# Patient Record
Sex: FEMALE | Race: WHITE | NOT HISPANIC OR LATINO | Employment: FULL TIME | URBAN - METROPOLITAN AREA
[De-identification: names, ages, dates, MRNs, and addresses within clinical notes are randomized per-mention and may not be internally consistent; named-entity substitution may affect disease eponyms.]

---

## 2017-02-13 ENCOUNTER — GENERIC CONVERSION - ENCOUNTER (OUTPATIENT)
Dept: OTHER | Facility: OTHER | Age: 62
End: 2017-02-13

## 2017-02-15 ENCOUNTER — APPOINTMENT (OUTPATIENT)
Dept: PHYSICAL THERAPY | Age: 62
End: 2017-02-15
Payer: COMMERCIAL

## 2017-02-15 PROCEDURE — 97161 PT EVAL LOW COMPLEX 20 MIN: CPT

## 2017-02-23 ENCOUNTER — APPOINTMENT (OUTPATIENT)
Dept: PHYSICAL THERAPY | Age: 62
End: 2017-02-23
Payer: COMMERCIAL

## 2017-02-23 PROCEDURE — 97110 THERAPEUTIC EXERCISES: CPT

## 2017-03-02 ENCOUNTER — APPOINTMENT (OUTPATIENT)
Dept: PHYSICAL THERAPY | Age: 62
End: 2017-03-02
Payer: COMMERCIAL

## 2017-03-02 PROCEDURE — 97140 MANUAL THERAPY 1/> REGIONS: CPT

## 2017-03-02 PROCEDURE — 97110 THERAPEUTIC EXERCISES: CPT

## 2017-03-03 ENCOUNTER — TRANSCRIBE ORDERS (OUTPATIENT)
Dept: LAB | Facility: HOSPITAL | Age: 62
End: 2017-03-03

## 2017-03-03 ENCOUNTER — APPOINTMENT (OUTPATIENT)
Dept: LAB | Facility: HOSPITAL | Age: 62
End: 2017-03-03
Payer: COMMERCIAL

## 2017-03-03 DIAGNOSIS — M15.0 PRIMARY GENERALIZED HYPERTROPHIC OSTEOARTHROSIS: ICD-10-CM

## 2017-03-03 DIAGNOSIS — Z79.899 ENCOUNTER FOR LONG-TERM (CURRENT) USE OF OTHER MEDICATIONS: ICD-10-CM

## 2017-03-03 DIAGNOSIS — L40.59 POLYARTICULAR PSORIATIC ARTHRITIS (HCC): Primary | ICD-10-CM

## 2017-03-03 DIAGNOSIS — L40.0 PSORIASIS VULGARIS: ICD-10-CM

## 2017-03-03 DIAGNOSIS — L40.59 POLYARTICULAR PSORIATIC ARTHRITIS (HCC): ICD-10-CM

## 2017-03-03 LAB
ALBUMIN SERPL BCP-MCNC: 3.7 G/DL (ref 3.5–5)
ALP SERPL-CCNC: 65 U/L (ref 46–116)
ALT SERPL W P-5'-P-CCNC: 19 U/L (ref 12–78)
ANION GAP SERPL CALCULATED.3IONS-SCNC: 6 MMOL/L (ref 4–13)
AST SERPL W P-5'-P-CCNC: 18 U/L (ref 5–45)
BACTERIA UR QL AUTO: ABNORMAL /HPF
BASOPHILS # BLD AUTO: 0.02 THOUSANDS/ΜL (ref 0–0.1)
BASOPHILS NFR BLD AUTO: 1 % (ref 0–1)
BILIRUB SERPL-MCNC: 0.59 MG/DL (ref 0.2–1)
BILIRUB UR QL STRIP: NEGATIVE
BUN SERPL-MCNC: 15 MG/DL (ref 5–25)
CALCIUM SERPL-MCNC: 9.4 MG/DL (ref 8.3–10.1)
CHLORIDE SERPL-SCNC: 105 MMOL/L (ref 100–108)
CLARITY UR: ABNORMAL
CO2 SERPL-SCNC: 30 MMOL/L (ref 21–32)
COLOR UR: YELLOW
CREAT SERPL-MCNC: 0.74 MG/DL (ref 0.6–1.3)
EOSINOPHIL # BLD AUTO: 0.01 THOUSAND/ΜL (ref 0–0.61)
EOSINOPHIL NFR BLD AUTO: 0 % (ref 0–6)
ERYTHROCYTE [DISTWIDTH] IN BLOOD BY AUTOMATED COUNT: 12.4 % (ref 11.6–15.1)
ERYTHROCYTE [SEDIMENTATION RATE] IN BLOOD: 7 MM/HOUR (ref 0–20)
GFR SERPL CREATININE-BSD FRML MDRD: >60 ML/MIN/1.73SQ M
GLUCOSE SERPL-MCNC: 85 MG/DL (ref 65–140)
GLUCOSE UR STRIP-MCNC: NEGATIVE MG/DL
HCT VFR BLD AUTO: 41.3 % (ref 34.8–46.1)
HGB BLD-MCNC: 13.7 G/DL (ref 11.5–15.4)
HGB UR QL STRIP.AUTO: NEGATIVE
HYALINE CASTS #/AREA URNS LPF: ABNORMAL /LPF
KETONES UR STRIP-MCNC: NEGATIVE MG/DL
LEUKOCYTE ESTERASE UR QL STRIP: ABNORMAL
LYMPHOCYTES # BLD AUTO: 1.37 THOUSANDS/ΜL (ref 0.6–4.47)
LYMPHOCYTES NFR BLD AUTO: 33 % (ref 14–44)
MCH RBC QN AUTO: 31.9 PG (ref 26.8–34.3)
MCHC RBC AUTO-ENTMCNC: 33.2 G/DL (ref 31.4–37.4)
MCV RBC AUTO: 96 FL (ref 82–98)
MONOCYTES # BLD AUTO: 0.64 THOUSAND/ΜL (ref 0.17–1.22)
MONOCYTES NFR BLD AUTO: 15 % (ref 4–12)
NEUTROPHILS # BLD AUTO: 2.14 THOUSANDS/ΜL (ref 1.85–7.62)
NEUTS SEG NFR BLD AUTO: 51 % (ref 43–75)
NITRITE UR QL STRIP: NEGATIVE
NON-SQ EPI CELLS URNS QL MICRO: ABNORMAL /HPF
NRBC BLD AUTO-RTO: 0 /100 WBCS
PH UR STRIP.AUTO: 7.5 [PH] (ref 4.5–8)
PLATELET # BLD AUTO: 217 THOUSANDS/UL (ref 149–390)
PMV BLD AUTO: 10.1 FL (ref 8.9–12.7)
POTASSIUM SERPL-SCNC: 3.8 MMOL/L (ref 3.5–5.3)
PROT SERPL-MCNC: 7.6 G/DL (ref 6.4–8.2)
PROT UR STRIP-MCNC: NEGATIVE MG/DL
RBC # BLD AUTO: 4.3 MILLION/UL (ref 3.81–5.12)
RBC #/AREA URNS AUTO: ABNORMAL /HPF
SODIUM SERPL-SCNC: 141 MMOL/L (ref 136–145)
SP GR UR STRIP.AUTO: 1.01 (ref 1–1.03)
UROBILINOGEN UR QL STRIP.AUTO: 0.2 E.U./DL
WBC # BLD AUTO: 4.18 THOUSAND/UL (ref 4.31–10.16)
WBC #/AREA URNS AUTO: ABNORMAL /HPF

## 2017-03-03 PROCEDURE — 85652 RBC SED RATE AUTOMATED: CPT

## 2017-03-03 PROCEDURE — 85025 COMPLETE CBC W/AUTO DIFF WBC: CPT

## 2017-03-03 PROCEDURE — 81001 URINALYSIS AUTO W/SCOPE: CPT | Performed by: INTERNAL MEDICINE

## 2017-03-03 PROCEDURE — 80053 COMPREHEN METABOLIC PANEL: CPT

## 2017-03-03 PROCEDURE — 36415 COLL VENOUS BLD VENIPUNCTURE: CPT

## 2017-03-09 ENCOUNTER — GENERIC CONVERSION - ENCOUNTER (OUTPATIENT)
Dept: OTHER | Facility: OTHER | Age: 62
End: 2017-03-09

## 2017-03-20 ENCOUNTER — ALLSCRIPTS OFFICE VISIT (OUTPATIENT)
Dept: OTHER | Facility: OTHER | Age: 62
End: 2017-03-20

## 2017-03-20 ENCOUNTER — HOSPITAL ENCOUNTER (OUTPATIENT)
Dept: RADIOLOGY | Facility: HOSPITAL | Age: 62
Discharge: HOME | End: 2017-03-20
Attending: ORTHOPAEDIC SURGERY
Payer: COMMERCIAL

## 2017-03-20 ENCOUNTER — TRANSCRIBE ORDERS (OUTPATIENT)
Dept: ADMINISTRATIVE | Facility: HOSPITAL | Age: 62
End: 2017-03-20

## 2017-03-20 DIAGNOSIS — M19.211 SECONDARY OSTEOARTHRITIS OF RIGHT SHOULDER: Primary | ICD-10-CM

## 2017-03-20 DIAGNOSIS — M25.511 PAIN IN RIGHT SHOULDER: ICD-10-CM

## 2017-03-20 DIAGNOSIS — M19.211 SECONDARY OSTEOARTHRITIS OF RIGHT SHOULDER: ICD-10-CM

## 2017-03-20 DIAGNOSIS — M17.11 PRIMARY OSTEOARTHRITIS OF RIGHT KNEE: ICD-10-CM

## 2017-03-20 PROCEDURE — 73030 X-RAY EXAM OF SHOULDER: CPT

## 2017-04-06 ENCOUNTER — HOSPITAL ENCOUNTER (OUTPATIENT)
Dept: RADIOLOGY | Facility: HOSPITAL | Age: 62
Discharge: HOME | End: 2017-04-06
Payer: COMMERCIAL

## 2017-04-06 DIAGNOSIS — M19.211 SECONDARY OSTEOARTHRITIS OF RIGHT SHOULDER: ICD-10-CM

## 2017-04-06 PROCEDURE — 73200 CT UPPER EXTREMITY W/O DYE: CPT

## 2017-04-06 PROCEDURE — 73221 MRI JOINT UPR EXTREM W/O DYE: CPT

## 2017-04-17 ENCOUNTER — ALLSCRIPTS OFFICE VISIT (OUTPATIENT)
Dept: OTHER | Facility: OTHER | Age: 62
End: 2017-04-17

## 2017-04-17 ENCOUNTER — APPOINTMENT (OUTPATIENT)
Dept: LAB | Facility: HOSPITAL | Age: 62
End: 2017-04-17
Attending: ORTHOPAEDIC SURGERY
Payer: COMMERCIAL

## 2017-04-17 ENCOUNTER — TRANSCRIBE ORDERS (OUTPATIENT)
Dept: LAB | Facility: HOSPITAL | Age: 62
End: 2017-04-17

## 2017-04-17 DIAGNOSIS — M17.11 PRIMARY OSTEOARTHRITIS OF RIGHT KNEE: ICD-10-CM

## 2017-04-17 DIAGNOSIS — M25.511 PAIN IN RIGHT SHOULDER: ICD-10-CM

## 2017-04-17 LAB
BASOPHILS # BLD AUTO: 0.03 THOUSANDS/ΜL (ref 0–0.1)
BASOPHILS NFR BLD AUTO: 1 % (ref 0–1)
EOSINOPHIL # BLD AUTO: 0.01 THOUSAND/ΜL (ref 0–0.61)
EOSINOPHIL NFR BLD AUTO: 0 % (ref 0–6)
ERYTHROCYTE [DISTWIDTH] IN BLOOD BY AUTOMATED COUNT: 12.6 % (ref 11.6–15.1)
EST. AVERAGE GLUCOSE BLD GHB EST-MCNC: 105 MG/DL
HBA1C MFR BLD: 5.3 % (ref 4.2–6.3)
HCT VFR BLD AUTO: 41.7 % (ref 34.8–46.1)
HGB BLD-MCNC: 13.9 G/DL (ref 11.5–15.4)
LYMPHOCYTES # BLD AUTO: 1.72 THOUSANDS/ΜL (ref 0.6–4.47)
LYMPHOCYTES NFR BLD AUTO: 40 % (ref 14–44)
MCH RBC QN AUTO: 32.1 PG (ref 26.8–34.3)
MCHC RBC AUTO-ENTMCNC: 33.3 G/DL (ref 31.4–37.4)
MCV RBC AUTO: 96 FL (ref 82–98)
MONOCYTES # BLD AUTO: 0.56 THOUSAND/ΜL (ref 0.17–1.22)
MONOCYTES NFR BLD AUTO: 13 % (ref 4–12)
NEUTROPHILS # BLD AUTO: 2 THOUSANDS/ΜL (ref 1.85–7.62)
NEUTS SEG NFR BLD AUTO: 46 % (ref 43–75)
NRBC BLD AUTO-RTO: 0 /100 WBCS
PLATELET # BLD AUTO: 214 THOUSANDS/UL (ref 149–390)
PMV BLD AUTO: 10.1 FL (ref 8.9–12.7)
RBC # BLD AUTO: 4.33 MILLION/UL (ref 3.81–5.12)
WBC # BLD AUTO: 4.33 THOUSAND/UL (ref 4.31–10.16)

## 2017-04-17 PROCEDURE — 86901 BLOOD TYPING SEROLOGIC RH(D): CPT

## 2017-04-17 PROCEDURE — 86850 RBC ANTIBODY SCREEN: CPT

## 2017-04-17 PROCEDURE — 86900 BLOOD TYPING SEROLOGIC ABO: CPT

## 2017-04-17 PROCEDURE — 85025 COMPLETE CBC W/AUTO DIFF WBC: CPT

## 2017-04-17 PROCEDURE — 83036 HEMOGLOBIN GLYCOSYLATED A1C: CPT

## 2017-04-17 PROCEDURE — 36415 COLL VENOUS BLD VENIPUNCTURE: CPT

## 2017-04-19 ENCOUNTER — ALLSCRIPTS OFFICE VISIT (OUTPATIENT)
Dept: OTHER | Facility: OTHER | Age: 62
End: 2017-04-19

## 2017-04-24 RX ORDER — LANOLIN ALCOHOL/MO/W.PET/CERES
1 CREAM (GRAM) TOPICAL 3 TIMES DAILY
COMMUNITY

## 2017-04-24 RX ORDER — SULFASALAZINE 500 MG/1
1000 TABLET ORAL DAILY
COMMUNITY
End: 2019-12-04

## 2017-04-25 ENCOUNTER — ANESTHESIA (OUTPATIENT)
Dept: PERIOP | Facility: HOSPITAL | Age: 62
DRG: 483 | End: 2017-04-25
Payer: COMMERCIAL

## 2017-04-25 ENCOUNTER — APPOINTMENT (INPATIENT)
Dept: RADIOLOGY | Facility: HOSPITAL | Age: 62
DRG: 483 | End: 2017-04-25
Attending: ORTHOPAEDIC SURGERY
Payer: COMMERCIAL

## 2017-04-25 ENCOUNTER — HOSPITAL ENCOUNTER (INPATIENT)
Facility: HOSPITAL | Age: 62
LOS: 2 days | Discharge: HOME HEALTH | DRG: 483 | End: 2017-04-27
Attending: ORTHOPAEDIC SURGERY | Admitting: ORTHOPAEDIC SURGERY
Payer: COMMERCIAL

## 2017-04-25 ENCOUNTER — ANESTHESIA EVENT (OUTPATIENT)
Dept: PERIOP | Facility: HOSPITAL | Age: 62
DRG: 483 | End: 2017-04-25
Payer: COMMERCIAL

## 2017-04-25 PROBLEM — M19.011 OSTEOARTHRITIS OF RIGHT GLENOHUMERAL JOINT: Status: ACTIVE | Noted: 2017-04-25

## 2017-04-25 LAB
ABO GROUP BLD BPU: NORMAL
ABO GROUP BLD BPU: NORMAL
ABO GROUP BLD: NORMAL
BLD GP AB SCN SERPL QL: NEGATIVE
BPU ID: NORMAL
BPU ID: NORMAL
CROSSMATCH: NORMAL
CROSSMATCH: NORMAL
RH BLD: POSITIVE
SPECIMEN EXPIRATION DATE: NORMAL
UNIT DISPENSE STATUS: NORMAL
UNIT DISPENSE STATUS: NORMAL
UNIT PRODUCT CODE: NORMAL
UNIT PRODUCT CODE: NORMAL
UNIT RH: NORMAL
UNIT RH: NORMAL

## 2017-04-25 PROCEDURE — 86920 COMPATIBILITY TEST SPIN: CPT

## 2017-04-25 PROCEDURE — C1776 JOINT DEVICE (IMPLANTABLE): HCPCS | Performed by: ORTHOPAEDIC SURGERY

## 2017-04-25 PROCEDURE — 0LS30ZZ REPOSITION RIGHT UPPER ARM TENDON, OPEN APPROACH: ICD-10-PCS | Performed by: ORTHOPAEDIC SURGERY

## 2017-04-25 PROCEDURE — 0RRJ0JZ REPLACEMENT OF RIGHT SHOULDER JOINT WITH SYNTHETIC SUBSTITUTE, OPEN APPROACH: ICD-10-PCS | Performed by: ORTHOPAEDIC SURGERY

## 2017-04-25 PROCEDURE — 73020 X-RAY EXAM OF SHOULDER: CPT

## 2017-04-25 PROCEDURE — C1713 ANCHOR/SCREW BN/BN,TIS/BN: HCPCS | Performed by: ORTHOPAEDIC SURGERY

## 2017-04-25 DEVICE — IMPLANTABLE DEVICE
Type: IMPLANTABLE DEVICE | Site: SHOULDER | Status: FUNCTIONAL
Brand: AEQUALIS™ PERFORM

## 2017-04-25 DEVICE — SMARTSET HV HIGH VISCOSITY BONE CEMENT 40G
Type: IMPLANTABLE DEVICE | Site: SHOULDER | Status: FUNCTIONAL
Brand: SMARTSET

## 2017-04-25 DEVICE — IMPLANTABLE DEVICE
Type: IMPLANTABLE DEVICE | Site: SHOULDER | Status: FUNCTIONAL
Brand: AEQUALIS™ ASCEND™ FLEX

## 2017-04-25 DEVICE — IMPLANTABLE DEVICE
Type: IMPLANTABLE DEVICE | Site: SHOULDER | Status: FUNCTIONAL
Brand: FLEX SHOULDER SYSTEM

## 2017-04-25 RX ORDER — SODIUM PHOSPHATE,MONO-DIBASIC 19G-7G/118
500 ENEMA (ML) RECTAL 3 TIMES DAILY
Status: DISCONTINUED | OUTPATIENT
Start: 2017-04-25 | End: 2017-04-27 | Stop reason: HOSPADM

## 2017-04-25 RX ORDER — OXYCODONE HYDROCHLORIDE 5 MG/1
TABLET ORAL
Qty: 45 TABLET | Refills: 0 | Status: ON HOLD | OUTPATIENT
Start: 2017-04-25 | End: 2017-10-24 | Stop reason: ALTCHOICE

## 2017-04-25 RX ORDER — EPHEDRINE SULFATE 50 MG/ML
INJECTION, SOLUTION INTRAVENOUS AS NEEDED
Status: DISCONTINUED | OUTPATIENT
Start: 2017-04-25 | End: 2017-04-25 | Stop reason: SURG

## 2017-04-25 RX ORDER — MEPERIDINE HYDROCHLORIDE 25 MG/ML
12.5 INJECTION INTRAMUSCULAR; INTRAVENOUS; SUBCUTANEOUS AS NEEDED
Status: DISCONTINUED | OUTPATIENT
Start: 2017-04-25 | End: 2017-04-25 | Stop reason: HOSPADM

## 2017-04-25 RX ORDER — MAGNESIUM HYDROXIDE 1200 MG/15ML
LIQUID ORAL AS NEEDED
Status: DISCONTINUED | OUTPATIENT
Start: 2017-04-25 | End: 2017-04-25 | Stop reason: HOSPADM

## 2017-04-25 RX ORDER — SODIUM CHLORIDE, SODIUM LACTATE, POTASSIUM CHLORIDE, CALCIUM CHLORIDE 600; 310; 30; 20 MG/100ML; MG/100ML; MG/100ML; MG/100ML
125 INJECTION, SOLUTION INTRAVENOUS CONTINUOUS
Status: DISCONTINUED | OUTPATIENT
Start: 2017-04-25 | End: 2017-04-27 | Stop reason: HOSPADM

## 2017-04-25 RX ORDER — DOCUSATE SODIUM 100 MG/1
100 CAPSULE, LIQUID FILLED ORAL 2 TIMES DAILY
Status: DISCONTINUED | OUTPATIENT
Start: 2017-04-25 | End: 2017-04-27 | Stop reason: HOSPADM

## 2017-04-25 RX ORDER — FENTANYL CITRATE 50 UG/ML
INJECTION, SOLUTION INTRAMUSCULAR; INTRAVENOUS AS NEEDED
Status: DISCONTINUED | OUTPATIENT
Start: 2017-04-25 | End: 2017-04-25 | Stop reason: SURG

## 2017-04-25 RX ORDER — ROPIVACAINE HYDROCHLORIDE 5 MG/ML
INJECTION, SOLUTION EPIDURAL; INFILTRATION; PERINEURAL AS NEEDED
Status: DISCONTINUED | OUTPATIENT
Start: 2017-04-25 | End: 2017-04-25 | Stop reason: SURG

## 2017-04-25 RX ORDER — MORPHINE SULFATE 2 MG/ML
2 INJECTION, SOLUTION INTRAMUSCULAR; INTRAVENOUS
Status: DISCONTINUED | OUTPATIENT
Start: 2017-04-25 | End: 2017-04-27 | Stop reason: HOSPADM

## 2017-04-25 RX ORDER — ROCURONIUM BROMIDE 10 MG/ML
INJECTION, SOLUTION INTRAVENOUS AS NEEDED
Status: DISCONTINUED | OUTPATIENT
Start: 2017-04-25 | End: 2017-04-25 | Stop reason: SURG

## 2017-04-25 RX ORDER — SULFASALAZINE 500 MG/1
1000 TABLET ORAL 2 TIMES DAILY
Status: DISCONTINUED | OUTPATIENT
Start: 2017-04-25 | End: 2017-04-27 | Stop reason: HOSPADM

## 2017-04-25 RX ORDER — FENTANYL CITRATE/PF 50 MCG/ML
25 SYRINGE (ML) INJECTION
Status: DISCONTINUED | OUTPATIENT
Start: 2017-04-25 | End: 2017-04-25 | Stop reason: HOSPADM

## 2017-04-25 RX ORDER — SODIUM CHLORIDE, SODIUM LACTATE, POTASSIUM CHLORIDE, CALCIUM CHLORIDE 600; 310; 30; 20 MG/100ML; MG/100ML; MG/100ML; MG/100ML
20 INJECTION, SOLUTION INTRAVENOUS CONTINUOUS
Status: DISCONTINUED | OUTPATIENT
Start: 2017-04-25 | End: 2017-04-27 | Stop reason: HOSPADM

## 2017-04-25 RX ORDER — KETOROLAC TROMETHAMINE 30 MG/ML
INJECTION, SOLUTION INTRAMUSCULAR; INTRAVENOUS AS NEEDED
Status: DISCONTINUED | OUTPATIENT
Start: 2017-04-25 | End: 2017-04-25 | Stop reason: SURG

## 2017-04-25 RX ORDER — OXYCODONE HYDROCHLORIDE 5 MG/1
5 TABLET ORAL EVERY 4 HOURS PRN
Status: DISCONTINUED | OUTPATIENT
Start: 2017-04-25 | End: 2017-04-27 | Stop reason: HOSPADM

## 2017-04-25 RX ORDER — DEXMEDETOMIDINE HYDROCHLORIDE 100 UG/ML
INJECTION, SOLUTION INTRAVENOUS AS NEEDED
Status: DISCONTINUED | OUTPATIENT
Start: 2017-04-25 | End: 2017-04-25 | Stop reason: SURG

## 2017-04-25 RX ORDER — ALBUTEROL SULFATE 2.5 MG/3ML
2.5 SOLUTION RESPIRATORY (INHALATION) ONCE AS NEEDED
Status: DISCONTINUED | OUTPATIENT
Start: 2017-04-25 | End: 2017-04-25 | Stop reason: HOSPADM

## 2017-04-25 RX ORDER — ACETAMINOPHEN 325 MG/1
650 TABLET ORAL EVERY 6 HOURS PRN
Status: DISCONTINUED | OUTPATIENT
Start: 2017-04-25 | End: 2017-04-27 | Stop reason: HOSPADM

## 2017-04-25 RX ORDER — LIDOCAINE HYDROCHLORIDE 10 MG/ML
INJECTION, SOLUTION INFILTRATION; PERINEURAL AS NEEDED
Status: DISCONTINUED | OUTPATIENT
Start: 2017-04-25 | End: 2017-04-25

## 2017-04-25 RX ORDER — OXYCODONE HYDROCHLORIDE 10 MG/1
10 TABLET ORAL EVERY 4 HOURS PRN
Status: DISCONTINUED | OUTPATIENT
Start: 2017-04-25 | End: 2017-04-27 | Stop reason: HOSPADM

## 2017-04-25 RX ORDER — GLYCOPYRROLATE 0.2 MG/ML
INJECTION INTRAMUSCULAR; INTRAVENOUS AS NEEDED
Status: DISCONTINUED | OUTPATIENT
Start: 2017-04-25 | End: 2017-04-25 | Stop reason: SURG

## 2017-04-25 RX ORDER — ONDANSETRON 2 MG/ML
INJECTION INTRAMUSCULAR; INTRAVENOUS AS NEEDED
Status: DISCONTINUED | OUTPATIENT
Start: 2017-04-25 | End: 2017-04-25 | Stop reason: SURG

## 2017-04-25 RX ORDER — PROPOFOL 10 MG/ML
INJECTION, EMULSION INTRAVENOUS AS NEEDED
Status: DISCONTINUED | OUTPATIENT
Start: 2017-04-25 | End: 2017-04-25 | Stop reason: SURG

## 2017-04-25 RX ORDER — ONDANSETRON 2 MG/ML
4 INJECTION INTRAMUSCULAR; INTRAVENOUS EVERY 6 HOURS PRN
Status: DISCONTINUED | OUTPATIENT
Start: 2017-04-25 | End: 2017-04-27 | Stop reason: HOSPADM

## 2017-04-25 RX ORDER — CALCIUM CARBONATE 200(500)MG
1000 TABLET,CHEWABLE ORAL DAILY PRN
Status: DISCONTINUED | OUTPATIENT
Start: 2017-04-25 | End: 2017-04-27 | Stop reason: HOSPADM

## 2017-04-25 RX ORDER — ONDANSETRON 2 MG/ML
4 INJECTION INTRAMUSCULAR; INTRAVENOUS ONCE AS NEEDED
Status: DISCONTINUED | OUTPATIENT
Start: 2017-04-25 | End: 2017-04-25 | Stop reason: HOSPADM

## 2017-04-25 RX ORDER — B-COMPLEX WITH VITAMIN C
1 TABLET ORAL
Status: DISCONTINUED | OUTPATIENT
Start: 2017-04-26 | End: 2017-04-27 | Stop reason: HOSPADM

## 2017-04-25 RX ORDER — SODIUM CHLORIDE, SODIUM LACTATE, POTASSIUM CHLORIDE, CALCIUM CHLORIDE 600; 310; 30; 20 MG/100ML; MG/100ML; MG/100ML; MG/100ML
125 INJECTION, SOLUTION INTRAVENOUS CONTINUOUS
Status: DISPENSED | OUTPATIENT
Start: 2017-04-25 | End: 2017-04-26

## 2017-04-25 RX ORDER — MIDAZOLAM HYDROCHLORIDE 1 MG/ML
INJECTION INTRAMUSCULAR; INTRAVENOUS AS NEEDED
Status: DISCONTINUED | OUTPATIENT
Start: 2017-04-25 | End: 2017-04-25 | Stop reason: SURG

## 2017-04-25 RX ADMIN — OXYCODONE HYDROCHLORIDE 5 MG: 5 TABLET ORAL at 17:23

## 2017-04-25 RX ADMIN — ROPIVACAINE HYDROCHLORIDE 30 ML: 5 INJECTION, SOLUTION EPIDURAL; INFILTRATION; PERINEURAL at 07:20

## 2017-04-25 RX ADMIN — CEFAZOLIN SODIUM 2000 MG: 10 INJECTION, POWDER, FOR SOLUTION INTRAVENOUS at 15:43

## 2017-04-25 RX ADMIN — EPHEDRINE SULFATE 5 MG: 50 INJECTION, SOLUTION INTRAMUSCULAR; INTRAVENOUS; SUBCUTANEOUS at 08:30

## 2017-04-25 RX ADMIN — CEFAZOLIN SODIUM 2000 MG: 10 INJECTION, POWDER, FOR SOLUTION INTRAVENOUS at 23:18

## 2017-04-25 RX ADMIN — PROPOFOL 150 MG: 10 INJECTION, EMULSION INTRAVENOUS at 07:32

## 2017-04-25 RX ADMIN — EPHEDRINE SULFATE 5 MG: 50 INJECTION, SOLUTION INTRAMUSCULAR; INTRAVENOUS; SUBCUTANEOUS at 07:51

## 2017-04-25 RX ADMIN — ROCURONIUM BROMIDE 40 MG: 10 INJECTION, SOLUTION INTRAVENOUS at 07:33

## 2017-04-25 RX ADMIN — Medication 500 MG: at 17:22

## 2017-04-25 RX ADMIN — DEXAMETHASONE SODIUM PHOSPHATE 10 MG: 10 INJECTION INTRAMUSCULAR; INTRAVENOUS at 07:50

## 2017-04-25 RX ADMIN — SODIUM CHLORIDE, SODIUM LACTATE, POTASSIUM CHLORIDE, AND CALCIUM CHLORIDE: .6; .31; .03; .02 INJECTION, SOLUTION INTRAVENOUS at 07:10

## 2017-04-25 RX ADMIN — ACETAMINOPHEN 650 MG: 325 TABLET, FILM COATED ORAL at 17:22

## 2017-04-25 RX ADMIN — EPHEDRINE SULFATE 5 MG: 50 INJECTION, SOLUTION INTRAMUSCULAR; INTRAVENOUS; SUBCUTANEOUS at 08:54

## 2017-04-25 RX ADMIN — EPHEDRINE SULFATE 5 MG: 50 INJECTION, SOLUTION INTRAMUSCULAR; INTRAVENOUS; SUBCUTANEOUS at 08:00

## 2017-04-25 RX ADMIN — EPHEDRINE SULFATE 5 MG: 50 INJECTION, SOLUTION INTRAMUSCULAR; INTRAVENOUS; SUBCUTANEOUS at 09:14

## 2017-04-25 RX ADMIN — Medication 2000 MG: at 07:40

## 2017-04-25 RX ADMIN — SODIUM CHLORIDE, SODIUM LACTATE, POTASSIUM CHLORIDE, AND CALCIUM CHLORIDE 125 ML/HR: .6; .31; .03; .02 INJECTION, SOLUTION INTRAVENOUS at 10:28

## 2017-04-25 RX ADMIN — ONDANSETRON 4 MG: 2 INJECTION INTRAMUSCULAR; INTRAVENOUS at 09:40

## 2017-04-25 RX ADMIN — KETOROLAC TROMETHAMINE 30 MG: 30 INJECTION, SOLUTION INTRAMUSCULAR at 09:42

## 2017-04-25 RX ADMIN — SODIUM CHLORIDE, SODIUM LACTATE, POTASSIUM CHLORIDE, AND CALCIUM CHLORIDE 125 ML/HR: .6; .31; .03; .02 INJECTION, SOLUTION INTRAVENOUS at 18:05

## 2017-04-25 RX ADMIN — MIDAZOLAM HYDROCHLORIDE 2 MG: 1 INJECTION, SOLUTION INTRAMUSCULAR; INTRAVENOUS at 07:15

## 2017-04-25 RX ADMIN — FENTANYL CITRATE 100 MCG: 50 INJECTION, SOLUTION INTRAMUSCULAR; INTRAVENOUS at 07:32

## 2017-04-25 RX ADMIN — GLYCOPYRROLATE 0.2 MG: 0.2 INJECTION INTRAMUSCULAR; INTRAVENOUS at 07:51

## 2017-04-25 RX ADMIN — NEOSTIGMINE METHYLSULFATE 2 MG: 1 INJECTION INTRAMUSCULAR; INTRAVENOUS; SUBCUTANEOUS at 09:43

## 2017-04-25 RX ADMIN — GLYCOPYRROLATE 0.2 MG: 0.2 INJECTION INTRAMUSCULAR; INTRAVENOUS at 09:43

## 2017-04-25 RX ADMIN — DOCUSATE SODIUM 100 MG: 100 CAPSULE, LIQUID FILLED ORAL at 17:23

## 2017-04-25 RX ADMIN — SULFASALAZINE 1000 MG: 500 TABLET ORAL at 17:23

## 2017-04-25 RX ADMIN — MENTHOL 5.4 MG: 5.4 LOZENGE ORAL at 17:23

## 2017-04-25 RX ADMIN — DEXMEDETOMIDINE HYDROCHLORIDE 40 MCG: 100 INJECTION, SOLUTION INTRAVENOUS at 07:20

## 2017-04-26 LAB
ERYTHROCYTE [DISTWIDTH] IN BLOOD BY AUTOMATED COUNT: 13.2 % (ref 11.6–15.1)
HCT VFR BLD AUTO: 26.9 % (ref 34.8–46.1)
HGB BLD-MCNC: 8.8 G/DL (ref 11.5–15.4)
MCH RBC QN AUTO: 31.9 PG (ref 26.8–34.3)
MCHC RBC AUTO-ENTMCNC: 32.7 G/DL (ref 31.4–37.4)
MCV RBC AUTO: 98 FL (ref 82–98)
PLATELET # BLD AUTO: 160 THOUSANDS/UL (ref 149–390)
PMV BLD AUTO: 9.8 FL (ref 8.9–12.7)
RBC # BLD AUTO: 2.76 MILLION/UL (ref 3.81–5.12)
WBC # BLD AUTO: 7.49 THOUSAND/UL (ref 4.31–10.16)

## 2017-04-26 PROCEDURE — 97166 OT EVAL MOD COMPLEX 45 MIN: CPT

## 2017-04-26 PROCEDURE — 97163 PT EVAL HIGH COMPLEX 45 MIN: CPT

## 2017-04-26 PROCEDURE — G8979 MOBILITY GOAL STATUS: HCPCS

## 2017-04-26 PROCEDURE — 85027 COMPLETE CBC AUTOMATED: CPT | Performed by: PHYSICIAN ASSISTANT

## 2017-04-26 PROCEDURE — G8978 MOBILITY CURRENT STATUS: HCPCS

## 2017-04-26 PROCEDURE — G8987 SELF CARE CURRENT STATUS: HCPCS

## 2017-04-26 PROCEDURE — G8988 SELF CARE GOAL STATUS: HCPCS

## 2017-04-26 RX ADMIN — SODIUM CHLORIDE, SODIUM LACTATE, POTASSIUM CHLORIDE, AND CALCIUM CHLORIDE 125 ML/HR: .6; .31; .03; .02 INJECTION, SOLUTION INTRAVENOUS at 03:10

## 2017-04-26 RX ADMIN — SULFASALAZINE 1000 MG: 500 TABLET ORAL at 08:55

## 2017-04-26 RX ADMIN — Medication 500 MG: at 08:55

## 2017-04-26 RX ADMIN — SODIUM CHLORIDE, SODIUM LACTATE, POTASSIUM CHLORIDE, AND CALCIUM CHLORIDE 125 ML/HR: .6; .31; .03; .02 INJECTION, SOLUTION INTRAVENOUS at 11:29

## 2017-04-26 RX ADMIN — MORPHINE SULFATE 2 MG: 2 INJECTION, SOLUTION INTRAMUSCULAR; INTRAVENOUS at 06:13

## 2017-04-26 RX ADMIN — Medication 500 MG: at 18:10

## 2017-04-26 RX ADMIN — DOCUSATE SODIUM 100 MG: 100 CAPSULE, LIQUID FILLED ORAL at 18:10

## 2017-04-26 RX ADMIN — ONDANSETRON 4 MG: 2 INJECTION INTRAMUSCULAR; INTRAVENOUS at 00:25

## 2017-04-26 RX ADMIN — OXYCODONE HYDROCHLORIDE 10 MG: 10 TABLET ORAL at 15:06

## 2017-04-26 RX ADMIN — OXYCODONE HYDROCHLORIDE 10 MG: 10 TABLET ORAL at 20:53

## 2017-04-26 RX ADMIN — OXYCODONE HYDROCHLORIDE 10 MG: 10 TABLET ORAL at 04:30

## 2017-04-26 RX ADMIN — DOCUSATE SODIUM 100 MG: 100 CAPSULE, LIQUID FILLED ORAL at 08:55

## 2017-04-26 RX ADMIN — Medication 500 MG: at 20:53

## 2017-04-26 RX ADMIN — OXYCODONE HYDROCHLORIDE 5 MG: 5 TABLET ORAL at 00:33

## 2017-04-26 RX ADMIN — OXYCODONE HYDROCHLORIDE 10 MG: 10 TABLET ORAL at 08:56

## 2017-04-26 RX ADMIN — CALCIUM CARBONATE 500 MG (1,250 MG)-VITAMIN D3 200 UNIT TABLET 1 TABLET: at 08:55

## 2017-04-27 VITALS
DIASTOLIC BLOOD PRESSURE: 62 MMHG | HEART RATE: 81 BPM | WEIGHT: 137 LBS | OXYGEN SATURATION: 96 % | BODY MASS INDEX: 21.5 KG/M2 | TEMPERATURE: 98 F | RESPIRATION RATE: 16 BRPM | SYSTOLIC BLOOD PRESSURE: 120 MMHG | HEIGHT: 67 IN

## 2017-04-27 PROCEDURE — 97530 THERAPEUTIC ACTIVITIES: CPT

## 2017-04-27 PROCEDURE — 97535 SELF CARE MNGMENT TRAINING: CPT

## 2017-04-27 PROCEDURE — 97116 GAIT TRAINING THERAPY: CPT

## 2017-04-27 PROCEDURE — 97110 THERAPEUTIC EXERCISES: CPT

## 2017-04-27 RX ADMIN — ACETAMINOPHEN 650 MG: 325 TABLET, FILM COATED ORAL at 02:33

## 2017-04-27 RX ADMIN — SULFASALAZINE 1000 MG: 500 TABLET ORAL at 09:40

## 2017-04-27 RX ADMIN — DOCUSATE SODIUM 100 MG: 100 CAPSULE, LIQUID FILLED ORAL at 09:40

## 2017-04-27 RX ADMIN — CALCIUM CARBONATE 500 MG (1,250 MG)-VITAMIN D3 200 UNIT TABLET 1 TABLET: at 09:39

## 2017-04-27 RX ADMIN — ACETAMINOPHEN 650 MG: 325 TABLET, FILM COATED ORAL at 09:39

## 2017-04-27 RX ADMIN — Medication 500 MG: at 09:40

## 2017-05-01 ENCOUNTER — APPOINTMENT (OUTPATIENT)
Dept: PHYSICAL THERAPY | Age: 62
End: 2017-05-01
Payer: COMMERCIAL

## 2017-05-01 ENCOUNTER — GENERIC CONVERSION - ENCOUNTER (OUTPATIENT)
Dept: OTHER | Facility: OTHER | Age: 62
End: 2017-05-01

## 2017-05-01 DIAGNOSIS — M17.11 PRIMARY OSTEOARTHRITIS OF RIGHT KNEE: ICD-10-CM

## 2017-05-01 PROCEDURE — 97162 PT EVAL MOD COMPLEX 30 MIN: CPT

## 2017-05-01 PROCEDURE — 97110 THERAPEUTIC EXERCISES: CPT

## 2017-05-04 ENCOUNTER — APPOINTMENT (OUTPATIENT)
Dept: PHYSICAL THERAPY | Age: 62
End: 2017-05-04
Payer: COMMERCIAL

## 2017-05-04 PROCEDURE — 97140 MANUAL THERAPY 1/> REGIONS: CPT

## 2017-05-08 ENCOUNTER — HOSPITAL ENCOUNTER (OUTPATIENT)
Dept: RADIOLOGY | Facility: HOSPITAL | Age: 62
Discharge: HOME | End: 2017-05-08
Attending: ORTHOPAEDIC SURGERY
Payer: COMMERCIAL

## 2017-05-08 ENCOUNTER — ALLSCRIPTS OFFICE VISIT (OUTPATIENT)
Dept: OTHER | Facility: OTHER | Age: 62
End: 2017-05-08

## 2017-05-08 ENCOUNTER — APPOINTMENT (OUTPATIENT)
Dept: PHYSICAL THERAPY | Age: 62
End: 2017-05-08
Payer: COMMERCIAL

## 2017-05-08 DIAGNOSIS — Z47.1 AFTERCARE FOLLOWING JOINT REPLACEMENT SURGERY: ICD-10-CM

## 2017-05-08 DIAGNOSIS — Z13.820 ENCOUNTER FOR SCREENING FOR OSTEOPOROSIS: ICD-10-CM

## 2017-05-08 PROCEDURE — 97140 MANUAL THERAPY 1/> REGIONS: CPT

## 2017-05-08 PROCEDURE — 73030 X-RAY EXAM OF SHOULDER: CPT

## 2017-05-08 PROCEDURE — 97110 THERAPEUTIC EXERCISES: CPT

## 2017-05-10 ENCOUNTER — APPOINTMENT (OUTPATIENT)
Dept: PHYSICAL THERAPY | Age: 62
End: 2017-05-10
Payer: COMMERCIAL

## 2017-05-10 PROCEDURE — 97140 MANUAL THERAPY 1/> REGIONS: CPT

## 2017-05-10 PROCEDURE — 97110 THERAPEUTIC EXERCISES: CPT

## 2017-05-15 ENCOUNTER — APPOINTMENT (OUTPATIENT)
Dept: PHYSICAL THERAPY | Age: 62
End: 2017-05-15
Payer: COMMERCIAL

## 2017-05-15 PROCEDURE — 97110 THERAPEUTIC EXERCISES: CPT

## 2017-05-15 PROCEDURE — 97140 MANUAL THERAPY 1/> REGIONS: CPT

## 2017-05-18 ENCOUNTER — APPOINTMENT (OUTPATIENT)
Dept: PHYSICAL THERAPY | Age: 62
End: 2017-05-18
Payer: COMMERCIAL

## 2017-05-18 PROCEDURE — 97110 THERAPEUTIC EXERCISES: CPT

## 2017-05-18 PROCEDURE — 97140 MANUAL THERAPY 1/> REGIONS: CPT

## 2017-05-22 ENCOUNTER — APPOINTMENT (OUTPATIENT)
Dept: PHYSICAL THERAPY | Age: 62
End: 2017-05-22
Payer: COMMERCIAL

## 2017-05-22 PROCEDURE — 97110 THERAPEUTIC EXERCISES: CPT

## 2017-05-22 PROCEDURE — 97140 MANUAL THERAPY 1/> REGIONS: CPT

## 2017-05-25 ENCOUNTER — APPOINTMENT (OUTPATIENT)
Dept: PHYSICAL THERAPY | Age: 62
End: 2017-05-25
Payer: COMMERCIAL

## 2017-05-25 PROCEDURE — 97140 MANUAL THERAPY 1/> REGIONS: CPT

## 2017-05-25 PROCEDURE — 97110 THERAPEUTIC EXERCISES: CPT

## 2017-05-30 ENCOUNTER — APPOINTMENT (OUTPATIENT)
Dept: PHYSICAL THERAPY | Age: 62
End: 2017-05-30
Payer: COMMERCIAL

## 2017-05-30 PROCEDURE — 97110 THERAPEUTIC EXERCISES: CPT

## 2017-05-30 PROCEDURE — 97140 MANUAL THERAPY 1/> REGIONS: CPT

## 2017-06-01 ENCOUNTER — APPOINTMENT (OUTPATIENT)
Dept: PHYSICAL THERAPY | Age: 62
End: 2017-06-01
Payer: COMMERCIAL

## 2017-06-01 PROCEDURE — 97140 MANUAL THERAPY 1/> REGIONS: CPT

## 2017-06-01 PROCEDURE — 97110 THERAPEUTIC EXERCISES: CPT

## 2017-06-05 ENCOUNTER — APPOINTMENT (OUTPATIENT)
Dept: PHYSICAL THERAPY | Age: 62
End: 2017-06-05
Payer: COMMERCIAL

## 2017-06-05 PROCEDURE — 97110 THERAPEUTIC EXERCISES: CPT

## 2017-06-05 PROCEDURE — 97140 MANUAL THERAPY 1/> REGIONS: CPT

## 2017-06-07 ENCOUNTER — APPOINTMENT (OUTPATIENT)
Dept: PHYSICAL THERAPY | Age: 62
End: 2017-06-07
Payer: COMMERCIAL

## 2017-06-07 PROCEDURE — 97140 MANUAL THERAPY 1/> REGIONS: CPT

## 2017-06-07 PROCEDURE — 97110 THERAPEUTIC EXERCISES: CPT

## 2017-06-08 ENCOUNTER — APPOINTMENT (OUTPATIENT)
Dept: PHYSICAL THERAPY | Age: 62
End: 2017-06-08
Payer: COMMERCIAL

## 2017-06-09 DIAGNOSIS — R92.2 INCONCLUSIVE MAMMOGRAM: ICD-10-CM

## 2017-06-09 DIAGNOSIS — Z47.1 AFTERCARE FOLLOWING JOINT REPLACEMENT SURGERY: ICD-10-CM

## 2017-06-09 DIAGNOSIS — Z12.31 ENCOUNTER FOR SCREENING MAMMOGRAM FOR MALIGNANT NEOPLASM OF BREAST: ICD-10-CM

## 2017-06-12 ENCOUNTER — APPOINTMENT (OUTPATIENT)
Dept: PHYSICAL THERAPY | Age: 62
End: 2017-06-12
Payer: COMMERCIAL

## 2017-06-12 ENCOUNTER — ALLSCRIPTS OFFICE VISIT (OUTPATIENT)
Dept: OTHER | Facility: OTHER | Age: 62
End: 2017-06-12

## 2017-06-12 ENCOUNTER — HOSPITAL ENCOUNTER (OUTPATIENT)
Dept: RADIOLOGY | Facility: HOSPITAL | Age: 62
Discharge: HOME | End: 2017-06-12
Attending: ORTHOPAEDIC SURGERY
Payer: COMMERCIAL

## 2017-06-12 DIAGNOSIS — Z47.1 AFTERCARE FOLLOWING JOINT REPLACEMENT SURGERY: ICD-10-CM

## 2017-06-12 PROCEDURE — 73030 X-RAY EXAM OF SHOULDER: CPT

## 2017-06-15 ENCOUNTER — APPOINTMENT (OUTPATIENT)
Dept: LAB | Age: 62
End: 2017-06-15
Payer: COMMERCIAL

## 2017-06-15 ENCOUNTER — GENERIC CONVERSION - ENCOUNTER (OUTPATIENT)
Dept: OTHER | Facility: OTHER | Age: 62
End: 2017-06-15

## 2017-06-15 ENCOUNTER — TRANSCRIBE ORDERS (OUTPATIENT)
Dept: ADMINISTRATIVE | Age: 62
End: 2017-06-15

## 2017-06-15 ENCOUNTER — HOSPITAL ENCOUNTER (OUTPATIENT)
Dept: ULTRASOUND IMAGING | Facility: CLINIC | Age: 62
Discharge: HOME | End: 2017-06-15
Payer: COMMERCIAL

## 2017-06-15 ENCOUNTER — HOSPITAL ENCOUNTER (OUTPATIENT)
Dept: RADIOLOGY | Age: 62
Discharge: HOME | End: 2017-06-15
Payer: COMMERCIAL

## 2017-06-15 ENCOUNTER — APPOINTMENT (OUTPATIENT)
Dept: PHYSICAL THERAPY | Age: 62
End: 2017-06-15
Payer: COMMERCIAL

## 2017-06-15 ENCOUNTER — HOSPITAL ENCOUNTER (OUTPATIENT)
Dept: MAMMOGRAPHY | Facility: CLINIC | Age: 62
Discharge: HOME | End: 2017-06-15
Payer: COMMERCIAL

## 2017-06-15 DIAGNOSIS — Z00.8 HEALTH EXAMINATION IN POPULATION SURVEY: Primary | ICD-10-CM

## 2017-06-15 DIAGNOSIS — Z79.899 ENCOUNTER FOR LONG-TERM (CURRENT) USE OF OTHER MEDICATIONS: ICD-10-CM

## 2017-06-15 DIAGNOSIS — Z00.8 HEALTH EXAMINATION IN POPULATION SURVEY: ICD-10-CM

## 2017-06-15 DIAGNOSIS — L40.59 POLYARTICULAR PSORIATIC ARTHRITIS (HCC): ICD-10-CM

## 2017-06-15 DIAGNOSIS — R92.2 INCONCLUSIVE MAMMOGRAM: ICD-10-CM

## 2017-06-15 DIAGNOSIS — Z12.31 ENCOUNTER FOR SCREENING MAMMOGRAM FOR MALIGNANT NEOPLASM OF BREAST: ICD-10-CM

## 2017-06-15 DIAGNOSIS — M15.0 PRIMARY GENERALIZED HYPERTROPHIC OSTEOARTHROSIS: ICD-10-CM

## 2017-06-15 DIAGNOSIS — Z13.820 ENCOUNTER FOR SCREENING FOR OSTEOPOROSIS: ICD-10-CM

## 2017-06-15 DIAGNOSIS — L40.59 POLYARTICULAR PSORIATIC ARTHRITIS (HCC): Primary | ICD-10-CM

## 2017-06-15 LAB
ALBUMIN SERPL BCP-MCNC: 3.7 G/DL (ref 3.5–5)
ALP SERPL-CCNC: 71 U/L (ref 46–116)
ALT SERPL W P-5'-P-CCNC: 17 U/L (ref 12–78)
ANION GAP SERPL CALCULATED.3IONS-SCNC: 5 MMOL/L (ref 4–13)
AST SERPL W P-5'-P-CCNC: 22 U/L (ref 5–45)
BACTERIA UR QL AUTO: NORMAL /HPF
BASOPHILS # BLD AUTO: 0.02 THOUSANDS/ΜL (ref 0–0.1)
BASOPHILS NFR BLD AUTO: 1 % (ref 0–1)
BILIRUB SERPL-MCNC: 0.35 MG/DL (ref 0.2–1)
BILIRUB UR QL STRIP: NEGATIVE
BUN SERPL-MCNC: 12 MG/DL (ref 5–25)
CALCIUM SERPL-MCNC: 8.9 MG/DL (ref 8.3–10.1)
CHLORIDE SERPL-SCNC: 105 MMOL/L (ref 100–108)
CHOLEST SERPL-MCNC: 188 MG/DL (ref 50–200)
CLARITY UR: CLEAR
CO2 SERPL-SCNC: 30 MMOL/L (ref 21–32)
COLOR UR: YELLOW
CREAT SERPL-MCNC: 0.71 MG/DL (ref 0.6–1.3)
EOSINOPHIL # BLD AUTO: 0.01 THOUSAND/ΜL (ref 0–0.61)
EOSINOPHIL NFR BLD AUTO: 0 % (ref 0–6)
ERYTHROCYTE [DISTWIDTH] IN BLOOD BY AUTOMATED COUNT: 12.8 % (ref 11.6–15.1)
ERYTHROCYTE [SEDIMENTATION RATE] IN BLOOD: 7 MM/HOUR (ref 0–20)
EST. AVERAGE GLUCOSE BLD GHB EST-MCNC: 88 MG/DL
GFR SERPL CREATININE-BSD FRML MDRD: >60 ML/MIN/1.73SQ M
GLUCOSE P FAST SERPL-MCNC: 87 MG/DL (ref 65–99)
GLUCOSE UR STRIP-MCNC: NEGATIVE MG/DL
HBA1C MFR BLD: 4.7 % (ref 4.2–6.3)
HCT VFR BLD AUTO: 40.3 % (ref 34.8–46.1)
HDLC SERPL-MCNC: 72 MG/DL (ref 40–60)
HGB BLD-MCNC: 13.1 G/DL (ref 11.5–15.4)
HGB UR QL STRIP.AUTO: NEGATIVE
HYALINE CASTS #/AREA URNS LPF: NORMAL /LPF
KETONES UR STRIP-MCNC: NEGATIVE MG/DL
LDLC SERPL CALC-MCNC: 106 MG/DL (ref 0–100)
LEUKOCYTE ESTERASE UR QL STRIP: ABNORMAL
LYMPHOCYTES # BLD AUTO: 1.62 THOUSANDS/ΜL (ref 0.6–4.47)
LYMPHOCYTES NFR BLD AUTO: 40 % (ref 14–44)
MCH RBC QN AUTO: 31.6 PG (ref 26.8–34.3)
MCHC RBC AUTO-ENTMCNC: 32.5 G/DL (ref 31.4–37.4)
MCV RBC AUTO: 97 FL (ref 82–98)
MONOCYTES # BLD AUTO: 0.57 THOUSAND/ΜL (ref 0.17–1.22)
MONOCYTES NFR BLD AUTO: 14 % (ref 4–12)
NEUTROPHILS # BLD AUTO: 1.84 THOUSANDS/ΜL (ref 1.85–7.62)
NEUTS SEG NFR BLD AUTO: 45 % (ref 43–75)
NITRITE UR QL STRIP: NEGATIVE
NON-SQ EPI CELLS URNS QL MICRO: NORMAL /HPF
NRBC BLD AUTO-RTO: 0 /100 WBCS
PH UR STRIP.AUTO: 7.5 [PH] (ref 4.5–8)
PLATELET # BLD AUTO: 259 THOUSANDS/UL (ref 149–390)
PMV BLD AUTO: 10.4 FL (ref 8.9–12.7)
POTASSIUM SERPL-SCNC: 3.9 MMOL/L (ref 3.5–5.3)
PROT SERPL-MCNC: 7.3 G/DL (ref 6.4–8.2)
PROT UR STRIP-MCNC: NEGATIVE MG/DL
RBC # BLD AUTO: 4.15 MILLION/UL (ref 3.81–5.12)
RBC #/AREA URNS AUTO: NORMAL /HPF
SODIUM SERPL-SCNC: 140 MMOL/L (ref 136–145)
SP GR UR STRIP.AUTO: 1.01 (ref 1–1.03)
TRIGL SERPL-MCNC: 50 MG/DL
UROBILINOGEN UR QL STRIP.AUTO: 0.2 E.U./DL
WBC # BLD AUTO: 4.07 THOUSAND/UL (ref 4.31–10.16)
WBC #/AREA URNS AUTO: NORMAL /HPF

## 2017-06-15 PROCEDURE — 76377 3D RENDER W/INTRP POSTPROCES: CPT

## 2017-06-15 PROCEDURE — 81001 URINALYSIS AUTO W/SCOPE: CPT | Performed by: INTERNAL MEDICINE

## 2017-06-15 PROCEDURE — 85652 RBC SED RATE AUTOMATED: CPT

## 2017-06-15 PROCEDURE — 85025 COMPLETE CBC W/AUTO DIFF WBC: CPT

## 2017-06-15 PROCEDURE — 80053 COMPREHEN METABOLIC PANEL: CPT

## 2017-06-15 PROCEDURE — G0202 SCR MAMMO BI INCL CAD: HCPCS

## 2017-06-15 PROCEDURE — 83036 HEMOGLOBIN GLYCOSYLATED A1C: CPT

## 2017-06-15 PROCEDURE — 76641 ULTRASOUND BREAST COMPLETE: CPT

## 2017-06-15 PROCEDURE — 77063 BREAST TOMOSYNTHESIS BI: CPT

## 2017-06-15 PROCEDURE — 77080 DXA BONE DENSITY AXIAL: CPT

## 2017-06-15 PROCEDURE — 36415 COLL VENOUS BLD VENIPUNCTURE: CPT

## 2017-06-15 PROCEDURE — 80061 LIPID PANEL: CPT

## 2017-06-19 ENCOUNTER — APPOINTMENT (OUTPATIENT)
Dept: PHYSICAL THERAPY | Age: 62
End: 2017-06-19
Payer: COMMERCIAL

## 2017-06-19 PROCEDURE — 97140 MANUAL THERAPY 1/> REGIONS: CPT

## 2017-06-19 PROCEDURE — 97110 THERAPEUTIC EXERCISES: CPT

## 2017-06-22 ENCOUNTER — APPOINTMENT (OUTPATIENT)
Dept: PHYSICAL THERAPY | Age: 62
End: 2017-06-22
Payer: COMMERCIAL

## 2017-06-22 PROCEDURE — 97140 MANUAL THERAPY 1/> REGIONS: CPT

## 2017-06-22 PROCEDURE — 97110 THERAPEUTIC EXERCISES: CPT

## 2017-06-26 ENCOUNTER — APPOINTMENT (OUTPATIENT)
Dept: PHYSICAL THERAPY | Age: 62
End: 2017-06-26
Payer: COMMERCIAL

## 2017-06-26 PROCEDURE — 97140 MANUAL THERAPY 1/> REGIONS: CPT

## 2017-06-26 PROCEDURE — 97110 THERAPEUTIC EXERCISES: CPT

## 2017-06-29 ENCOUNTER — APPOINTMENT (OUTPATIENT)
Dept: PHYSICAL THERAPY | Age: 62
End: 2017-06-29
Payer: COMMERCIAL

## 2017-06-29 PROCEDURE — 97140 MANUAL THERAPY 1/> REGIONS: CPT

## 2017-06-29 PROCEDURE — 97110 THERAPEUTIC EXERCISES: CPT

## 2017-07-03 ENCOUNTER — APPOINTMENT (OUTPATIENT)
Dept: PHYSICAL THERAPY | Age: 62
End: 2017-07-03
Payer: COMMERCIAL

## 2017-07-03 PROCEDURE — 97110 THERAPEUTIC EXERCISES: CPT

## 2017-07-03 PROCEDURE — 97140 MANUAL THERAPY 1/> REGIONS: CPT

## 2017-07-06 ENCOUNTER — APPOINTMENT (OUTPATIENT)
Dept: PHYSICAL THERAPY | Age: 62
End: 2017-07-06
Payer: COMMERCIAL

## 2017-07-06 PROCEDURE — 97110 THERAPEUTIC EXERCISES: CPT

## 2017-07-06 PROCEDURE — 97140 MANUAL THERAPY 1/> REGIONS: CPT

## 2017-07-10 ENCOUNTER — ALLSCRIPTS OFFICE VISIT (OUTPATIENT)
Dept: OTHER | Facility: OTHER | Age: 62
End: 2017-07-10

## 2017-07-17 ENCOUNTER — APPOINTMENT (OUTPATIENT)
Dept: PHYSICAL THERAPY | Age: 62
End: 2017-07-17
Payer: COMMERCIAL

## 2017-07-17 PROCEDURE — 97140 MANUAL THERAPY 1/> REGIONS: CPT

## 2017-07-17 PROCEDURE — 97110 THERAPEUTIC EXERCISES: CPT

## 2017-07-20 ENCOUNTER — APPOINTMENT (OUTPATIENT)
Dept: PHYSICAL THERAPY | Age: 62
End: 2017-07-20
Payer: COMMERCIAL

## 2017-07-20 PROCEDURE — 97140 MANUAL THERAPY 1/> REGIONS: CPT

## 2017-07-20 PROCEDURE — 97110 THERAPEUTIC EXERCISES: CPT

## 2017-07-24 ENCOUNTER — APPOINTMENT (OUTPATIENT)
Dept: PHYSICAL THERAPY | Age: 62
End: 2017-07-24
Payer: COMMERCIAL

## 2017-07-24 PROCEDURE — 97110 THERAPEUTIC EXERCISES: CPT

## 2017-07-24 PROCEDURE — 97140 MANUAL THERAPY 1/> REGIONS: CPT

## 2017-07-27 ENCOUNTER — APPOINTMENT (OUTPATIENT)
Dept: PHYSICAL THERAPY | Age: 62
End: 2017-07-27
Payer: COMMERCIAL

## 2017-07-27 PROCEDURE — 97110 THERAPEUTIC EXERCISES: CPT

## 2017-07-27 PROCEDURE — 97140 MANUAL THERAPY 1/> REGIONS: CPT

## 2017-08-03 ENCOUNTER — APPOINTMENT (OUTPATIENT)
Dept: PHYSICAL THERAPY | Age: 62
End: 2017-08-03
Payer: COMMERCIAL

## 2017-08-03 PROCEDURE — 97110 THERAPEUTIC EXERCISES: CPT

## 2017-08-03 PROCEDURE — 97140 MANUAL THERAPY 1/> REGIONS: CPT

## 2017-08-09 ENCOUNTER — ALLSCRIPTS OFFICE VISIT (OUTPATIENT)
Dept: OTHER | Facility: OTHER | Age: 62
End: 2017-08-09

## 2017-08-10 ENCOUNTER — APPOINTMENT (OUTPATIENT)
Dept: PHYSICAL THERAPY | Age: 62
End: 2017-08-10
Payer: COMMERCIAL

## 2017-08-10 PROCEDURE — 97140 MANUAL THERAPY 1/> REGIONS: CPT

## 2017-08-10 PROCEDURE — 97110 THERAPEUTIC EXERCISES: CPT

## 2017-08-17 ENCOUNTER — APPOINTMENT (OUTPATIENT)
Dept: PHYSICAL THERAPY | Age: 62
End: 2017-08-17
Payer: COMMERCIAL

## 2017-08-17 PROCEDURE — 97140 MANUAL THERAPY 1/> REGIONS: CPT

## 2017-08-17 PROCEDURE — 97110 THERAPEUTIC EXERCISES: CPT

## 2017-08-24 ENCOUNTER — APPOINTMENT (OUTPATIENT)
Dept: PHYSICAL THERAPY | Age: 62
End: 2017-08-24
Payer: COMMERCIAL

## 2017-08-24 PROCEDURE — 97140 MANUAL THERAPY 1/> REGIONS: CPT

## 2017-08-24 PROCEDURE — 97110 THERAPEUTIC EXERCISES: CPT

## 2017-08-31 ENCOUNTER — APPOINTMENT (OUTPATIENT)
Dept: PHYSICAL THERAPY | Age: 62
End: 2017-08-31
Payer: COMMERCIAL

## 2017-08-31 PROCEDURE — 97110 THERAPEUTIC EXERCISES: CPT

## 2017-08-31 PROCEDURE — 97140 MANUAL THERAPY 1/> REGIONS: CPT

## 2017-09-06 ENCOUNTER — ALLSCRIPTS OFFICE VISIT (OUTPATIENT)
Dept: OTHER | Facility: OTHER | Age: 62
End: 2017-09-06

## 2017-09-07 ENCOUNTER — APPOINTMENT (OUTPATIENT)
Dept: PHYSICAL THERAPY | Age: 62
End: 2017-09-07
Payer: COMMERCIAL

## 2017-09-07 PROCEDURE — 97110 THERAPEUTIC EXERCISES: CPT

## 2017-09-07 PROCEDURE — 97140 MANUAL THERAPY 1/> REGIONS: CPT

## 2017-09-14 ENCOUNTER — APPOINTMENT (OUTPATIENT)
Dept: PHYSICAL THERAPY | Age: 62
End: 2017-09-14
Payer: COMMERCIAL

## 2017-09-14 PROCEDURE — 97140 MANUAL THERAPY 1/> REGIONS: CPT

## 2017-09-14 PROCEDURE — 97110 THERAPEUTIC EXERCISES: CPT

## 2017-09-21 ENCOUNTER — APPOINTMENT (OUTPATIENT)
Dept: PHYSICAL THERAPY | Age: 62
End: 2017-09-21
Payer: COMMERCIAL

## 2017-10-05 ENCOUNTER — APPOINTMENT (OUTPATIENT)
Dept: PHYSICAL THERAPY | Age: 62
End: 2017-10-05
Payer: COMMERCIAL

## 2017-10-05 PROCEDURE — 97110 THERAPEUTIC EXERCISES: CPT

## 2017-10-05 PROCEDURE — 97140 MANUAL THERAPY 1/> REGIONS: CPT

## 2017-10-12 ENCOUNTER — APPOINTMENT (OUTPATIENT)
Dept: PHYSICAL THERAPY | Age: 62
End: 2017-10-12
Payer: COMMERCIAL

## 2017-10-12 PROCEDURE — 97140 MANUAL THERAPY 1/> REGIONS: CPT

## 2017-10-12 PROCEDURE — 97110 THERAPEUTIC EXERCISES: CPT

## 2017-10-19 ENCOUNTER — APPOINTMENT (OUTPATIENT)
Dept: PHYSICAL THERAPY | Age: 62
End: 2017-10-19
Payer: COMMERCIAL

## 2017-10-19 PROCEDURE — 97110 THERAPEUTIC EXERCISES: CPT

## 2017-10-19 PROCEDURE — 97140 MANUAL THERAPY 1/> REGIONS: CPT

## 2017-10-24 ENCOUNTER — ANESTHESIA EVENT (OUTPATIENT)
Dept: GASTROENTEROLOGY | Facility: MEDICAL CENTER | Age: 62
End: 2017-10-24
Payer: COMMERCIAL

## 2017-10-24 ENCOUNTER — HOSPITAL ENCOUNTER (OUTPATIENT)
Facility: MEDICAL CENTER | Age: 62
Setting detail: OUTPATIENT SURGERY
Discharge: HOME | End: 2017-10-24
Attending: INTERNAL MEDICINE | Admitting: INTERNAL MEDICINE
Payer: COMMERCIAL

## 2017-10-24 ENCOUNTER — ANESTHESIA (OUTPATIENT)
Dept: GASTROENTEROLOGY | Facility: MEDICAL CENTER | Age: 62
End: 2017-10-24
Payer: COMMERCIAL

## 2017-10-24 ENCOUNTER — GENERIC CONVERSION - ENCOUNTER (OUTPATIENT)
Dept: GASTROENTEROLOGY | Facility: MEDICAL CENTER | Age: 62
End: 2017-10-24

## 2017-10-24 VITALS
TEMPERATURE: 99.4 F | HEART RATE: 57 BPM | WEIGHT: 136 LBS | SYSTOLIC BLOOD PRESSURE: 121 MMHG | HEIGHT: 67 IN | BODY MASS INDEX: 21.35 KG/M2 | OXYGEN SATURATION: 100 % | RESPIRATION RATE: 18 BRPM | DIASTOLIC BLOOD PRESSURE: 79 MMHG

## 2017-10-24 DIAGNOSIS — Z12.11 ENCOUNTER FOR SCREENING FOR MALIGNANT NEOPLASM OF COLON: ICD-10-CM

## 2017-10-24 PROCEDURE — 88305 TISSUE EXAM BY PATHOLOGIST: CPT | Performed by: INTERNAL MEDICINE

## 2017-10-24 RX ORDER — PROPOFOL 10 MG/ML
INJECTION, EMULSION INTRAVENOUS AS NEEDED
Status: DISCONTINUED | OUTPATIENT
Start: 2017-10-24 | End: 2017-10-24 | Stop reason: SURG

## 2017-10-24 RX ORDER — SODIUM CHLORIDE 9 MG/ML
125 INJECTION, SOLUTION INTRAVENOUS CONTINUOUS
Status: DISCONTINUED | OUTPATIENT
Start: 2017-10-24 | End: 2017-10-24 | Stop reason: HOSPADM

## 2017-10-24 RX ADMIN — PROPOFOL 20 MG: 10 INJECTION, EMULSION INTRAVENOUS at 07:45

## 2017-10-24 RX ADMIN — PROPOFOL 20 MG: 10 INJECTION, EMULSION INTRAVENOUS at 07:40

## 2017-10-24 RX ADMIN — PROPOFOL 80 MG: 10 INJECTION, EMULSION INTRAVENOUS at 07:36

## 2017-10-24 RX ADMIN — SODIUM CHLORIDE 125 ML/HR: 0.9 INJECTION, SOLUTION INTRAVENOUS at 07:12

## 2017-10-24 RX ADMIN — PROPOFOL 40 MG: 10 INJECTION, EMULSION INTRAVENOUS at 07:49

## 2017-10-24 RX ADMIN — PROPOFOL 20 MG: 10 INJECTION, EMULSION INTRAVENOUS at 07:53

## 2017-10-24 RX ADMIN — PROPOFOL 40 MG: 10 INJECTION, EMULSION INTRAVENOUS at 07:42

## 2017-10-24 NOTE — ANESTHESIA PREPROCEDURE EVALUATION
Review of Systems/Medical History  Patient summary reviewed  Chart reviewed  No history of anesthetic complications     Cardiovascular  Negative cardio ROS    Pulmonary  Negative pulmonary ROS ,        GI/Hepatic  Negative GI/hepatic ROS          Negative  ROS        Endo/Other  Negative endo/other ROS Arthritis     GYN  Negative gynecology ROS          Hematology  Negative hematology ROS      Musculoskeletal  Osteoarthritis,        Neurology      Comment: H/o b/l tinnitis  Psychology   Negative psychology ROS            Physical Exam    Airway    Mallampati score: II  TM Distance: >3 FB  Neck ROM: full     Dental   No notable dental hx     Cardiovascular  Comment: Negative ROS, Rhythm: regular, Rate: normal, Cardiovascular exam normal    Pulmonary  Pulmonary exam normal Breath sounds clear to auscultation,     Other Findings        Anesthesia Plan  ASA Score- 2       Anesthesia Type- IV sedation with anesthesia with ASA Monitors  Additional Monitors:   Airway Plan:           Induction- intravenous  Informed Consent- Anesthetic plan and risks discussed with patient

## 2017-10-24 NOTE — DISCHARGE INSTRUCTIONS
Colonoscopy   WHAT YOU NEED TO KNOW:   A colonoscopy is a procedure to examine the inside of your colon (intestine) with a scope  Polyps or tissue growths may have been removed during your colonoscopy  It is normal to feel bloated and to have some abdominal discomfort  You should be passing gas  If you have hemorrhoids or you had polyps removed, you may have a small amount of bleeding  DISCHARGE INSTRUCTIONS:   Seek care immediately if:   · You have a large amount of bright red blood in your bowel movements  · Your abdomen is hard and firm and you have severe pain  · You have sudden trouble breathing  Contact your healthcare provider if:   · You develop a rash or hives  · You have a fever within 24 hours of your procedure  · You have not had a bowel movement for 3 days after your procedure  · You have questions or concerns about your condition or care  Activity:   · Do not lift, strain, or run  for 3 days after your procedure  · Rest after your procedure  You have been given medicine to relax you  Do not  drive or make important decisions until the day after your procedure  Return to your normal activity as directed  · Relieve gas and discomfort from bloating  by lying on your right side with a heating pad on your abdomen  You may need to take short walks to help the gas move out  Eat small meals until bloating is relieved  If you had polyps removed: For 7 days after your procedure:  · Do not  take aspirin  · Do not  go on long car rides  Help prevent constipation:   · Eat a variety of healthy foods  Healthy foods include fruit, vegetables, whole-grain breads, low-fat dairy products, beans, lean meat, and fish  Ask if you need to be on a special diet  Your healthcare provider may recommend that you eat high-fiber foods such as cooked beans  Fiber helps you have regular bowel movements  · Drink liquids as directed    Adults should drink between 9 and 13 eight-ounce cups of liquid every day  Ask what amount is best for you  For most people, good liquids to drink are water, juice, and milk  · Exercise as directed  Talk to your healthcare provider about the best exercise plan for you  Exercise can help prevent constipation, decrease your blood pressure and improve your health  Follow up with your healthcare provider as directed:  Write down your questions so you remember to ask them during your visits  © 2017 [address] [employer] Information is for End User's use only and may not be sold, redistributed or otherwise used for commercial purposes  All illustrations and images included in CareNotes® are the copyrighted property of A D A M , Inc  or [address]  The above information is an  only  It is not intended as medical advice for individual conditions or treatments  Talk to your doctor, nurse or pharmacist before following any medical regimen to see if it is safe and effective for you  Colorectal Polyps   WHAT YOU NEED TO KNOW:   Colorectal polyps are small growths of tissue in the lining of the colon and rectum  Most polyps are hyperplastic polyps and are usually benign (noncancerous)  Certain types of polyps, called adenomatous polyps, may turn into cancer  DISCHARGE INSTRUCTIONS:   Follow up with your healthcare provider or gastroenterologist as directed: You may need to return for more tests, such as another colonoscopy  Write down your questions so you remember to ask them during your visits  Reduce your risk for colorectal polyps:   · Eat a variety of healthy foods:  Healthy foods include fruit, vegetables, whole-grain breads, low-fat dairy products, beans, lean meat, and fish  Ask if you need to be on a special diet  · Maintain a healthy weight:  Ask your healthcare provider if you need to lose weight and how much you need to lose   Ask for help with a weight loss program     · Exercise:  Begin to exercise slowly and do more as you get stronger  Talk with your healthcare provider before you start an exercise program      · Limit alcohol:  Your risk for polyps increases the more you drink  · Do not smoke: If you smoke, it is never too late to quit  Ask for information about how to stop  For support and more information:   · [facility] ([facility])  [address] , [place] [address]  Phone: [phone]  Web Address: www digestive  niddk nih gov  Contact your healthcare provider or gastroenterologist if:   · You have a fever  · You have chills, a cough, or feel weak and achy  · You have abdominal pain that does not go away or gets worse after you take medicine  · Your abdomen is swollen  · You are losing weight without trying  · You have questions or concerns about your condition or care  Seek care immediately or call 911 if:   · You have sudden shortness of breath  · You have a fast heart rate, fast breathing, or are too dizzy to stand up  · You have severe abdominal pain  · You see blood in your bowel movement  © 2017 [address] [facility] Information is for End User's use only and may not be sold, redistributed or otherwise used for commercial purposes  All illustrations and images included in CareNotes® are the copyrighted property of [employer] M , Inc  or [employer]  The above information is an  only  It is not intended as medical advice for individual conditions or treatments  Talk to your doctor, nurse or pharmacist before following any medical regimen to see if it is safe and effective for you  Hemorrhoids   WHAT YOU NEED TO KNOW:   What are hemorrhoids? Hemorrhoids are swollen blood vessels inside your rectum (internal hemorrhoids) or on your anus (external hemorrhoids)  Sometimes a hemorrhoid may prolapse  This means it extends out of your anus  What increases my risk for hemorrhoids? · Pregnancy or obesity    · Straining or sitting for a long time during bowel movements    · Liver disease    · Weak muscles around the anus caused by older age, rectal surgery, or anal intercourse    · A lack of physical activity    · Chronic diarrhea or constipation    · A low-fiber diet  What are the signs and symptoms of hemorrhoids? · Pain or itching around your anus or inside your rectum    · Swelling or bumps around your anus    · Bright red blood in your bowel movement, on the toilet paper, or in the toilet bowl    · Tissue bulging out of your anus (prolapsed hemorrhoids)    · Incontinence (poor control over urine or bowel movements)  How are hemorrhoids diagnosed? Your healthcare provider will ask about your symptoms, the foods you eat, and your bowel movements  He will examine your anus for external hemorrhoids  You may need the following:  · A digital rectal exam  is a test to check for hemorrhoids  Your healthcare provider will put a gloved finger inside your anus to feel for the hemorrhoids  · An anoscopy  is a test that uses a scope (small tube with a light and camera on the end) to look at your hemorrhoids  How are hemorrhoids treated? Treatment will depend on your symptoms  You may need any of the following:  · Medicines  can help decrease pain and swelling, and soften your bowel movement  The medicine may be a pill, pad, cream, or ointment  · Procedures  may be used to shrink or remove your hemorrhoid  Examples include rubber-band ligation, sclerotherapy, and photocoagulation  These procedures may be done in your healthcare provider's office  Ask your healthcare provider for more information about these procedures  · Surgery  may be needed to shrink or remove your hemorrhoids  How can I manage my symptoms? · Apply ice on your anus for 15 to 20 minutes every hour or as directed  Use an ice pack, or put crushed ice in a plastic bag   Cover it with a towel before you apply it to your anus  Ice helps prevent tissue damage and decreases swelling and pain  · Take a sitz bath  Fill a bathtub with 4 to 6 inches of warm water  You may also use a sitz bath pan that fits inside a toilet bowl  Sit in the sitz bath for 15 minutes  Do this 3 times a day, and after each bowel movement  The warm water can help decrease pain and swelling  · Keep your anal area clean  Gently wash the area with warm water daily  Soap may irritate the area  After a bowel movement, wipe with moist towelettes or wet toilet paper  Dry toilet paper can irritate the area  How can I help prevent hemorrhoids? · Do not strain to have a bowel movement  Do not sit on the toilet too long  These actions can increase pressure on the tissues in your rectum and anus  · Drink plenty of liquids  Liquids can help prevent constipation  Ask how much liquid to drink each day and which liquids are best for you  · Eat a variety of high-fiber foods  Examples include fruits, vegetables, and whole grains  Ask your healthcare provider how much fiber you need each day  You may need to take a fiber supplement  · Exercise as directed  Exercise, such as walking, may make it easier to have a bowel movement  Ask your healthcare provider to help you create an exercise plan  · Do not have anal sex  Anal sex can weaken the skin around your rectum and anus  · Avoid heavy lifting  This can cause straining and increase your risk for another hemorrhoid  When should I seek immediate care? · You have severe pain in your rectum or around your anus  · You have severe pain in your abdomen and you are vomiting  · You have bleeding from your anus that soaks through your underwear  When should I contact my healthcare provider? · You have frequent and painful bowel movements  · Your hemorrhoid looks or feels more swollen than usual      · You do not have a bowel movement for 2 days or more       · You see or feel tissue coming through your anus  · You have questions or concerns about your condition or care  CARE AGREEMENT:   You have the right to help plan your care  Learn about your health condition and how it may be treated  Discuss treatment options with your caregivers to decide what care you want to receive  You always have the right to refuse treatment  The above information is an  only  It is not intended as medical advice for individual conditions or treatments  Talk to your doctor, nurse or pharmacist before following any medical regimen to see if it is safe and effective for you  © 2017 [address]  Information is for End User's use only and may not be sold, redistributed or otherwise used for commercial purposes  All illustrations and images included in CareNotes® are the copyrighted property of A [employer] A M , Inc  or [employer]

## 2017-10-24 NOTE — OP NOTE
**** GI/ENDOSCOPY REPORT ****     PATIENT NAME: Rm Singh ------ VISIT ID:  Patient ID: [id number]   YOB: 1955     INTRODUCTION: Colonoscopy - A 64 female patient presents for an outpatient   Colonoscopy at [address] [place] [address]  PREVIOUS COLONOSCOPY: 2012     INDICATIONS: Surveillance  Family history of colon cancer  CONSENT:  The benefits, risks, and alternatives to the procedure were   discussed and informed consent was obtained from the patient  PREPARATION: EKG, pulse, pulse oximetry and blood pressure were monitored   throughout the procedure  The patient was identified by myself both   verbally and by visual inspection of ID band  Airway Assessment   Classification: Airway class 2 - Visualization of the soft palate, fauces   and uvula  ASA Classification: See anesthesia record  MEDICATIONS: Anesthesia-check records     PROCEDURE:  The endoscope was passed without difficulty through the anus   under direct visualization and advanced to the cecum, confirmed by   appendiceal orifice and ileocecal valve  The scope was withdrawn and the   mucosa was carefully examined  The quality of the preparation was good  Cecal Intubation Time: 6 minutes(s) Scope Withdrawal Time: 14 minutes(s)     RECTAL EXAM: Normal rectal exam      FINDINGS:  A single flat polyp, measuring 10 mm in size, was found in the   cecum  The polyp was completely removed by snare cautery polypectomy  Retrieved  One clip placement  A single flat polyp, measuring less than 5   mm in size, was found in the colon  The polyp was completely removed by   cold biopsy polypectomy  The polyp was retrieved  Small internal   hemorrhoids were found  COMPLICATIONS: There were no complications  IMPRESSIONS: A single flat polyp found in the cecum; removed by snare   cautery polypectomy  A single flat polyp found in the colon; removed by   cold biopsy polypectomy  Internal hemorrhoids  RECOMMENDATIONS: Colonoscopy recommended in 3 years  Resume regular diet   as tolerated  Follow-up on the results of the biopsy specimens  ESTIMATED BLOOD LOSS:     PATHOLOGY SPECIMENS: Completely removed by snare cautery polypectomy  Completely removed by cold biopsy polypectomy  PROCEDURE CODES:     ICD-9 Codes: 211 3 Benign neoplasm of colon     ICD-10 Codes: K63 5 Polyp of colon K63 5 Polyp of colon K64 9 Unspecified   hemorrhoids     PERFORMED BY: HIRO Patel  on 10/24/2017  Version 1, electronically signed by HIRO Reyes  on 10/24/2017   at 08:13

## 2017-10-25 NOTE — CONSULTS
Assessment  1  Encounter for screening for malignant neoplasm of colon (V76 51) (Z12 11)    Plan  Encounter for screening for malignant neoplasm of colon    · Suprep Bowel Prep Kit 17 5-3 13-1 6 GM/180ML Oral Solution; DILUTE CONTENTS  AND USE AS DIRECTED FOR BOWEL PREP   Rx By: Jay Larson; Dispense: 0 Days ; #:1 X 177 ML Bottle (2 Bottles); Refill: 0;For: Encounter for screening for malignant neoplasm of colon; TRACY = N; Rx auto-faxed to [address]; Last Updated By: System, [employer]; 9/6/2017 10:11:50 AM   · COLONOSCOPY (GI, SURG); Status:Active; Requested QEY:[id number];    Perform:[facility]; NYI:[id number]; Last Updated Silvia Nicholas; 9/6/2017 10:22:18 AM;Ordered;For:Encounter for screening for malignant neoplasm of colon; Ordered By:Nicho Churchill;    Discussion/Summary  Discussion Summary:   She is here for colorectal cancer screening consult  Her last colonoscopy was 5 years ago  She had normal colonoscopy  Due to her family history of colon cancer she is due for surveillance colonoscopy  We reviewed risks, benefits and alternates of colonoscopy  Risks include but not limited a, perforation bowel prep instructions  Counseling Documentation With Imm: The patient was counseled regarding prognosis,-- patient and family education,-- impressions  Goals and Barriers: The patient has the current Goals: See above  The patent has the current Barriers: None  Patient's Capacity to Self-Care: Patient is able to Self-Care  Chief Complaint  Chief Complaint Free Text Note Form: Patient is here for consult for colonoscopy  History of Present Illness  HPI: 40-year-old female with family history of colon cancer here for surveillance colonoscopy consult  Her family history significant for colon cancer including her maternal uncle who had colon cancer at young age  Her last colonoscopy was in 2012 by Demi Whiting   I reviewed her last colonoscopy report which was normal  She has no Gastroenterology symptoms  She denies melena, hematochezia, abdominal pain, nausea or vomiting  She has no reflux symptoms      Review of Systems  Complete-Female GI Adult:   Constitutional: No fever, no chills, feels well, no tiredness, no recent weight gain or weight loss  Eyes: No complaints of eye pain, no red eyes, no eyesight problems, no discharge, no dry eyes, no itching of eyes  ENT: no complaints of earache, no loss of hearing, no nose bleeds, no nasal discharge, no sore throat, no hoarseness  Cardiovascular: No complaints of slow heart rate, no fast heart rate, no chest pain, no palpitations, no leg claudication, no lower extremity edema  Respiratory: No complaints of shortness of breath, no wheezing, no cough, no SOB on exertion, no orthopnea, no PND  Gastrointestinal: constipation  Genitourinary: No complaints of dysuria, no incontinence, no pelvic pain, no dysmenorrhea, no vaginal discharge or bleeding  Musculoskeletal: No complaints of arthralgias, no myalgias, no joint swelling or stiffness, no limb pain or swelling  Integumentary: No complaints of skin rash or lesions, no itching, no skin wounds, no breast pain or lump  Neurological: No complaints of headache, no confusion, no convulsions, no numbness, no dizziness or fainting, no tingling, no limb weakness, no difficulty walking  Psychiatric: Not suicidal, no sleep disturbance, no anxiety or depression, no change in personality, no emotional problems  Endocrine: No complaints of proptosis, no hot flashes, no muscle weakness, no deepening of the voice, no feelings of weakness  Hematologic/Lymphatic: No complaints of swollen glands, no swollen glands in the neck, does not bleed easily, does not bruise easily  ROS Reviewed:   ROS reviewed  Active Problems  1  Abnormal blood chemistry (790 6) (R79 9)   2  Aftercare following right shoulder joint replacement surgery (V54 81,V43 61)   (Z47 1,Z96 611)   3   Arthritis (483 90) (M11 90) 4  Arthritis of right shoulder region (716 91) (M19 011)   5  Bursitis, knee (726 60) (M70 50)   6  Dense breasts (793 82) (R92 2)   7  Encounter for annual routine gynecological examination (V72 31) (Z01 419)   8  Encounter for PPD test (V74 1) (Z11 1)   9  Encounter for screening mammogram for malignant neoplasm of breast (V76 12)   (Z12 31)   10  Knee pain, right (719 46) (M25 561)   11  Leukopenia (288 50) (D72 819)   12  Localized secondary osteoarthritis of right shoulder region (715 21) (M19 211)   13  Post-nasal discharge (473 9) (R09 82)   14  Pre-op evaluation (V72 84) (Z01 818)   15  Primary osteoarthritis of right knee (715 16) (M17 11)   16  Psoriatic arthropathy (696 0) (L40 50)   17  Screening for osteoporosis (V82 81) (Z13 820)   18  Status post total shoulder replacement, right (V43 61) (Z96 611)   19  Varicose veins without complication (775 0) (R17 87)   20  History of Vitamin D deficiency (268 9) (E55 9)    Past Medical History  1  History of Abnormal thyroid function test (794 5) (R94 6)   2  History of Cough (786 2) (R05)   3  History of Effusion of knee (719 06) (M25 469)   4  History of acute bronchitis (V12 69) (Z87 09)   5  History of hypothyroidism (V12 29) (Z86 39)   6  History of low back pain (V13 59) (Z87 39)   7  History of Plantar warts (078 12) (B07 0)   8  History of Primary osteoarthritis of right knee (715 16) (M17 11)   9  History of Prolapsing Mitral Valve Leaflet Syndrome (424 0)   10  History of Reported Pap Smear   11  History of Reported Positive Pap Smear (795 19)   12  History of Shoulder Dislocation (831 00)   13  History of Tinnitus, unspecified laterality (388 30) (H93 19)   14  History of Vitamin D deficiency (268 9) (E55 9)  Active Problems And Past Medical History Reviewed: The active problems and past medical history were reviewed and updated today  Surgical History  1  History of Arthroscopy Knee   2   History of Cervical Conization Loop Electrode Excision 3  History of Colonoscopy (Fiberoptic)   4  History of Shoulder Surgery  Surgical History Reviewed: The surgical history was reviewed and updated today  Family History  Mother    1  Family history of Arthritis (V17 7)   2  Family history of Type 2 Diabetes Mellitus  Father    3  Family history of Arthritis (V17 7)   4  Family history of Laryngeal Cancer (V16 2)  Maternal Grandmother    5  Family history of Type 2 Diabetes Mellitus  Maternal Uncle    6  Family history of Colon Cancer (V16 0)  Family History    7  Family history of Diabetes Mellitus (V18 0)   8  Family history of Osteoarthritis (V17 7)  Family History Reviewed: The family history was reviewed and updated today  Social History   · Being A Social Drinker   · Marital History -  (V61 03)   · Never A Smoker   · Working Full Time  Social History Reviewed: The social history was reviewed and updated today  The social history was reviewed and is unchanged  Current Meds   1  Calcium 600+D3 600-200 MG-UNIT Oral Tablet; TAKE 1 TABLET DAILY; Therapy: [id number] to (Evaluate:86Ndw2152); Last Rx:28Jan2013 Ordered   2  Fish Oil OIL; USE AS DIRECTED; Therapy: 72HIM6309 to (Last Rx:28Jan2013) Ordered   3  Glucosamine-Chondroitin 500-400 MG Oral Capsule; TAKE 1 CAPSULE 3 TIMES DAILY; Therapy: 82IIW8436 to Recorded   4  SulfaSALAzine 500 MG Oral Tablet; 2 tablets po daily; Therapy: 84VET2493 to (Last Rx:19Apr2017) Ordered  Medication List Reviewed: The medication list was reviewed and updated today  Allergies  1  No Known Drug Allergies    Vitals  Vital Signs    Recorded: 86FMR9544 09:54AM   Temperature 97 8 F, Tympanic   Heart Rate 56   Respiration 18   Systolic 730, LUE, Sitting   Diastolic 83, LUE, Sitting   Height 5 ft 6 in   Weight 141 lb 6 oz   BMI Calculated 22 82   BSA Calculated 1 73     Physical Exam    Constitutional   General appearance: No acute distress, well appearing and well nourished      Ears, Nose, Mouth, and Throat   Oropharynx: Normal with no erythema, edema, exudate or lesions  Pulmonary   Respiratory effort: No increased work of breathing or signs of respiratory distress  Auscultation of lungs: Clear to auscultation  Cardiovascular   Auscultation of heart: Normal rate and rhythm, normal S1 and S2, without murmurs  Abdomen   Abdomen: Non-tender, no masses  Liver and spleen: No hepatomegaly or splenomegaly  Lymphatic   Palpation of lymph nodes in neck: No lymphadenopathy  Musculoskeletal   Inspection/palpation of joints, bones, and muscles: Normal     Skin   Skin and subcutaneous tissue: Normal without rashes or lesions      Psychiatric   Orientation to person, place, and time: Normal          Future Appointments    Date/Time Provider Specialty Site   11/07/2017 05:30 PM Drew Paulino MD Internal Medicine [facility] INTERNAL MED   10/24/2017 07:30 AM Susi Whipple MD Gastroenterology Adult [address] ENDOSCOPY     Signatures   Electronically signed by : Emily Charlton MD; Sep  6 2017  4:57PM EST                       (Author)

## 2017-10-26 ENCOUNTER — APPOINTMENT (OUTPATIENT)
Dept: PHYSICAL THERAPY | Age: 62
End: 2017-10-26
Payer: COMMERCIAL

## 2017-10-26 PROCEDURE — 97110 THERAPEUTIC EXERCISES: CPT

## 2017-10-26 PROCEDURE — 97140 MANUAL THERAPY 1/> REGIONS: CPT

## 2017-10-29 ENCOUNTER — GENERIC CONVERSION - ENCOUNTER (OUTPATIENT)
Dept: OTHER | Facility: OTHER | Age: 62
End: 2017-10-29

## 2017-11-28 DIAGNOSIS — H91.90 HEARING LOSS: ICD-10-CM

## 2017-12-05 ENCOUNTER — APPOINTMENT (OUTPATIENT)
Dept: AUDIOLOGY | Age: 62
End: 2017-12-05
Payer: COMMERCIAL

## 2017-12-05 ENCOUNTER — GENERIC CONVERSION - ENCOUNTER (OUTPATIENT)
Dept: OTHER | Facility: OTHER | Age: 62
End: 2017-12-05

## 2017-12-05 PROCEDURE — 92557 COMPREHENSIVE HEARING TEST: CPT | Performed by: AUDIOLOGIST

## 2017-12-05 PROCEDURE — 92567 TYMPANOMETRY: CPT | Performed by: AUDIOLOGIST

## 2017-12-29 ENCOUNTER — APPOINTMENT (OUTPATIENT)
Dept: AUDIOLOGY | Age: 62
End: 2017-12-29
Payer: COMMERCIAL

## 2017-12-29 PROCEDURE — V5160 DISPENSING FEE BINAURAL: HCPCS | Performed by: AUDIOLOGIST

## 2017-12-29 PROCEDURE — V5261 HEARING AID, DIGIT, BIN, BTE: HCPCS | Performed by: AUDIOLOGIST

## 2018-01-11 NOTE — PROGRESS NOTES
Chief Complaint  pt here to have ppd read and get shingles vaccine    PPD negative    Pt brought in her own Zostavax from pharmacy      Active Problems    1  Abnormal blood chemistry (790 6) (R79 9)   2  Abnormal thyroid function test (794 5) (R94 6)   3  Arthritis (716 90) (M19 90)   4  Bursitis, knee (726 60) (M70 50)   5  Effusion of knee (719 06) (M25 469)   6  Encounter for PPD test (V74 1) (Z11 1)   7  Encounter for screening mammogram for malignant neoplasm of breast (V76 12)   (Z12 31)   8  Hoarseness (784 42) (R49 0)   9  Knee pain, right (719 46) (M25 561)   10  Leukopenia (288 50) (D72 819)   11  Need for shingles vaccine (V04 89) (Z23)   12  Post-nasal discharge (473 9) (R09 82)   13  Primary osteoarthritis of right knee (715 16) (M17 11)   14  Psoriatic arthropathy (696 0) (L40 50)   15  Right shoulder pain (719 41) (M25 511)   16  Varicose veins without complication (399 3) (Y18 99)   17  History of Vitamin D deficiency (268 9) (E55 9)    Current Meds   1  Calcium 600+D3 600-200 MG-UNIT Oral Tablet; TAKE 1 TABLET DAILY; Therapy: 24YVR5899 to (Evaluate:28Sou0850); Last Rx:28Jan2013 Ordered   2  Fish Oil OIL; USE AS DIRECTED; Therapy: 10UCG1561 to (Last Rx:28Jan2013) Ordered   3  Glucosamine-Chondroitin 500-400 MG Oral Capsule; TAKE 1 CAPSULE 3 TIMES DAILY; Therapy: 29XPY4848 to Recorded   4  Proventil  (90 Base) MCG/ACT Inhalation Aerosol Solution; INHALE 1 TO 2   PUFFS EVERY 4 TO 6 HOURS AS NEEDED; Therapy: 23ULJ5881 to (Last Rx:26Mar2015) Ordered   5  SulfaSALAzine 500 MG Oral Tablet; 3 TABLETS PO DAILY; Therapy: 52LTE5213 to (Last Rx:28Jan2013) Ordered   6  TraMADol HCl - 50 MG Oral Tablet; TAKE 1 TO 2 TABLETS 4 TIMES DAILY AS NEEDED   FOR PAIN;   Therapy: 64Edk8519 to (Evaluate:22Mar2015); Last Rx:27Vzg6543 Ordered   7  Zostavax 03721 UNT/0 65ML Subcutaneous Solution Reconstituted;  Inject 0 65 ml SQ;   Therapy: 00FWR9990 to (Last Kathrine Mathews)  Requested for: 90XVM2153 Ordered    Allergies    1   No Known Drug Allergies    Future Appointments    Date/Time Provider Specialty Site   06/13/2017 05:00 PM Alvaro Morales MD Internal Medicine [facility] INTERNAL MED     Signatures   Electronically signed by : Desiree Doll MD; Oct 28 2016  8:05AM EST                       (Author)

## 2018-01-13 VITALS
WEIGHT: 141.38 LBS | TEMPERATURE: 97.8 F | SYSTOLIC BLOOD PRESSURE: 129 MMHG | DIASTOLIC BLOOD PRESSURE: 83 MMHG | HEART RATE: 56 BPM | RESPIRATION RATE: 18 BRPM | BODY MASS INDEX: 22.72 KG/M2 | HEIGHT: 66 IN

## 2018-01-13 VITALS
DIASTOLIC BLOOD PRESSURE: 81 MMHG | HEIGHT: 66 IN | BODY MASS INDEX: 22.66 KG/M2 | HEART RATE: 55 BPM | WEIGHT: 141 LBS | SYSTOLIC BLOOD PRESSURE: 132 MMHG

## 2018-01-14 VITALS
SYSTOLIC BLOOD PRESSURE: 122 MMHG | WEIGHT: 141.13 LBS | DIASTOLIC BLOOD PRESSURE: 82 MMHG | BODY MASS INDEX: 22.68 KG/M2 | HEIGHT: 66 IN

## 2018-01-14 VITALS — DIASTOLIC BLOOD PRESSURE: 89 MMHG | HEART RATE: 78 BPM | SYSTOLIC BLOOD PRESSURE: 145 MMHG

## 2018-01-14 VITALS
HEART RATE: 63 BPM | WEIGHT: 138.25 LBS | SYSTOLIC BLOOD PRESSURE: 145 MMHG | DIASTOLIC BLOOD PRESSURE: 87 MMHG | BODY MASS INDEX: 22.22 KG/M2 | HEIGHT: 66 IN

## 2018-01-14 VITALS
DIASTOLIC BLOOD PRESSURE: 93 MMHG | SYSTOLIC BLOOD PRESSURE: 143 MMHG | HEART RATE: 60 BPM | WEIGHT: 139.25 LBS | BODY MASS INDEX: 22.14 KG/M2

## 2018-01-14 NOTE — RESULT NOTES
Verified Results  * DXA BONE DENSITY SPINE HIP AND PELVIS [id number] 08:28AM Antonella Person Order Number: [id number]    - Patient Instructions: To schedule this appointment, please contact Central Scheduling at [phone]  Test Name Result Flag Reference   DXA BONE DENSITY SPINE HIP AND PELVIS (Report)     DXA SCAN     CLINICAL HISTORY: 57-year-old woman  Menopause at age 46  OTHER RISK FACTORS: June 28, 2011  TECHNIQUE: Bone densitometry was performed using a Hologic Horizon A bone densitometer  Regions of interest appear properly placed  COMPARISON: June 28, 2011  RESULTS:      LUMBAR SPINE L1-L2: BMD 0 963 gm/cm2 / T-score -0 1 / Z score 1 3  These values are artifactually elevated due to scoliosis with secondary degenerative sclerosis and osteophytosis  (Lumbar levels within parentheses [if any] represent vertebrae excluded from analysis due to local structural abnormalities or artifact)  LEFT TOTAL HIP: BMD 0 920 gm/cm2 / T-score -0 2 / Z score 0 9   LEFT FEMORAL NECK: BMD 0 858 gm/cm2 / T score 0 1 / Z score 1 4     LEFT FOREARM: 33% RADIUS BMD 0 636 gm/cm2 / T-score -0 8 / Z score 0 6     CHANGE: Since the last DXA:   LUMBAR SPINE BMD has increased 0 071 gm/cm2 or 6 6%  This change is statistically significant  However, this apparent increase in BMD is most likely artifactual as noted above  HIP BMD has decreased 0 017 gm/cm2 or 1 8%  This change is statistically significant  FOREARM BMD has decreased 0 074 gm/cm2 or 10 4%  This change is statistically significant  IMPRESSION:     1  Normal bone density  2  Since a DXA study from June 28, 2011, bone mineral density has decreased 1 8% in the left hip and 10 4% in the left radius  These changes are statistically significant       3  The 10 year risk of hip fracture is 0 1% with the 10 year risk of major osteoporotic fracture being 6 0% as calculated by the [facility]/WHO fracture risk assessment tool (FRAX)  4  The current NOF guidelines recommend treating patients with a T-score of -2 5 or less in the lumbar spine or hips, or in post-menopausal women and men over the age of 48 with low bone mass (osteopenia) and a FRAX 10 year risk score of >3% for hip    fracture and/or >20% for major osteoporotic fracture  5  A daily intake of at least 1200 mg calcium and vitamin D 800- 1000 IU, as well as weight bearing and muscle strengthening exercise, fall prevention and avoidance of tobacco and excessive alcohol as preventive measurements are suggested  6  Follow-up DXA in two years is recommended for most patients  A one year follow-up DXA is recommended after initiation or change in therapy for osteoporosis  More frequent evaluation is also appropriate for patients with conditions associated with    rapid bone loss, such as glucocorticoid therapy  The FRAX tool has not been validated in patients currently or previously treated with pharmacotherapy for osteoporosis  In such patients, clinical judgment must be exercised in interpreting FRAX scores  It is not appropriate to use FRAX to monitor    treatment response  WHO CLASSIFICATION:   Normal (a T-score of -1 0 or higher)   Low bone mineral density (a T-score of less than -1 0 but higher than -2 5)   Osteoporosis (a T-score of -2 5 or less)   Severe osteoporosis (a T-score of -2 5 or less with a fragility fracture)     Least significant change (lumbar spine): 0 014 g/cm2; 1 7%   Least significant change (total hip): 0 008 g/cm2; 1 1%   Least significant change (forearm): 0 011 g/cm2; 1 9%         Workstation performed: [id number]     Signed by:   Rios Solnao MD   6/15/17

## 2018-01-14 NOTE — MISCELLANEOUS
Assessment    1  Psoriatic arthropathy (696 0) (L40 50)   2  History of Shoulder Surgery   3  History of Vitamin D deficiency (268 9) (E55 9)   4  Knee pain, right (719 46) (M25 561)   5  Arthritis of right shoulder region (716 91) (M19 011)    Plan  Screening for osteoporosis    · * DXA BONE DENSITY SPINE HIP AND PELVIS; Status:Active; Requested [id number];     Perform:[facility]; ITL:80QVZ7085; Last Updated By:Brittny Cornell; 2017 1:55:51 PM;Ordered; For:Screening for osteoporosis; Ordered By:David Morales;    Discussion/Summary  Discussion Summary:     1  s/p shoulder surgery, right  Doing well  Apply warm compress at least twice a day before exercising, taking Tylenol prn   2  Constipation, Hx of hypothyroid  Discussed high fiber diet, take stool supplement regularly  Recheck TFTs  3  Psoriatic arthritis  Stable, sees Dr Neftaly Arguello months  4  Hx of right knee pain  5  HM  Bone density and mammogram due  Colonoscopy due   Regular follow up in October  Counseling Documentation With Imm: The patient was counseled regarding instructions for management, risk factor reductions, impressions  History of Present Illness  TCM Communication [facility]: The patient is being contacted for follow-up after hospitalization  She was hospitalized at [place]  The dates of hospitalization: -, date of admission: 17, date of discharge: 2017  Diagnosis: Secondary osteoarthritis of right shoulder  She was discharged to home  Medications reviewed and updated today  She scheduled a follow up appointment  Follow-up appointments with other specialists: Ortho surgeon   The patient is currently asymptomatic  Counseling was provided to the patient  Communication performed and completed by Garcia Ordonez 17   HPI: Ms Leroy Michaels is feeling well  She took pain medications while in the hospital  She felt "loopy" while on it, taking Tylenol instead   She takes 2 extra strength at night, helps with the pain  She takes a stool softener regularly, helps with her bowel movements  She saw ortho earlier today, started on physical therapy already  She complains of a firm hard spot beneath her right arm, sore when stretching  Denies any chest pain, or shortness of breath  No allergies  Review of Systems  Complete-Female:   Constitutional: no fever  Eyes: no eyesight problems  ENT: no nasal discharge  Cardiovascular: no chest pain, no palpitations and no lower extremity edema  Respiratory: no cough, no wheezing and no shortness of breath during exertion  Gastrointestinal: no abdominal pain and no constipation  Genitourinary: no dysuria  Musculoskeletal: arthralgias and joint stiffness, but as noted in HPI and no joint swelling  Integumentary: no skin wound  Neurological: no numbness and no difficulty walking  Psychiatric: sleep disturbances  no feelings of weakness   Hematologic/Lymphatic: no tendency for easy bruising  Active Problems     1  Abnormal blood chemistry (790 6) (R79 9)   2  Arthritis (716 90) (M19 90)   3  Arthritis of right shoulder region (716 91) (M19 011)   4  Bursitis, knee (726 60) (M70 50)   5  Encounter for PPD test (V74 1) (Z11 1)   6  Encounter for screening mammogram for malignant neoplasm of breast (V76 12)   (Z12 31)   7  Knee pain, right (719 46) (M25 561)   8  Leukopenia (288 50) (D72 819)   9  Localized secondary osteoarthritis of right shoulder region (715 21) (M19 211)   10  Post-nasal discharge (473 9) (R09 82)   11  Pre-op evaluation (V72 84) (Z01 818)   12  Primary osteoarthritis of right knee (715 16) (M17 11)   13  Psoriatic arthropathy (696 0) (L40 50)   14  Status post total shoulder replacement, right (V43 61) (Z96 611)   15  Varicose veins without complication (916 2) (K20 19)   16   History of Vitamin D deficiency (268 9) (E55 9)    Aftercare following right shoulder joint replacement surgery (V54 81) (Z47 1)          Past Medical History    1  History of Abnormal thyroid function test (794 5) (R94 6)   2  History of Cough (786 2) (R05)   3  History of Effusion of knee (719 06) (M25 469)   4  History of acute bronchitis (V12 69) (Z87 09)   5  History of hypothyroidism (V12 29) (Z86 39)   6  History of low back pain (V13 59) (Z87 39)   7  History of Plantar warts (078 12) (B07 0)   8  History of Primary osteoarthritis of right knee (715 16) (M17 11)   9  History of Prolapsing Mitral Valve Leaflet Syndrome (424 0)   10  History of Reported Pap Smear   11  History of Reported Positive Pap Smear (795 19)   12  History of Shoulder Dislocation (831 00)   13  History of Tinnitus, unspecified laterality (388 30) (H93 19)   14  History of Vitamin D deficiency (268 9) (E55 9)    Surgical History    1  History of Arthroscopy Knee   2  History of Cervical Conization Loop Electrode Excision   3  History of Colonoscopy (Fiberoptic)   4  History of Shoulder Surgery    Family History  Mother    1  Family history of Arthritis (V17 7)   2  Family history of Type 2 Diabetes Mellitus  Father    3  Family history of Arthritis (V17 7)   4  Family history of Laryngeal Cancer (V16 2)  Maternal Grandmother    5  Family history of Type 2 Diabetes Mellitus  Maternal Uncle    6  Family history of Colon Cancer (V16 0)  Family History    7  Family history of Diabetes Mellitus (V18 0)   8  Family history of Osteoarthritis (V17 7)    Social History    · Being A Social Drinker   · Marital History -  (V61 03)   · Never A Smoker   · Working Full Time  Social History Reviewed: The social history was reviewed and is unchanged  Current Meds   1  Calcium 600+D3 600-200 MG-UNIT Oral Tablet; TAKE 1 TABLET DAILY; Therapy: 29SMM5205 to (Evaluate:22Lsl4829); Last Rx:28Jan2013 Ordered   2  Fish Oil OIL; USE AS DIRECTED; Therapy: 82WWV4737 to (Last Rx:28Jan2013) Ordered   3  Glucosamine-Chondroitin 500-400 MG Oral Capsule; TAKE 1 CAPSULE 3 TIMES DAILY;    Therapy: 51OZX3339 to Recorded   4  SulfaSALAzine 500 MG Oral Tablet; 2 tablets po daily; Therapy: 56IMJ5754 to (Last Rx:19Apr2017) Ordered  Medication List Reviewed: The medication list was reviewed and updated today  Allergies    1  No Known Drug Allergies    Vitals  Signs   Recorded: 96PDK9405 01:08PM   Temperature: 98 F  Heart Rate: 82  Respiration: 17  Systolic: 637  Diastolic: 74  Height: 5 ft 6 in  Weight: 138 lb   BMI Calculated: 22 27  BSA Calculated: 1 71  O2 Saturation: 96    Physical Exam    Constitutional   General appearance: No acute distress, well appearing and well nourished  comfortable, clothing appropriate and well hydrated  Head and Face   Head and face: Normal     Eyes   Pupils and irises: Equal, round, reactive to light  Ears, Nose, Mouth, and Throat   External inspection of ears and nose: Normal     Neck   Neck: Supple, symmetric, trachea midline, no masses  Pulmonary   Respiratory effort: No increased work of breathing or signs of respiratory distress  Auscultation of lungs: Clear to auscultation  Cardiovascular   Auscultation of heart: Normal rate and rhythm, normal S1 and S2, no murmurs  Examination of extremities for edema and/or varicosities: Normal     Abdomen   Abdomen: Non-tender, no masses  Musculoskeletal   Gait and station: Normal     Skin Steri-Strips R anterior shoulder  Neurologic   Cortical function: Normal mental status  Psychiatric   Orientation to person, place, and time: Normal     Mood and affect: Normal        Future Appointments    Date/Time Provider Specialty Site   07/10/2017 11:10 HIRO Calixto  Orthopedic Surgery [facility] [place]   11/07/2017 05:30 PM Etienne Campos MD Internal Medicine [facility] INTERNAL MED   07/03/2017 01:00 PM HIRO Garcia   Obstetrics/Gynecology [facility] [address]     Signatures   Electronically signed by : Glendy Blanco MD; May  8 2017  4:14PM EST (Author)

## 2018-01-15 VITALS
WEIGHT: 134.25 LBS | BODY MASS INDEX: 21.57 KG/M2 | SYSTOLIC BLOOD PRESSURE: 150 MMHG | HEIGHT: 66 IN | DIASTOLIC BLOOD PRESSURE: 75 MMHG | HEART RATE: 51 BPM

## 2018-01-15 VITALS
SYSTOLIC BLOOD PRESSURE: 134 MMHG | TEMPERATURE: 98 F | HEART RATE: 48 BPM | HEIGHT: 66 IN | OXYGEN SATURATION: 98 % | DIASTOLIC BLOOD PRESSURE: 78 MMHG | WEIGHT: 136.5 LBS | RESPIRATION RATE: 16 BRPM | BODY MASS INDEX: 21.94 KG/M2

## 2018-01-17 NOTE — RESULT NOTES
Verified Results  (1) TISSUE EXAM 24Oct2017 07:46AM Bela Meza     Test Name Result Flag Reference   LAB AP CASE REPORT (Report)     Surgical Pathology Report             Case: [id number]                   Authorizing Provider: Ivanna Gilbert MD      Collected:      10/24/2017 0746        Ordering Location:   [facility]    Received:      10/25/2017 Scott Santos  Endoscopy                            Pathologist:      Devon Rubi MD                                 Specimens:  A) - Polyp, Colorectal, cecal polyp, hot snare                             B) - Polyp, Colorectal, rectal polyp, cold bx   LAB AP FINAL DIAGNOSIS (Report)     A  Cecal polyp (hot snare):    - Portions of serrated colon polyp with features suggesting sessile   serrated adenoma  - No high-grade dysplasia or malignancy identified  B  Rectal polyp (cold biopsy):    - Portions of tubular adenoma  - No high-grade dysplasia or malignancy identified  Electronically signed by Devon Rubi MD on 10/27/2017 at 11:14 AM   LAB AP NOTE      Interpretation performed at [facility], [address] [place] [address], [address] [place] [address]  LAB AP SURGICAL ADDITIONAL INFORMATION (Report)     All controls performed with the immunohistochemical stains reported above   reacted appropriately  These tests were developed and their performance   characteristics determined by [facility] or   [address] [place] [address]  They may not be cleared or approved by the [place]  Food and Drug Administration  The FDA has determined that such clearance   or approval is not necessary  These tests are used for clinical purposes  They should not be regarded as investigational or for research  This   laboratory has been approved by [place] 88, designated as a high-complexity   laboratory and is qualified to perform these tests  LAB AP GROSS DESCRIPTION (Report)     A   The specimen is received in formalin, labeled with the patient's name   and hospital number, and is designated Cecal polyp  The specimen   consists of one tan-pink polypoid soft tissue fragment measuring 0 7 cm in   greatest dimension  The margin of resection is inked blue  The polypoid   fragment is bisected revealing tan-pink dense cut surfaces  Also submitted   in the container are 2 additional tan-pink soft tissue fragments measuring   0 3 cm and 0 4 cm in greatest dimension  Entirely submitted  One cassette  B  The specimen is received in formalin, labeled with the patient's name   and hospital number, and is designated Rectal polyp  The specimen   consists of one tan-pink soft tissue fragment measuring 0 2 cm in greatest   dimension  Entirely submitted  One cassette  Note: The estimated total formalin fixation time based upon information   provided by the submitting clinician and the standard processing schedule   is under 72 hours   MAS   LAB AP CLINICAL INFORMATION      Encounter for screening for malignant neoplasm of colon

## 2018-01-19 ENCOUNTER — APPOINTMENT (OUTPATIENT)
Dept: LAB | Age: 63
End: 2018-01-19
Payer: COMMERCIAL

## 2018-01-19 ENCOUNTER — TRANSCRIBE ORDERS (OUTPATIENT)
Dept: ADMINISTRATIVE | Age: 63
End: 2018-01-19

## 2018-01-19 DIAGNOSIS — Q79.60 EHLERS-DANLOS SYNDROME: ICD-10-CM

## 2018-01-19 DIAGNOSIS — M15.0 PRIMARY GENERALIZED HYPERTROPHIC OSTEOARTHROSIS: ICD-10-CM

## 2018-01-19 DIAGNOSIS — Z79.899 NEED FOR PROPHYLACTIC CHEMOTHERAPY: ICD-10-CM

## 2018-01-19 DIAGNOSIS — L40.59 POLYARTICULAR PSORIATIC ARTHRITIS (HCC): Primary | ICD-10-CM

## 2018-01-19 LAB
ALBUMIN SERPL BCP-MCNC: 4 G/DL (ref 3.5–5)
ALP SERPL-CCNC: 80 U/L (ref 46–116)
ALT SERPL W P-5'-P-CCNC: 25 U/L (ref 12–78)
ANION GAP SERPL CALCULATED.3IONS-SCNC: 4 MMOL/L (ref 4–13)
AST SERPL W P-5'-P-CCNC: 26 U/L (ref 5–45)
BACTERIA UR QL AUTO: NORMAL /HPF
BASOPHILS # BLD AUTO: 0.03 THOUSANDS/ΜL (ref 0–0.1)
BASOPHILS NFR BLD AUTO: 1 % (ref 0–1)
BILIRUB SERPL-MCNC: 0.43 MG/DL (ref 0.2–1)
BILIRUB UR QL STRIP: NEGATIVE
BUN SERPL-MCNC: 12 MG/DL (ref 5–25)
CALCIUM SERPL-MCNC: 9.1 MG/DL (ref 8.3–10.1)
CHLORIDE SERPL-SCNC: 104 MMOL/L (ref 100–108)
CLARITY UR: CLEAR
CO2 SERPL-SCNC: 29 MMOL/L (ref 21–32)
COLOR UR: YELLOW
CREAT SERPL-MCNC: 0.67 MG/DL (ref 0.6–1.3)
EOSINOPHIL # BLD AUTO: 0.01 THOUSAND/ΜL (ref 0–0.61)
EOSINOPHIL NFR BLD AUTO: 0 % (ref 0–6)
ERYTHROCYTE [DISTWIDTH] IN BLOOD BY AUTOMATED COUNT: 12.7 % (ref 11.6–15.1)
ERYTHROCYTE [SEDIMENTATION RATE] IN BLOOD: 7 MM/HOUR (ref 0–20)
GFR SERPL CREATININE-BSD FRML MDRD: 95 ML/MIN/1.73SQ M
GLUCOSE SERPL-MCNC: 83 MG/DL (ref 65–140)
GLUCOSE UR STRIP-MCNC: NEGATIVE MG/DL
HCT VFR BLD AUTO: 39.8 % (ref 34.8–46.1)
HGB BLD-MCNC: 13.2 G/DL (ref 11.5–15.4)
HGB UR QL STRIP.AUTO: NEGATIVE
HYALINE CASTS #/AREA URNS LPF: NORMAL /LPF
KETONES UR STRIP-MCNC: NEGATIVE MG/DL
LEUKOCYTE ESTERASE UR QL STRIP: ABNORMAL
LYMPHOCYTES # BLD AUTO: 1.91 THOUSANDS/ΜL (ref 0.6–4.47)
LYMPHOCYTES NFR BLD AUTO: 38 % (ref 14–44)
MCH RBC QN AUTO: 31.4 PG (ref 26.8–34.3)
MCHC RBC AUTO-ENTMCNC: 33.2 G/DL (ref 31.4–37.4)
MCV RBC AUTO: 95 FL (ref 82–98)
MONOCYTES # BLD AUTO: 0.71 THOUSAND/ΜL (ref 0.17–1.22)
MONOCYTES NFR BLD AUTO: 14 % (ref 4–12)
NEUTROPHILS # BLD AUTO: 2.35 THOUSANDS/ΜL (ref 1.85–7.62)
NEUTS SEG NFR BLD AUTO: 47 % (ref 43–75)
NITRITE UR QL STRIP: NEGATIVE
NON-SQ EPI CELLS URNS QL MICRO: NORMAL /HPF
NRBC BLD AUTO-RTO: 0 /100 WBCS
PH UR STRIP.AUTO: 7 [PH] (ref 4.5–8)
PLATELET # BLD AUTO: 284 THOUSANDS/UL (ref 149–390)
PMV BLD AUTO: 10.4 FL (ref 8.9–12.7)
POTASSIUM SERPL-SCNC: 4 MMOL/L (ref 3.5–5.3)
PROT SERPL-MCNC: 7.9 G/DL (ref 6.4–8.2)
PROT UR STRIP-MCNC: NEGATIVE MG/DL
RBC # BLD AUTO: 4.21 MILLION/UL (ref 3.81–5.12)
RBC #/AREA URNS AUTO: NORMAL /HPF
SODIUM SERPL-SCNC: 137 MMOL/L (ref 136–145)
SP GR UR STRIP.AUTO: 1.01 (ref 1–1.03)
UROBILINOGEN UR QL STRIP.AUTO: 0.2 E.U./DL
WBC # BLD AUTO: 5.02 THOUSAND/UL (ref 4.31–10.16)
WBC #/AREA URNS AUTO: NORMAL /HPF

## 2018-01-19 PROCEDURE — 36415 COLL VENOUS BLD VENIPUNCTURE: CPT

## 2018-01-19 PROCEDURE — 85652 RBC SED RATE AUTOMATED: CPT

## 2018-01-19 PROCEDURE — 85025 COMPLETE CBC W/AUTO DIFF WBC: CPT

## 2018-01-19 PROCEDURE — 81001 URINALYSIS AUTO W/SCOPE: CPT | Performed by: INTERNAL MEDICINE

## 2018-01-19 PROCEDURE — 80053 COMPREHEN METABOLIC PANEL: CPT

## 2018-01-22 VITALS
SYSTOLIC BLOOD PRESSURE: 132 MMHG | DIASTOLIC BLOOD PRESSURE: 74 MMHG | BODY MASS INDEX: 22.18 KG/M2 | WEIGHT: 138 LBS | TEMPERATURE: 98 F | RESPIRATION RATE: 17 BRPM | HEIGHT: 66 IN | HEART RATE: 82 BPM | OXYGEN SATURATION: 96 %

## 2018-01-24 VITALS
DIASTOLIC BLOOD PRESSURE: 80 MMHG | RESPIRATION RATE: 18 BRPM | OXYGEN SATURATION: 98 % | WEIGHT: 139.5 LBS | HEIGHT: 66 IN | BODY MASS INDEX: 22.42 KG/M2 | SYSTOLIC BLOOD PRESSURE: 120 MMHG | HEART RATE: 52 BPM | TEMPERATURE: 98.1 F

## 2018-03-09 ENCOUNTER — TELEPHONE (OUTPATIENT)
Dept: INTERNAL MEDICINE CLINIC | Facility: CLINIC | Age: 63
End: 2018-03-09

## 2018-03-09 DIAGNOSIS — R10.11 RUQ PAIN: Primary | ICD-10-CM

## 2018-03-09 NOTE — TELEPHONE ENCOUNTER
FOR A COUPLE MOS  Pt  HAS A DULL ACHE ON Rt  SIDE UNDER RIBS WHERE HER LIVER Is  SHE KNOWS HER RECENT LIVER ENZYMES WERE NORMAL  IT'S NOT A SHARP PAIN BUT IT IS THERE ALL THE ITME  HAS HAD THIS SINCE KELSEY  SHE TAKES MED FOR HER PSORIATIC ARTHRITIS AND HER RHEUMATOLOGIST CHECKS HER BW RECENTLY

## 2018-04-13 ENCOUNTER — HOSPITAL ENCOUNTER (OUTPATIENT)
Dept: RADIOLOGY | Age: 63
Discharge: HOME | End: 2018-04-13
Payer: COMMERCIAL

## 2018-04-13 DIAGNOSIS — R10.11 RUQ PAIN: ICD-10-CM

## 2018-04-13 PROCEDURE — 76705 ECHO EXAM OF ABDOMEN: CPT

## 2018-04-16 ENCOUNTER — TELEPHONE (OUTPATIENT)
Dept: INTERNAL MEDICINE CLINIC | Facility: CLINIC | Age: 63
End: 2018-04-16

## 2018-04-16 NOTE — TELEPHONE ENCOUNTER
----- Message from Mario Redd MD sent at 4/16/2018  8:38 AM EDT -----  Ultrasound is normal  Ask if still having symptoms

## 2018-06-05 ENCOUNTER — OFFICE VISIT (OUTPATIENT)
Dept: AUDIOLOGY | Age: 63
End: 2018-06-05

## 2018-06-05 DIAGNOSIS — H90.3 SENSORINEURAL HEARING LOSS, BILATERAL: Primary | ICD-10-CM

## 2018-06-05 NOTE — PROGRESS NOTES
Hearing aid visit:    Name:  Jung Delgado  :  1955  Age:  58 y o  Date of Evaluation: 18     Jung Delgado is being seen for a hearing aid visit  Jung Delgado   is fit binaurally with O[employer]on Opn 1 mini [employer] hearing aid(s) serial number [id number] right/ serial number [id number] left  Warranty 21  Patient reports hearing aids stopped working over the weekend  Action:  Cleaned and checked hearing aids, wax filters completely clogged  Changed out filters and reviewed cleaning and changing  Recommendations: Follow up as needed          Suzette Darden , CCC-A  Clinical Audiologist

## 2018-06-19 ENCOUNTER — TRANSCRIBE ORDERS (OUTPATIENT)
Dept: ADMINISTRATIVE | Age: 63
End: 2018-06-19

## 2018-06-19 ENCOUNTER — APPOINTMENT (OUTPATIENT)
Dept: LAB | Age: 63
End: 2018-06-19

## 2018-06-19 DIAGNOSIS — Z00.8 HEALTH EXAMINATION IN POPULATION SURVEY: Primary | ICD-10-CM

## 2018-06-19 DIAGNOSIS — Z00.8 HEALTH EXAMINATION IN POPULATION SURVEY: ICD-10-CM

## 2018-06-19 LAB
CHOLEST SERPL-MCNC: 173 MG/DL (ref 50–200)
EST. AVERAGE GLUCOSE BLD GHB EST-MCNC: 100 MG/DL
HBA1C MFR BLD: 5.1 % (ref 4.2–6.3)
HDLC SERPL-MCNC: 75 MG/DL (ref 40–60)
LDLC SERPL CALC-MCNC: 91 MG/DL (ref 0–100)
NONHDLC SERPL-MCNC: 98 MG/DL
TRIGL SERPL-MCNC: 36 MG/DL

## 2018-06-19 PROCEDURE — 36415 COLL VENOUS BLD VENIPUNCTURE: CPT

## 2018-06-19 PROCEDURE — 80061 LIPID PANEL: CPT

## 2018-06-19 PROCEDURE — 83036 HEMOGLOBIN GLYCOSYLATED A1C: CPT

## 2018-08-01 ENCOUNTER — ANNUAL EXAM (OUTPATIENT)
Dept: OBGYN CLINIC | Facility: CLINIC | Age: 63
End: 2018-08-01
Payer: COMMERCIAL

## 2018-08-01 VITALS
WEIGHT: 139.6 LBS | HEIGHT: 67 IN | BODY MASS INDEX: 21.91 KG/M2 | DIASTOLIC BLOOD PRESSURE: 84 MMHG | SYSTOLIC BLOOD PRESSURE: 120 MMHG

## 2018-08-01 DIAGNOSIS — Z12.39 BREAST CANCER SCREENING: Primary | ICD-10-CM

## 2018-08-01 DIAGNOSIS — Z01.419 WOMEN'S ANNUAL ROUTINE GYNECOLOGICAL EXAMINATION: ICD-10-CM

## 2018-08-01 PROCEDURE — 87624 HPV HI-RISK TYP POOLED RSLT: CPT | Performed by: OBSTETRICS & GYNECOLOGY

## 2018-08-01 PROCEDURE — G0145 SCR C/V CYTO,THINLAYER,RESCR: HCPCS | Performed by: OBSTETRICS & GYNECOLOGY

## 2018-08-01 PROCEDURE — 99396 PREV VISIT EST AGE 40-64: CPT | Performed by: OBSTETRICS & GYNECOLOGY

## 2018-08-01 NOTE — PROGRESS NOTES
This is a 60-year-old white female, she is a  4 para 3 with 3 prior vaginal deliveries  She is now in menopause  She has been involved with intimate relations for almost 2 years  Things are going well  Vaginal dryness is not an issue  She is doing well since her shoulder replacement  Last year she had a DEXA scan colonoscopy all which were normal  She will need to make arrange for mammogram this year  She does have a history of psoriatic arthritis is under control  There are no new major family illnesses report at this time  There is no problem with depression or anxiety  Patient sees a dentist on a regular basis  She exercises  She is content with her weight  Review of systems negative      Medical history significant for psoriatic arthritis  History of shoulder replacement  Surgical history significant for cone biopsy of the cervix, and right shoulder replacement      Family history significant for arthritis, diabetes, can cancer, colon cancer, and throat cancer  Social history for tobacco positive for social alcohol        Physical exam      This is a 60-year-old white female, she is in no acute distress, her HEENT is was within normal limits, pulmonary exam shows the lungs clear to A&P, cardiac exam shows a regular rhythm and rate no murmur normal normal S1-S2, breast exam symmetrical nontender no dimpling no retraction no masses  Axilla clear bilaterally  Abdominal exam is soft nontender no masses flat positive bowel sounds  Pelvic exam the external genitalia no consistent with menopause  The vagina is clean and healthy to En day are thin  The cervix is closed uterus is small anterior, no cervical motion tenderness  Adnexa clear bilaterally  A Pap smear was performed because of her new relationship  Impression stable gyn examination  Menopause  Pap smear performed  Continue yearly mammograms  Return to office in 1 year    Colonoscopies and DEXA scans and dental appointments are all up-to-date

## 2018-08-03 LAB
HPV RRNA GENITAL QL NAA+PROBE: NORMAL
LAB AP GYN PRIMARY INTERPRETATION: NORMAL
Lab: NORMAL

## 2018-08-20 ENCOUNTER — HOSPITAL ENCOUNTER (OUTPATIENT)
Dept: RADIOLOGY | Age: 63
Discharge: HOME | End: 2018-08-20
Payer: COMMERCIAL

## 2018-08-20 DIAGNOSIS — Z12.39 BREAST CANCER SCREENING: ICD-10-CM

## 2018-08-20 PROCEDURE — 77063 BREAST TOMOSYNTHESIS BI: CPT

## 2018-08-20 PROCEDURE — 77067 SCR MAMMO BI INCL CAD: CPT

## 2018-08-22 ENCOUNTER — TELEPHONE (OUTPATIENT)
Dept: INTERNAL MEDICINE CLINIC | Facility: CLINIC | Age: 63
End: 2018-08-22

## 2018-08-22 DIAGNOSIS — L40.50 PSORIASIS WITH ARTHROPATHY (HCC): ICD-10-CM

## 2018-08-22 DIAGNOSIS — D72.818 OTHER DECREASED WHITE BLOOD CELL (WBC) COUNT: Primary | ICD-10-CM

## 2018-08-24 PROBLEM — M25.469 KNEE JOINT EFFUSION: Status: ACTIVE | Noted: 2018-08-24

## 2018-08-24 PROBLEM — R92.2 DENSE BREASTS: Status: ACTIVE | Noted: 2017-06-09

## 2018-08-24 PROBLEM — M19.211 LOCALIZED SECONDARY OSTEOARTHRITIS OF RIGHT SHOULDER REGION: Status: ACTIVE | Noted: 2017-03-20

## 2018-08-24 PROBLEM — H91.90 HEARING LOSS: Status: ACTIVE | Noted: 2017-11-28

## 2018-08-24 PROBLEM — M22.40 CHONDROMALACIA PATELLAE: Status: ACTIVE | Noted: 2018-08-24

## 2018-08-24 PROBLEM — M19.011 ARTHRITIS OF RIGHT SHOULDER REGION: Status: ACTIVE | Noted: 2017-04-19

## 2018-08-24 PROBLEM — R92.30 DENSE BREASTS: Status: ACTIVE | Noted: 2017-06-09

## 2018-08-24 PROBLEM — K59.00 CONSTIPATION: Status: ACTIVE | Noted: 2017-12-05

## 2018-08-24 PROBLEM — IMO0002 CURRENT TEAR OF MEDIAL CARTILAGE OR MENISCUS OF KNEE: Status: ACTIVE | Noted: 2018-08-24

## 2018-08-24 NOTE — TELEPHONE ENCOUNTER
Pt notified would like orders put in, will go to [facility] lab to have done prior to appt  Does not need cb once orders are in

## 2018-08-29 ENCOUNTER — OFFICE VISIT (OUTPATIENT)
Dept: OBGYN CLINIC | Facility: HOSPITAL | Age: 63
End: 2018-08-29
Payer: COMMERCIAL

## 2018-08-29 VITALS
WEIGHT: 139 LBS | HEIGHT: 67 IN | DIASTOLIC BLOOD PRESSURE: 78 MMHG | HEART RATE: 45 BPM | BODY MASS INDEX: 21.82 KG/M2 | SYSTOLIC BLOOD PRESSURE: 125 MMHG

## 2018-08-29 DIAGNOSIS — M25.561 CHRONIC PAIN OF RIGHT KNEE: ICD-10-CM

## 2018-08-29 DIAGNOSIS — M17.11 PRIMARY OSTEOARTHRITIS OF RIGHT KNEE: Primary | ICD-10-CM

## 2018-08-29 DIAGNOSIS — G89.29 CHRONIC PAIN OF RIGHT KNEE: ICD-10-CM

## 2018-08-29 DIAGNOSIS — Z98.890 HISTORY OF ARTHROSCOPY OF RIGHT KNEE: ICD-10-CM

## 2018-08-29 PROCEDURE — 20610 DRAIN/INJ JOINT/BURSA W/O US: CPT | Performed by: ORTHOPAEDIC SURGERY

## 2018-08-29 PROCEDURE — 99213 OFFICE O/P EST LOW 20 MIN: CPT | Performed by: ORTHOPAEDIC SURGERY

## 2018-08-29 RX ORDER — BUPIVACAINE HYDROCHLORIDE 2.5 MG/ML
2 INJECTION, SOLUTION INFILTRATION; PERINEURAL
Status: COMPLETED | OUTPATIENT
Start: 2018-08-29 | End: 2018-08-29

## 2018-08-29 RX ORDER — LIDOCAINE HYDROCHLORIDE 10 MG/ML
2 INJECTION, SOLUTION INFILTRATION; PERINEURAL
Status: COMPLETED | OUTPATIENT
Start: 2018-08-29 | End: 2018-08-29

## 2018-08-29 RX ORDER — BETAMETHASONE SODIUM PHOSPHATE AND BETAMETHASONE ACETATE 3; 3 MG/ML; MG/ML
12 INJECTION, SUSPENSION INTRA-ARTICULAR; INTRALESIONAL; INTRAMUSCULAR; SOFT TISSUE
Status: COMPLETED | OUTPATIENT
Start: 2018-08-29 | End: 2018-08-29

## 2018-08-29 RX ADMIN — BETAMETHASONE SODIUM PHOSPHATE AND BETAMETHASONE ACETATE 12 MG: 3; 3 INJECTION, SUSPENSION INTRA-ARTICULAR; INTRALESIONAL; INTRAMUSCULAR; SOFT TISSUE at 14:13

## 2018-08-29 RX ADMIN — LIDOCAINE HYDROCHLORIDE 2 ML: 10 INJECTION, SOLUTION INFILTRATION; PERINEURAL at 14:13

## 2018-08-29 RX ADMIN — BUPIVACAINE HYDROCHLORIDE 2 ML: 2.5 INJECTION, SOLUTION INFILTRATION; PERINEURAL at 14:13

## 2018-08-29 NOTE — PROGRESS NOTES
Assessment:  1  Primary osteoarthritis of right knee     2  Chronic pain of right knee     3  History of arthroscopy of right knee         Plan:  Right knee, known OA and based on her symptoms and physical exam findings appears that her symptoms are associated with her underlying OA being aggravated  Treatment options discussed, offered and accept a right knee cortisone injection  ICE and post-injection protocol advised  WBAT  Activities as tolerated  Short arc quadriceps strengthening exercises  To do next visit:  Return in about 3 months (around 11/29/2018) for re-check  Scribe Attestation    I,:   Lana Cruz am acting as a scribe while in the presence of the attending physician :        I,:   Marcelo Jimenez MD personally performed the services described in this documentation    as scribed in my presence :              Subjective:   Ankit Jacobson is a 58 y o  female who presents for re-evaluation of her right knee due to pain and swelling  She had a right knee scope in 2008/09 by Dr David Lopez and subsequently has had cortisone as well as visco injections, but not since 2015  Her right knee pain is medially and will vary with her daily and exercise activities (tennis, cycling)  Review of systems negative unless otherwise specified in HPI      Past Medical History:   Diagnosis Date    Abnormal thyroid function test     R    5/8/17     Arthritis     Dislocated shoulder     Right / LA  Felecia Ramirez 1/30/13     Effusion of knee     LA    5/21/14   R    5/8/17     Hypothyroidism     LA    2/6/13   R   3/31/15     Primary osteoarthritis of right knee     LA   4/9/14   R    5/21/14     Prolapsing mitral leaflet syndrome     LA    1/28/13   [place]  Felecia Ramirez 3/31/15     Tinnitus     ringing in both ears    Vaginal Pap smear, abnormal     Vitamin D deficiency     LA    5/8/17  R  Felecia Ramirez 3/31/15        Past Surgical History:   Procedure Laterality Date    CERVICAL BIOPSY  W/ LOOP ELECTRODE EXCISION      KNEE ARTHROSCOPY Right right knee, right shoulder , open right shoulder     HI COLONOSCOPY FLX DX W/COLLJ SPEC WHEN PFRMD N/A 10/24/2017    Procedure: COLONOSCOPY;  Surgeon: Breanna Napoles MD;  Location: [facility]; Service: Gastroenterology    HI RECONSTR TOTAL SHOULDER IMPLANT Right 4/25/2017    Procedure: TOTAL SHOULDER ARTHROPLASTY ;  Surgeon: Nidhi Wilson MD;  [facility];  Service: Orthopedics    SHOULDER SURGERY Right     total shoulder replacement        Family History   Problem Relation Age of Onset    Arthritis Mother     Diabetes type II Mother     Arthritis Father     Throat cancer Father     Diabetes type II Maternal Grandmother     Colon cancer Maternal Uncle     Diabetes Family     Osteoarthritis Family        Social History     Occupational History    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       working full time     Social History Main Topics    Smoking status: Never Smoker    Smokeless tobacco: Never Used    Alcohol use Yes      Comment: socially    Drug use: No    Sexual activity: Yes     Partners: Male     Birth control/ protection: Post-menopausal         Current Outpatient Prescriptions:     Calcium Carb-Cholecalciferol (CALCIUM + D3 PO), Take by mouth daily  , Disp: , Rfl:     glucosamine-chondroitin 500-400 MG tablet, Take 1 tablet by mouth 3 (three) times a day, Disp: , Rfl:     Omega-3 Fatty Acids (FISH OIL PO), Take by mouth, Disp: , Rfl:     sulfaSALAzine (AZULFIDINE) 500 mg tablet, Take 1,000 mg by mouth daily  , Disp: , Rfl:     No Known Allergies         Vitals:    08/29/18 1341   BP: 125/78   Pulse: (!) 45       Objective:          Physical Exam                    Right Knee Exam     Tenderness   The patient is experiencing tenderness in the medial joint line (medial patellar facet)  Range of Motion   Extension: 5   Flexion: 110 (with crepitus and stiffness)     Muscle Strength     The patient has normal right knee strength      Tests   Jasiel: Medial - negative Lateral - negative  Lachman:  Anterior - negative      Drawer:       Anterior - negative    Posterior - negative    Other   Erythema: absent  Sensation: normal  Swelling: mild    Comments:    Trace effusion  Mild hypermobile patella  Bony enlargement medially    NVID            Diagnostics, reviewed and taken today if performed as documented:    None performed      Procedures, if performed today:  Large joint arthrocentesis  Date/Time: 8/29/2018 2:13 PM  Consent given by: patient  Site marked: site marked  Supporting Documentation  Indications: pain   Procedure Details  Location: knee - R knee  Preparation: Patient was prepped and draped in the usual sterile fashion  Needle size: 22 G  Ultrasound guidance: no  Medications administered: 2 mL bupivacaine 0 25 %; 2 mL lidocaine 1 %; 12 mg betamethasone acetate-betamethasone sodium phosphate 6 (3-3) mg/mL    Patient tolerance: patient tolerated the procedure well with no immediate complications  Dressing:  Sterile dressing applied

## 2018-08-29 NOTE — PATIENT INSTRUCTIONS
Steroid Joint Injection   WHAT YOU NEED TO KNOW:   What do I need to know about steroid joint injection? A steroid joint injection is a procedure to inject steroid medicine into a joint  Steroid medicine decreases pain and inflammation  The injection may also contain an anesthetic (numbing medicine) to decrease pain  It may be done to treat conditions such as arthritis, gout, or carpal tunnel syndrome  The injections may be given in your knee, ankle, shoulder, elbow, wrist, or ankle  How do I prepare for steroid joint injection? Your healthcare provider will talk to you about how to prepare for this procedure  He will tell you what medicines to take or not take on the day of your procedure  You may need to stop taking blood thinners several days before your procedure  What will happen during steroid joint injection? You may be given local anesthesia to numb the area where the injection will be given  With local anesthesia, you may still feel pressure during the procedure, but you should not feel any pain  Your healthcare provider may use ultrasound or fluoroscopy (a type of x-ray) to guide the needle to the right area  He will then inject the steroid into your joint  A bandage will be placed on the injection site  What will happen after steroid joint injection? You may have redness, warmth, or sweating in your face and chest right after the steroid injection  Steroids can affect blood sugar levels  If you have diabetes, check your blood sugars closely in the first 24 hours after your procedure  What are the risks of steroid joint injection? You may get an infection in your joint  The injection may also cause more pain during the first 24 to 36 hours  You may need more than one injection to feel pain relief  The skin near the injection site may be damaged and become discolored or indented  This can happen if the steroid is placed too close to your skin   A tendon near the injection site may rupture or a nerve can be damaged  CARE AGREEMENT:   You have the right to help plan your care  Learn about your health condition and how it may be treated  Discuss treatment options with your caregivers to decide what care you want to receive  You always have the right to refuse treatment  The above information is an  only  It is not intended as medical advice for individual conditions or treatments  Talk to your doctor, nurse or pharmacist before following any medical regimen to see if it is safe and effective for you  © 2017 [employer]  Information is for End User's use only and may not be sold, redistributed or otherwise used for commercial purposes  All illustrations and images included in CareNotes® are the copyrighted property of [employer]  or [employer]

## 2018-08-30 ENCOUNTER — TELEPHONE (OUTPATIENT)
Dept: OBGYN CLINIC | Facility: HOSPITAL | Age: 63
End: 2018-08-30

## 2018-08-30 NOTE — TELEPHONE ENCOUNTER
Patient sees Dr Virginia Hancock  She was in the office yesterday and forgot to ask about the form she dropped off about a month ago  She states it was for reimbursement for the fish oil she was taking  Is this form complete? I did not see it in the patient's chart

## 2018-09-04 ENCOUNTER — TRANSCRIBE ORDERS (OUTPATIENT)
Dept: ADMINISTRATIVE | Age: 63
End: 2018-09-04

## 2018-09-04 ENCOUNTER — APPOINTMENT (OUTPATIENT)
Dept: LAB | Age: 63
End: 2018-09-04
Payer: COMMERCIAL

## 2018-09-04 DIAGNOSIS — L40.59 POLYARTICULAR PSORIATIC ARTHRITIS (HCC): ICD-10-CM

## 2018-09-04 DIAGNOSIS — L40.59 POLYARTICULAR PSORIATIC ARTHRITIS (HCC): Primary | ICD-10-CM

## 2018-09-04 DIAGNOSIS — L40.50 PSORIATIC ARTHROPATHY (HCC): ICD-10-CM

## 2018-09-04 DIAGNOSIS — Z79.899 NEED FOR PROPHYLACTIC CHEMOTHERAPY: ICD-10-CM

## 2018-09-04 DIAGNOSIS — L40.50 PSORIATIC ARTHROPATHY (HCC): Primary | ICD-10-CM

## 2018-09-04 LAB
ALBUMIN SERPL BCP-MCNC: 3.8 G/DL (ref 3.5–5)
ALP SERPL-CCNC: 73 U/L (ref 46–116)
ALT SERPL W P-5'-P-CCNC: 19 U/L (ref 12–78)
ANION GAP SERPL CALCULATED.3IONS-SCNC: 7 MMOL/L (ref 4–13)
AST SERPL W P-5'-P-CCNC: 18 U/L (ref 5–45)
BACTERIA UR QL AUTO: ABNORMAL /HPF
BASOPHILS # BLD AUTO: 0.05 THOUSANDS/ΜL (ref 0–0.1)
BASOPHILS NFR BLD AUTO: 1 % (ref 0–1)
BILIRUB SERPL-MCNC: 0.4 MG/DL (ref 0.2–1)
BILIRUB UR QL STRIP: NEGATIVE
BUN SERPL-MCNC: 16 MG/DL (ref 5–25)
CALCIUM SERPL-MCNC: 8.8 MG/DL (ref 8.3–10.1)
CHLORIDE SERPL-SCNC: 102 MMOL/L (ref 100–108)
CLARITY UR: CLEAR
CO2 SERPL-SCNC: 28 MMOL/L (ref 21–32)
COLOR UR: YELLOW
CREAT SERPL-MCNC: 0.82 MG/DL (ref 0.6–1.3)
EOSINOPHIL # BLD AUTO: 0.01 THOUSAND/ΜL (ref 0–0.61)
EOSINOPHIL NFR BLD AUTO: 0 % (ref 0–6)
ERYTHROCYTE [DISTWIDTH] IN BLOOD BY AUTOMATED COUNT: 12.4 % (ref 11.6–15.1)
ERYTHROCYTE [SEDIMENTATION RATE] IN BLOOD: 7 MM/HOUR (ref 0–20)
GFR SERPL CREATININE-BSD FRML MDRD: 77 ML/MIN/1.73SQ M
GLUCOSE P FAST SERPL-MCNC: 88 MG/DL (ref 65–99)
GLUCOSE UR STRIP-MCNC: NEGATIVE MG/DL
HCT VFR BLD AUTO: 42.6 % (ref 34.8–46.1)
HGB BLD-MCNC: 13.8 G/DL (ref 11.5–15.4)
HGB UR QL STRIP.AUTO: ABNORMAL
HYALINE CASTS #/AREA URNS LPF: ABNORMAL /LPF
IMM GRANULOCYTES # BLD AUTO: 0.02 THOUSAND/UL (ref 0–0.2)
IMM GRANULOCYTES NFR BLD AUTO: 0 % (ref 0–2)
KETONES UR STRIP-MCNC: NEGATIVE MG/DL
LEUKOCYTE ESTERASE UR QL STRIP: ABNORMAL
LYMPHOCYTES # BLD AUTO: 2.1 THOUSANDS/ΜL (ref 0.6–4.47)
LYMPHOCYTES NFR BLD AUTO: 45 % (ref 14–44)
MCH RBC QN AUTO: 32 PG (ref 26.8–34.3)
MCHC RBC AUTO-ENTMCNC: 32.4 G/DL (ref 31.4–37.4)
MCV RBC AUTO: 99 FL (ref 82–98)
MONOCYTES # BLD AUTO: 0.64 THOUSAND/ΜL (ref 0.17–1.22)
MONOCYTES NFR BLD AUTO: 14 % (ref 4–12)
NEUTROPHILS # BLD AUTO: 1.9 THOUSANDS/ΜL (ref 1.85–7.62)
NEUTS SEG NFR BLD AUTO: 40 % (ref 43–75)
NITRITE UR QL STRIP: NEGATIVE
NON-SQ EPI CELLS URNS QL MICRO: ABNORMAL /HPF
NRBC BLD AUTO-RTO: 0 /100 WBCS
PH UR STRIP.AUTO: 6 [PH] (ref 4.5–8)
PLATELET # BLD AUTO: 244 THOUSANDS/UL (ref 149–390)
PMV BLD AUTO: 10.3 FL (ref 8.9–12.7)
POTASSIUM SERPL-SCNC: 4.2 MMOL/L (ref 3.5–5.3)
PROT SERPL-MCNC: 7.9 G/DL (ref 6.4–8.2)
PROT UR STRIP-MCNC: NEGATIVE MG/DL
RBC # BLD AUTO: 4.31 MILLION/UL (ref 3.81–5.12)
RBC #/AREA URNS AUTO: ABNORMAL /HPF
SODIUM SERPL-SCNC: 137 MMOL/L (ref 136–145)
SP GR UR STRIP.AUTO: 1.02 (ref 1–1.03)
TSH SERPL DL<=0.05 MIU/L-ACNC: 7.36 UIU/ML (ref 0.36–3.74)
UROBILINOGEN UR QL STRIP.AUTO: 0.2 E.U./DL
WBC # BLD AUTO: 4.72 THOUSAND/UL (ref 4.31–10.16)
WBC #/AREA URNS AUTO: ABNORMAL /HPF

## 2018-09-04 PROCEDURE — 81001 URINALYSIS AUTO W/SCOPE: CPT | Performed by: PHYSICIAN ASSISTANT

## 2018-09-04 PROCEDURE — 84443 ASSAY THYROID STIM HORMONE: CPT | Performed by: INTERNAL MEDICINE

## 2018-09-04 PROCEDURE — 80053 COMPREHEN METABOLIC PANEL: CPT | Performed by: INTERNAL MEDICINE

## 2018-09-04 PROCEDURE — 36415 COLL VENOUS BLD VENIPUNCTURE: CPT | Performed by: INTERNAL MEDICINE

## 2018-09-04 PROCEDURE — 85025 COMPLETE CBC W/AUTO DIFF WBC: CPT | Performed by: INTERNAL MEDICINE

## 2018-09-04 PROCEDURE — 85652 RBC SED RATE AUTOMATED: CPT

## 2018-09-11 ENCOUNTER — OFFICE VISIT (OUTPATIENT)
Dept: INTERNAL MEDICINE CLINIC | Facility: CLINIC | Age: 63
End: 2018-09-11
Payer: COMMERCIAL

## 2018-09-11 VITALS
TEMPERATURE: 97.8 F | BODY MASS INDEX: 21.75 KG/M2 | HEART RATE: 52 BPM | WEIGHT: 138.6 LBS | DIASTOLIC BLOOD PRESSURE: 80 MMHG | HEIGHT: 67 IN | RESPIRATION RATE: 16 BRPM | OXYGEN SATURATION: 98 % | SYSTOLIC BLOOD PRESSURE: 120 MMHG

## 2018-09-11 DIAGNOSIS — E03.9 ACQUIRED HYPOTHYROIDISM: ICD-10-CM

## 2018-09-11 DIAGNOSIS — Z00.00 HEALTH MAINTENANCE EXAMINATION: Primary | ICD-10-CM

## 2018-09-11 DIAGNOSIS — K59.09 OTHER CONSTIPATION: ICD-10-CM

## 2018-09-11 DIAGNOSIS — G89.29 CHRONIC PAIN OF RIGHT KNEE: ICD-10-CM

## 2018-09-11 DIAGNOSIS — L40.50 PSORIASIS WITH ARTHROPATHY (HCC): ICD-10-CM

## 2018-09-11 DIAGNOSIS — M25.561 CHRONIC PAIN OF RIGHT KNEE: ICD-10-CM

## 2018-09-11 PROCEDURE — 99396 PREV VISIT EST AGE 40-64: CPT | Performed by: INTERNAL MEDICINE

## 2018-09-11 RX ORDER — LEVOTHYROXINE SODIUM 0.03 MG/1
25 TABLET ORAL DAILY
Qty: 30 TABLET | Refills: 1 | Status: SHIPPED | OUTPATIENT
Start: 2018-09-11 | End: 2018-10-22 | Stop reason: SDUPTHER

## 2018-09-11 NOTE — PROGRESS NOTES
Assessment/Plan:    Acquired hypothyroidism  Agrees to start medication  Recheck TFTs in 6 weeks  Leukopenia  Now normal   Repeat CBC due to slight lymphocytosis  Knee pain, right  S/p injection  Follow up with ortho  Psoriasis with arthropathy (HCC)  On sulfasalazine  Sees Dr Camara Quiet months  Hearing loss  Uses hearing aids regularly  Diagnoses and all orders for this visit:    Health maintenance examination  Comments:  Mammogram updated, bone density 2017 normal  Repeat colonoscopy 2020  Acquired hypothyroidism  -     levothyroxine 25 mcg tablet; Take 1 tablet (25 mcg total) by mouth daily  -     TSH, 3rd generation with Free T4 reflex; Future  -     CBC and differential; Future    Psoriasis with arthropathy (HCC)    Other constipation    Chronic pain of right knee      Follow up in 1 year or as needed  Subjective:      Patient ID: James Hernandez is a 58 y o  female  Ms  Asberry Frankel has been doing well  She noticed that she is feeling more tired in the morning, has dry skin  She sleeps well at night  She reports her shoulder feels good, back to playing tennis  She had an injection for her right knee recently  She sees Dr Becki Romero regularly  She takes Miralax every other day for constipation  The following portions of the patient's history were reviewed and updated as appropriate: allergies, current medications, past medical history, past social history and problem list     Review of Systems   Constitutional: Positive for fatigue  Negative for activity change and appetite change  HENT: Positive for hearing loss  Negative for congestion, ear pain and postnasal drip  Eyes: Negative for visual disturbance  Respiratory: Negative for cough and shortness of breath  Cardiovascular: Negative for chest pain and leg swelling  Gastrointestinal: Positive for constipation  Negative for abdominal pain and diarrhea  Genitourinary: Negative for dysuria, frequency and urgency  Musculoskeletal: Negative for arthralgias and myalgias  Skin: Negative for rash and wound  Neurological: Negative for dizziness, numbness and headaches  Hematological: Does not bruise/bleed easily  Psychiatric/Behavioral: Negative for confusion  The patient is not nervous/anxious  Objective:      /80   Pulse (!) 52   Temp 97 8 °F (36 6 °C)   Resp 16   Ht 5' 7" (1 702 m)   Wt 62 9 kg (138 lb 9 6 oz)   SpO2 98%   BMI 21 71 kg/m²          Physical Exam   Constitutional: She is oriented to person, place, and time  She appears well-developed and well-nourished  HENT:   Head: Normocephalic and atraumatic  Nose: Nose normal    Eyes: Conjunctivae are normal  Pupils are equal, round, and reactive to light  Neck: Neck supple  Cardiovascular: Normal rate, regular rhythm and normal heart sounds  No edema  Pulmonary/Chest: Effort normal and breath sounds normal  She has no wheezes  She has no rales  Abdominal: Soft  Bowel sounds are normal    Neurological: She is alert and oriented to person, place, and time  Skin: Skin is warm  No rash noted  Psychiatric: She has a normal mood and affect  Her behavior is normal    Nursing note and vitals reviewed  Lab results reviewed with patient

## 2018-10-16 ENCOUNTER — APPOINTMENT (OUTPATIENT)
Dept: LAB | Age: 63
End: 2018-10-16
Payer: COMMERCIAL

## 2018-10-16 ENCOUNTER — TRANSCRIBE ORDERS (OUTPATIENT)
Dept: ADMINISTRATIVE | Age: 63
End: 2018-10-16

## 2018-10-16 DIAGNOSIS — E03.9 ACQUIRED HYPOTHYROIDISM: ICD-10-CM

## 2018-10-16 LAB
BASOPHILS # BLD AUTO: 0.04 THOUSANDS/ΜL (ref 0–0.1)
BASOPHILS NFR BLD AUTO: 1 % (ref 0–1)
EOSINOPHIL # BLD AUTO: 0 THOUSAND/ΜL (ref 0–0.61)
EOSINOPHIL NFR BLD AUTO: 0 % (ref 0–6)
ERYTHROCYTE [DISTWIDTH] IN BLOOD BY AUTOMATED COUNT: 12.5 % (ref 11.6–15.1)
HCT VFR BLD AUTO: 39.4 % (ref 34.8–46.1)
HGB BLD-MCNC: 12.8 G/DL (ref 11.5–15.4)
IMM GRANULOCYTES # BLD AUTO: 0.01 THOUSAND/UL (ref 0–0.2)
IMM GRANULOCYTES NFR BLD AUTO: 0 % (ref 0–2)
LYMPHOCYTES # BLD AUTO: 1.28 THOUSANDS/ΜL (ref 0.6–4.47)
LYMPHOCYTES NFR BLD AUTO: 33 % (ref 14–44)
MCH RBC QN AUTO: 31.9 PG (ref 26.8–34.3)
MCHC RBC AUTO-ENTMCNC: 32.5 G/DL (ref 31.4–37.4)
MCV RBC AUTO: 98 FL (ref 82–98)
MONOCYTES # BLD AUTO: 0.59 THOUSAND/ΜL (ref 0.17–1.22)
MONOCYTES NFR BLD AUTO: 15 % (ref 4–12)
NEUTROPHILS # BLD AUTO: 1.96 THOUSANDS/ΜL (ref 1.85–7.62)
NEUTS SEG NFR BLD AUTO: 51 % (ref 43–75)
NRBC BLD AUTO-RTO: 0 /100 WBCS
PLATELET # BLD AUTO: 221 THOUSANDS/UL (ref 149–390)
PMV BLD AUTO: 10 FL (ref 8.9–12.7)
RBC # BLD AUTO: 4.01 MILLION/UL (ref 3.81–5.12)
T4 FREE SERPL-MCNC: 0.77 NG/DL (ref 0.76–1.46)
TSH SERPL DL<=0.05 MIU/L-ACNC: 4.2 UIU/ML (ref 0.36–3.74)
WBC # BLD AUTO: 3.88 THOUSAND/UL (ref 4.31–10.16)

## 2018-10-16 PROCEDURE — 84443 ASSAY THYROID STIM HORMONE: CPT

## 2018-10-16 PROCEDURE — 84439 ASSAY OF FREE THYROXINE: CPT

## 2018-10-16 PROCEDURE — 36415 COLL VENOUS BLD VENIPUNCTURE: CPT

## 2018-10-16 PROCEDURE — 85025 COMPLETE CBC W/AUTO DIFF WBC: CPT

## 2018-10-20 ENCOUNTER — TELEPHONE (OUTPATIENT)
Dept: INTERNAL MEDICINE CLINIC | Facility: CLINIC | Age: 63
End: 2018-10-20

## 2018-10-20 DIAGNOSIS — E03.9 ACQUIRED HYPOTHYROIDISM: Primary | ICD-10-CM

## 2018-10-20 NOTE — TELEPHONE ENCOUNTER
Thyroid test still not within normal   Please take 2 tablets one day a week (like Sunday), continue taking 1 tablet daily 6 days a week  recheck labs in a few weeks  Printed  Your white count is a bit low  Will recheck this with thyroid labs

## 2018-10-22 DIAGNOSIS — E03.9 ACQUIRED HYPOTHYROIDISM: ICD-10-CM

## 2018-10-22 RX ORDER — LEVOTHYROXINE SODIUM 0.03 MG/1
25 TABLET ORAL DAILY
Qty: 90 TABLET | Refills: 1 | Status: SHIPPED | OUTPATIENT
Start: 2018-10-22 | End: 2019-03-26 | Stop reason: SDUPTHER

## 2018-11-05 ENCOUNTER — TRANSCRIBE ORDERS (OUTPATIENT)
Dept: ADMINISTRATIVE | Age: 63
End: 2018-11-05

## 2018-11-05 ENCOUNTER — APPOINTMENT (OUTPATIENT)
Dept: LAB | Age: 63
End: 2018-11-05
Payer: COMMERCIAL

## 2018-11-05 DIAGNOSIS — E03.9 ACQUIRED HYPOTHYROIDISM: ICD-10-CM

## 2018-11-05 LAB
BASOPHILS # BLD AUTO: 0.03 THOUSANDS/ΜL (ref 0–0.1)
BASOPHILS NFR BLD AUTO: 1 % (ref 0–1)
EOSINOPHIL # BLD AUTO: 0 THOUSAND/ΜL (ref 0–0.61)
EOSINOPHIL NFR BLD AUTO: 0 % (ref 0–6)
ERYTHROCYTE [DISTWIDTH] IN BLOOD BY AUTOMATED COUNT: 12.4 % (ref 11.6–15.1)
HCT VFR BLD AUTO: 39.2 % (ref 34.8–46.1)
HGB BLD-MCNC: 12.6 G/DL (ref 11.5–15.4)
IMM GRANULOCYTES # BLD AUTO: 0.02 THOUSAND/UL (ref 0–0.2)
IMM GRANULOCYTES NFR BLD AUTO: 0 % (ref 0–2)
LYMPHOCYTES # BLD AUTO: 1.75 THOUSANDS/ΜL (ref 0.6–4.47)
LYMPHOCYTES NFR BLD AUTO: 38 % (ref 14–44)
MCH RBC QN AUTO: 31.9 PG (ref 26.8–34.3)
MCHC RBC AUTO-ENTMCNC: 32.1 G/DL (ref 31.4–37.4)
MCV RBC AUTO: 99 FL (ref 82–98)
MONOCYTES # BLD AUTO: 0.67 THOUSAND/ΜL (ref 0.17–1.22)
MONOCYTES NFR BLD AUTO: 15 % (ref 4–12)
NEUTROPHILS # BLD AUTO: 2.09 THOUSANDS/ΜL (ref 1.85–7.62)
NEUTS SEG NFR BLD AUTO: 46 % (ref 43–75)
NRBC BLD AUTO-RTO: 0 /100 WBCS
PLATELET # BLD AUTO: 237 THOUSANDS/UL (ref 149–390)
PMV BLD AUTO: 10.2 FL (ref 8.9–12.7)
RBC # BLD AUTO: 3.95 MILLION/UL (ref 3.81–5.12)
T4 FREE SERPL-MCNC: 0.82 NG/DL (ref 0.76–1.46)
TSH SERPL DL<=0.05 MIU/L-ACNC: 4.74 UIU/ML (ref 0.36–3.74)
WBC # BLD AUTO: 4.56 THOUSAND/UL (ref 4.31–10.16)

## 2018-11-05 PROCEDURE — 85025 COMPLETE CBC W/AUTO DIFF WBC: CPT

## 2018-11-05 PROCEDURE — 36415 COLL VENOUS BLD VENIPUNCTURE: CPT

## 2018-11-05 PROCEDURE — 84439 ASSAY OF FREE THYROXINE: CPT

## 2018-11-05 PROCEDURE — 84443 ASSAY THYROID STIM HORMONE: CPT

## 2018-11-08 ENCOUNTER — TELEPHONE (OUTPATIENT)
Dept: INTERNAL MEDICINE CLINIC | Facility: CLINIC | Age: 63
End: 2018-11-08

## 2018-11-08 NOTE — TELEPHONE ENCOUNTER
How are you taking your thyroid medication? One a day?     Can continue current dosing, will recheck labs next year (January)    White count is normal

## 2018-11-09 NOTE — TELEPHONE ENCOUNTER
Regarding your thyroid medication, please continue taking 2 pills one day a week, rest of the wake take one

## 2018-11-09 NOTE — TELEPHONE ENCOUNTER
Patient is taking it once a day for the past 2 saturdays she took 2  As suggested she wants to know if she should continue taking 2 pills one day a week?  You can leave her a message in [employer] she said

## 2018-11-26 ENCOUNTER — OFFICE VISIT (OUTPATIENT)
Dept: OBGYN CLINIC | Facility: HOSPITAL | Age: 63
End: 2018-11-26
Payer: COMMERCIAL

## 2018-11-26 ENCOUNTER — HOSPITAL ENCOUNTER (OUTPATIENT)
Dept: RADIOLOGY | Facility: HOSPITAL | Age: 63
Discharge: HOME | End: 2018-11-26
Payer: COMMERCIAL

## 2018-11-26 VITALS
HEART RATE: 53 BPM | HEIGHT: 67 IN | DIASTOLIC BLOOD PRESSURE: 84 MMHG | SYSTOLIC BLOOD PRESSURE: 135 MMHG | WEIGHT: 141 LBS | BODY MASS INDEX: 22.13 KG/M2

## 2018-11-26 DIAGNOSIS — M17.10 ARTHRITIS OF KNEE: ICD-10-CM

## 2018-11-26 DIAGNOSIS — M25.562 LEFT KNEE PAIN, UNSPECIFIED CHRONICITY: ICD-10-CM

## 2018-11-26 DIAGNOSIS — M25.562 LEFT KNEE PAIN, UNSPECIFIED CHRONICITY: Primary | ICD-10-CM

## 2018-11-26 PROCEDURE — 73562 X-RAY EXAM OF KNEE 3: CPT

## 2018-11-26 PROCEDURE — 99213 OFFICE O/P EST LOW 20 MIN: CPT | Performed by: PHYSICIAN ASSISTANT

## 2018-11-26 PROCEDURE — 20610 DRAIN/INJ JOINT/BURSA W/O US: CPT | Performed by: PHYSICIAN ASSISTANT

## 2018-11-26 RX ORDER — LIDOCAINE HYDROCHLORIDE 10 MG/ML
2 INJECTION, SOLUTION INFILTRATION; PERINEURAL
Status: COMPLETED | OUTPATIENT
Start: 2018-11-26 | End: 2018-11-26

## 2018-11-26 RX ORDER — BUPIVACAINE HYDROCHLORIDE 5 MG/ML
2 INJECTION, SOLUTION EPIDURAL; INTRACAUDAL
Status: COMPLETED | OUTPATIENT
Start: 2018-11-26 | End: 2018-11-26

## 2018-11-26 RX ORDER — TRIAMCINOLONE ACETONIDE 40 MG/ML
40 INJECTION, SUSPENSION INTRA-ARTICULAR; INTRAMUSCULAR
Status: COMPLETED | OUTPATIENT
Start: 2018-11-26 | End: 2018-11-26

## 2018-11-26 RX ADMIN — TRIAMCINOLONE ACETONIDE 40 MG: 40 INJECTION, SUSPENSION INTRA-ARTICULAR; INTRAMUSCULAR at 16:50

## 2018-11-26 RX ADMIN — LIDOCAINE HYDROCHLORIDE 2 ML: 10 INJECTION, SOLUTION INFILTRATION; PERINEURAL at 16:50

## 2018-11-26 RX ADMIN — BUPIVACAINE HYDROCHLORIDE 2 ML: 5 INJECTION, SOLUTION EPIDURAL; INTRACAUDAL at 16:50

## 2018-11-26 NOTE — PATIENT INSTRUCTIONS
Ice Pack Application   WHAT YOU NEED TO KNOW:   Ice can be used to decrease swelling and pain after an injury or surgery  Common injuries that may benefit from ice therapy are sprains, strains, and bruises  The use of ice is most effective in the first 1 to 3 days after an injury  DISCHARGE INSTRUCTIONS:   How to apply ice:   · Fill a bag with crushed ice about half full  Remove the air from the bag before you close it  You can also use a bag of frozen vegetables  · Wrap the ice pack in a cloth to protect your skin from frostbite or other injury  · Put the ice over the injured area for 20 to 30 minutes or as long as directed  · Check your skin after about 30 seconds for color changes or blistering  Remove the ice if you notice skin changes or you feel burning or numbness in the area  · Throw the ice pack away after use  · Apply ice to your injured area 4 times each day or as directed  Ask your healthcare provider how many days you should apply ice  Contact your healthcare provider if:   · You see blisters, whitening of your skin, or a bluish color to your skin after using ice  · You feel burning or numbness when using ice  · You have questions about the use of ice packs  © 2017 [facility] [facility] Information is for End User's use only and may not be sold, redistributed or otherwise used for commercial purposes  All illustrations and images included in CareNotes® are the copyrighted property of [employer]  or [employer]  The above information is an  only  It is not intended as medical advice for individual conditions or treatments  Talk to your doctor, nurse or pharmacist before following any medical regimen to see if it is safe and effective for you  Safe Use of NSAIDs   WHAT YOU NEED TO KNOW:   NSAIDs are medicines that are used to decrease pain, swelling, and fever  NSAIDs are available with or without a doctor's order   NSAIDs that you can buy without a doctor's order include aspirin, ibuprofen, and naproxen  DISCHARGE INSTRUCTIONS:   Return to the emergency department if:   · You have swelling around your mouth or trouble breathing  · You are breathing fast or you have a fast heartbeat  · You have nausea, vomiting, or abdominal pain  · You have blood in your vomit or bowel movements  · You have a seizure  Contact your healthcare provider if:   · You have a headache or become confused  · You develop hearing loss or ringing in your ears  · You develop itching, a rash, or hives  · You have swelling around your lower legs, feet, ankles, and hands  · You do not know how much NSAIDs to give to your child  · You have questions or concerns about your condition or care  How to give NSAIDs to your child safely:   · Read the directions on the label  Find out if the medicine is right for your child's age and how much to give to your child  The dose for your child's weight or age should be listed  Do not  give your child more than the recommended amount  · Use the measuring tool that came with the medicine  Do not  use another measuring tool, such as a kitchen spoon  Other measuring tools do not provide the right amount of medicine  How to take NSAIDs safely:   · Read the directions on the label to learn how much medicine you should take and often to take it  Do not take more than the recommended amount  · Talk to your healthcare provider if you need take NSAIDs for more than 30 days  The longer you take NSAIDs, the higher your risk of side effects will be  You may need to take other medicines to decrease your risk of side effects such as stomach bleeding  · Do not take an over-the-counter NSAIDs with prescription NSAIDs  The combined amount of NSAIDs may be too high  · Tell your healthcare provider about other medicines you take  Some medicines can increase the risk of side effects from NSAIDs   Your healthcare provider will tell you if it is okay to take NSAIDs and how to take them  Who should not take NSAIDs:  Certain people should avoid or limit NSAIDs  Do not  give NSAIDs to children under 10months of age without direction from your child's doctor  Do not give aspirin to children under 25years of age  Your child could develop Reye syndrome if he takes aspirin  Reye syndrome can cause life-threatening brain and liver damage  Check your child's medicine labels for aspirin, salicylates, or oil of wintergreen  Talk to your healthcare provider before you take NSAIDs if any of the following apply to you:  · You have reflux disease, a peptic ulcer, H pylori infection, or bleeding in your stomach or intestines  · You have a bleeding disorder, or you take blood-thinning medicine  · You are allergic to aspirin or other NSAIDs  · You have liver or kidney or disease  · You have high blood pressure or heart disease  · You have 3 or more alcoholic drinks each day  · You are pregnant  What you need to know about an NSAID overdose:  Certain health problems can occur if you take too much NSAID medicine at one time or over time  Problems include nausea, vomiting, and abdominal pain  You may develop gastritis, peptic ulcers, and stomach bleeding  You may also develop fluid retention, heart problems, and kidney problems  NSAIDs can worsen high blood pressure  You may become confused, or you may have a headache, hearing loss, or hallucinations  An overdose of aspirin may also cause rapid breathing, a rapid heartbeat, or seizures  What to do if you think you or your child took too much NSAID medicine:  Call the [facility] at [phone] immediately  © 2017 [address] [employer]  Information is for End User's use only and may not be sold, redistributed or otherwise used for commercial purposes   All illustrations and images included in CareNotes® are the copyrighted property of [employer]  or [employer]  The above information is an  only  It is not intended as medical advice for individual conditions or treatments  Talk to your doctor, nurse or pharmacist before following any medical regimen to see if it is safe and effective for you

## 2018-11-26 NOTE — PROGRESS NOTES
Assessment/Plan   Diagnoses and all orders for this visit:    Left knee pain, unspecified chronicity  -     Cancel: XR knee 4+ vw left injury; Future          Subjective   Patient ID: Sarabjit Herman is a 61 y o  female  Vitals:    11/26/18 1223   BP: 135/84   Pulse: (!) 48     62yo female comes in for an evaluation of her left knee  She has been having ongoing medial knee pain  She is a patient of Dr Volodymyr Hogan and has an appt next week, but her pain was increasing and she did not want to wait that long  She has a history of psoriatic arthritis  The pain is dull in character, mild in severity, pain does not radiate and is not associated with numbness  The following portions of the patient's history were reviewed and updated as appropriate: allergies, current medications, past family history, past medical history, past social history, past surgical history and problem list     Review of Systems  Ortho Exam  Past Medical History:   Diagnosis Date    Abnormal thyroid function test     R    5/8/17     Arthritis     Dislocated shoulder     Right / LA  Ramon Ingles Ramon Ingles 1/30/13     Effusion of knee     LA    5/21/14   R    5/8/17     Hypothyroidism     LA    2/6/13   R   3/31/15     Primary osteoarthritis of right knee     LA   4/9/14   R    5/21/14     Prolapsing mitral leaflet syndrome     LA    1/28/13   [place]  Ramon Ingles Ramon Ingles 3/31/15     Tinnitus     ringing in both ears    Vaginal Pap smear, abnormal     Vitamin D deficiency     LA    5/8/17  R  Ramon Ingles Ramon Ingles 3/31/15      Past Surgical History:   Procedure Laterality Date    CERVICAL BIOPSY  W/ LOOP ELECTRODE EXCISION      KNEE ARTHROSCOPY Right     right knee, right shoulder , open right shoulder     HI COLONOSCOPY FLX DX W/COLLJ SPEC WHEN PFRMD N/A 10/24/2017    Procedure: COLONOSCOPY;  Surgeon: Melchor Ames MD;  Location: [facility];   Service: Gastroenterology    HI RECONSTR TOTAL SHOULDER IMPLANT Right 4/25/2017    Procedure: TOTAL SHOULDER ARTHROPLASTY ;  Surgeon: Olivia Escudero MD; Location: [facility];  Service: Orthopedics    SHOULDER SURGERY Right     total shoulder replacement      Family History   Problem Relation Age of Onset    Arthritis Mother     Diabetes type II Mother     Arthritis Father     Throat cancer Father     Diabetes type II Maternal Grandmother     Colon cancer Maternal Uncle     Diabetes Family     Osteoarthritis Family      Social History     Occupational History    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       working full time     Social History Main Topics    Smoking status: Never Smoker    Smokeless tobacco: Never Used    Alcohol use Yes      Comment: socially    Drug use: No    Sexual activity: Yes     Partners: Male     Birth control/ protection: Post-menopausal       Review of Systems   Constitutional: Negative  HENT: Negative  Eyes: Negative  Respiratory: Negative  Cardiovascular: Negative  Gastrointestinal: Negative  Endocrine: Negative  Genitourinary: Negative  Musculoskeletal: As below      Allergic/Immunologic: Negative  Neurological: Negative  Hematological: Negative  Psychiatric/Behavioral: Negative          Objective   Physical Exam    · Constitutional: Awake, Alert, Oriented  · Eyes: EOMI  · Psych: Mood and affect appropriate  · Heart: regular rate and rhythm  · Lungs: No audible wheezing  · Abdomen: soft  · Lymph: no lymphedema   left Knee:  - Appearance   No swelling, discoloration, deformity, or ecchymosis  - Effusion   mild  - Palpation   No tenderness about the  lateral joint line, patella, patellar tendon, MCL, LCL, hamstrings, or medial / lateral tibial plateau   + mild medial joint line tenderness  - ROM   Extension: 0 and Flexion: 130  - Special Tests   Jasiel's Test negative, Lachman's Test negative, Anterior Drawer Test negative, Posterior Drawer Test negative, Valgus Stress Test negative, Varus Stress Test negative and Patellar apprehension negative  - Motor   normal 5/5 in all planes  - NVI distally    I have personally reviewed pertinent films in PACS and my interpretation is medial joint line narrowing       Large joint arthrocentesis  Date/Time: 11/26/2018 4:50 PM  Consent given by: patient  Site marked: site marked  Timeout: Immediately prior to procedure a time out was called to verify the correct patient, procedure, equipment, support staff and site/side marked as required   Supporting Documentation  Indications: pain   Procedure Details  Location: knee - L knee  Needle size: 22 G  Ultrasound guidance: no  Approach: lateral  Medications administered: 2 mL bupivacaine (PF) 0 5 %; 2 mL lidocaine 1 %; 40 mg triamcinolone acetonide 40 mg/mL    Patient tolerance: patient tolerated the procedure well with no immediate complications  Dressing:  Sterile dressing applied

## 2018-12-03 RX ORDER — DEXAMETHASONE SODIUM PHOSPHATE 4 MG/ML
1 INJECTION, SOLUTION INTRA-ARTICULAR; INTRALESIONAL; INTRAMUSCULAR; INTRAVENOUS; SOFT TISSUE
COMMUNITY
End: 2020-09-15

## 2018-12-05 ENCOUNTER — OFFICE VISIT (OUTPATIENT)
Dept: OBGYN CLINIC | Facility: HOSPITAL | Age: 63
End: 2018-12-05
Payer: COMMERCIAL

## 2018-12-05 VITALS
BODY MASS INDEX: 22.13 KG/M2 | HEIGHT: 67 IN | HEART RATE: 87 BPM | DIASTOLIC BLOOD PRESSURE: 87 MMHG | WEIGHT: 141 LBS | SYSTOLIC BLOOD PRESSURE: 154 MMHG

## 2018-12-05 DIAGNOSIS — M17.12 PRIMARY OSTEOARTHRITIS OF LEFT KNEE: ICD-10-CM

## 2018-12-05 DIAGNOSIS — M25.562 ACUTE PAIN OF LEFT KNEE: Primary | ICD-10-CM

## 2018-12-05 PROCEDURE — 99213 OFFICE O/P EST LOW 20 MIN: CPT | Performed by: ORTHOPAEDIC SURGERY

## 2018-12-05 NOTE — PROGRESS NOTES
Assessment:  1  Acute pain of left knee  MRI knee left  wo contrast    r/o MTP stress reaction    2  Primary osteoarthritis of left knee         Plan:  Diagnostics reviewed and physical exam performed  Diagnosis, treatment options and associated risks were discussed with the patient including no treatment, nonsurgical treatment and potential for surgical intervention  The patient was given the opportunity to ask questions regarding each  Patient was educated on maintaining a healthy lifestyle with proper weight management and physician recommended home exercise program/routine for regular fitness  Given the lack of relief with last week's cortisone injection and tenderness over her medial tibial plateau area recommend obtaining an MRI for further evaluation to rule out subchondral insufficiency fracture  Weight-bearing and activities as tolerated  To do next visit:  Return for re-check after her left knee MRI   The above stated was discussed in layman's terms and the patient expressed understanding  All questions were answered to the patient's satisfaction  Scribe Attestation    I,:   Walter Shannon am acting as a scribe while in the presence of the attending physician :        I,:   Camila Macedo MD personally performed the services described in this documentation    as scribed in my presence :              Subjective:   Sarabjit Herman is a 61 y o  female who presents for evaluation due to pain at her left knee medially  She was playing tennis a few weeks ago however had a rapid increase of pain medially at her left knee and she saw Agusto Hathaway last week for immediate care  X-rays were taken and a cortisone injection was performed without any relief even temporarily of her symptoms  Her right knee which has been treated with intermittent injections for known osteoarthritis is doing rather well        Review of systems negative unless otherwise specified in HPI    Past Medical History:   Diagnosis Date    Abnormal thyroid function test     R    5/8/17     Arthritis     Dislocated shoulder     Right / LA  Yvonne Meyer 1/30/13     Effusion of knee     LA    5/21/14   R    5/8/17     Hypothyroidism     LA    2/6/13   R   3/31/15     Primary osteoarthritis of right knee     LA   4/9/14   R    5/21/14     Prolapsing mitral leaflet syndrome     LA    1/28/13   [place]  Yvonne Meyer 3/31/15     Tinnitus     ringing in both ears    Vaginal Pap smear, abnormal     Vitamin D deficiency     LA    5/8/17  R  Yvonne Meyer 3/31/15        Past Surgical History:   Procedure Laterality Date    CERVICAL BIOPSY  W/ LOOP ELECTRODE EXCISION      KNEE ARTHROSCOPY Right     right knee, right shoulder , open right shoulder     CT COLONOSCOPY FLX DX W/COLLJ SPEC WHEN PFRMD N/A 10/24/2017    Procedure: COLONOSCOPY;  Surgeon: Maicol Farmer MD;  Location: [facility];   Service: Gastroenterology    CT RECONSTR TOTAL SHOULDER IMPLANT Right 4/25/2017    Procedure: TOTAL SHOULDER ARTHROPLASTY ;  Surgeon: Kofi Church MD;  [facility];  Service: Orthopedics    SHOULDER SURGERY Right     total shoulder replacement        Family History   Problem Relation Age of Onset    Arthritis Mother     Diabetes type II Mother     Arthritis Father     Throat cancer Father     Diabetes type II Maternal Grandmother     Colon cancer Maternal Uncle     Diabetes Family     Osteoarthritis Family        Social History     Occupational History    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       working full time     Social History Main Topics    Smoking status: Never Smoker    Smokeless tobacco: Never Used    Alcohol use Yes      Comment: socially    Drug use: No    Sexual activity: Yes     Partners: Male     Birth control/ protection: Post-menopausal         Current Outpatient Prescriptions:     Calcium Carb-Cholecalciferol (CALCIUM + D3 PO), Take by mouth daily  , Disp: , Rfl:     dexamethasone (DECADRON) 4 mg/mL, 1 mL, Disp: , Rfl:    glucosamine-chondroitin 500-400 MG tablet, Take 1 tablet by mouth 3 (three) times a day, Disp: , Rfl:     levothyroxine 25 mcg tablet, Take 1 tablet (25 mcg total) by mouth daily, Disp: 90 tablet, Rfl: 1    Omega-3 Fatty Acids (FISH OIL PO), Take by mouth, Disp: , Rfl:     sulfaSALAzine (AZULFIDINE) 500 mg tablet, Take 1,000 mg by mouth daily  , Disp: , Rfl:     No Known Allergies         Vitals:    12/05/18 1522   BP: 154/87   Pulse: 87       Objective:          Physical Exam                    Left Knee Exam     Tenderness   The patient is experiencing tenderness in the medial joint line (medial tibial plateau)  Range of Motion   The patient has normal left knee ROM  Left knee flexion: with crepitus  Muscle Strength     The patient has normal left knee strength  Tests   Varus: negative  Valgus: negative    Other   Erythema: absent  Sensation: normal  Swelling: mild  Effusion: no effusion present    Comments:    Mild varus alignment with bony enlargement medially  Equivocal garfield's medially  Diagnostics, reviewed and taken today if performed as documented: The attending physician has personally reviewed the pertinent films in PACS and interpretation is as follows:    Left knee x-rays taken 11/26/18 were reviewed today and show:  No fracture or dislocation, moderate medial joint space narrowing with mild patellofemoral DJD  Subchondral sclerosis and osteophyte formation present  Procedures, if performed today:    Procedures    None performed      Portions of the record may have been created with voice recognition software   Occasional wrong word or "sound a like" substitutions may have occurred due to the inherent limitations of voice recognition software   Read the chart carefully and recognize, using context, where substitutions have occurred

## 2018-12-12 ENCOUNTER — HOSPITAL ENCOUNTER (OUTPATIENT)
Dept: RADIOLOGY | Age: 63
Discharge: HOME | End: 2018-12-12
Payer: COMMERCIAL

## 2018-12-12 DIAGNOSIS — M25.562 ACUTE PAIN OF LEFT KNEE: ICD-10-CM

## 2018-12-12 PROCEDURE — 73721 MRI JNT OF LWR EXTRE W/O DYE: CPT

## 2018-12-19 ENCOUNTER — OFFICE VISIT (OUTPATIENT)
Dept: OBGYN CLINIC | Facility: HOSPITAL | Age: 63
End: 2018-12-19
Payer: COMMERCIAL

## 2018-12-19 VITALS
SYSTOLIC BLOOD PRESSURE: 139 MMHG | DIASTOLIC BLOOD PRESSURE: 88 MMHG | HEART RATE: 51 BPM | WEIGHT: 139.99 LBS | HEIGHT: 66 IN | BODY MASS INDEX: 22.5 KG/M2

## 2018-12-19 DIAGNOSIS — M17.12 PRIMARY OSTEOARTHRITIS OF LEFT KNEE: ICD-10-CM

## 2018-12-19 DIAGNOSIS — M23.307 DEGENERATIVE TEAR OF MENISCUS, LEFT: ICD-10-CM

## 2018-12-19 DIAGNOSIS — M25.562 ACUTE PAIN OF LEFT KNEE: Primary | ICD-10-CM

## 2018-12-19 PROCEDURE — 20610 DRAIN/INJ JOINT/BURSA W/O US: CPT | Performed by: ORTHOPAEDIC SURGERY

## 2018-12-19 PROCEDURE — 99213 OFFICE O/P EST LOW 20 MIN: CPT | Performed by: ORTHOPAEDIC SURGERY

## 2018-12-19 RX ORDER — BETAMETHASONE SODIUM PHOSPHATE AND BETAMETHASONE ACETATE 3; 3 MG/ML; MG/ML
12 INJECTION, SUSPENSION INTRA-ARTICULAR; INTRALESIONAL; INTRAMUSCULAR; SOFT TISSUE
Status: COMPLETED | OUTPATIENT
Start: 2018-12-19 | End: 2018-12-19

## 2018-12-19 RX ORDER — BUPIVACAINE HYDROCHLORIDE 2.5 MG/ML
2 INJECTION, SOLUTION INFILTRATION; PERINEURAL
Status: COMPLETED | OUTPATIENT
Start: 2018-12-19 | End: 2018-12-19

## 2018-12-19 RX ORDER — LIDOCAINE HYDROCHLORIDE 20 MG/ML
2 INJECTION, SOLUTION EPIDURAL; INFILTRATION; INTRACAUDAL; PERINEURAL
Status: COMPLETED | OUTPATIENT
Start: 2018-12-19 | End: 2018-12-19

## 2018-12-19 RX ADMIN — BUPIVACAINE HYDROCHLORIDE 2 ML: 2.5 INJECTION, SOLUTION INFILTRATION; PERINEURAL at 15:42

## 2018-12-19 RX ADMIN — LIDOCAINE HYDROCHLORIDE 2 ML: 20 INJECTION, SOLUTION EPIDURAL; INFILTRATION; INTRACAUDAL; PERINEURAL at 15:42

## 2018-12-19 RX ADMIN — BETAMETHASONE SODIUM PHOSPHATE AND BETAMETHASONE ACETATE 12 MG: 3; 3 INJECTION, SUSPENSION INTRA-ARTICULAR; INTRALESIONAL; INTRAMUSCULAR; SOFT TISSUE at 15:42

## 2018-12-19 NOTE — PROGRESS NOTES
Assessment:  1  Acute pain of left knee  Large joint arthrocentesis   2  Primary osteoarthritis of left knee  Large joint arthrocentesis   3  Degenerative tear of meniscus, left         Plan:    Reviewed MRI showing MMT and arthritic changes  Cortisone injection offered and accepted today  If she receives no relief then next appt set up for possible a/pmm  Activities to tolerance  To do next visit:  Return in about 4 weeks (around 1/16/2019) for Recheck  The above stated was discussed in layman's terms and the patient expressed understanding  All questions were answered to the patient's satisfaction  Scribe Attestation    I,:   Earlinemeron Au am acting as a scribe while in the presence of the attending physician :        I,:   Princess Osborn MD personally performed the services described in this documentation    as scribed in my presence :              Subjective:   Abi Millard is a 61 y o  female who presents to review her MRI of her left knee  Pain started a few wks ago while playing tennis which progressively got worse  She had cortisone injection prior to her previous appt with Dr Shayy White w/o relief  Pain continues to be medially based  MR shows medial meniscal tear with arthritic changes in medial compartment  No current locking  Pain with pivoting or twisting motions  Review of systems negative unless otherwise specified in HPI    Past Medical History:   Diagnosis Date    Abnormal thyroid function test     R    5/8/17     Arthritis     Dislocated shoulder     Right / LA  Monia Le Monia Le 1/30/13     Effusion of knee     LA    5/21/14   R    5/8/17     Hypothyroidism     LA    2/6/13   R   3/31/15     Primary osteoarthritis of right knee     LA   4/9/14   R    5/21/14     Prolapsing mitral leaflet syndrome     LA    1/28/13   [place]  Monia Le Monia Le 3/31/15     Tinnitus     ringing in both ears    Vaginal Pap smear, abnormal     Vitamin D deficiency     LA    5/8/17  R  Monia Le Monia Le 3/31/15        Past Surgical History:   Procedure Laterality Date    CERVICAL BIOPSY  W/ LOOP ELECTRODE EXCISION      KNEE ARTHROSCOPY Right     right knee, right shoulder , open right shoulder     [place] COLONOSCOPY FLX DX W/COLLJ SPEC WHEN PFRMD N/A 10/24/2017    Procedure: COLONOSCOPY;  Surgeon: Radha Linda MD;  Location: [facility] LAB;   Service: Gastroenterology    TN RECONSTR TOTAL SHOULDER IMPLANT Right 4/25/2017    Procedure: TOTAL SHOULDER ARTHROPLASTY ;  Surgeon: Alex Andrade MD;  [facility];  Service: Orthopedics    SHOULDER SURGERY Right     total shoulder replacement        Family History   Problem Relation Age of Onset    Arthritis Mother     Diabetes type II Mother     Arthritis Father     Throat cancer Father     Diabetes type II Maternal Grandmother     Colon cancer Maternal Uncle     Diabetes Family     Osteoarthritis Family        Social History     Occupational History    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       working full time     Social History Main Topics    Smoking status: Never Smoker    Smokeless tobacco: Never Used    Alcohol use Yes      Comment: socially    Drug use: No    Sexual activity: Yes     Partners: Male     Birth control/ protection: Post-menopausal         Current Outpatient Prescriptions:     Calcium Carb-Cholecalciferol (CALCIUM + D3 PO), Take by mouth daily  , Disp: , Rfl:     dexamethasone (DECADRON) 4 mg/mL, 1 mL, Disp: , Rfl:     glucosamine-chondroitin 500-400 MG tablet, Take 1 tablet by mouth 3 (three) times a day, Disp: , Rfl:     levothyroxine 25 mcg tablet, Take 1 tablet (25 mcg total) by mouth daily, Disp: 90 tablet, Rfl: 1    Omega-3 Fatty Acids (FISH OIL PO), Take by mouth, Disp: , Rfl:     sulfaSALAzine (AZULFIDINE) 500 mg tablet, Take 1,000 mg by mouth daily  , Disp: , Rfl:     No Known Allergies         Vitals:    12/19/18 1522   BP: 139/88   Pulse: (!) 51       Objective:                    Left Knee Exam     Tenderness   The patient is experiencing tenderness in the medial joint line  Range of Motion   Extension: normal   Flexion: normal     Muscle Strength     The patient has normal left knee strength  Tests   Jasiel:  Medial - positive Lateral - negative  Lachman:  Anterior - negative    Posterior - negative  Drawer:       Anterior - negative     Posterior - negative  Varus: negative  Valgus: negative  Pivot Shift: negative    Other   Erythema: absent  Scars: absent  Sensation: normal  Pulse: present  Swelling: mild  Effusion: effusion present            Diagnostics, reviewed and taken today if performed as documented:    None performed      The attending physician has personally reviewed the pertinent films in PACS and interpretation is as follows:      Procedures, if performed today:    Large joint arthrocentesis  Date/Time: 12/19/2018 3:42 PM  Consent given by: patient  Site marked: site marked  Timeout: Immediately prior to procedure a time out was called to verify the correct patient, procedure, equipment, support staff and site/side marked as required   Supporting Documentation  Indications: pain   Procedure Details  Location: knee - L knee  Preparation: Patient was prepped and draped in the usual sterile fashion  Needle size: 22 G  Approach: anterolateral  Medications administered: 2 mL bupivacaine 0 25 %; 2 mL lidocaine (PF) 2 %; 12 mg betamethasone acetate-betamethasone sodium phosphate 6 (3-3) mg/mL    Patient tolerance: patient tolerated the procedure well with no immediate complications  Dressing:  Sterile dressing applied        None performed      Portions of the record may have been created with voice recognition software   Occasional wrong word or "sound a like" substitutions may have occurred due to the inherent limitations of voice recognition software   Read the chart carefully and recognize, using context, where substitutions have occurred

## 2019-01-10 ENCOUNTER — APPOINTMENT (OUTPATIENT)
Dept: LAB | Age: 64
End: 2019-01-10
Payer: COMMERCIAL

## 2019-01-10 ENCOUNTER — TELEPHONE (OUTPATIENT)
Dept: INTERNAL MEDICINE CLINIC | Facility: CLINIC | Age: 64
End: 2019-01-10

## 2019-01-10 DIAGNOSIS — E03.9 ACQUIRED HYPOTHYROIDISM: Primary | ICD-10-CM

## 2019-01-10 LAB — TSH SERPL DL<=0.05 MIU/L-ACNC: 2.28 UIU/ML (ref 0.36–3.74)

## 2019-01-10 PROCEDURE — 84443 ASSAY THYROID STIM HORMONE: CPT

## 2019-01-10 PROCEDURE — 36415 COLL VENOUS BLD VENIPUNCTURE: CPT

## 2019-01-10 NOTE — TELEPHONE ENCOUNTER
Pt  Is there because she said you wanted to repeat TSh and T4 in Jan  But there is nothing in computer  Please enter order and they will be able to see it

## 2019-01-11 ENCOUNTER — TELEPHONE (OUTPATIENT)
Dept: INTERNAL MEDICINE CLINIC | Facility: CLINIC | Age: 64
End: 2019-01-11

## 2019-01-11 NOTE — TELEPHONE ENCOUNTER
----- Message from Jaden Chi MD sent at 1/10/2019  5:49 PM EST -----  Repeat thyroid labs are normal

## 2019-02-13 ENCOUNTER — OFFICE VISIT (OUTPATIENT)
Dept: OBGYN CLINIC | Facility: HOSPITAL | Age: 64
End: 2019-02-13
Payer: COMMERCIAL

## 2019-02-13 VITALS
DIASTOLIC BLOOD PRESSURE: 77 MMHG | BODY MASS INDEX: 22.07 KG/M2 | HEART RATE: 82 BPM | HEIGHT: 67 IN | SYSTOLIC BLOOD PRESSURE: 128 MMHG | WEIGHT: 140.6 LBS

## 2019-02-13 DIAGNOSIS — M17.12 PRIMARY OSTEOARTHRITIS OF LEFT KNEE: ICD-10-CM

## 2019-02-13 DIAGNOSIS — M23.307 DEGENERATIVE TEAR OF MENISCUS, LEFT: Primary | ICD-10-CM

## 2019-02-13 PROCEDURE — 99213 OFFICE O/P EST LOW 20 MIN: CPT | Performed by: ORTHOPAEDIC SURGERY

## 2019-02-13 PROCEDURE — 20610 DRAIN/INJ JOINT/BURSA W/O US: CPT | Performed by: ORTHOPAEDIC SURGERY

## 2019-02-13 RX ORDER — TRIAMCINOLONE ACETONIDE 40 MG/ML
1 INJECTION, SUSPENSION INTRA-ARTICULAR; INTRAMUSCULAR
COMMUNITY
End: 2020-09-15

## 2019-02-13 RX ORDER — MELOXICAM 15 MG/1
TABLET ORAL DAILY
COMMUNITY
End: 2019-05-16

## 2019-02-13 RX ORDER — DEXAMETHASONE SODIUM PHOSPHATE 4 MG/ML
1 INJECTION, SOLUTION INTRA-ARTICULAR; INTRALESIONAL; INTRAMUSCULAR; INTRAVENOUS; SOFT TISSUE
COMMUNITY
End: 2019-02-13

## 2019-02-13 RX ORDER — MELOXICAM 7.5 MG/1
TABLET ORAL DAILY
COMMUNITY
End: 2019-05-16

## 2019-02-13 NOTE — PROGRESS NOTES
Assessment:  1  Degenerative tear of meniscus, left     2  Primary osteoarthritis of left knee       Patient Active Problem List   Diagnosis    Osteoarthritis of right glenohumeral joint    Arthritis    Bursitis, knee    Chondromalacia patellae    Constipation    Current tear of medial cartilage or meniscus of knee    Dense breasts    Hearing loss    Acute pain of left knee    Knee joint effusion    Leukopenia    Arthritis of right shoulder region    Primary osteoarthritis of right knee    Post-nasal discharge    Localized secondary osteoarthritis of right shoulder region    Varicose veins without complication    Psoriasis with arthropathy ([address] )    Acquired hypothyroidism    Primary osteoarthritis of left knee    Degenerative tear of meniscus, left           Plan    Znoia Cooper continues to have left knee pain with initial relief from cortisone injection and a gradual return over the past 2 weeks  She does not wish to pursue surgical intervention as it is likely that most of her discomfort is coming from the arthritis in her left knee as well as the meniscus tear  Synvisc 1 was advised and administered today  This procedure is outlined below  And will follow up in 3 months time for recheck of her left knee  Patient was seen, examined and plan formulated by Dr Anival Talbert joint arthrocentesis: L knee  Date/Time: 2/13/2019 2:49 PM  Consent given by: patient  Supporting Documentation  Indications: pain   Procedure Details  Location: knee - L knee  Needle size: 18 G  Approach: superior  Medications administered: 48 mg hylan 48 MG/6ML    Patient tolerance: patient tolerated the procedure well with no immediate complications          Subjective:     Patient ID:    Chief Maynor Jimenez 61 y o  female      HPI    60-year-old female who is here for recheck of her left knee    She received a cortisone injection here back in November of 2018 after being diagnosed with a medial meniscus tear and degenerative changes of her left knee  She had excellent relief from her cortisone injection  The pain however did return over the past 1-2 weeks without exacerbating event  She notes no mechanical symptoms and notes occasional swelling with increased activity        The following portions of the patient's history were reviewed and updated as appropriate: allergies, current medications, past family history, past social history, past surgical history and problem list     All organ systems normal    Social History     Socioeconomic History    Marital status:      Spouse name: Not on file    Number of children: Not on file    Years of education: Not on file    Highest education level: Not on file   Occupational History    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      Comment: working full time   Social Needs    Financial resource strain: Not on file    Food insecurity:     Worry: Not on file     Inability: Not on file   Twyxt needs:     Medical: Not on file     Non-medical: Not on file   Tobacco Use    Smoking status: Never Smoker    Smokeless tobacco: Never Used   Substance and Sexual Activity    Alcohol use: Yes     Comment: socially    Drug use: No    Sexual activity: Yes     Partners: Male     Birth control/protection: Post-menopausal   Lifestyle    Physical activity:     Days per week: Not on file     Minutes per session: Not on file    Stress: Not on file   Relationships    Social connections:     Talks on phone: Not on file     Gets together: Not on file     Attends Muslim service: Not on file     Active member of club or organization: Not on file     Attends meetings of clubs or organizations: Not on file     Relationship status: Not on file    Intimate partner violence:     Fear of current or ex partner: Not on file     Emotionally abused: Not on file     Physically abused: Not on file     Forced sexual activity: Not on file   Other Topics Concern    Not on file   Social History Narrative    , in a relationship    Working - respiratory therapist     Past Medical History:   Diagnosis Date    Abnormal thyroid function test     R    5/8/17     Arthritis     Dislocated shoulder     Right / LA  Marilyn Sanders Jairojannie 1/30/13     Effusion of knee     LA    5/21/14   R    5/8/17     Hypothyroidism     LA    2/6/13   R   3/31/15     Primary osteoarthritis of right knee     LA   4/9/14   R    5/21/14     Prolapsing mitral leaflet syndrome     LA    1/28/13   [place]  Marilyn Lalaeverett Gilljannie 3/31/15     Tinnitus     ringing in both ears    Vaginal Pap smear, abnormal     Vitamin D deficiency     LA    5/8/17  R  Marilyn Sanders Jairojannie 3/31/15      Past Surgical History:   Procedure Laterality Date    CERVICAL BIOPSY  W/ LOOP ELECTRODE EXCISION      KNEE ARTHROSCOPY Right     right knee, right shoulder , open right shoulder     [place] COLONOSCOPY FLX DX W/COLLJ SPEC WHEN PFRMD N/A 10/24/2017    Procedure: COLONOSCOPY;  Surgeon: Lorraine Torres MD;  Location: [facility] GI LAB;   Service: Gastroenterology    OH RECONSTR TOTAL SHOULDER IMPLANT Right 4/25/2017    Procedure: TOTAL SHOULDER ARTHROPLASTY ;  Surgeon: Tien Lockett MD;  Location:  MAIN OR;  Service: Orthopedics    SHOULDER SURGERY Right     total shoulder replacement      No Known Allergies  Current Outpatient Medications on File Prior to Visit   Medication Sig Dispense Refill    Calcium Carb-Cholecalciferol (CALCIUM + D3 PO) Take by mouth daily        dexamethasone (DECADRON) 4 mg/mL 1 mL      glucosamine-chondroitin 500-400 MG tablet Take 1 tablet by mouth 3 (three) times a day      Hyaluronan (HYMOVIS) 24 MG/3ML SOSY 3 mL      levothyroxine 25 mcg tablet Take 1 tablet (25 mcg total) by mouth daily 90 tablet 1    meloxicam (MOBIC) 15 mg tablet Daily      meloxicam (MOBIC) 7 5 mg tablet Daily      Omega-3 Fatty Acids (FISH OIL PO) Take by mouth      Propoxyphene N-Acetaminophen (DARVOCET-N 100 PO) Darvocet-N 100 100 mg-650 mg tablet   Take 1 tablet by mouth every 4-6 hours as needed for pain      sulfaSALAzine (AZULFIDINE) 500 mg tablet Take 1,000 mg by mouth daily        triamcinolone acetonide (KENALOG) 40 mg/mL 1 mL      [DISCONTINUED] dexamethasone (DECADRON) 4 mg/mL 1 mL      [DISCONTINUED] Omega-3 Fatty Acids (FISH OIL BURP-LESS PO) Daily       No current facility-administered medications on file prior to visit  Objective:        Right Knee Exam     Tenderness   The patient is experiencing tenderness in the medial joint line  Range of Motion   The patient has normal right knee ROM  Other   Sensation: normal  Pulse: present  Swelling: none  Effusion: no effusion present                      Portions of the record may have been created with voice recognition software   Occasional wrong word or "sound a like" substitutions may have occurred due to the inherent limitations of voice recognition software   Read the chart carefully and recognize, using context, where substitutions have occurred

## 2019-03-26 ENCOUNTER — APPOINTMENT (OUTPATIENT)
Dept: RADIOLOGY | Age: 64
End: 2019-03-26
Payer: COMMERCIAL

## 2019-03-26 ENCOUNTER — TRANSCRIBE ORDERS (OUTPATIENT)
Dept: ADMINISTRATIVE | Age: 64
End: 2019-03-26

## 2019-03-26 DIAGNOSIS — M47.816 LUMBAR SPONDYLOSIS: ICD-10-CM

## 2019-03-26 DIAGNOSIS — M47.816 LUMBAR SPONDYLOSIS: Primary | ICD-10-CM

## 2019-03-26 DIAGNOSIS — E03.9 ACQUIRED HYPOTHYROIDISM: ICD-10-CM

## 2019-03-26 PROCEDURE — 72110 X-RAY EXAM L-2 SPINE 4/>VWS: CPT

## 2019-03-27 RX ORDER — LEVOTHYROXINE SODIUM 0.03 MG/1
25 TABLET ORAL DAILY
Qty: 90 TABLET | Refills: 1 | Status: SHIPPED | OUTPATIENT
Start: 2019-03-27 | End: 2019-08-26 | Stop reason: SDUPTHER

## 2019-05-15 ENCOUNTER — OFFICE VISIT (OUTPATIENT)
Dept: OBGYN CLINIC | Facility: HOSPITAL | Age: 64
End: 2019-05-15
Payer: COMMERCIAL

## 2019-05-15 VITALS
HEART RATE: 53 BPM | WEIGHT: 140 LBS | HEIGHT: 67 IN | BODY MASS INDEX: 21.97 KG/M2 | SYSTOLIC BLOOD PRESSURE: 131 MMHG | DIASTOLIC BLOOD PRESSURE: 79 MMHG

## 2019-05-15 DIAGNOSIS — G89.29 CHRONIC PAIN OF LEFT KNEE: ICD-10-CM

## 2019-05-15 DIAGNOSIS — M17.12 PRIMARY OSTEOARTHRITIS OF LEFT KNEE: Primary | ICD-10-CM

## 2019-05-15 DIAGNOSIS — M25.562 CHRONIC PAIN OF LEFT KNEE: ICD-10-CM

## 2019-05-15 PROCEDURE — 20610 DRAIN/INJ JOINT/BURSA W/O US: CPT | Performed by: ORTHOPAEDIC SURGERY

## 2019-05-15 PROCEDURE — 99213 OFFICE O/P EST LOW 20 MIN: CPT | Performed by: ORTHOPAEDIC SURGERY

## 2019-05-15 RX ORDER — CLOBETASOL PROPIONATE 0.05 G/100ML
SHAMPOO TOPICAL
COMMUNITY
End: 2019-12-04

## 2019-05-15 RX ORDER — BUPIVACAINE HYDROCHLORIDE 2.5 MG/ML
2 INJECTION, SOLUTION INFILTRATION; PERINEURAL
Status: COMPLETED | OUTPATIENT
Start: 2019-05-15 | End: 2019-05-15

## 2019-05-15 RX ORDER — TRIAMCINOLONE ACETONIDE 40 MG/ML
1 INJECTION, SUSPENSION INTRA-ARTICULAR; INTRAMUSCULAR
COMMUNITY
End: 2019-05-15

## 2019-05-15 RX ORDER — LIDOCAINE HYDROCHLORIDE 20 MG/ML
2 INJECTION, SOLUTION EPIDURAL; INFILTRATION; INTRACAUDAL; PERINEURAL
Status: COMPLETED | OUTPATIENT
Start: 2019-05-15 | End: 2019-05-15

## 2019-05-15 RX ORDER — BETAMETHASONE SODIUM PHOSPHATE AND BETAMETHASONE ACETATE 3; 3 MG/ML; MG/ML
12 INJECTION, SUSPENSION INTRA-ARTICULAR; INTRALESIONAL; INTRAMUSCULAR; SOFT TISSUE
Status: COMPLETED | OUTPATIENT
Start: 2019-05-15 | End: 2019-05-15

## 2019-05-15 RX ADMIN — BUPIVACAINE HYDROCHLORIDE 2 ML: 2.5 INJECTION, SOLUTION INFILTRATION; PERINEURAL at 16:10

## 2019-05-15 RX ADMIN — BETAMETHASONE SODIUM PHOSPHATE AND BETAMETHASONE ACETATE 12 MG: 3; 3 INJECTION, SUSPENSION INTRA-ARTICULAR; INTRALESIONAL; INTRAMUSCULAR; SOFT TISSUE at 16:10

## 2019-05-15 RX ADMIN — LIDOCAINE HYDROCHLORIDE 2 ML: 20 INJECTION, SOLUTION EPIDURAL; INFILTRATION; INTRACAUDAL; PERINEURAL at 16:10

## 2019-07-16 ENCOUNTER — OFFICE VISIT (OUTPATIENT)
Dept: OBGYN CLINIC | Facility: HOSPITAL | Age: 64
End: 2019-07-16
Payer: COMMERCIAL

## 2019-07-16 VITALS
HEART RATE: 54 BPM | BODY MASS INDEX: 21.82 KG/M2 | SYSTOLIC BLOOD PRESSURE: 131 MMHG | HEIGHT: 67 IN | WEIGHT: 139 LBS | DIASTOLIC BLOOD PRESSURE: 79 MMHG

## 2019-07-16 DIAGNOSIS — G89.29 CHRONIC PAIN OF LEFT KNEE: Primary | ICD-10-CM

## 2019-07-16 DIAGNOSIS — M25.562 CHRONIC PAIN OF LEFT KNEE: Primary | ICD-10-CM

## 2019-07-16 DIAGNOSIS — M17.12 PRIMARY OSTEOARTHRITIS OF LEFT KNEE: ICD-10-CM

## 2019-07-16 PROCEDURE — 20610 DRAIN/INJ JOINT/BURSA W/O US: CPT | Performed by: PHYSICIAN ASSISTANT

## 2019-07-16 PROCEDURE — 99212 OFFICE O/P EST SF 10 MIN: CPT | Performed by: ORTHOPAEDIC SURGERY

## 2019-07-16 RX ORDER — BUPIVACAINE HYDROCHLORIDE 2.5 MG/ML
2 INJECTION, SOLUTION INFILTRATION; PERINEURAL
Status: COMPLETED | OUTPATIENT
Start: 2019-07-16 | End: 2019-07-16

## 2019-07-16 RX ORDER — BETAMETHASONE SODIUM PHOSPHATE AND BETAMETHASONE ACETATE 3; 3 MG/ML; MG/ML
12 INJECTION, SUSPENSION INTRA-ARTICULAR; INTRALESIONAL; INTRAMUSCULAR; SOFT TISSUE
Status: COMPLETED | OUTPATIENT
Start: 2019-07-16 | End: 2019-07-16

## 2019-07-16 RX ORDER — LIDOCAINE HYDROCHLORIDE 10 MG/ML
2 INJECTION, SOLUTION INFILTRATION; PERINEURAL
Status: COMPLETED | OUTPATIENT
Start: 2019-07-16 | End: 2019-07-16

## 2019-07-16 RX ADMIN — BETAMETHASONE SODIUM PHOSPHATE AND BETAMETHASONE ACETATE 12 MG: 3; 3 INJECTION, SUSPENSION INTRA-ARTICULAR; INTRALESIONAL; INTRAMUSCULAR; SOFT TISSUE at 14:44

## 2019-07-16 RX ADMIN — LIDOCAINE HYDROCHLORIDE 2 ML: 10 INJECTION, SOLUTION INFILTRATION; PERINEURAL at 14:44

## 2019-07-16 RX ADMIN — BUPIVACAINE HYDROCHLORIDE 2 ML: 2.5 INJECTION, SOLUTION INFILTRATION; PERINEURAL at 14:44

## 2019-07-16 NOTE — PROGRESS NOTES
Subjective;   60-year-old adult female patient well known to the practice  Her history and exam finds no in left knee osteoarthritic changes predominantly medial compartment, as well as meniscal injury documented on MRI but without mechanical symptomatology  She has benefitted by steroid injection in the past,   And an initial steroid injection without pain relief but a subsequent injection done by the doctor which provided her significant relief  She is also an individual that is intended for Visco supplement injections at 3 shot series in August   Given a significant increase in her discomfort she reached out to us on my chart and an appointment was afforded for clinical exam to be performed today  Past Medical History:   Diagnosis Date    Abnormal thyroid function test     R    5/8/17     Arthritis     Dislocated shoulder     Right / LA  Precious Lang Kale 1/30/13     Effusion of knee     LA    5/21/14   R    5/8/17     Hypothyroidism     LA    2/6/13   R   3/31/15     Primary osteoarthritis of right knee     LA   4/9/14   R    5/21/14     Prolapsing mitral leaflet syndrome     LA    1/28/13   [place]  Precious Edwardsvani Crawfordana Kale 3/31/15     Tinnitus     ringing in both ears    Vaginal Pap smear, abnormal     Vitamin D deficiency     LA    5/8/17  R  Precious Edwardsvani [facility] 3/31/15        Past Surgical History:   Procedure Laterality Date    CERVICAL BIOPSY  W/ LOOP ELECTRODE EXCISION      KNEE ARTHROSCOPY Right     right knee, right shoulder , open right shoulder     SD COLONOSCOPY FLX DX W/COLLJ SPEC WHEN PFRMD N/A 10/24/2017    Procedure: COLONOSCOPY;  Surgeon: Emily Charlton MD;  Location: [facility] GI LAB;   Service: Gastroenterology    SD RECONSTR TOTAL SHOULDER IMPLANT Right 4/25/2017    Procedure: TOTAL SHOULDER ARTHROPLASTY ;  Surgeon: Ernesto Shah MD;  Location:  MAIN OR;  Service: Orthopedics    SHOULDER SURGERY Right     total shoulder replacement        Family History   Problem Relation Age of Onset    Arthritis Mother     Diabetes type II Mother  Arthritis Father     Throat cancer Father     Diabetes type II Maternal Grandmother     Colon cancer Maternal Uncle     Diabetes Family     Osteoarthritis Family        Social History     Tobacco Use    Smoking status: Never Smoker    Smokeless tobacco: Never Used   Substance Use Topics    Alcohol use: Yes     Comment: socially    Drug use: No     Exam;    So well-developed well-nourished adult female who is in no acute distress  Her left knee is devoid of any significant redness bruising or fluid collection  Her left knee fully extends and she points directly to the medial compartment its medial expanse  She denies posterior medial or lateral compartmental pain she denies anterior patellofemoral discomfort  She has a negative patellar compression test  Patellar excursion is symmetrical to her opposite knee and without pain  Left knee Jasiel's and left knee Apley's grind test negative  Left knee Lachman's and drawers test negative  She has reproducible pain to palpation medial compartment    Review of previous x-rays; left knee series done November of 2018 shows medial compartment narrowing otherwise modest arthritic changes of the patellofemoral joint and to a lesser extent the lateral compartment  MRI left knee; shows evidence of a meniscal tear however in a patient that has no reproducible clinical mechanical symptomatology  Large joint arthrocentesis: L knee  Date/Time: 7/16/2019 2:44 PM  Consent given by: patient  Supporting Documentation  Indications: pain   Procedure Details  Location: knee - L knee  Needle size: 22 G  Medications administered: 2 mL bupivacaine 0 25 %; 2 mL lidocaine 1 %; 12 mg betamethasone acetate-betamethasone sodium phosphate 6 (3-3) mg/mL          Impression; Left knee arthralgia  Left knee osteoarthritic changes    Plan;    She was offered and accepted an injection of steroid to the medial compartment of the knee today    She will walk and walk only for the next week, at which time she is then allowed to resume all activities that she desires  She remains a candidate for Visco supplement administration in August, however should her symptomatology return and severity increase clinical exam may warrant a different treatment pathway at that time    Her exam was supervised by and plan formulated by the attending surgeon it was my privilege to assist him in its delivery

## 2019-08-05 ENCOUNTER — ANNUAL EXAM (OUTPATIENT)
Dept: OBGYN CLINIC | Facility: CLINIC | Age: 64
End: 2019-08-05
Payer: COMMERCIAL

## 2019-08-05 VITALS
BODY MASS INDEX: 21.94 KG/M2 | WEIGHT: 139.8 LBS | DIASTOLIC BLOOD PRESSURE: 78 MMHG | SYSTOLIC BLOOD PRESSURE: 124 MMHG | HEIGHT: 67 IN

## 2019-08-05 DIAGNOSIS — Z01.419 WOMEN'S ANNUAL ROUTINE GYNECOLOGICAL EXAMINATION: ICD-10-CM

## 2019-08-05 DIAGNOSIS — Z12.39 BREAST CANCER SCREENING: Primary | ICD-10-CM

## 2019-08-05 PROCEDURE — 99396 PREV VISIT EST AGE 40-64: CPT | Performed by: OBSTETRICS & GYNECOLOGY

## 2019-08-05 PROCEDURE — 87624 HPV HI-RISK TYP POOLED RSLT: CPT | Performed by: OBSTETRICS & GYNECOLOGY

## 2019-08-05 PROCEDURE — G0145 SCR C/V CYTO,THINLAYER,RESCR: HCPCS | Performed by: OBSTETRICS & GYNECOLOGY

## 2019-08-05 NOTE — PROGRESS NOTES
Assessment/Plan:    The patient was informed of a stable menopausal gyn examination  Her DEXA scans are all normal limits  A Pap smear was performed because a new relationship  She will continue to get yearly mammograms  She is happy with her weight  She should return my office in 1 year  Subjective:      Patient ID: Juliana Christy is a 61 y o  female  HPI    This is a 79-year-old white female, she is a  4 para 3 with 3 prior vaginal deliveries  She is now in menopause  She is in a stable relation for last year and a half  She will need a Pap smear performed today  She denies any major gynecological  GI complaint  Menopause symptoms are under control  There is no problem with intimacy  Her colonoscopies and mammograms are up-to-date  She is happy with her weight  She has a dentist on a regular basis  She has been followed by ortho for torned meniscus in her left knee  There are no new major family illnesses report this time  The following portions of the patient's history were reviewed and updated as appropriate: allergies, current medications, past family history, past medical history, past social history, past surgical history and problem list     Review of Systems   All other systems reviewed and are negative  PAST MEDICAL HISTORY   significant for hypothyroidism, torn meniscus, vitamin-D deficiency  PAST SURGICAL HISTORY   significant for right shoulder replacement, arthroscopy of the knee, colonoscopy  History of a LEEP of the cervix    PAST FAMILY HISTORY   significant for diabetes, osteoarthritis, throat cancer, colon cancer, arthritis,    SOCIAL HISTORY  negative for tobacco positive for social alcohol   Objective:      /78   Ht 5' 6 5" (1 689 m)   Wt 63 4 kg (139 lb 12 8 oz)   BMI 22 23 kg/m²          Physical Exam   Constitutional: She is oriented to person, place, and time  She appears well-developed and well-nourished     HENT:   Head: Normocephalic and atraumatic  Eyes: EOM are normal    Neck: Normal range of motion  Cardiovascular: Normal rate and regular rhythm  Pulmonary/Chest: Effort normal and breath sounds normal  Right breast exhibits no inverted nipple, no mass, no nipple discharge, no skin change and no tenderness  Left breast exhibits no inverted nipple, no mass, no nipple discharge, no skin change and no tenderness  No breast swelling, tenderness, discharge or bleeding  Breasts are symmetrical    Abdominal: Soft  Bowel sounds are normal  She exhibits no distension and no mass  There is no tenderness  There is no rebound and no guarding  No hernia  Hernia confirmed negative in the right inguinal area and confirmed negative in the left inguinal area  Genitourinary: Rectum normal, vagina normal and uterus normal  No labial fusion  There is no rash, tenderness, lesion or injury on the right labia  There is no rash, tenderness, lesion or injury on the left labia  Uterus is not deviated, not enlarged, not fixed and not tender  Cervix exhibits no motion tenderness, no discharge and no friability  Right adnexum displays no mass, no tenderness and no fullness  Left adnexum displays no mass, no tenderness and no fullness  Genitourinary Comments: A Pap smear was performed because of her new relation less than 2 years ago  Musculoskeletal: Normal range of motion  Lymphadenopathy: No inguinal adenopathy noted on the right or left side  Neurological: She is alert and oriented to person, place, and time  Skin: Skin is warm and dry  Psychiatric: She has a normal mood and affect   Her behavior is normal

## 2019-08-05 NOTE — PATIENT INSTRUCTIONS
The patient was informed of a stable gyn examination  A Pap smear was performed  Mammograms colonoscopy up-to-date  She should return my office in 1 year unless new problems arise

## 2019-08-07 LAB
HPV HR 12 DNA CVX QL NAA+PROBE: NEGATIVE
HPV16 DNA CVX QL NAA+PROBE: NEGATIVE
HPV18 DNA CVX QL NAA+PROBE: NEGATIVE

## 2019-08-08 LAB
LAB AP GYN PRIMARY INTERPRETATION: NORMAL
Lab: NORMAL

## 2019-08-21 ENCOUNTER — OFFICE VISIT (OUTPATIENT)
Dept: OBGYN CLINIC | Facility: HOSPITAL | Age: 64
End: 2019-08-21
Payer: COMMERCIAL

## 2019-08-21 ENCOUNTER — TELEPHONE (OUTPATIENT)
Dept: INTERNAL MEDICINE CLINIC | Facility: CLINIC | Age: 64
End: 2019-08-21

## 2019-08-21 VITALS
WEIGHT: 139 LBS | HEART RATE: 55 BPM | BODY MASS INDEX: 21.82 KG/M2 | DIASTOLIC BLOOD PRESSURE: 84 MMHG | SYSTOLIC BLOOD PRESSURE: 127 MMHG | HEIGHT: 67 IN

## 2019-08-21 DIAGNOSIS — E03.9 ACQUIRED HYPOTHYROIDISM: ICD-10-CM

## 2019-08-21 DIAGNOSIS — M25.562 CHRONIC PAIN OF LEFT KNEE: Primary | ICD-10-CM

## 2019-08-21 DIAGNOSIS — D72.818 OTHER DECREASED WHITE BLOOD CELL (WBC) COUNT: Primary | ICD-10-CM

## 2019-08-21 DIAGNOSIS — G89.29 CHRONIC PAIN OF LEFT KNEE: Primary | ICD-10-CM

## 2019-08-21 DIAGNOSIS — M17.12 PRIMARY OSTEOARTHRITIS OF LEFT KNEE: ICD-10-CM

## 2019-08-21 PROCEDURE — 20610 DRAIN/INJ JOINT/BURSA W/O US: CPT | Performed by: ORTHOPAEDIC SURGERY

## 2019-08-21 NOTE — PROGRESS NOTES
1  Chronic pain of left knee     2  Primary osteoarthritis of left knee       Patient is here for her first injection of Synvisc into the left knee  Patient reports knee pain  All organ systems normal  Physical exam of the knee shows no effusion no ecchymosis        Large joint arthrocentesis: L knee  Date/Time: 8/21/2019 2:27 PM  Consent given by: patient  Supporting Documentation  Indications: pain   Procedure Details  Location: knee - L knee  Needle size: 22 G  Approach: anterolateral  Medications administered: 16 mg hylan 16 MG/2ML    Patient tolerance: patient tolerated the procedure well with no immediate complications          Patient tolerated procedure follow up one week

## 2019-08-24 ENCOUNTER — APPOINTMENT (OUTPATIENT)
Dept: LAB | Age: 64
End: 2019-08-24
Payer: COMMERCIAL

## 2019-08-24 ENCOUNTER — TRANSCRIBE ORDERS (OUTPATIENT)
Dept: ADMINISTRATIVE | Age: 64
End: 2019-08-24

## 2019-08-24 DIAGNOSIS — E55.9 AVITAMINOSIS D: ICD-10-CM

## 2019-08-24 DIAGNOSIS — Z79.899 ENCOUNTER FOR LONG-TERM (CURRENT) USE OF OTHER MEDICATIONS: ICD-10-CM

## 2019-08-24 DIAGNOSIS — L40.59 POLYARTICULAR PSORIATIC ARTHRITIS (HCC): Primary | ICD-10-CM

## 2019-08-24 DIAGNOSIS — L40.59 POLYARTICULAR PSORIATIC ARTHRITIS (HCC): ICD-10-CM

## 2019-08-24 LAB
ALBUMIN SERPL BCP-MCNC: 3.8 G/DL (ref 3.5–5)
ALP SERPL-CCNC: 76 U/L (ref 46–116)
ALT SERPL W P-5'-P-CCNC: 19 U/L (ref 12–78)
ANION GAP SERPL CALCULATED.3IONS-SCNC: 4 MMOL/L (ref 4–13)
AST SERPL W P-5'-P-CCNC: 18 U/L (ref 5–45)
BACTERIA UR QL AUTO: ABNORMAL /HPF
BASOPHILS # BLD AUTO: 0.04 THOUSANDS/ΜL (ref 0–0.1)
BASOPHILS NFR BLD AUTO: 1 % (ref 0–1)
BILIRUB SERPL-MCNC: 0.42 MG/DL (ref 0.2–1)
BILIRUB UR QL STRIP: NEGATIVE
BUN SERPL-MCNC: 13 MG/DL (ref 5–25)
CALCIUM SERPL-MCNC: 8.7 MG/DL (ref 8.3–10.1)
CHLORIDE SERPL-SCNC: 106 MMOL/L (ref 100–108)
CHOLEST SERPL-MCNC: 209 MG/DL (ref 50–200)
CLARITY UR: CLEAR
CO2 SERPL-SCNC: 28 MMOL/L (ref 21–32)
COLOR UR: YELLOW
CREAT SERPL-MCNC: 0.74 MG/DL (ref 0.6–1.3)
EOSINOPHIL # BLD AUTO: 0 THOUSAND/ΜL (ref 0–0.61)
EOSINOPHIL NFR BLD AUTO: 0 % (ref 0–6)
ERYTHROCYTE [DISTWIDTH] IN BLOOD BY AUTOMATED COUNT: 12.6 % (ref 11.6–15.1)
ERYTHROCYTE [SEDIMENTATION RATE] IN BLOOD: 11 MM/HOUR (ref 0–20)
GFR SERPL CREATININE-BSD FRML MDRD: 86 ML/MIN/1.73SQ M
GLUCOSE P FAST SERPL-MCNC: 95 MG/DL (ref 65–99)
GLUCOSE UR STRIP-MCNC: NEGATIVE MG/DL
HCT VFR BLD AUTO: 41.8 % (ref 34.8–46.1)
HDLC SERPL-MCNC: 73 MG/DL (ref 40–60)
HGB BLD-MCNC: 13.5 G/DL (ref 11.5–15.4)
HGB UR QL STRIP.AUTO: NEGATIVE
HYALINE CASTS #/AREA URNS LPF: ABNORMAL /LPF
IMM GRANULOCYTES # BLD AUTO: 0.01 THOUSAND/UL (ref 0–0.2)
IMM GRANULOCYTES NFR BLD AUTO: 0 % (ref 0–2)
KETONES UR STRIP-MCNC: NEGATIVE MG/DL
LDLC SERPL CALC-MCNC: 130 MG/DL (ref 0–100)
LEUKOCYTE ESTERASE UR QL STRIP: ABNORMAL
LYMPHOCYTES # BLD AUTO: 1.68 THOUSANDS/ΜL (ref 0.6–4.47)
LYMPHOCYTES NFR BLD AUTO: 39 % (ref 14–44)
MCH RBC QN AUTO: 31.9 PG (ref 26.8–34.3)
MCHC RBC AUTO-ENTMCNC: 32.3 G/DL (ref 31.4–37.4)
MCV RBC AUTO: 99 FL (ref 82–98)
MONOCYTES # BLD AUTO: 0.64 THOUSAND/ΜL (ref 0.17–1.22)
MONOCYTES NFR BLD AUTO: 15 % (ref 4–12)
NEUTROPHILS # BLD AUTO: 1.9 THOUSANDS/ΜL (ref 1.85–7.62)
NEUTS SEG NFR BLD AUTO: 45 % (ref 43–75)
NITRITE UR QL STRIP: NEGATIVE
NON-SQ EPI CELLS URNS QL MICRO: ABNORMAL /HPF
NONHDLC SERPL-MCNC: 136 MG/DL
NRBC BLD AUTO-RTO: 0 /100 WBCS
PH UR STRIP.AUTO: 7.5 [PH]
PLATELET # BLD AUTO: 240 THOUSANDS/UL (ref 149–390)
PMV BLD AUTO: 10.3 FL (ref 8.9–12.7)
POTASSIUM SERPL-SCNC: 3.9 MMOL/L (ref 3.5–5.3)
PROT SERPL-MCNC: 7.7 G/DL (ref 6.4–8.2)
PROT UR STRIP-MCNC: NEGATIVE MG/DL
RBC # BLD AUTO: 4.23 MILLION/UL (ref 3.81–5.12)
RBC #/AREA URNS AUTO: ABNORMAL /HPF
SODIUM SERPL-SCNC: 138 MMOL/L (ref 136–145)
SP GR UR STRIP.AUTO: 1.01 (ref 1–1.03)
T4 FREE SERPL-MCNC: 0.69 NG/DL (ref 0.76–1.46)
TRIGL SERPL-MCNC: 31 MG/DL
TSH SERPL DL<=0.05 MIU/L-ACNC: 6.89 UIU/ML (ref 0.36–3.74)
UROBILINOGEN UR QL STRIP.AUTO: 0.2 E.U./DL
WBC # BLD AUTO: 4.27 THOUSAND/UL (ref 4.31–10.16)
WBC #/AREA URNS AUTO: ABNORMAL /HPF

## 2019-08-24 PROCEDURE — 84443 ASSAY THYROID STIM HORMONE: CPT | Performed by: INTERNAL MEDICINE

## 2019-08-24 PROCEDURE — 36415 COLL VENOUS BLD VENIPUNCTURE: CPT | Performed by: INTERNAL MEDICINE

## 2019-08-24 PROCEDURE — 80053 COMPREHEN METABOLIC PANEL: CPT | Performed by: INTERNAL MEDICINE

## 2019-08-24 PROCEDURE — 85025 COMPLETE CBC W/AUTO DIFF WBC: CPT | Performed by: INTERNAL MEDICINE

## 2019-08-24 PROCEDURE — 80061 LIPID PANEL: CPT | Performed by: INTERNAL MEDICINE

## 2019-08-24 PROCEDURE — 85652 RBC SED RATE AUTOMATED: CPT

## 2019-08-24 PROCEDURE — 81001 URINALYSIS AUTO W/SCOPE: CPT | Performed by: PHYSICIAN ASSISTANT

## 2019-08-24 PROCEDURE — 84439 ASSAY OF FREE THYROXINE: CPT | Performed by: INTERNAL MEDICINE

## 2019-08-26 ENCOUNTER — TELEPHONE (OUTPATIENT)
Dept: INTERNAL MEDICINE CLINIC | Facility: CLINIC | Age: 64
End: 2019-08-26

## 2019-08-26 DIAGNOSIS — E03.9 ACQUIRED HYPOTHYROIDISM: ICD-10-CM

## 2019-08-26 RX ORDER — LEVOTHYROXINE SODIUM 0.03 MG/1
TABLET ORAL
Qty: 120 TABLET | Refills: 1 | Status: SHIPPED | OUTPATIENT
Start: 2019-08-26 | End: 2020-02-13

## 2019-08-26 RX ORDER — LEVOTHYROXINE SODIUM 0.03 MG/1
TABLET ORAL
Qty: 90 TABLET | Refills: 1
Start: 2019-08-26 | End: 2019-08-26 | Stop reason: SDUPTHER

## 2019-08-26 NOTE — TELEPHONE ENCOUNTER
Patient notified  She is already taking 2 tabs on saturdays  Do you want her to add 2 additional days to make it 2 tabs 3 days a week or keep it at 2 tabs 2 days a week?

## 2019-08-26 NOTE — TELEPHONE ENCOUNTER
Thyroid test is abnormal, we can adjust your thyroid medication now or we can discuss at your visit  I would increase to 2 tablets 2 days a week, 1 tablet rest of the week  I will discuss the rest of your labs at your visit

## 2019-08-29 ENCOUNTER — OFFICE VISIT (OUTPATIENT)
Dept: OBGYN CLINIC | Facility: HOSPITAL | Age: 64
End: 2019-08-29
Payer: COMMERCIAL

## 2019-08-29 VITALS
DIASTOLIC BLOOD PRESSURE: 80 MMHG | WEIGHT: 139 LBS | HEIGHT: 67 IN | HEART RATE: 55 BPM | BODY MASS INDEX: 21.82 KG/M2 | SYSTOLIC BLOOD PRESSURE: 134 MMHG

## 2019-08-29 DIAGNOSIS — G89.29 CHRONIC PAIN OF LEFT KNEE: Primary | ICD-10-CM

## 2019-08-29 DIAGNOSIS — M25.562 CHRONIC PAIN OF LEFT KNEE: Primary | ICD-10-CM

## 2019-08-29 DIAGNOSIS — M17.12 PRIMARY OSTEOARTHRITIS OF LEFT KNEE: ICD-10-CM

## 2019-08-29 PROCEDURE — 20610 DRAIN/INJ JOINT/BURSA W/O US: CPT | Performed by: ORTHOPAEDIC SURGERY

## 2019-08-29 NOTE — PROGRESS NOTES
Assessment:  1  Chronic pain of left knee  Large joint arthrocentesis: R knee   2  Primary osteoarthritis of left knee  Large joint arthrocentesis: R knee       Plan:  The patient was provided with 2nd left knee Synvisc injection  She should return in one week for 3rd left knee Synvisc injection  To do next visit:  Return in about 1 week (around 9/5/2019)  The above stated was discussed in layman's terms and the patient expressed understanding  All questions were answered to the patient's satisfaction  Scribe Attestation    I,:   Manas Aguero am acting as a scribe while in the presence of the attending physician :        I,:   Jamie Lawson MD personally performed the services described in this documentation    as scribed in my presence :              Subjective:   Real Dumont is a 61 y o  female who presents for 2nd left knee Synvisc injection  She has found relief from the 1st injection  Today she complains of generalized left knee pain  She rates her pain at 1/10  Review of systems negative unless otherwise specified in HPI    Past Medical History:   Diagnosis Date    Abnormal thyroid function test     R    5/8/17     Arthritis     Dislocated shoulder     Right / [place]  Negrete Sara Negrete Sara 1/30/13     Effusion of knee     LA    5/21/14   R    5/8/17     Hypothyroidism     LA    2/6/13   R   3/31/15     Primary osteoarthritis of right knee     LA   4/9/14   R    5/21/14     Prolapsing mitral leaflet syndrome     [place]    1/28/13   [place]  Negrete Sara Negrete Sara 3/31/15     Tinnitus     ringing in both ears    Vaginal Pap smear, abnormal     Vitamin D deficiency     LA    5/8/17  R  Negrete Sara Negrete Sara 3/31/15        Past Surgical History:   Procedure Laterality Date    CERVICAL BIOPSY  W/ LOOP ELECTRODE EXCISION      KNEE ARTHROSCOPY Right     right knee, right shoulder , open right shoulder     AR COLONOSCOPY FLX DX W/COLLJ SPEC WHEN PFRMD N/A 10/24/2017    Procedure: COLONOSCOPY;  Surgeon: Sandy Valenzuela MD;  Location: [facility];   Service: Gastroenterology    TX RECONSTR TOTAL SHOULDER IMPLANT Right 4/25/2017    Procedure: TOTAL SHOULDER ARTHROPLASTY ;  Surgeon: Randi Pena MD;  Location: [facility];  Service: Orthopedics    SHOULDER SURGERY Right     total shoulder replacement        Family History   Problem Relation Age of Onset    Arthritis Mother     Diabetes type II Mother     Arthritis Father     Throat cancer Father     Diabetes type II Maternal Grandmother     Colon cancer Maternal Uncle     Diabetes Family     Osteoarthritis Family        Social History     Occupational History    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      Comment: working full time   Tobacco Use    Smoking status: Never Smoker    Smokeless tobacco: Never Used   Substance and Sexual Activity    Alcohol use: Yes     Comment: socially    Drug use: No    Sexual activity: Yes     Partners: Male     Birth control/protection: Post-menopausal         Current Outpatient Medications:     Ascorbic Acid (VITAMIN C PO), Vitamin C, Disp: , Rfl:     Calcium Carb-Cholecalciferol (CALCIUM + D3 PO), Take by mouth daily  , Disp: , Rfl:     Cholecalciferol (VITAMIN D3 PO), Vitamin D3, Disp: , Rfl:     clobetasol propionate (CLOBEX) 0 05 % shampoo, clobetasol 0 05 % shampoo, Disp: , Rfl:     dexamethasone (DECADRON) 4 mg/mL, 1 mL, Disp: , Rfl:     diclofenac sodium (VOLTAREN) 1 %, Apply 2 g topically 4 (four) times a day, Disp: 3 Tube, Rfl: 1    glucosamine-chondroitin 500-400 MG tablet, Take 1 tablet by mouth 3 (three) times a day, Disp: , Rfl:     GLUCOSAMINE-CHONDROITIN PO, Glucosamine Chondroitin MaxStr, Disp: , Rfl:     Hyaluronan (HYMOVIS) 24 MG/3ML SOSY, 3 mL, Disp: , Rfl:     levothyroxine 25 mcg tablet, Take 2 tabs x 3 days, 1 tab x 4 days, Disp: 120 tablet, Rfl: 1    Omega-3 Fatty Acids (FISH OIL PO), Fish Oil, Disp: , Rfl:     Propoxyphene N-Acetaminophen (DARVOCET-N 100 PO), Darvocet-N 100 100 mg-650 mg tablet  Take 1 tablet by mouth every 4-6 hours as needed for pain, Disp: , Rfl:     sulfaSALAzine (AZULFIDINE) 500 mg tablet, Take 1,000 mg by mouth daily  , Disp: , Rfl:     triamcinolone acetonide (KENALOG) 40 mg/mL, 1 mL, Disp: , Rfl:     No Known Allergies         Vitals:    08/29/19 1545   BP: 134/80   Pulse: 55       Objective:  Physical exam  · General: Awake, Alert, Oriented  · Eyes: Pupils equal, round and reactive to light  · Heart: regular rate and rhythm  · Lungs: No audible wheezing  · Abdomen: soft                    Ortho Exam   Left knee:  Varus alignment  TTP medial joint line with bony enlargement  No erythema or ecchymosis  No effusion or swelling  Normal strength  Good ROM   Calf compartments soft and supple  Sensation intact  Toes are warm sensate and mobile        Diagnostics, reviewed and taken today if performed as documented:    None performed     Procedures, if performed today:    Large joint arthrocentesis: L knee  Date/Time: 8/29/2019 3:57 PM  Consent given by: patient  Site marked: site marked  Timeout: Immediately prior to procedure a time out was called to verify the correct patient, procedure, equipment, support staff and site/side marked as required   Supporting Documentation  Indications: pain   Procedure Details  Location: knee - L knee  Preparation: Patient was prepped and draped in the usual sterile fashion  Needle size: 22 G  Ultrasound guidance: no  Approach: anteromedial  Medications administered: 16 mg hylan 16 MG/2ML  Specialty Pharmacy Supplied: received medications from pharmacy  Patient tolerance: patient tolerated the procedure well with no immediate complications  Dressing:  Sterile dressing applied              Portions of the record may have been created with voice recognition software  Occasional wrong word or "sound a like" substitutions may have occurred due to the inherent limitations of voice recognition software    Read the chart carefully and recognize, using context, where substitutions have occurred

## 2019-09-04 ENCOUNTER — OFFICE VISIT (OUTPATIENT)
Dept: OBGYN CLINIC | Facility: HOSPITAL | Age: 64
End: 2019-09-04
Payer: COMMERCIAL

## 2019-09-04 VITALS
HEIGHT: 66 IN | WEIGHT: 138.89 LBS | SYSTOLIC BLOOD PRESSURE: 142 MMHG | DIASTOLIC BLOOD PRESSURE: 87 MMHG | HEART RATE: 51 BPM | BODY MASS INDEX: 22.32 KG/M2

## 2019-09-04 DIAGNOSIS — G89.29 CHRONIC PAIN OF LEFT KNEE: ICD-10-CM

## 2019-09-04 DIAGNOSIS — M25.562 CHRONIC PAIN OF LEFT KNEE: ICD-10-CM

## 2019-09-04 DIAGNOSIS — M17.12 PRIMARY OSTEOARTHRITIS OF LEFT KNEE: Primary | ICD-10-CM

## 2019-09-04 PROCEDURE — 20610 DRAIN/INJ JOINT/BURSA W/O US: CPT | Performed by: ORTHOPAEDIC SURGERY

## 2019-09-04 NOTE — PROGRESS NOTES
Assessment:  1  Primary osteoarthritis of left knee     2  Chronic pain of left knee         Plan:  The patient was provided with 3rd left knee Synvisc injection  She should follow up in 3 weeks  To do next visit:  Return in about 3 months (around 12/4/2019)  The above stated was discussed in layman's terms and the patient expressed understanding  All questions were answered to the patient's satisfaction  Scribe Attestation    I,:   Akhil Moore am acting as a scribe while in the presence of the attending physician :        I,:   Agnieszka Mojica MD personally performed the services described in this documentation    as scribed in my presence :              Subjective:   Unique Tello is a 61 y o  female who presents for 3rd Left knee Synvisc injection  She has found relief from injection 1 and 2  Today she complains of generalized left knee pain  Review of systems negative unless otherwise specified in HPI    Past Medical History:   Diagnosis Date    Abnormal thyroid function test     R    5/8/17     Arthritis     Dislocated shoulder     Right / [place]  Amara Lightning Amara Lightning 1/30/13     Effusion of knee     LA    5/21/14   R    5/8/17     Hypothyroidism     LA    2/6/13   R   3/31/15     Primary osteoarthritis of right knee     LA   4/9/14   R    5/21/14     Prolapsing mitral leaflet syndrome     LA    1/28/13   [place]  Amara Lightning Amara Lightning 3/31/15     Tinnitus     ringing in both ears    Vaginal Pap smear, abnormal     Vitamin D deficiency     LA    5/8/17  R  Amara Lightning Amara Lightning 3/31/15        Past Surgical History:   Procedure Laterality Date    CERVICAL BIOPSY  W/ LOOP ELECTRODE EXCISION      KNEE ARTHROSCOPY Right     right knee, right shoulder , open right shoulder     NE COLONOSCOPY FLX DX W/COLLJ SPEC WHEN PFRMD N/A 10/24/2017    Procedure: COLONOSCOPY;  Surgeon: Vin Valera MD;  Location: [facility];   Service: Gastroenterology    NE RECONSTR TOTAL SHOULDER IMPLANT Right 4/25/2017    Procedure: TOTAL SHOULDER ARTHROPLASTY ;  Surgeon: Ashly Alvarez MD;  [facility];  Service: Orthopedics    SHOULDER SURGERY Right     total shoulder replacement        Family History   Problem Relation Age of Onset    Arthritis Mother     Diabetes type II Mother     Arthritis Father     Throat cancer Father     Diabetes type II Maternal Grandmother     Colon cancer Maternal Uncle     Diabetes Family     Osteoarthritis Family        Social History     Occupational History    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      Comment: working full time   Tobacco Use    Smoking status: Never Smoker    Smokeless tobacco: Never Used   Substance and Sexual Activity    Alcohol use: Yes     Comment: socially    Drug use: No    Sexual activity: Yes     Partners: Male     Birth control/protection: Post-menopausal         Current Outpatient Medications:     Ascorbic Acid (VITAMIN C PO), Vitamin C, Disp: , Rfl:     Calcium Carb-Cholecalciferol (CALCIUM + D3 PO), Take by mouth daily  , Disp: , Rfl:     Cholecalciferol (VITAMIN D3 PO), Vitamin D3, Disp: , Rfl:     clobetasol propionate (CLOBEX) 0 05 % shampoo, clobetasol 0 05 % shampoo, Disp: , Rfl:     dexamethasone (DECADRON) 4 mg/mL, 1 mL, Disp: , Rfl:     diclofenac sodium (VOLTAREN) 1 %, Apply 2 g topically 4 (four) times a day, Disp: 3 Tube, Rfl: 1    glucosamine-chondroitin 500-400 MG tablet, Take 1 tablet by mouth 3 (three) times a day, Disp: , Rfl:     GLUCOSAMINE-CHONDROITIN PO, Glucosamine Chondroitin MaxStr, Disp: , Rfl:     Hyaluronan (HYMOVIS) 24 MG/3ML SOSY, 3 mL, Disp: , Rfl:     levothyroxine 25 mcg tablet, Take 2 tabs x 3 days, 1 tab x 4 days, Disp: 120 tablet, Rfl: 1    Omega-3 Fatty Acids (FISH OIL PO), Fish Oil, Disp: , Rfl:     Propoxyphene N-Acetaminophen (DARVOCET-N 100 PO), Darvocet-N 100 100 mg-650 mg tablet  Take 1 tablet by mouth every 4-6 hours as needed for pain, Disp: , Rfl:     sulfaSALAzine (AZULFIDINE) 500 mg tablet, Take 1,000 mg by mouth daily  , Disp: , Rfl:     triamcinolone acetonide (KENALOG) 40 mg/mL, 1 mL, Disp: , Rfl:     No Known Allergies         Vitals:    09/04/19 1554   BP: 142/87   Pulse: (!) 51       Objective:  Physical exam  · General: Awake, Alert, Oriented  · Eyes: Pupils equal, round and reactive to light  · Heart: regular rate and rhythm  · Lungs: No audible wheezing  · Abdomen: soft                    Ortho Exam   Left knee:  Varus alignment  TTP medial joint line with bony enlargement  No erythema or ecchymosis  No effusion or swelling  Normal strength  Good ROM   Calf compartments soft and supple  Sensation intact  Toes are warm sensate and mobile    Diagnostics, reviewed and taken today if performed as documented:    None performed    Procedures, if performed today:    Large joint arthrocentesis: L knee  Date/Time: 9/4/2019 4:01 PM  Consent given by: patient  Site marked: site marked  Timeout: Immediately prior to procedure a time out was called to verify the correct patient, procedure, equipment, support staff and site/side marked as required   Supporting Documentation  Indications: pain   Procedure Details  Location: knee - L knee  Preparation: Patient was prepped and draped in the usual sterile fashion  Needle size: 22 G  Ultrasound guidance: no  Approach: anterolateral  Medications administered: 16 mg hylan 16 MG/2ML    Patient tolerance: patient tolerated the procedure well with no immediate complications  Dressing:  Sterile dressing applied            Portions of the record may have been created with voice recognition software  Occasional wrong word or "sound a like" substitutions may have occurred due to the inherent limitations of voice recognition software  Read the chart carefully and recognize, using context, where substitutions have occurred

## 2019-09-10 ENCOUNTER — OFFICE VISIT (OUTPATIENT)
Dept: INTERNAL MEDICINE CLINIC | Facility: CLINIC | Age: 64
End: 2019-09-10
Payer: COMMERCIAL

## 2019-09-10 VITALS
OXYGEN SATURATION: 97 % | WEIGHT: 139 LBS | HEIGHT: 67 IN | RESPIRATION RATE: 18 BRPM | TEMPERATURE: 98 F | DIASTOLIC BLOOD PRESSURE: 70 MMHG | BODY MASS INDEX: 21.82 KG/M2 | SYSTOLIC BLOOD PRESSURE: 132 MMHG | HEART RATE: 58 BPM

## 2019-09-10 DIAGNOSIS — I83.90 VARICOSE VEINS WITHOUT COMPLICATION: ICD-10-CM

## 2019-09-10 DIAGNOSIS — Z00.00 ANNUAL PHYSICAL EXAM: Primary | ICD-10-CM

## 2019-09-10 DIAGNOSIS — K59.09 OTHER CONSTIPATION: ICD-10-CM

## 2019-09-10 DIAGNOSIS — D72.818 OTHER DECREASED WHITE BLOOD CELL (WBC) COUNT: ICD-10-CM

## 2019-09-10 DIAGNOSIS — E78.49 OTHER HYPERLIPIDEMIA: ICD-10-CM

## 2019-09-10 DIAGNOSIS — L40.50 PSORIASIS WITH ARTHROPATHY (HCC): ICD-10-CM

## 2019-09-10 DIAGNOSIS — H61.21 IMPACTED CERUMEN OF RIGHT EAR: ICD-10-CM

## 2019-09-10 DIAGNOSIS — E03.9 ACQUIRED HYPOTHYROIDISM: ICD-10-CM

## 2019-09-10 DIAGNOSIS — M17.12 PRIMARY OSTEOARTHRITIS OF LEFT KNEE: ICD-10-CM

## 2019-09-10 DIAGNOSIS — Z11.59 NEED FOR HEPATITIS C SCREENING TEST: ICD-10-CM

## 2019-09-10 PROBLEM — M19.011 ARTHRITIS OF RIGHT SHOULDER REGION: Status: RESOLVED | Noted: 2017-04-19 | Resolved: 2019-09-10

## 2019-09-10 PROBLEM — Z98.890 HISTORY OF SHOULDER SURGERY: Status: ACTIVE | Noted: 2019-09-10

## 2019-09-10 PROCEDURE — 69210 REMOVE IMPACTED EAR WAX UNI: CPT | Performed by: INTERNAL MEDICINE

## 2019-09-10 PROCEDURE — 99396 PREV VISIT EST AGE 40-64: CPT | Performed by: INTERNAL MEDICINE

## 2019-09-10 NOTE — PROGRESS NOTES
[address] INTERNAL MEDICINE    NAME: Danisha Hou  AGE: 61 y o  SEX: female  : 1955     DATE: 9/10/2019     Assessment and Plan:     Problem List Items Addressed This Visit        Endocrine    Acquired hypothyroidism     Medication recently adjusted, recheck TFTs in a few weeks  Relevant Orders    TSH, 3rd generation with Free T4 reflex       Cardiovascular and Mediastinum    Varicose veins without complication       Musculoskeletal and Integument    Primary osteoarthritis of left knee     S/p injections  Follow up with Ortho  Psoriasis with arthropathy (HCC)     Stable, on sulfasalazine  Sees rheum q6 months  Other    Constipation     Takes a small dose of Miralax daily  Leukopenia     Stable  Other hyperlipidemia     Lipids worse, limit eggs and cheese  Relevant Orders    Lipid panel      Other Visit Diagnoses     Annual physical exam    -  Primary    Updated  Colonoscopy due , mammogram scheduled  Need for hepatitis C screening test        Relevant Orders    Hepatitis C antibody    Impacted cerumen of right ear        Successful removal  Avoid Q tips  Relevant Orders    Ear cerumen removal          Immunizations and preventive care screenings were discussed with patient today  Appropriate education was printed on patient's after visit summary  Counseling:  Dental Health: discussed importance of regular tooth brushing, flossing, and dental visits  · Exercise: the importance of regular exercise/physical activity was discussed  Recommend exercise 3-5 times per week for at least 30 minutes  Return in about 1 year (around 9/10/2020)  Chief Complaint:     Chief Complaint   Patient presents with    Annual Exam      History of Present Illness:     Ms Surya Arnold is having issues with her left knee, has been receiving injections   She walks regularly, feels she needs more weights for toning  She is limited with this since her shoulder surgery  She requests a prescription to replace her compression stockings  She feels her right ear is full, does wear her hearing aids regularly  Adult Annual Physical   Patient here for a comprehensive physical exam  The patient reports no problems  Diet and Physical Activity  · Diet/Nutrition: well balanced diet  · Exercise: walking and strength training exercises  Depression Screening  PHQ-9 Depression Screening    PHQ-9:    Frequency of the following problems over the past two weeks:            General Health  · Sleep: sleeps well  · Hearing: requires use of hearing aids  · Vision: no vision problems  · Dental: regular dental visits  /GYN Health  · Patient is: postmenopausal  · Last menstrual period:   · Contraceptive method:   Syble Rob Review of Systems:     Review of Systems   Constitutional: Negative for appetite change and fatigue  HENT: Positive for hearing loss  Negative for congestion, ear pain and postnasal drip  Eyes: Negative for visual disturbance  Respiratory: Negative for cough and shortness of breath  Cardiovascular: Negative for chest pain and leg swelling  Gastrointestinal: Negative for abdominal pain, constipation and diarrhea  Genitourinary: Negative for dysuria and frequency  Musculoskeletal: Positive for arthralgias  Negative for joint swelling and myalgias  Skin: Positive for rash  Negative for wound  Neurological: Negative for dizziness, numbness and headaches  Psychiatric/Behavioral: Negative for sleep disturbance  The patient is not nervous/anxious  Past Medical History:     Past Medical History:   Diagnosis Date    Abnormal thyroid function test     R    5/8/17     Arthritis     Dislocated shoulder     Right / LA  Sunni Rivas 1/30/13     Effusion of knee     LA    5/21/14   R    5/8/17     Hypothyroidism     LA    2/6/13   R   3/31/15     Primary osteoarthritis of right knee LA 4/9/14   R    5/21/14     Prolapsing mitral leaflet syndrome     LA    1/28/13   AR  Caden Ruiz 3/31/15     Tinnitus     ringing in both ears    Vaginal Pap smear, abnormal     Vitamin D deficiency     LA    5/8/17  R  Caden Ruiz 3/31/15       Past Surgical History:     Past Surgical History:   Procedure Laterality Date    CERVICAL BIOPSY  W/ LOOP ELECTRODE EXCISION      KNEE ARTHROSCOPY Right     right knee, right shoulder , open right shoulder     MI COLONOSCOPY FLX DX W/COLLJ SPEC WHEN PFRMD N/A 10/24/2017    Procedure: COLONOSCOPY;  Surgeon: Sandy Valenzuela MD;  Location: [facility];   Service: Gastroenterology    MI RECONSTR TOTAL SHOULDER IMPLANT Right 4/25/2017    Procedure: TOTAL SHOULDER ARTHROPLASTY ;  Surgeon: Neelam Garza MD;  Location: [facility] OR;  Service: Orthopedics    SHOULDER SURGERY Right     total shoulder replacement       Social History:     Social History     Socioeconomic History    Marital status:      Spouse name: None    Number of children: None    Years of education: None    Highest education level: None   Occupational History    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      Comment: working full time   Social Needs    Financial resource strain: None    Food insecurity:     Worry: None     Inability: None    Transportation needs:     Medical: None     Non-medical: None   Tobacco Use    Smoking status: Never Smoker    Smokeless tobacco: Never Used   Substance and Sexual Activity    Alcohol use: Yes     Comment: socially    Drug use: No    Sexual activity: Yes     Partners: Male     Birth control/protection: Post-menopausal   Lifestyle    Physical activity:     Days per week: None     Minutes per session: None    Stress: None   Relationships    Social connections:     Talks on phone: None     Gets together: None     Attends Protestant service: None     Active member of club or organization: None     Attends meetings of clubs or organizations: None     Relationship status: None    Intimate partner violence:     Fear of current or ex partner: None     Emotionally abused: None     Physically abused: None     Forced sexual activity: None   Other Topics Concern    None   Social History Narrative    , in a relationship    Working - respiratory therapist      Family History:     Family History   Problem Relation Age of Onset    Arthritis Mother     Diabetes type II Mother     Arthritis Father     Throat cancer Father     Diabetes type II Maternal Grandmother     Colon cancer Maternal Uncle     Diabetes Family     Osteoarthritis Family       Current Medications:     Current Outpatient Medications   Medication Sig Dispense Refill    Ascorbic Acid (VITAMIN C PO) Vitamin C      Calcium Carb-Cholecalciferol (CALCIUM + D3 PO) Take by mouth daily        Cholecalciferol (VITAMIN D3 PO) Vitamin D3      clobetasol propionate (CLOBEX) 0 05 % shampoo clobetasol 0 05 % shampoo      dexamethasone (DECADRON) 4 mg/mL 1 mL      diclofenac sodium (VOLTAREN) 1 % Apply 2 g topically 4 (four) times a day 3 Tube 1    glucosamine-chondroitin 500-400 MG tablet Take 1 tablet by mouth 3 (three) times a day      GLUCOSAMINE-CHONDROITIN PO Glucosamine Chondroitin MaxStr      Hyaluronan (HYMOVIS) 24 MG/3ML SOSY 3 mL      levothyroxine 25 mcg tablet Take 2 tabs x 3 days, 1 tab x 4 days 120 tablet 1    Omega-3 Fatty Acids (FISH OIL PO) Fish Oil      Propoxyphene N-Acetaminophen (DARVOCET-N 100 PO) Darvocet-N 100 100 mg-650 mg tablet   Take 1 tablet by mouth every 4-6 hours as needed for pain      sulfaSALAzine (AZULFIDINE) 500 mg tablet Take 1,000 mg by mouth daily        triamcinolone acetonide (KENALOG) 40 mg/mL 1 mL       No current facility-administered medications for this visit         Allergies:     No Known Allergies   Physical Exam:     /70   Pulse 58   Temp 98 °F (36 7 °C) (Oral)   Resp 18   Ht 5' 6 5" (1 689 m)   Wt 63 kg (139 lb)   SpO2 97%   BMI 22 10 kg/m²     Physical Exam   Constitutional: She is oriented to person, place, and time  She appears well-developed and well-nourished  HENT:   Head: Normocephalic and atraumatic  Right Ear: External ear and ear canal normal  Decreased hearing is noted  Left Ear: Tympanic membrane, external ear and ear canal normal  Decreased hearing is noted  Nose: Nose normal    Impacted cerumen right   Eyes: Pupils are equal, round, and reactive to light  Conjunctivae are normal    Neck: Neck supple  Cardiovascular: Normal rate, regular rhythm and normal heart sounds  Pulmonary/Chest: Effort normal and breath sounds normal  She has no wheezes  She has no rales  Abdominal: Soft  Bowel sounds are normal    Musculoskeletal: She exhibits no edema  Neurological: She is alert and oriented to person, place, and time  Skin: Skin is warm  No rash noted  Psychiatric: She has a normal mood and affect  Her behavior is normal    Nursing note and vitals reviewed  Elizabeth Carbone MD  [facility] INTERNAL MEDICINE     Ear cerumen removal  Date/Time: 9/10/2019 5:55 PM  Performed by: Elizabeth Carbone MD  Authorized by: Elizabeth Carbone MD     Patient location:  Clinic  Consent:     Consent obtained:  Verbal  Procedure details:     Local anesthetic:  None    Location:  R ear    Procedure type: curette      Approach:  External  Post-procedure details:     Complication:  None and bleeding    Hearing quality:  Normal    Patient tolerance of procedure:   Tolerated well, no immediate complications

## 2019-09-26 ENCOUNTER — HOSPITAL ENCOUNTER (OUTPATIENT)
Dept: RADIOLOGY | Age: 64
Discharge: HOME | End: 2019-09-26
Payer: COMMERCIAL

## 2019-09-26 VITALS — BODY MASS INDEX: 22.34 KG/M2 | WEIGHT: 139 LBS | HEIGHT: 66 IN

## 2019-09-26 DIAGNOSIS — Z12.39 BREAST CANCER SCREENING: ICD-10-CM

## 2019-09-26 PROCEDURE — 77067 SCR MAMMO BI INCL CAD: CPT

## 2019-09-26 PROCEDURE — 77063 BREAST TOMOSYNTHESIS BI: CPT

## 2019-10-17 ENCOUNTER — OFFICE VISIT (OUTPATIENT)
Dept: AUDIOLOGY | Age: 64
End: 2019-10-17

## 2019-10-17 DIAGNOSIS — H90.3 SENSORY HEARING LOSS, BILATERAL: Primary | ICD-10-CM

## 2019-10-18 ENCOUNTER — TELEPHONE (OUTPATIENT)
Dept: INTERNAL MEDICINE CLINIC | Facility: CLINIC | Age: 64
End: 2019-10-18

## 2019-10-18 DIAGNOSIS — H91.90 HEARING LOSS, UNSPECIFIED HEARING LOSS TYPE, UNSPECIFIED LATERALITY: Primary | ICD-10-CM

## 2019-10-18 NOTE — PROGRESS NOTES
Hearing Aid Visit:    Name:  Dora Tolbert  :  1955  Age:  61 y o  Date of Evaluation: 10/18/19     Dora Tolbert is being seen for a hearing aid visit  Patient is fit with [email or web address] Opn 1 Mini RITE hearing aid(s)  Right serial number [id number]  Left serial number [id number]  Warranty date: 21 (Loss/Damage and repair)  Patient reports she still feels like she struggles to understand in noisy environments  Action:  Cleaned and checked  Both wax filters were clogged - replaced both filters  Changed domes from 6 open to 8 double base, patient noted a good fit and good sound quality  Increased the high frequencies by 2 dB  Recommendations: Follow up as needed        Suzette Hope , CCC-A  Clinical Audiologist

## 2019-10-22 ENCOUNTER — APPOINTMENT (OUTPATIENT)
Dept: LAB | Age: 64
End: 2019-10-22
Payer: COMMERCIAL

## 2019-10-22 DIAGNOSIS — E03.9 ACQUIRED HYPOTHYROIDISM: ICD-10-CM

## 2019-10-22 LAB
T4 FREE SERPL-MCNC: 0.78 NG/DL (ref 0.76–1.46)
TSH SERPL DL<=0.05 MIU/L-ACNC: 4.16 UIU/ML (ref 0.36–3.74)

## 2019-10-22 PROCEDURE — 84439 ASSAY OF FREE THYROXINE: CPT

## 2019-10-22 PROCEDURE — 84443 ASSAY THYROID STIM HORMONE: CPT

## 2019-10-22 PROCEDURE — 36415 COLL VENOUS BLD VENIPUNCTURE: CPT

## 2019-10-23 ENCOUNTER — TELEPHONE (OUTPATIENT)
Dept: INTERNAL MEDICINE CLINIC | Facility: CLINIC | Age: 64
End: 2019-10-23

## 2019-12-04 ENCOUNTER — OFFICE VISIT (OUTPATIENT)
Dept: OBGYN CLINIC | Facility: HOSPITAL | Age: 64
End: 2019-12-04
Payer: COMMERCIAL

## 2019-12-04 VITALS
HEART RATE: 57 BPM | DIASTOLIC BLOOD PRESSURE: 80 MMHG | WEIGHT: 140 LBS | HEIGHT: 66 IN | BODY MASS INDEX: 22.5 KG/M2 | SYSTOLIC BLOOD PRESSURE: 142 MMHG

## 2019-12-04 DIAGNOSIS — M17.12 PRIMARY OSTEOARTHRITIS OF LEFT KNEE: Primary | ICD-10-CM

## 2019-12-04 PROCEDURE — 99213 OFFICE O/P EST LOW 20 MIN: CPT | Performed by: ORTHOPAEDIC SURGERY

## 2019-12-04 RX ORDER — SULFASALAZINE 500 MG/1
TABLET, DELAYED RELEASE ORAL
Refills: 2 | COMMUNITY
Start: 2019-11-19 | End: 2020-09-15

## 2019-12-04 RX ORDER — ALBUTEROL SULFATE 90 UG/1
AEROSOL, METERED RESPIRATORY (INHALATION)
COMMUNITY
Start: 2015-03-26 | End: 2020-09-15

## 2019-12-04 RX ORDER — CLOBETASOL PROPIONATE 0.5 MG/G
CREAM TOPICAL
Refills: 3 | COMMUNITY
Start: 2019-11-19 | End: 2021-11-12 | Stop reason: SDUPTHER

## 2019-12-04 RX ORDER — TRAMADOL HYDROCHLORIDE 50 MG/1
TABLET ORAL
COMMUNITY
Start: 2015-02-20 | End: 2020-09-15

## 2019-12-04 NOTE — PROGRESS NOTES
59 y o female here for follow up regarding left knee OA  She was last seen in September and was provided with her last viscous supplementation injection  Her pain has been very well controlled since her last injection, she has no restrictions to her daily activities and has returned to playing tennis and walking for exercise  Review of Systems  Review of systems negative unless otherwise specified in HPI    Past Medical History  Past Medical History:   Diagnosis Date    Abnormal thyroid function test     R    5/8/17     Arthritis     Dislocated shoulder     Right / LA  Thelda Shanna Oreilly 1/30/13     Effusion of knee     LA    5/21/14   R    5/8/17     Hypothyroidism     LA    2/6/13   R   3/31/15     Primary osteoarthritis of right knee     LA   4/9/14   R    5/21/14     Prolapsing mitral leaflet syndrome     LA    1/28/13   AR  Olivia Oreillye 3/31/15     Tinnitus     ringing in both ears    Vaginal Pap smear, abnormal     Vitamin D deficiency     LA    5/8/17  R  Thelda Shanna Oreilly 3/31/15        Past Surgical History  Past Surgical History:   Procedure Laterality Date    CERVICAL BIOPSY  W/ LOOP ELECTRODE EXCISION      KNEE ARTHROSCOPY Right     right knee, right shoulder , open right shoulder     MS COLONOSCOPY FLX DX W/COLLJ SPEC WHEN PFRMD N/A 10/24/2017    Procedure: COLONOSCOPY;  Surgeon: Abimael Sanchez MD;  Location: [facility];   Service: Gastroenterology    MS RECONSTR TOTAL SHOULDER IMPLANT Right 4/25/2017    Procedure: TOTAL SHOULDER ARTHROPLASTY ;  Surgeon: Best Lacy MD;  Location: [facility] OR;  Service: Orthopedics    SHOULDER SURGERY Right     total shoulder replacement        Current Medications  Current Outpatient Medications on File Prior to Visit   Medication Sig Dispense Refill    albuterol (PROVENTIL HFA) 90 mcg/act inhaler Inhale      Na Sulfate-K Sulfate-Mg Sulf (SUPREP BOWEL PREP KIT) 17 5-3 13-1 6 GM/177ML SOLN Take by mouth      traMADol (ULTRAM) 50 mg tablet Take by mouth      zoster vaccine live, PF, (ZOSTAVAX) 19,400 units/0 65 mL Inject under the skin      Ascorbic Acid (VITAMIN C PO) Vitamin C      Calcium Carb-Cholecalciferol (CALCIUM + D3 PO) Take by mouth daily        Cholecalciferol (VITAMIN D3 PO) Vitamin D3      clobetasol (TEMOVATE) 0 05 % cream   3    dexamethasone (DECADRON) 4 mg/mL 1 mL      diclofenac sodium (VOLTAREN) 1 % Apply 2 g topically 4 (four) times a day 3 Tube 1    glucosamine-chondroitin 500-400 MG tablet Take 1 tablet by mouth 3 (three) times a day      Hyaluronan (HYMOVIS) 24 MG/3ML SOSY 3 mL      levothyroxine 25 mcg tablet Take 2 tabs x 3 days, 1 tab x 4 days 120 tablet 1    Omega-3 Fatty Acids (FISH OIL PO) Fish Oil      Propoxyphene N-Acetaminophen (DARVOCET-N 100 PO) Darvocet-N 100 100 mg-650 mg tablet   Take 1 tablet by mouth every 4-6 hours as needed for pain      sulfaSALAzine (AZULFIDINE-EN) 500 mg EC tablet   2    triamcinolone acetonide (KENALOG) 40 mg/mL 1 mL      [DISCONTINUED] clobetasol propionate (CLOBEX) 0 05 % shampoo clobetasol 0 05 % shampoo      [DISCONTINUED] GLUCOSAMINE-CHONDROITIN PO Glucosamine Chondroitin MaxStr      [DISCONTINUED] sulfaSALAzine (AZULFIDINE) 500 mg tablet Take 1,000 mg by mouth daily         No current facility-administered medications on file prior to visit          Recent Labs [facility])  0   Lab Value Date/Time    HCT 41 8 08/24/2019 0816    HCT 40 4 12/15/2015 0709    HGB 13 5 08/24/2019 0816    HGB 13 4 12/15/2015 0709    WBC 4 27 (L) 08/24/2019 0816    WBC 4 22 (L) 12/15/2015 0709    ESR 11 08/24/2019 0816    ESR 6 12/15/2015 0709    GLUCOSE 94 12/15/2015 0709    HGBA1C 5 1 06/19/2018 0910    HGBA1C 5 0 06/04/2015 0746         Physical exam  · General: Awake, Alert, Oriented  · Eyes: Pupils equal, round and reactive to light  · Heart: regular rate and intact distal pulses   · Lungs: No audible wheezing  · Abdomen: soft  Left knee  Skin intact  No effusion  Full active ROM  NTTP over the medial or lateral joint line  Stable ligamentous exam  Motor and sensory innervation intact to the left leg  Brisk capillary refill to the foot and toes      Assessment/Plan:   59 y  o female with left knee OA that has responded favorably to viscous supplementation injections    WBAT LLE  Activity as tolerated  NSAIDS and Tylenol as needed for further pain relief  Patient may follow up as needed

## 2019-12-16 ENCOUNTER — OFFICE VISIT (OUTPATIENT)
Dept: AUDIOLOGY | Age: 64
End: 2019-12-16
Payer: COMMERCIAL

## 2019-12-16 DIAGNOSIS — H90.3 SENSORY HEARING LOSS, BILATERAL: Primary | ICD-10-CM

## 2019-12-16 PROCEDURE — 92567 TYMPANOMETRY: CPT | Performed by: AUDIOLOGIST

## 2019-12-16 PROCEDURE — 92557 COMPREHENSIVE HEARING TEST: CPT | Performed by: AUDIOLOGIST

## 2019-12-16 NOTE — PROGRESS NOTES
HEARING EVALUATION    Name:  Margarita Canada  :  1955  Age:  59 y o  Date of Evaluation: 19     History: Known Hearing Loss binaurally  Reason for visit: Margarita Canada is being seen today at the request of Dr Tim Ramirez for an evaluation of hearing  Patient reports no issues or concerns regarding hearing sensitivty  Patient denies otalgia, otorrhea, dizziness, fullness, and a long history of tinnitus  Patient denies a family history of hearing loss and noise exposure  EVALUATION:    Otoscopic Evaluation:   Right Ear: Clear and healthy ear canal and tympanic membrane   Left Ear: Clear and healthy ear canal and tympanic membrane    Tympanometry:   Right: Type A - normal middle ear pressure and compliance   Left: Type A - normal middle ear pressure and compliance    Audiogram Results:  Pure tone testing revealed a normal sloping to profound sensorineural hearing loss in the  right ear and a normal sloping to severe sensorineural hearing loss in the  left  ear  SRT and PTA are in agreement indicating good test reliability  Word recognition scores were excellent bilaterally  There was a slight shift in the right ear at 1500Hz and in the left ear at [facility]  There was also a shift/decrease in her word discrimination  *see attached audiogram      RECOMMENDATIONS:  Annual hearing eval, Consult ENT, Return to [facility]  for F/U and Copy to Patient/Caregiver    PATIENT EDUCATION:   Discussed results and recommendations with patient  Questions were addressed and the patient was encouraged to contact our department should concerns arise        Suzette Cerda , CCC-A  Clinical Audiologist

## 2019-12-23 ENCOUNTER — OFFICE VISIT (OUTPATIENT)
Dept: AUDIOLOGY | Age: 64
End: 2019-12-23

## 2019-12-23 DIAGNOSIS — H90.3 SENSORY HEARING LOSS, BILATERAL: Primary | ICD-10-CM

## 2019-12-23 NOTE — PROGRESS NOTES
Hearing Aid Visit:    Name:  Bsailio Wells  :  1955  Age:  59 y o  Date of Evaluation: 19     Basilio Wells is being seen for a hearing aid visit  Patient is fit with [employer] Opn 1 Mini RITE hearing aid(s)  Right serial number [id number]  Left serial number [id number]  Warranty date: 21 (Loss/Damage and repair)  Patient reports her right hearing is not charging  She switched the hearing aids in the  and the left will charge in either port and she switched the batteries and the will both charge in the left hearing aid  Action:  Replaced right battery door to see if that will correct the charging issue  Reprogrammed hearing aids to new hearing loss, gave an extra boost in the high frequencies to help with clarity and understanding  Turned on speech rescue  Added a program two with masking noise, for patient to try  Reviewed with patient how to change the programs with the buttons  Patient practiced in office  Recommendations: Follow up as needed        Suzette Mcbride , CCC-A  Clinical Audiologist

## 2020-01-10 ENCOUNTER — TRANSCRIBE ORDERS (OUTPATIENT)
Dept: ADMINISTRATIVE | Facility: HOSPITAL | Age: 65
End: 2020-01-10

## 2020-01-10 DIAGNOSIS — Z79.2 ENCOUNTER FOR LONG-TERM (CURRENT) USE OF ANTIBIOTICS: Primary | ICD-10-CM

## 2020-02-13 ENCOUNTER — DOCUMENTATION (OUTPATIENT)
Dept: AUDIOLOGY | Age: 65
End: 2020-02-13

## 2020-02-13 ENCOUNTER — TELEPHONE (OUTPATIENT)
Dept: INTERNAL MEDICINE CLINIC | Facility: CLINIC | Age: 65
End: 2020-02-13

## 2020-02-13 DIAGNOSIS — E03.9 ACQUIRED HYPOTHYROIDISM: ICD-10-CM

## 2020-02-13 RX ORDER — LEVOTHYROXINE SODIUM 0.03 MG/1
TABLET ORAL
Qty: 120 TABLET | Refills: 1 | Status: SHIPPED | OUTPATIENT
Start: 2020-02-13 | End: 2020-08-04

## 2020-02-13 NOTE — PROGRESS NOTES
Progress Note    Name:  Faby Casas  :  1955  Age:  59 y o  Date of Evaluation: 20     Scanned in HA chart         Suzette Bowman , CCC-A  Clinical Audiologist

## 2020-02-19 ENCOUNTER — TRANSCRIBE ORDERS (OUTPATIENT)
Dept: ADMINISTRATIVE | Age: 65
End: 2020-02-19

## 2020-02-19 ENCOUNTER — TELEPHONE (OUTPATIENT)
Dept: INTERNAL MEDICINE CLINIC | Facility: CLINIC | Age: 65
End: 2020-02-19

## 2020-02-19 ENCOUNTER — HOSPITAL ENCOUNTER (OUTPATIENT)
Dept: RADIOLOGY | Age: 65
Discharge: HOME | End: 2020-02-19
Payer: COMMERCIAL

## 2020-02-19 ENCOUNTER — APPOINTMENT (OUTPATIENT)
Dept: LAB | Age: 65
End: 2020-02-19
Payer: COMMERCIAL

## 2020-02-19 DIAGNOSIS — Z79.899 ENCOUNTER FOR LONG-TERM (CURRENT) USE OF OTHER MEDICATIONS: ICD-10-CM

## 2020-02-19 DIAGNOSIS — E78.49 OTHER HYPERLIPIDEMIA: ICD-10-CM

## 2020-02-19 DIAGNOSIS — L40.59 POLYARTICULAR PSORIATIC ARTHRITIS (HCC): ICD-10-CM

## 2020-02-19 DIAGNOSIS — Z79.2 ENCOUNTER FOR LONG-TERM (CURRENT) USE OF ANTIBIOTICS: ICD-10-CM

## 2020-02-19 DIAGNOSIS — Z11.59 NEED FOR HEPATITIS C SCREENING TEST: ICD-10-CM

## 2020-02-19 DIAGNOSIS — E55.9 AVITAMINOSIS D: ICD-10-CM

## 2020-02-19 DIAGNOSIS — L40.59 POLYARTICULAR PSORIATIC ARTHRITIS (HCC): Primary | ICD-10-CM

## 2020-02-19 LAB
25(OH)D3 SERPL-MCNC: 63.9 NG/ML (ref 30–100)
ALBUMIN SERPL BCP-MCNC: 4 G/DL (ref 3.5–5)
ALP SERPL-CCNC: 69 U/L (ref 46–116)
ALT SERPL W P-5'-P-CCNC: 22 U/L (ref 12–78)
ANION GAP SERPL CALCULATED.3IONS-SCNC: 4 MMOL/L (ref 4–13)
AST SERPL W P-5'-P-CCNC: 17 U/L (ref 5–45)
BACTERIA UR QL AUTO: NORMAL /HPF
BASOPHILS # BLD AUTO: 0.04 THOUSANDS/ΜL (ref 0–0.1)
BASOPHILS NFR BLD AUTO: 1 % (ref 0–1)
BILIRUB SERPL-MCNC: 0.56 MG/DL (ref 0.2–1)
BILIRUB UR QL STRIP: NEGATIVE
BUN SERPL-MCNC: 15 MG/DL (ref 5–25)
CALCIUM SERPL-MCNC: 9.3 MG/DL (ref 8.3–10.1)
CHLORIDE SERPL-SCNC: 107 MMOL/L (ref 100–108)
CHOLEST SERPL-MCNC: 220 MG/DL (ref 50–200)
CLARITY UR: CLEAR
CO2 SERPL-SCNC: 27 MMOL/L (ref 21–32)
COLOR UR: YELLOW
CREAT SERPL-MCNC: 0.77 MG/DL (ref 0.6–1.3)
EOSINOPHIL # BLD AUTO: 0 THOUSAND/ΜL (ref 0–0.61)
EOSINOPHIL NFR BLD AUTO: 0 % (ref 0–6)
ERYTHROCYTE [DISTWIDTH] IN BLOOD BY AUTOMATED COUNT: 12.8 % (ref 11.6–15.1)
ERYTHROCYTE [SEDIMENTATION RATE] IN BLOOD: 7 MM/HOUR (ref 0–20)
GFR SERPL CREATININE-BSD FRML MDRD: 82 ML/MIN/1.73SQ M
GLUCOSE P FAST SERPL-MCNC: 92 MG/DL (ref 65–99)
GLUCOSE UR STRIP-MCNC: NEGATIVE MG/DL
HCT VFR BLD AUTO: 42.8 % (ref 34.8–46.1)
HCV AB SER QL: NORMAL
HDLC SERPL-MCNC: 93 MG/DL
HGB BLD-MCNC: 14 G/DL (ref 11.5–15.4)
HGB UR QL STRIP.AUTO: NEGATIVE
HYALINE CASTS #/AREA URNS LPF: NORMAL /LPF
IMM GRANULOCYTES # BLD AUTO: 0.01 THOUSAND/UL (ref 0–0.2)
IMM GRANULOCYTES NFR BLD AUTO: 0 % (ref 0–2)
KETONES UR STRIP-MCNC: NEGATIVE MG/DL
LDLC SERPL CALC-MCNC: 118 MG/DL (ref 0–100)
LEUKOCYTE ESTERASE UR QL STRIP: ABNORMAL
LYMPHOCYTES # BLD AUTO: 1.43 THOUSANDS/ΜL (ref 0.6–4.47)
LYMPHOCYTES NFR BLD AUTO: 35 % (ref 14–44)
MCH RBC QN AUTO: 32 PG (ref 26.8–34.3)
MCHC RBC AUTO-ENTMCNC: 32.7 G/DL (ref 31.4–37.4)
MCV RBC AUTO: 98 FL (ref 82–98)
MONOCYTES # BLD AUTO: 0.59 THOUSAND/ΜL (ref 0.17–1.22)
MONOCYTES NFR BLD AUTO: 15 % (ref 4–12)
NEUTROPHILS # BLD AUTO: 2.01 THOUSANDS/ΜL (ref 1.85–7.62)
NEUTS SEG NFR BLD AUTO: 49 % (ref 43–75)
NITRITE UR QL STRIP: NEGATIVE
NON-SQ EPI CELLS URNS QL MICRO: NORMAL /HPF
NONHDLC SERPL-MCNC: 127 MG/DL
NRBC BLD AUTO-RTO: 0 /100 WBCS
PH UR STRIP.AUTO: 7.5 [PH]
PLATELET # BLD AUTO: 240 THOUSANDS/UL (ref 149–390)
PMV BLD AUTO: 10.6 FL (ref 8.9–12.7)
POTASSIUM SERPL-SCNC: 3.7 MMOL/L (ref 3.5–5.3)
PROT SERPL-MCNC: 8.2 G/DL (ref 6.4–8.2)
PROT UR STRIP-MCNC: NEGATIVE MG/DL
RBC # BLD AUTO: 4.37 MILLION/UL (ref 3.81–5.12)
RBC #/AREA URNS AUTO: NORMAL /HPF
SODIUM SERPL-SCNC: 138 MMOL/L (ref 136–145)
SP GR UR STRIP.AUTO: 1.01 (ref 1–1.03)
TRIGL SERPL-MCNC: 46 MG/DL
UROBILINOGEN UR QL STRIP.AUTO: 0.2 E.U./DL
WBC # BLD AUTO: 4.08 THOUSAND/UL (ref 4.31–10.16)
WBC #/AREA URNS AUTO: NORMAL /HPF

## 2020-02-19 PROCEDURE — 81001 URINALYSIS AUTO W/SCOPE: CPT | Performed by: PHYSICIAN ASSISTANT

## 2020-02-19 PROCEDURE — 86803 HEPATITIS C AB TEST: CPT

## 2020-02-19 PROCEDURE — 80053 COMPREHEN METABOLIC PANEL: CPT

## 2020-02-19 PROCEDURE — 85652 RBC SED RATE AUTOMATED: CPT | Performed by: PHYSICIAN ASSISTANT

## 2020-02-19 PROCEDURE — 36415 COLL VENOUS BLD VENIPUNCTURE: CPT | Performed by: PHYSICIAN ASSISTANT

## 2020-02-19 PROCEDURE — 85025 COMPLETE CBC W/AUTO DIFF WBC: CPT

## 2020-02-19 PROCEDURE — 80061 LIPID PANEL: CPT

## 2020-02-19 PROCEDURE — 82306 VITAMIN D 25 HYDROXY: CPT

## 2020-02-19 PROCEDURE — 77080 DXA BONE DENSITY AXIAL: CPT

## 2020-02-19 NOTE — TELEPHONE ENCOUNTER
You can take up to 1200 mg of calcium daily, no need for more  Continue your vitamin D  Also weight bearing exercises are very important for osteopenia, walk daily

## 2020-02-19 NOTE — TELEPHONE ENCOUNTER
Patient notified  Patient states in her Dexa scan she noticed there were changes in the hip area  She would like to know if she should increase her calcium

## 2020-02-19 NOTE — TELEPHONE ENCOUNTER
Patient called regarding her BW  She saw them in [facility] 138 her cholesterol level is up and LDL up  She doesn't want to go on any statins  She has tried to improve her eating habits but would like to know what else she can do

## 2020-02-19 NOTE — TELEPHONE ENCOUNTER
Your total cholesterol is abit higher but your LDL (bad cholesterol) has improved  You can try taking fish oil capsules 1000 mg, 1 to 2 a day  You can also take CoQ 10, may help with the cholesterol

## 2020-03-02 ENCOUNTER — OFFICE VISIT (OUTPATIENT)
Dept: AUDIOLOGY | Age: 65
End: 2020-03-02

## 2020-03-02 DIAGNOSIS — H90.3 SENSORY HEARING LOSS, BILATERAL: Primary | ICD-10-CM

## 2020-03-03 NOTE — PROGRESS NOTES
Hearing Aid Visit:    Name:  Es Velázquez  :  1955  Age:  59 y o  Date of Evaluation: 20     Es Velázquez is being seen for a hearing aid visit  Patient is fit with [employer] Opn 1 Mini RITE hearing aid(s)  Right serial [id number]  Left serial [id number]  Warranty date: 21 (Loss/Damage and repair)  Patient reports the right hearing does not hold a  or will turn red without a full charge  Action:  Sending in right hearing aid for repair under warranty  Recommendations:   Scheduled HAV on 3/13/2020        Suzette Dang , CCC-A  Clinical Audiologist

## 2020-03-13 ENCOUNTER — OFFICE VISIT (OUTPATIENT)
Dept: AUDIOLOGY | Age: 65
End: 2020-03-13

## 2020-03-13 DIAGNOSIS — H90.3 SENSORY HEARING LOSS, BILATERAL: Primary | ICD-10-CM

## 2020-03-13 NOTE — PROGRESS NOTES
Hearing Aid Visit:    Name:  Joyce Mathur  :  1955  Age:  59 y o  Date of Evaluation: 20     Joyce Mathur is being seen for a hearing aid visit  Patient is fit with [employer] Opn 1 Mini RITE hearing aid(s)  Right serial [id number]  Left serial [id number]  Warranty date: 21 (Loss/Damage and repair)      Patient was seen today to pickup her repaired right hearing aid  Action:  Reprogrammed right hearing aid, patient was happy with sound quality  Recommendations: Follow up as needed        Suzette Hansen , CCC-A  Clinical Audiologist

## 2020-03-23 NOTE — PROGRESS NOTES
Progress Note    Name:  Angelica Roberto  :  1955  Age:  59 y o  Date of Evaluation: 20     Patient dropped off old  and picked up a new  ([id number])         Suzette Garcia , CCC-A  Clinical Audiologist

## 2020-04-17 ENCOUNTER — OFFICE VISIT (OUTPATIENT)
Dept: AUDIOLOGY | Age: 65
End: 2020-04-17

## 2020-04-17 DIAGNOSIS — H90.3 SENSORY HEARING LOSS, BILATERAL: Primary | ICD-10-CM

## 2020-07-09 ENCOUNTER — OFFICE VISIT (OUTPATIENT)
Dept: AUDIOLOGY | Age: 65
End: 2020-07-09
Payer: COMMERCIAL

## 2020-07-09 ENCOUNTER — LAB (OUTPATIENT)
Dept: LAB | Age: 65
End: 2020-07-09
Payer: COMMERCIAL

## 2020-07-09 ENCOUNTER — TRANSCRIBE ORDERS (OUTPATIENT)
Dept: ADMINISTRATIVE | Age: 65
End: 2020-07-09

## 2020-07-09 DIAGNOSIS — L40.59 POLYARTICULAR PSORIATIC ARTHRITIS (HCC): Primary | ICD-10-CM

## 2020-07-09 DIAGNOSIS — H90.3 SENSORY HEARING LOSS, BILATERAL: Primary | ICD-10-CM

## 2020-07-09 DIAGNOSIS — L40.59 POLYARTICULAR PSORIATIC ARTHRITIS (HCC): ICD-10-CM

## 2020-07-09 DIAGNOSIS — Z79.899 ENCOUNTER FOR LONG-TERM (CURRENT) USE OF OTHER MEDICATIONS: ICD-10-CM

## 2020-07-09 LAB
ALBUMIN SERPL BCP-MCNC: 3.9 G/DL (ref 3.5–5)
ALP SERPL-CCNC: 79 U/L (ref 46–116)
ALT SERPL W P-5'-P-CCNC: 19 U/L (ref 12–78)
ANION GAP SERPL CALCULATED.3IONS-SCNC: 2 MMOL/L (ref 4–13)
AST SERPL W P-5'-P-CCNC: 18 U/L (ref 5–45)
BACTERIA UR QL AUTO: ABNORMAL /HPF
BASOPHILS # BLD AUTO: 0.04 THOUSANDS/ΜL (ref 0–0.1)
BASOPHILS NFR BLD AUTO: 1 % (ref 0–1)
BILIRUB SERPL-MCNC: 0.43 MG/DL (ref 0.2–1)
BILIRUB UR QL STRIP: NEGATIVE
BUN SERPL-MCNC: 21 MG/DL (ref 5–25)
CALCIUM SERPL-MCNC: 9.6 MG/DL (ref 8.3–10.1)
CHLORIDE SERPL-SCNC: 106 MMOL/L (ref 100–108)
CLARITY UR: CLEAR
CO2 SERPL-SCNC: 29 MMOL/L (ref 21–32)
COLOR UR: YELLOW
CREAT SERPL-MCNC: 0.82 MG/DL (ref 0.6–1.3)
EOSINOPHIL # BLD AUTO: 0 THOUSAND/ΜL (ref 0–0.61)
EOSINOPHIL NFR BLD AUTO: 0 % (ref 0–6)
ERYTHROCYTE [DISTWIDTH] IN BLOOD BY AUTOMATED COUNT: 12.6 % (ref 11.6–15.1)
ERYTHROCYTE [SEDIMENTATION RATE] IN BLOOD: 8 MM/HOUR (ref 0–20)
GFR SERPL CREATININE-BSD FRML MDRD: 76 ML/MIN/1.73SQ M
GLUCOSE SERPL-MCNC: 89 MG/DL (ref 65–140)
GLUCOSE UR STRIP-MCNC: NEGATIVE MG/DL
HCT VFR BLD AUTO: 40.7 % (ref 34.8–46.1)
HGB BLD-MCNC: 13.3 G/DL (ref 11.5–15.4)
HGB UR QL STRIP.AUTO: NEGATIVE
IMM GRANULOCYTES # BLD AUTO: 0.01 THOUSAND/UL (ref 0–0.2)
IMM GRANULOCYTES NFR BLD AUTO: 0 % (ref 0–2)
KETONES UR STRIP-MCNC: NEGATIVE MG/DL
LEUKOCYTE ESTERASE UR QL STRIP: ABNORMAL
LYMPHOCYTES # BLD AUTO: 1.77 THOUSANDS/ΜL (ref 0.6–4.47)
LYMPHOCYTES NFR BLD AUTO: 32 % (ref 14–44)
MCH RBC QN AUTO: 32.4 PG (ref 26.8–34.3)
MCHC RBC AUTO-ENTMCNC: 32.7 G/DL (ref 31.4–37.4)
MCV RBC AUTO: 99 FL (ref 82–98)
MONOCYTES # BLD AUTO: 0.74 THOUSAND/ΜL (ref 0.17–1.22)
MONOCYTES NFR BLD AUTO: 13 % (ref 4–12)
NEUTROPHILS # BLD AUTO: 3.03 THOUSANDS/ΜL (ref 1.85–7.62)
NEUTS SEG NFR BLD AUTO: 54 % (ref 43–75)
NITRITE UR QL STRIP: NEGATIVE
NON-SQ EPI CELLS URNS QL MICRO: ABNORMAL /HPF
NRBC BLD AUTO-RTO: 0 /100 WBCS
PH UR STRIP.AUTO: 6 [PH]
PLATELET # BLD AUTO: 246 THOUSANDS/UL (ref 149–390)
PMV BLD AUTO: 10.1 FL (ref 8.9–12.7)
POTASSIUM SERPL-SCNC: 3.7 MMOL/L (ref 3.5–5.3)
PROT SERPL-MCNC: 7.8 G/DL (ref 6.4–8.2)
PROT UR STRIP-MCNC: NEGATIVE MG/DL
RBC # BLD AUTO: 4.11 MILLION/UL (ref 3.81–5.12)
RBC #/AREA URNS AUTO: ABNORMAL /HPF
SODIUM SERPL-SCNC: 137 MMOL/L (ref 136–145)
SP GR UR STRIP.AUTO: 1.03 (ref 1–1.03)
URATE CRY URNS QL MICRO: ABNORMAL /HPF
UROBILINOGEN UR QL STRIP.AUTO: 0.2 E.U./DL
WBC # BLD AUTO: 5.59 THOUSAND/UL (ref 4.31–10.16)
WBC #/AREA URNS AUTO: ABNORMAL /HPF

## 2020-07-09 PROCEDURE — 81001 URINALYSIS AUTO W/SCOPE: CPT | Performed by: INTERNAL MEDICINE

## 2020-07-09 PROCEDURE — 85652 RBC SED RATE AUTOMATED: CPT

## 2020-07-09 PROCEDURE — 80053 COMPREHEN METABOLIC PANEL: CPT

## 2020-07-09 PROCEDURE — 36415 COLL VENOUS BLD VENIPUNCTURE: CPT

## 2020-07-09 PROCEDURE — 85025 COMPLETE CBC W/AUTO DIFF WBC: CPT

## 2020-07-09 NOTE — PROGRESS NOTES
Hearing Aid Fitting    Name:  Tamika Ramos  :  1955  Age:  59 y o  Date of Evaluation: 20     Tamika Ramos is being see today to be fit with new hearing aids  Patient is fit with [employer] Opn S 1 mini RITE R hearing aid(s)  Right serial number [id number]  Left serial number [id number]  Warranty date: 23 (Loss/Damage and repair)  Ear mold Battery  Dome   Right - R 285 6mm SB   Left - R 285 6mm SB     The hearing aid(s) were adjusted based on the patient's most recent audiogram and patient comfort  Patient noted good sound quality, and was happy with the fitting  Care and cleaning of the hearing aids was reviewed  Domes, filters, and batteries and user manual were reviewed with the patient  Insertion and removal of the hearing aids was done  The patient practiced insertion and removal of the devices in the office, they demonstrated excellent ability to manipulate the hearing aids  Telephone use was reviewed with the patient  The patient expressed satisfaction with the hearing aids  Recommendation:   Follow up in two weeks         Suzette Singh , HEDY-A  Clinical Audiologist

## 2020-08-04 ENCOUNTER — TELEPHONE (OUTPATIENT)
Dept: INTERNAL MEDICINE CLINIC | Facility: CLINIC | Age: 65
End: 2020-08-04

## 2020-08-04 DIAGNOSIS — E03.9 ACQUIRED HYPOTHYROIDISM: ICD-10-CM

## 2020-08-04 DIAGNOSIS — E03.9 ACQUIRED HYPOTHYROIDISM: Primary | ICD-10-CM

## 2020-08-04 RX ORDER — LEVOTHYROXINE SODIUM 0.03 MG/1
TABLET ORAL
Qty: 120 TABLET | Refills: 0 | Status: SHIPPED | OUTPATIENT
Start: 2020-08-04 | End: 2020-12-30 | Stop reason: SDUPTHER

## 2020-08-04 NOTE — TELEPHONE ENCOUNTER
Pt  Wants to know if you want to check her thyroid levels before her next appt  In Sept  Can either call her back or send her a message in [employer]  She would go to [facility]

## 2020-08-10 ENCOUNTER — APPOINTMENT (OUTPATIENT)
Dept: LAB | Age: 65
End: 2020-08-10
Payer: COMMERCIAL

## 2020-08-10 LAB
T4 FREE SERPL-MCNC: 0.82 NG/DL (ref 0.76–1.46)
TSH SERPL DL<=0.05 MIU/L-ACNC: 4.67 UIU/ML (ref 0.36–3.74)

## 2020-08-10 PROCEDURE — 84443 ASSAY THYROID STIM HORMONE: CPT

## 2020-08-10 PROCEDURE — 36415 COLL VENOUS BLD VENIPUNCTURE: CPT

## 2020-08-10 PROCEDURE — 84439 ASSAY OF FREE THYROXINE: CPT

## 2020-08-11 NOTE — RESULT ENCOUNTER NOTE
Sent message to [employer]:  Your thyroid test is about the same  If you are feeling well, I would not change the dose  If you are feeling more tired or sluggish then we can adjust the dose: take 2 tablets 5 days a week, 1 tablet 2 days a week such as the weekends  Let me know what you decide

## 2020-08-20 ENCOUNTER — ANNUAL EXAM (OUTPATIENT)
Dept: OBGYN CLINIC | Facility: CLINIC | Age: 65
End: 2020-08-20
Payer: COMMERCIAL

## 2020-08-20 VITALS
WEIGHT: 140.2 LBS | TEMPERATURE: 98.1 F | DIASTOLIC BLOOD PRESSURE: 84 MMHG | SYSTOLIC BLOOD PRESSURE: 130 MMHG | BODY MASS INDEX: 22 KG/M2 | HEIGHT: 67 IN

## 2020-08-20 DIAGNOSIS — Z01.419 WOMEN'S ANNUAL ROUTINE GYNECOLOGICAL EXAMINATION: Primary | ICD-10-CM

## 2020-08-20 DIAGNOSIS — Z12.39 BREAST CANCER SCREENING: ICD-10-CM

## 2020-08-20 PROCEDURE — G0145 SCR C/V CYTO,THINLAYER,RESCR: HCPCS | Performed by: OBSTETRICS & GYNECOLOGY

## 2020-08-20 PROCEDURE — 87624 HPV HI-RISK TYP POOLED RSLT: CPT | Performed by: OBSTETRICS & GYNECOLOGY

## 2020-08-20 PROCEDURE — 99396 PREV VISIT EST AGE 40-64: CPT | Performed by: OBSTETRICS & GYNECOLOGY

## 2020-08-20 NOTE — PATIENT INSTRUCTIONS
The patient was informed of a stable menopausal gyn examination  She was reminded to make sure her colonoscopies and mammograms are up-to-date  We may consider DEXA scan screening for osteoporosis next year  She should return my office in 1 year

## 2020-08-20 NOTE — PROGRESS NOTES
Assessment/Plan:  The patient was informed of a stable menopausal gyn examination  A Pap smear was performed  She will be getting  soon  She denies any major gynecological  GI complaint  She is content with her weight  Mammogram colonoscopies up-to-date  We may consider DEXA scan at her next visit  She feels safe at home  She has a dentist on a regular basis  There is no problem with depression or anxiety  Subjective:      Patient ID: Arash Vergara is a 59 y o  female  HPI    This is a 70-year-old white female, she is a  4 para 3 with 3 prior vaginal deliveries  She is now in menopause  She is in a stable relationship  She is now engaged to get   There is no problem with intimacy  She denies any major  GI complaint  Menopause symptoms are under control  Colonoscopies and mammograms are up-to-date  DEXA scans are up-to-date  She is dealing well with COVID situation  She has a dentist on a regular basis she is content with her weight  Does have a history of psoriatic arthritis, and hearing loss  These 2 conditions are under control  The following portions of the patient's history were reviewed and updated as appropriate: allergies, current medications, past family history, past medical history, past social history, past surgical history and problem list     Review of Systems   All other systems reviewed and are negative  Objective:      /84   Temp 98 1 °F (36 7 °C)   Ht 5' 6 5" (1 689 m)   Wt 63 6 kg (140 lb 3 2 oz)   BMI 22 29 kg/m²          Physical Exam  Vitals signs reviewed  Exam conducted with a chaperone present  Constitutional:       Appearance: Normal appearance  HENT:      Head: Normocephalic and atraumatic  Eyes:      Extraocular Movements: Extraocular movements intact  Neck:      Musculoskeletal: Normal range of motion and neck supple  Cardiovascular:      Rate and Rhythm: Normal rate and regular rhythm        Pulses: Normal pulses  Heart sounds: Normal heart sounds  Pulmonary:      Effort: Pulmonary effort is normal       Breath sounds: Normal breath sounds  Chest:      Breasts:         Right: Normal  No swelling, bleeding, inverted nipple or mass  Left: Normal  No swelling, bleeding, inverted nipple or mass  Abdominal:      General: Abdomen is flat  Bowel sounds are normal       Palpations: Abdomen is soft  There is no hepatomegaly or splenomegaly  Hernia: There is no hernia in the umbilical area, ventral area, left inguinal area or right inguinal area  Genitourinary:     General: Normal vulva  Pubic Area: No rash or pubic lice  Labia:         Right: No rash, tenderness or lesion  Left: No rash, tenderness or lesion  Urethra: No prolapse, urethral pain, urethral swelling or urethral lesion  Comments: The external genitalia within normal limits, the vagina is clean consistent with menopause  There is no atrophic changes  The cervix is small the uterus is small the anterior there is no cervical motion tenderness  Adnexa is clear  The rectum was normal appearance  A Pap smear was performed  There is no uterine prolapse  Musculoskeletal: Normal range of motion  Skin:     General: Skin is warm and dry  Neurological:      General: No focal deficit present  Mental Status: She is alert and oriented to person, place, and time     Psychiatric:         Mood and Affect: Mood normal          Behavior: Behavior normal

## 2020-08-27 LAB
LAB AP GYN PRIMARY INTERPRETATION: NORMAL
Lab: NORMAL

## 2020-09-15 ENCOUNTER — OFFICE VISIT (OUTPATIENT)
Dept: INTERNAL MEDICINE CLINIC | Facility: CLINIC | Age: 65
End: 2020-09-15
Payer: COMMERCIAL

## 2020-09-15 VITALS
BODY MASS INDEX: 21.66 KG/M2 | DIASTOLIC BLOOD PRESSURE: 78 MMHG | HEART RATE: 58 BPM | WEIGHT: 138 LBS | HEIGHT: 67 IN | SYSTOLIC BLOOD PRESSURE: 122 MMHG | TEMPERATURE: 97.1 F | OXYGEN SATURATION: 98 %

## 2020-09-15 DIAGNOSIS — I83.90 VARICOSE VEINS WITHOUT COMPLICATION: ICD-10-CM

## 2020-09-15 DIAGNOSIS — E78.49 OTHER HYPERLIPIDEMIA: ICD-10-CM

## 2020-09-15 DIAGNOSIS — E03.9 ACQUIRED HYPOTHYROIDISM: ICD-10-CM

## 2020-09-15 DIAGNOSIS — L40.50 PSORIASIS WITH ARTHROPATHY (HCC): ICD-10-CM

## 2020-09-15 DIAGNOSIS — K59.09 OTHER CONSTIPATION: ICD-10-CM

## 2020-09-15 DIAGNOSIS — D72.818 OTHER DECREASED WHITE BLOOD CELL (WBC) COUNT: ICD-10-CM

## 2020-09-15 DIAGNOSIS — Z12.11 SCREENING FOR COLON CANCER: ICD-10-CM

## 2020-09-15 DIAGNOSIS — Z00.00 HEALTH MAINTENANCE EXAMINATION: Primary | ICD-10-CM

## 2020-09-15 PROBLEM — M25.469 KNEE JOINT EFFUSION: Status: RESOLVED | Noted: 2018-08-24 | Resolved: 2020-09-15

## 2020-09-15 PROCEDURE — 99396 PREV VISIT EST AGE 40-64: CPT | Performed by: INTERNAL MEDICINE

## 2020-09-15 RX ORDER — SULFASALAZINE 500 MG/1
1000 TABLET, DELAYED RELEASE ORAL DAILY
Qty: 60 TABLET | Refills: 0
Start: 2020-09-15 | End: 2021-07-13 | Stop reason: SDUPTHER

## 2020-09-15 NOTE — PROGRESS NOTES
[address] INTERNAL MEDICINE    NAME: Ariella Poesy  AGE: 59 y o  SEX: female  : 1955     DATE: 9/15/2020     Assessment and Plan:     Problem List Items Addressed This Visit        Endocrine    Acquired hypothyroidism     No medication change  Taking medication in the morning, wait 30-60 minutes for food/coffee  Recheck TFTs in a few months  Relevant Orders    TSH, 3rd generation with Free T4 reflex       Cardiovascular and Mediastinum    Varicose veins without complication     Uses compression stockings prn, new prescription given  Musculoskeletal and Integument    Psoriasis with arthropathy (HCC)     Stable, on sulfasalazine  Uses steroid cream prn  Sees rheum, dermatology  Relevant Medications    sulfaSALAzine (AZULFIDINE-EN) 500 mg EC tablet       Other    Constipation     Takes Miralax regularly  Leukopenia     Recent CBC normal          Other hyperlipidemia     Lipids slightly worse, takes fish oil capsules  Low risk  Relevant Orders    Lipid panel      Other Visit Diagnoses     Health maintenance examination    -  Primary    Colonoscopy due  Prevnar later this year  Screening for colon cancer        Relevant Orders    Ambulatory referral for colonoscopy          Immunizations and preventive care screenings were discussed with patient today  Appropriate education was printed on patient's after visit summary  Counseling:  Dental Health: discussed importance of regular tooth brushing, flossing, and dental visits  · Exercise: the importance of regular exercise/physical activity was discussed  Recommend exercise 3-5 times per week for at least 30 minutes  Return in about 1 year (around 9/15/2021)       Chief Complaint:     Chief Complaint   Patient presents with    Annual Exam      History of Present Illness:     Adult Annual Physical   Patient here for a comprehensive physical exam  The patient reports no problems  She reports her knees have been giving her trouble recently, more so the right knee  She will call Ortho for another injection  She has an occasional psoriatic rash on her elbows and on her scalp  She applies steroids as needed  Remains very active, plays tennis 3 days a week and walks or bicycles regularly  She is planning to retire next year, has been working full-time throughout the pandemic  Diet and Physical Activity  · Diet/Nutrition: well balanced diet  · Exercise: 5-7 times a week on average  Depression Screening  PHQ-9 Depression Screening    PHQ-9:    Frequency of the following problems over the past two weeks:       Little interest or pleasure in doing things:  0 - not at all  Feeling down, depressed, or hopeless:  0 - not at all  PHQ-2 Score:  0       General Health  · Sleep: sleeps well  · Hearing: normal - bilateral   · Vision: no vision problems  · Dental: regular dental visits  /GYN Health  · Patient is: postmenopausal  · Last menstrual period:   · Contraceptive method: n/a  Review of Systems:     Review of Systems   Constitutional: Negative for activity change, appetite change and fatigue  HENT: Negative for congestion, ear pain and postnasal drip  Eyes: Negative for visual disturbance  Respiratory: Negative for cough and shortness of breath  Cardiovascular: Negative for chest pain and leg swelling  Gastrointestinal: Negative for abdominal pain, constipation and diarrhea  Genitourinary: Negative for dysuria and frequency  Musculoskeletal: Positive for arthralgias  Negative for joint swelling and myalgias  Skin: Positive for rash  Negative for wound  Neurological: Negative for dizziness and headaches  Psychiatric/Behavioral: Negative for confusion and sleep disturbance  The patient is not nervous/anxious         Past Medical History:     Past Medical History:   Diagnosis Date    Abnormal thyroid function test     R    5/8/17     Arthritis     [place]     [place] / [place]  [facility] 1/30/13     Effusion of knee     [place]    5/21/14   [place]    5/8/17     Hypothyroidism     [place]    2/6/13   R   3/31/15     Primary osteoarthritis of right knee     [place]   4/9/14   R    5/21/14     Prolapsing mitral leaflet syndrome     [place]    1/28/13   [place]  [facility] 3/31/15     Tinnitus     ringing in both ears    Vaginal Pap smear, abnormal     Vitamin D deficiency     [place]    5/8/17  [place]  [facility] [place] [facility] 3/31/15       Past Surgical History:     Past Surgical History:   Procedure Laterality Date    CERVICAL BIOPSY  W/ LOOP ELECTRODE EXCISION      KNEE ARTHROSCOPY Right     right knee, right shoulder , open right shoulder     CO COLONOSCOPY FLX DX W/COLLJ SPEC WHEN PFRMD N/A 10/24/2017    Procedure: COLONOSCOPY;  Surgeon: Alcira Husain MD;  Location: [facility];   Service: Gastroenterology    CO RECONSTR TOTAL SHOULDER IMPLANT Right 4/25/2017    Procedure: TOTAL SHOULDER ARTHROPLASTY ;  Surgeon: Bernarda Mcgraw MD;  Location:  MAIN OR;  Service: Orthopedics    SHOULDER SURGERY Right     total shoulder replacement       Social History:        Social History     Socioeconomic History    Marital status:      Spouse name: None    Number of children: None    Years of education: None    Highest education level: None   Occupational History    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      Comment: working full time   Social Needs    Financial resource strain: None    Food insecurity     Worry: None     Inability: None    Transportation needs     Medical: None     Non-medical: None   Tobacco Use    Smoking status: Never Smoker    Smokeless tobacco: Never Used   Substance and Sexual Activity    Alcohol use: Yes     Comment: socially    Drug use: No    Sexual activity: Yes     Partners: Male     Birth control/protection: Post-menopausal   Lifestyle    Physical activity     Days per week: None     Minutes per session: None    Stress: None   Relationships    Social connections     Talks on phone: None     Gets together: None     Attends Congregational service: None     Active member of club or organization: None     Attends meetings of clubs or organizations: None     Relationship status: None    Intimate partner violence     Fear of current or ex partner: None     Emotionally abused: None     Physically abused: None     Forced sexual activity: None   Other Topics Concern    None   Social History Narrative    ,    Engaged    Working - respiratory therapist      Family History:     Family History   Problem Relation Age of Onset    Arthritis Mother     Diabetes type II Mother     Arthritis Father     Throat cancer Father     Diabetes type II Maternal Grandmother     Colon cancer Maternal Uncle     Diabetes Family     Osteoarthritis Family     No Known Problems Sister     No Known Problems Daughter     Prostate cancer Maternal Grandfather     Breast cancer Paternal Grandmother 54    No Known Problems Paternal Grandfather     Breast cancer Paternal Aunt 50      Current Medications:     Current Outpatient Medications   Medication Sig Dispense Refill    Ascorbic Acid (VITAMIN C PO) Vitamin C      Calcium Carb-Cholecalciferol (CALCIUM + D3 PO) Take by mouth daily        Cholecalciferol (VITAMIN D3 PO) Vitamin D3      glucosamine-chondroitin 500-400 MG tablet Take 1 tablet by mouth 3 (three) times a day      levothyroxine 25 mcg tablet TAKE TWO TABLETS BY MOUTH 3 DAYS PER WEEK AND 1 TABLET 4 DAYS PER WEEK 120 tablet 0    Omega-3 Fatty Acids (FISH OIL PO) Fish Oil      sulfaSALAzine (AZULFIDINE-EN) 500 mg EC tablet Take 2 tablets (1,000 mg total) by mouth daily 60 tablet 0    clobetasol (TEMOVATE) 0 05 % cream   3    diclofenac sodium (VOLTAREN) 1 % Apply 2 g topically 4 (four) times a day (Patient not taking: Reported on 8/20/2020) 3 Tube 1     No current facility-administered medications for this visit  Allergies:     No Known Allergies   Physical Exam:     /78   Pulse 58   Temp (!) 97 1 °F (36 2 °C)   Ht 5' 6 5" (1 689 m)   Wt 62 6 kg (138 lb)   SpO2 98%   BMI 21 94 kg/m²     Physical Exam  Constitutional:       Appearance: She is well-developed  HENT:      Head: Normocephalic and atraumatic  Eyes:      Pupils: Pupils are equal, round, and reactive to light  Neck:      Musculoskeletal: Normal range of motion  Cardiovascular:      Rate and Rhythm: Normal rate and regular rhythm  Comments: Varicose veins, both LE  Pulmonary:      Effort: Pulmonary effort is normal       Breath sounds: Normal breath sounds  Abdominal:      General: Bowel sounds are normal       Palpations: Abdomen is soft  Musculoskeletal: Normal range of motion  Right knee: She exhibits normal range of motion and no swelling  No tenderness found  Left knee: She exhibits normal range of motion and no swelling  No tenderness found  Right lower leg: No edema  Left lower leg: No edema  Comments: (+) crepitus R knee   Skin:     General: Skin is warm  Findings: Rash present  Neurological:      General: No focal deficit present  Mental Status: She is alert and oriented to person, place, and time     Psychiatric:         Mood and Affect: Mood normal          Behavior: Behavior normal           Sukumar Mantilla MD  [facility] INTERNAL MEDICINE

## 2020-09-15 NOTE — PATIENT INSTRUCTIONS

## 2020-09-15 NOTE — ASSESSMENT & PLAN NOTE
No medication change  Taking medication in the morning, wait 30-60 minutes for food/coffee  Recheck TFTs in a few months

## 2020-09-24 ENCOUNTER — PREP FOR PROCEDURE (OUTPATIENT)
Dept: GASTROENTEROLOGY | Facility: CLINIC | Age: 65
End: 2020-09-24

## 2020-09-24 DIAGNOSIS — Z12.11 SCREEN FOR COLON CANCER: Primary | ICD-10-CM

## 2020-10-12 ENCOUNTER — HOSPITAL ENCOUNTER (OUTPATIENT)
Dept: RADIOLOGY | Age: 65
Discharge: HOME | End: 2020-10-12
Payer: COMMERCIAL

## 2020-10-12 VITALS — HEIGHT: 67 IN | BODY MASS INDEX: 21.66 KG/M2 | WEIGHT: 138 LBS

## 2020-10-12 DIAGNOSIS — Z12.39 BREAST CANCER SCREENING: ICD-10-CM

## 2020-10-12 DIAGNOSIS — Z12.31 ENCOUNTER FOR SCREENING MAMMOGRAM FOR MALIGNANT NEOPLASM OF BREAST: ICD-10-CM

## 2020-10-12 PROCEDURE — 77063 BREAST TOMOSYNTHESIS BI: CPT

## 2020-10-12 PROCEDURE — 77067 SCR MAMMO BI INCL CAD: CPT

## 2020-10-19 ENCOUNTER — TELEPHONE (OUTPATIENT)
Dept: GASTROENTEROLOGY | Facility: CLINIC | Age: 65
End: 2020-10-19

## 2020-10-19 DIAGNOSIS — Z12.11 COLON CANCER SCREENING: Primary | ICD-10-CM

## 2020-11-11 ENCOUNTER — ANESTHESIA EVENT (OUTPATIENT)
Dept: GASTROENTEROLOGY | Facility: HOSPITAL | Age: 65
End: 2020-11-11

## 2020-11-11 RX ORDER — SODIUM CHLORIDE, SODIUM LACTATE, POTASSIUM CHLORIDE, CALCIUM CHLORIDE 600; 310; 30; 20 MG/100ML; MG/100ML; MG/100ML; MG/100ML
125 INJECTION, SOLUTION INTRAVENOUS CONTINUOUS
Status: CANCELLED | OUTPATIENT
Start: 2020-11-11

## 2020-11-12 ENCOUNTER — ANESTHESIA (OUTPATIENT)
Dept: GASTROENTEROLOGY | Facility: HOSPITAL | Age: 65
End: 2020-11-12

## 2020-11-12 ENCOUNTER — HOSPITAL ENCOUNTER (OUTPATIENT)
Dept: GASTROENTEROLOGY | Facility: HOSPITAL | Age: 65
Setting detail: OUTPATIENT SURGERY
Discharge: HOME | End: 2020-11-12
Attending: INTERNAL MEDICINE | Admitting: INTERNAL MEDICINE
Payer: COMMERCIAL

## 2020-11-12 VITALS — HEART RATE: 65 BPM

## 2020-11-12 VITALS
OXYGEN SATURATION: 100 % | WEIGHT: 138 LBS | HEART RATE: 66 BPM | DIASTOLIC BLOOD PRESSURE: 73 MMHG | RESPIRATION RATE: 16 BRPM | HEIGHT: 67 IN | TEMPERATURE: 97.7 F | BODY MASS INDEX: 21.66 KG/M2 | SYSTOLIC BLOOD PRESSURE: 104 MMHG

## 2020-11-12 DIAGNOSIS — Z12.11 SCREEN FOR COLON CANCER: ICD-10-CM

## 2020-11-12 PROCEDURE — 45385 COLONOSCOPY W/LESION REMOVAL: CPT | Performed by: INTERNAL MEDICINE

## 2020-11-12 PROCEDURE — 88305 TISSUE EXAM BY PATHOLOGIST: CPT | Performed by: PATHOLOGY

## 2020-11-12 RX ORDER — PROPOFOL 10 MG/ML
INJECTION, EMULSION INTRAVENOUS CONTINUOUS PRN
Status: DISCONTINUED | OUTPATIENT
Start: 2020-11-12 | End: 2020-11-12

## 2020-11-12 RX ORDER — LIDOCAINE HYDROCHLORIDE 10 MG/ML
0.5 INJECTION, SOLUTION EPIDURAL; INFILTRATION; INTRACAUDAL; PERINEURAL ONCE AS NEEDED
Status: DISCONTINUED | OUTPATIENT
Start: 2020-11-12 | End: 2020-11-16 | Stop reason: HOSPADM

## 2020-11-12 RX ORDER — SODIUM CHLORIDE 9 MG/ML
125 INJECTION, SOLUTION INTRAVENOUS CONTINUOUS
Status: DISCONTINUED | OUTPATIENT
Start: 2020-11-12 | End: 2020-11-16 | Stop reason: HOSPADM

## 2020-11-12 RX ORDER — PROPOFOL 10 MG/ML
INJECTION, EMULSION INTRAVENOUS AS NEEDED
Status: DISCONTINUED | OUTPATIENT
Start: 2020-11-12 | End: 2020-11-12

## 2020-11-12 RX ADMIN — PROPOFOL 40 MG: 10 INJECTION, EMULSION INTRAVENOUS at 09:48

## 2020-11-12 RX ADMIN — PROPOFOL 40 MG: 10 INJECTION, EMULSION INTRAVENOUS at 09:58

## 2020-11-12 RX ADMIN — PROPOFOL 30 MG: 10 INJECTION, EMULSION INTRAVENOUS at 09:52

## 2020-11-12 RX ADMIN — PROPOFOL 30 MG: 10 INJECTION, EMULSION INTRAVENOUS at 09:50

## 2020-11-12 RX ADMIN — PROPOFOL 60 MG: 10 INJECTION, EMULSION INTRAVENOUS at 09:47

## 2020-11-12 RX ADMIN — PROPOFOL 40 MG: 10 INJECTION, EMULSION INTRAVENOUS at 09:49

## 2020-11-12 RX ADMIN — PROPOFOL 60 MCG/KG/MIN: 10 INJECTION, EMULSION INTRAVENOUS at 09:52

## 2020-11-12 RX ADMIN — SODIUM CHLORIDE 125 ML/HR: 0.9 INJECTION, SOLUTION INTRAVENOUS at 08:00

## 2020-12-29 ENCOUNTER — IMMUNIZATIONS (OUTPATIENT)
Dept: FAMILY MEDICINE CLINIC | Facility: HOSPITAL | Age: 65
End: 2020-12-29
Payer: COMMERCIAL

## 2020-12-29 DIAGNOSIS — Z23 ENCOUNTER FOR IMMUNIZATION: ICD-10-CM

## 2020-12-29 PROCEDURE — 0011A SARS-COV-2 / COVID-19 MRNA VACCINE (MODERNA) 100 MCG: CPT

## 2020-12-29 PROCEDURE — 91301 SARS-COV-2 / COVID-19 MRNA VACCINE (MODERNA) 100 MCG: CPT

## 2020-12-30 DIAGNOSIS — E03.9 ACQUIRED HYPOTHYROIDISM: ICD-10-CM

## 2020-12-30 RX ORDER — LEVOTHYROXINE SODIUM 0.03 MG/1
TABLET ORAL
Qty: 120 TABLET | Refills: 1 | Status: SHIPPED | OUTPATIENT
Start: 2020-12-30 | End: 2021-06-08

## 2021-01-13 ENCOUNTER — TELEPHONE (OUTPATIENT)
Dept: INTERNAL MEDICINE CLINIC | Facility: CLINIC | Age: 66
End: 2021-01-13

## 2021-01-13 NOTE — TELEPHONE ENCOUNTER
Pt wants to know due to age can she schedule to come in for a pneumonia shot and inquiring about the shingles vaccine

## 2021-01-27 ENCOUNTER — IMMUNIZATIONS (OUTPATIENT)
Dept: FAMILY MEDICINE CLINIC | Facility: HOSPITAL | Age: 66
End: 2021-01-27

## 2021-01-27 DIAGNOSIS — Z23 ENCOUNTER FOR IMMUNIZATION: Primary | ICD-10-CM

## 2021-01-27 PROCEDURE — 91301 SARS-COV-2 / COVID-19 MRNA VACCINE (MODERNA) 100 MCG: CPT

## 2021-01-27 PROCEDURE — 0012A SARS-COV-2 / COVID-19 MRNA VACCINE (MODERNA) 100 MCG: CPT

## 2021-02-03 ENCOUNTER — TELEPHONE (OUTPATIENT)
Dept: GASTROENTEROLOGY | Facility: CLINIC | Age: 66
End: 2021-02-03

## 2021-02-17 ENCOUNTER — OFFICE VISIT (OUTPATIENT)
Dept: DERMATOLOGY | Facility: CLINIC | Age: 66
End: 2021-02-17
Payer: COMMERCIAL

## 2021-02-17 VITALS — HEIGHT: 66 IN | BODY MASS INDEX: 22.34 KG/M2 | TEMPERATURE: 98.5 F | WEIGHT: 139 LBS

## 2021-02-17 DIAGNOSIS — L40.9 PSORIASIS: ICD-10-CM

## 2021-02-17 DIAGNOSIS — D22.60 MULTIPLE BENIGN MELANOCYTIC NEVI OF UPPER AND LOWER EXTREMITIES AND TRUNK: ICD-10-CM

## 2021-02-17 DIAGNOSIS — D18.01 CHERRY ANGIOMA: Primary | ICD-10-CM

## 2021-02-17 DIAGNOSIS — D22.70 MULTIPLE BENIGN MELANOCYTIC NEVI OF UPPER AND LOWER EXTREMITIES AND TRUNK: ICD-10-CM

## 2021-02-17 DIAGNOSIS — L82.1 SEBORRHEIC KERATOSIS: ICD-10-CM

## 2021-02-17 DIAGNOSIS — D22.5 MULTIPLE BENIGN MELANOCYTIC NEVI OF UPPER AND LOWER EXTREMITIES AND TRUNK: ICD-10-CM

## 2021-02-17 DIAGNOSIS — L81.4 SOLAR LENTIGINOSIS: ICD-10-CM

## 2021-02-17 PROCEDURE — 99204 OFFICE O/P NEW MOD 45 MIN: CPT | Performed by: DERMATOLOGY

## 2021-02-17 NOTE — PROGRESS NOTES
[facility] Note     Patient Name: Fady Ortiz  Encounter Date: 2/17/2021     Have you been cared for by a [facility] in the last 3 years and, if so, which one? No    · Have you traveled outside of the [address] [place] [address] in the past 3 months or outside of the [facility] area in the last 2 weeks? No     May we call your Preferred Phone number to discuss your specific medical information? Yes     May we leave a detailed message that includes your specific medical information? Yes      Today's Chief Concerns:   Concern #1:  Skin Check   Concern #2:  Spot in ear   Concern #3: Spot in eyebrow    Past Medical History:  Have you personally ever had or currently have any of the following? · Skin cancer (such as Melanoma, Basal Cell Carcinoma, Squamous Cell Carcinoma? (If Yes, please provide more detail)- No  · Eczema: No  · Psoriasis: No  · HIV/AIDS: No  · Hepatitis B or C: No  · Tuberculosis: No  · Systemic Immunosuppression such as Diabetes, Biologic or Immunotherapy, Chemotherapy, Organ Transplantation, Bone Marrow Transplantation (If YES, please provide more detail): No  · Radiation Treatment (If YES, please provide more detail): No  · Any other major medical conditions/concerns? 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 Family History:  Have any of your "first degree relatives" (parent, brother, sister, or child) had any of the following       · Skin cancer such as Melanoma or Merkel Cell Carcinoma or Pancreatic Cancer? No  · Eczema, Asthma, Hay Fever or Seasonal Allergies: No  · Psoriasis or Psoriatic Arthritis: No  · Do any other medical conditions seem to run in your family? If Yes, what condition and which relatives?   No    Current Medications:   (please update all dermatological medications before printing patient's AVS!)      Current Outpatient Medications:     Ascorbic Acid (VITAMIN C PO), Vitamin C, Disp: , Rfl:     Calcium Carb-Cholecalciferol (CALCIUM + D3 PO), Take by mouth daily  , Disp: , Rfl:     Cholecalciferol (VITAMIN D3 PO), Vitamin D3, Disp: , Rfl:     clobetasol (TEMOVATE) 0 05 % cream, , Disp: , Rfl: 3    glucosamine-chondroitin 500-400 MG tablet, Take 1 tablet by mouth 3 (three) times a day, Disp: , Rfl:     levothyroxine 25 mcg tablet, TAKE TWO TABLETS BY MOUTH 3 DAYS PER WEEK AND 1 TABLET 4 DAYS PER WEEK, Disp: 120 tablet, Rfl: 1    Omega-3 Fatty Acids (FISH OIL PO), Fish Oil, Disp: , Rfl:     sulfaSALAzine (AZULFIDINE-EN) 500 mg EC tablet, Take 2 tablets (1,000 mg total) by mouth daily, Disp: 60 tablet, Rfl: 0    diclofenac sodium (VOLTAREN) 1 %, Apply 2 g topically 4 (four) times a day (Patient not taking: Reported on 8/20/2020), Disp: 3 Tube, Rfl: 1      Review of Systems:  Have you recently had or currently have any of the following? If YES, what are you doing for the problem? · Fever, chills or unintended weight loss: No  · Sudden loss or change in your vision: No  · Nausea, vomiting or blood in your stool: No  · Painful or swollen joints: No  · Wheezing or cough: No  · Changing mole or non-healing wound: No  · Nosebleeds: No  · Excessive sweating: No  · Easy or prolonged bleeding? No  · Over the last 2 weeks, how often have you been bothered by the following problems? · Taking little interest or pleasure in doing things: 1 - Not at All  · Feeling down, depressed, or hopeless: 1 - Not at All  · Rapid heartbeat with epinephrine:  No    · FEMALES ONLY:    · Are you pregnant or planning to become pregnant? No  · Are you currently or planning to be nursing or breast feeding? No    · Any known allergies?       No Known Allergies      Physical Exam:     Was a chaperone (Derm Clinical Assistant) present throughout the entire Physical Exam? Yes     Did the Dermatology Team specifically  the patient on the importance of a Full Skin Exam to be sure that nothing is missed clinically?  Yes}  o Did the patient ultimately request or accept a Full Skin Exam?  Yes  o Did the patient specifically refuse to have the areas "under-the-bra" examined by the Dermatologist? No  o Did the patient specifically refuse to have the areas "under-the-underwear" examined by the Dermatologist? No    CONSTITUTIONAL:   Vitals:    02/17/21 1249   Temp: 98 5 °F (36 9 °C)   Weight: 63 kg (139 lb)   Height: 5' 6" (1 676 m)       PSYCH: Normal mood and affect  EYES: Normal conjunctiva  ENT: Normal lips and oral mucosa  CARDIOVASCULAR: No edema  RESPIRATORY: Normal respirations  HEME/LYMPH/IMMUNO:  No regional lymphadenopathy except as noted below in "ASSESSMENT AND PLAN BY DIAGNOSIS"    SKIN:  FULL ORGAN SYSTEM EXAM   Hair, Scalp, Ears, Face Normal except as noted below in Assessment   Neck, Cervical Chain Nodes Normal except as noted below in Assessment   Right Arm/Hand/Fingers Normal except as noted below in Assessment   Left Arm/Hand/Fingers Normal except as noted below in Assessment   Chest/Breasts/Axillae Viewed areas Normal except as noted below in Assessment   Abdomen, Umbilicus Normal except as noted below in Assessment   Back/Spine Normal except as noted below in Assessment   Groin/Genitalia/Buttocks Normal except as noted below in Assessment   Right Leg, Foot, Toes Normal except as noted below in Assessment   Left Leg, Foot, Toes Normal except as noted below in Assessment        Assessment and Plan by Diagnosis:    History of Present Condition:     Duration:  How long has this been an issue for you?    o  Ear-months, Eyebrow-years   Location Affected:  Where on the body is this affecting you?    o  Ear, eyebrow   Quality:  Is there any bleeding, pain, itch, burning/irritation, or redness associated with the skin lesion?    o  Deines   Severity:  Describe any bleeding, pain, itch, burning/irritation, or redness on a scale of 1 to 10 (with 10 being the worst)  o  Denies   Timing:  Does this condition seem to be there pretty constantly or do you notice it more at specific times throughout the day?    o  Constant   Context:  Have you ever noticed that this condition seems to be associated with specific activities you do?    o  Denies   Modifying Factors:    o Anything that seems to make the condition worse?    -  Denies  o What have you tried to do to make the condition better? -  Clobetasol-Eyebrow      CHERRY ANGIOMAS     Physical Exam:  · Anatomic Location Affected:  Trunk and extremites  · Morphological Description:  Scattered cherry red papules  · Denies pain, itch, bleeding  No treatments tried  Present for years  Present constantly; no modifying factors which make it worse or better  Assessment and Plan:  Based on a thorough discussion of this condition and the management approach to it (including a comprehensive discussion of the known risks, side effects and potential benefits of treatment), the patient (family) agrees to implement the following specific plan:  · Reassure benign        SEBORRHEIC KERATOSIS; NON-INFLAMED     Physical Exam:  · Anatomic Location Affected:  Trunk and extremities, Right conchal bowl, Eyebrow  · Morphological Description:  Waxy, smooth to warty textured, yellow to brownish-grey to dark brown to blackish, discrete, "stuck-on" appearing papules  · Present for years  Denies pain, itch, bleeding  Additional History of Present Condition:  Present constantly; no modifying factors which make it worse or better  No prior treatment  Assessment and Plan:  Based on a thorough discussion of this condition and the management approach to it (including a comprehensive discussion of the known risks, side effects and potential benefits of treatment), the patient (family) agrees to implement the following specific plan:  · Reassure benign  · Use sun protection    Apply SPF 30 or higher at least three times a day   Wear sun protecting clothing and hats  SOLAR LENTIGINES      Physical Exam:   Anatomic Location Affected:  Sun exposed areas of back, chest, arms, legs   Morphological Description:  Multiple scattered brown to tan evenly pigmented macules    Denies pain, itch, bleeding  No treatments tried  Present for months - years  Reports getting newer lesions with sun exposure  Assessment and Plan:  Based on a thorough discussion of this condition and the management approach to it (including a comprehensive discussion of the known risks, side effects and potential benefits of treatment), the patient (family) agrees to implement the following specific plan:  · Reassure benign  · Use sun protection  Apply SPF 30 or higher at least three times a day  Wear sun protecting clothing and hats  MULTIPLE MELANOCYTIC NEVI ("Moles")     Physical Exam:  · Anatomic Location Affected: Trunk and extremities  · Morphological Description:  Scattered, round to ovoid, symmetrical-appearing, even bordered, skin colored to dark brown macules/papules  · Denies pain, itch, bleeding  No treatments tried  Present for years  Present constantly; no modifying factors which make it worse or better  Denies actively changing or growing moles  Assessment and Plan:  Based on a thorough discussion of this condition and the management approach to it (including a comprehensive discussion of the known risks, side effects and potential benefits of treatment), the patient (family) agrees to implement the following specific plan:  · Reassure benign  · Monitor for changes  · Use sun protection  Apply SPF 30 or higher at least three times a day  Wear sun protecting clothing and hats  Worrisome signs of skin malignancy discussed, questions answered  Regular self-skin check discussed   Advised to call or return to office if patient notices any spots of concern, rapidly growing/changing lesions, bleeding lesions, non-healing lesions  Advised regular SPF use         PSORIASIS    Physical Exam:   Anatomic Location Affected:  Scalp is clear, left elbow with thin erythematous plaque   Severity: mild   Body Percent Affected: Less than 5%      Assessment and Plan:  Based on a thorough discussion of this condition and the management approach to it (including a comprehensive discussion of the known risks, side effects and potential benefits of treatment), the patient (family) agrees to implement the following specific plan:   Continue using compound shampoo- told patient to call us with the name of the shampoo   Continue using Clobetasol cream as needed         Scribe Attestation    I,:  Clare Dias am acting as a scribe while in the presence of the attending physician :       I,:  Quinn Jackson MD personally performed the services described in this documentation    as scribed in my presence :

## 2021-02-17 NOTE — PATIENT INSTRUCTIONS
PITTMAN ANGIOMAS     Assessment and Plan:  Based on a thorough discussion of this condition and the management approach to it (including a comprehensive discussion of the known risks, side effects and potential benefits of treatment), the patient (family) agrees to implement the following specific plan:  · Reassure benign        SEBORRHEIC KERATOSIS; NON-INFLAMED     Assessment and Plan:  Based on a thorough discussion of this condition and the management approach to it (including a comprehensive discussion of the known risks, side effects and potential benefits of treatment), the patient (family) agrees to implement the following specific plan:  · Reassure benign  · Use sun protection  Apply SPF 30 or higher at least three times a day  Wear sun protecting clothing and hats  SOLAR LENTIGINES         Assessment and Plan:  Based on a thorough discussion of this condition and the management approach to it (including a comprehensive discussion of the known risks, side effects and potential benefits of treatment), the patient (family) agrees to implement the following specific plan:  · Reassure benign  · Use sun protection  Apply SPF 30 or higher at least three times a day  Wear sun protecting clothing and hats  MULTIPLE MELANOCYTIC NEVI ("Moles")        Assessment and Plan:  Based on a thorough discussion of this condition and the management approach to it (including a comprehensive discussion of the known risks, side effects and potential benefits of treatment), the patient (family) agrees to implement the following specific plan:  · Reassure benign  · Monitor for changes  · Use sun protection  Apply SPF 30 or higher at least three times a day  Wear sun protecting clothing and hats  Worrisome signs of skin malignancy discussed, questions answered  Regular self-skin check discussed   Advised to call or return to office if patient notices any spots of concern, rapidly growing/changing lesions, bleeding lesions, non-healing lesions  Advised regular SPF use  PSORIASIS      Assessment and Plan:  Based on a thorough discussion of this condition and the management approach to it (including a comprehensive discussion of the known risks, side effects and potential benefits of treatment), the patient (family) agrees to implement the following specific plan:   Continue using compound shampoo   Continue using Clobetasol cream as needed     Psoriasis is a chronic inflammatory condition that causes the body to make new skin cells in days rather than weeks  As these cells pile up on the surface of the skin, you may see thick, scaly patches of thickened skin  Psoriasis affects 2-4% of males and females  It can start at any age including childhood, with peaks of onset at 15-25 years and 50-60 years  It tends to persist lifelong, fluctuating in extent and severity  It is particularly common in Caucasians but may affect people of any race  About one-third of patients with psoriasis have family members with psoriasis  Psoriasis is multifactorial  It is classified as an immune-mediated inflammatory disease (IMID)  Genetic factors are important and influence the type of psoriasis and response to treatment  What are the signs and symptoms of psoriasis? There are many different types of psoriasis that each have present uniquely  The types of psoriasis include:    Plaque psoriasis: About 80% to 90% of people who have psoriasis develop this type  When plaque psoriasis appears, you may see:  Plaque psoriasis usually presents with symmetrically distributed, red, scaly plaques with well-defined edges  The scale is typically silvery white, except in skin folds where the plaques often appear shiny and they may have a moist peeling surface  The most common sites are scalp, elbows and knees, but any part of the skin can be involved  The plaques are usually very persistent without treatment    Itch is mostly mild but may be severe in some patients, leading to scratching and lichenification (thickened leathery skin with increased skin markings)  Painful skin cracks or fissures may occur  When psoriatic plaques clear up, they may leave brown or pale marks that can be expected to fade over several months

## 2021-02-23 ENCOUNTER — OFFICE VISIT (OUTPATIENT)
Dept: GASTROENTEROLOGY | Facility: CLINIC | Age: 66
End: 2021-02-23
Payer: COMMERCIAL

## 2021-02-23 VITALS
HEIGHT: 66 IN | DIASTOLIC BLOOD PRESSURE: 80 MMHG | BODY MASS INDEX: 22.4 KG/M2 | TEMPERATURE: 96.1 F | HEART RATE: 52 BPM | SYSTOLIC BLOOD PRESSURE: 120 MMHG | WEIGHT: 139.4 LBS

## 2021-02-23 DIAGNOSIS — R19.5 LOOSE STOOLS: ICD-10-CM

## 2021-02-23 DIAGNOSIS — R10.9 ABDOMINAL CRAMPS: Primary | ICD-10-CM

## 2021-02-23 PROCEDURE — 99214 OFFICE O/P EST MOD 30 MIN: CPT | Performed by: INTERNAL MEDICINE

## 2021-02-23 NOTE — PROGRESS NOTES
[facility] - Outpatient Follow-up Note  Rowdy Hoffman 72 y o  female MRN: [id number]  Encounter: [id number]          ASSESSMENT AND PLAN:      1  Abdominal cramps  2  Loose stools     she noted loose bowel movement after her colonoscopy in November 2020  Frequency of loose bowel movement is improving  She is having 1 bowel movement in the morning  She denies melena, hematochezia, nausea, vomiting, weight loss or decreased appetite  her symptoms could be secondary to irritable bowel syndrome, change in gut microbiota  Doubt inflammatory bowel disease or an infectious etiology  I recommend increased fiber intake and probiotics  Patient will call if she develops any alarm symptoms     3  Colon polyps - removed from the cecum  She she is due for surveillance colonoscopy in 2025    ______________________________________________________________________    SUBJECTIVE:   63-year-old female here for evaluation of loose bowel movement and abdominal cramps  Her symptoms started after colonoscopy few months ago  Initially she was having more loose bowel movement but currently having 1 bowel movement today  Stool consistency is loose  She also reports cramping with bowel movement  She denies recent travel or sick contact  No other alarm symptoms such as weight loss, diarrhea, melena or hematochezia  No sick contact  No family history of GI malignancy  REVIEW OF SYSTEMS IS OTHERWISE NEGATIVE  Historical Information   Past Medical History:   Diagnosis Date    Abnormal thyroid function test     R    5/8/17     Arthritis     Colon polyps     Dislocated shoulder     Right / [place]  Ana Cristina Sequin Ana Cristina Sequin 1/30/13     Effusion of knee     [place]    5/21/14   R    5/8/17     Hypothyroidism     [place]    2/6/13   R   3/31/15     Primary osteoarthritis of right knee     [place]   4/9/14   R    5/21/14     Prolapsing mitral leaflet syndrome     [place]    1/28/13   [place]  Ana Cristina Sequin Ana Cristina Sequin 3/31/15     Psoriatic arthritis (HCC)     Tinnitus ringing in both ears    Vaginal Pap smear, abnormal     Vitamin D deficiency     [place]    5/8/17  R  Mimi Arbour Mimi Arbour 3/31/15      Past Surgical History:   Procedure Laterality Date    CERVICAL BIOPSY  W/ LOOP ELECTRODE EXCISION      COLONOSCOPY      COLONOSCOPY W/ POLYPECTOMY      KNEE ARTHROSCOPY Right     right knee, right shoulder , open right shoulder     WI COLONOSCOPY FLX DX W/COLLJ SPEC WHEN PFRMD N/A 10/24/2017    Procedure: COLONOSCOPY;  Surgeon: Hipolito De La Vega MD;  Location: [facility]; Service: Gastroenterology    WI RECONSTR TOTAL SHOULDER IMPLANT Right 4/25/2017    Procedure: TOTAL SHOULDER ARTHROPLASTY ;  Surgeon: Dustin Reeder MD;  Location:  MAIN OR;  Service: Orthopedics    SHOULDER SURGERY Right     total shoulder replacement      Social History   Social History     Substance and Sexual Activity   Alcohol Use Yes    Comment: socially     Social History     Substance and Sexual Activity   Drug Use No     Social History     Tobacco Use   Smoking Status Never Smoker   Smokeless Tobacco Never Used         Meds/Allergies       Current Outpatient Medications:     Ascorbic Acid (VITAMIN C PO)    Calcium Carb-Cholecalciferol (CALCIUM + D3 PO)    Cholecalciferol (VITAMIN D3 PO)    clobetasol (TEMOVATE) 0 05 % cream    glucosamine-chondroitin 500-400 MG tablet    levothyroxine 25 mcg tablet    Omega-3 Fatty Acids (FISH OIL PO)    sulfaSALAzine (AZULFIDINE-EN) 500 mg EC tablet    diclofenac sodium (VOLTAREN) 1 %    No Known Allergies        Objective     Blood pressure 120/80, pulse (!) 52, temperature (!) 96 1 °F (35 6 °C), temperature source Tympanic, height 5' 6" (1 676 m), weight 63 2 kg (139 lb 6 4 oz), not currently breastfeeding  Body mass index is 22 5 kg/m²        PHYSICAL EXAM:      General Appearance:   Alert, cooperative, no distress   HEENT:   Normocephalic, atraumatic, anicteric      Neck:  Supple, symmetrical, trachea midline   Lungs:   Clear to auscultation bilaterally; no rales, rhonchi or wheezing; respirations unlabored    Heart[de-identified]   Regular rate and rhythm; no murmur, rub, or gallop  Abdomen:   Soft, non-tender, non-distended; normal bowel sounds; no masses, no organomegaly    Genitalia:   Deferred    Rectal:   Deferred    Extremities:  No cyanosis, clubbing or edema    Pulses:  2+ and symmetric    Skin:  No jaundice, rashes, or lesions    Lymph nodes:  No palpable cervical lymphadenopathy        Lab Results:   No visits with results within 1 Day(s) from this visit  Latest known visit with results is:   Hospital Outpatient Visit on 11/12/2020   Component Date Value    Case Report 11/12/2020                      Value:Surgical Pathology Report                         Case: [id number]                                   Authorizing Provider:  Mega Rabago DO              Collected:           11/12/2020 1005              Ordering Location:     [address] [place] [address]      Received:            11/12/2020 [facility]                                                           Pathologist:           Sophia Guillen MD                                                          Specimen:    Polyp, Colorectal, cecal polyp ; hot snare                                                 Final Diagnosis 11/12/2020                      Value: This result contains rich text formatting which cannot be displayed here   Additional Information 11/12/2020                      Value: This result contains rich text formatting which cannot be displayed here   Synoptic Checklist 11/12/2020                      Value:  (COLON/RECTUM POLYP FORM - GI - All Specimens)                                                                                                                 :    Serrated Adenoma      Gross Description 11/12/2020                      Value: This result contains rich text formatting which cannot be displayed here           Radiology Results:   No results found     Answers for HPI/ROS submitted by the patient on 2/19/2021   Abdominal pain  Chronicity: new  Onset: more than 1 month ago  Onset quality: sudden  Frequency: daily  Progression since onset: gradually improving  Pain location: left flank, right flank  Pain - numeric: 4/10  Pain quality: cramping  Radiates to: left flank, right flank  anorexia: No  arthralgias: No  belching: No  constipation: No  diarrhea: Yes  dysuria: No  fever: No  flatus: No  frequency: No  headaches: No  hematochezia: No  hematuria: No  melena: No  myalgias: No  nausea:  No  weight loss: No  vomiting: No  Aggravated by: nothing  Relieved by: bowel movements  Diagnostic workup: lower endoscopy

## 2021-02-24 ENCOUNTER — LAB (OUTPATIENT)
Dept: LAB | Facility: HOSPITAL | Age: 66
End: 2021-02-24
Payer: COMMERCIAL

## 2021-02-24 ENCOUNTER — TRANSCRIBE ORDERS (OUTPATIENT)
Dept: LAB | Facility: HOSPITAL | Age: 66
End: 2021-02-24

## 2021-02-24 DIAGNOSIS — Z79.899 ENCOUNTER FOR LONG-TERM (CURRENT) USE OF OTHER MEDICATIONS: ICD-10-CM

## 2021-02-24 DIAGNOSIS — L40.59 POLYARTICULAR PSORIATIC ARTHRITIS (HCC): Primary | ICD-10-CM

## 2021-02-24 DIAGNOSIS — E78.49 OTHER HYPERLIPIDEMIA: ICD-10-CM

## 2021-02-24 DIAGNOSIS — E03.9 ACQUIRED HYPOTHYROIDISM: ICD-10-CM

## 2021-02-24 DIAGNOSIS — L40.59 POLYARTICULAR PSORIATIC ARTHRITIS (HCC): ICD-10-CM

## 2021-02-24 LAB
ALBUMIN SERPL BCP-MCNC: 3.7 G/DL (ref 3.5–5)
ALP SERPL-CCNC: 60 U/L (ref 46–116)
ALT SERPL W P-5'-P-CCNC: 20 U/L (ref 12–78)
ANION GAP SERPL CALCULATED.3IONS-SCNC: 2 MMOL/L (ref 4–13)
AST SERPL W P-5'-P-CCNC: 16 U/L (ref 5–45)
BACTERIA UR QL AUTO: ABNORMAL /HPF
BASOPHILS # BLD AUTO: 0.03 THOUSANDS/ΜL (ref 0–0.1)
BASOPHILS NFR BLD AUTO: 1 % (ref 0–1)
BILIRUB SERPL-MCNC: 0.58 MG/DL (ref 0.2–1)
BILIRUB UR QL STRIP: NEGATIVE
BUN SERPL-MCNC: 15 MG/DL (ref 5–25)
CALCIUM SERPL-MCNC: 9.2 MG/DL (ref 8.3–10.1)
CHLORIDE SERPL-SCNC: 109 MMOL/L (ref 100–108)
CHOLEST SERPL-MCNC: 215 MG/DL (ref 50–200)
CLARITY UR: ABNORMAL
CO2 SERPL-SCNC: 29 MMOL/L (ref 21–32)
COLOR UR: YELLOW
CREAT SERPL-MCNC: 0.86 MG/DL (ref 0.6–1.3)
EOSINOPHIL # BLD AUTO: 0 THOUSAND/ΜL (ref 0–0.61)
EOSINOPHIL NFR BLD AUTO: 0 % (ref 0–6)
ERYTHROCYTE [DISTWIDTH] IN BLOOD BY AUTOMATED COUNT: 12.5 % (ref 11.6–15.1)
ERYTHROCYTE [SEDIMENTATION RATE] IN BLOOD: 8 MM/HOUR (ref 0–29)
GFR SERPL CREATININE-BSD FRML MDRD: 71 ML/MIN/1.73SQ M
GLUCOSE P FAST SERPL-MCNC: 91 MG/DL (ref 65–99)
GLUCOSE UR STRIP-MCNC: NEGATIVE MG/DL
HCT VFR BLD AUTO: 40 % (ref 34.8–46.1)
HDLC SERPL-MCNC: 80 MG/DL
HGB BLD-MCNC: 13 G/DL (ref 11.5–15.4)
HGB UR QL STRIP.AUTO: NEGATIVE
HYALINE CASTS #/AREA URNS LPF: ABNORMAL /LPF
IMM GRANULOCYTES # BLD AUTO: 0 THOUSAND/UL (ref 0–0.2)
IMM GRANULOCYTES NFR BLD AUTO: 0 % (ref 0–2)
KETONES UR STRIP-MCNC: NEGATIVE MG/DL
LDLC SERPL CALC-MCNC: 126 MG/DL (ref 0–100)
LEUKOCYTE ESTERASE UR QL STRIP: ABNORMAL
LYMPHOCYTES # BLD AUTO: 1.93 THOUSANDS/ΜL (ref 0.6–4.47)
LYMPHOCYTES NFR BLD AUTO: 49 % (ref 14–44)
MCH RBC QN AUTO: 31.7 PG (ref 26.8–34.3)
MCHC RBC AUTO-ENTMCNC: 32.5 G/DL (ref 31.4–37.4)
MCV RBC AUTO: 98 FL (ref 82–98)
MONOCYTES # BLD AUTO: 0.49 THOUSAND/ΜL (ref 0.17–1.22)
MONOCYTES NFR BLD AUTO: 12 % (ref 4–12)
NEUTROPHILS # BLD AUTO: 1.53 THOUSANDS/ΜL (ref 1.85–7.62)
NEUTS SEG NFR BLD AUTO: 38 % (ref 43–75)
NITRITE UR QL STRIP: NEGATIVE
NON-SQ EPI CELLS URNS QL MICRO: ABNORMAL /HPF
NONHDLC SERPL-MCNC: 135 MG/DL
NRBC BLD AUTO-RTO: 0 /100 WBCS
PH UR STRIP.AUTO: 7.5 [PH]
PLATELET # BLD AUTO: 221 THOUSANDS/UL (ref 149–390)
PMV BLD AUTO: 9.4 FL (ref 8.9–12.7)
POTASSIUM SERPL-SCNC: 3.8 MMOL/L (ref 3.5–5.3)
PROT SERPL-MCNC: 7.4 G/DL (ref 6.4–8.2)
PROT UR STRIP-MCNC: NEGATIVE MG/DL
RBC # BLD AUTO: 4.1 MILLION/UL (ref 3.81–5.12)
RBC #/AREA URNS AUTO: ABNORMAL /HPF
SODIUM SERPL-SCNC: 140 MMOL/L (ref 136–145)
SP GR UR STRIP.AUTO: 1.02 (ref 1–1.03)
TRIGL SERPL-MCNC: 45 MG/DL
TSH SERPL DL<=0.05 MIU/L-ACNC: 3.36 UIU/ML (ref 0.36–3.74)
UROBILINOGEN UR QL STRIP.AUTO: 0.2 E.U./DL
WBC # BLD AUTO: 3.98 THOUSAND/UL (ref 4.31–10.16)
WBC #/AREA URNS AUTO: ABNORMAL /HPF

## 2021-02-24 PROCEDURE — 36415 COLL VENOUS BLD VENIPUNCTURE: CPT

## 2021-02-24 PROCEDURE — 85652 RBC SED RATE AUTOMATED: CPT

## 2021-02-24 PROCEDURE — 80061 LIPID PANEL: CPT

## 2021-02-24 PROCEDURE — 85025 COMPLETE CBC W/AUTO DIFF WBC: CPT

## 2021-02-24 PROCEDURE — 84443 ASSAY THYROID STIM HORMONE: CPT

## 2021-02-24 PROCEDURE — 81001 URINALYSIS AUTO W/SCOPE: CPT

## 2021-02-24 PROCEDURE — 80053 COMPREHEN METABOLIC PANEL: CPT

## 2021-03-01 ENCOUNTER — APPOINTMENT (OUTPATIENT)
Dept: RADIOLOGY | Facility: OTHER | Age: 66
End: 2021-03-01
Payer: COMMERCIAL

## 2021-03-01 ENCOUNTER — OFFICE VISIT (OUTPATIENT)
Dept: OBGYN CLINIC | Facility: OTHER | Age: 66
End: 2021-03-01
Payer: COMMERCIAL

## 2021-03-01 VITALS
BODY MASS INDEX: 22.34 KG/M2 | DIASTOLIC BLOOD PRESSURE: 74 MMHG | WEIGHT: 139 LBS | HEIGHT: 66 IN | SYSTOLIC BLOOD PRESSURE: 125 MMHG | HEART RATE: 50 BPM

## 2021-03-01 DIAGNOSIS — M17.0 BILATERAL PRIMARY OSTEOARTHRITIS OF KNEE: Primary | ICD-10-CM

## 2021-03-01 DIAGNOSIS — M25.562 PAIN IN BOTH KNEES, UNSPECIFIED CHRONICITY: ICD-10-CM

## 2021-03-01 DIAGNOSIS — M25.561 PAIN IN BOTH KNEES, UNSPECIFIED CHRONICITY: ICD-10-CM

## 2021-03-01 DIAGNOSIS — G89.29 CHRONIC PAIN OF BOTH KNEES: ICD-10-CM

## 2021-03-01 DIAGNOSIS — M25.561 CHRONIC PAIN OF BOTH KNEES: ICD-10-CM

## 2021-03-01 DIAGNOSIS — M25.562 CHRONIC PAIN OF BOTH KNEES: ICD-10-CM

## 2021-03-01 PROCEDURE — 20610 DRAIN/INJ JOINT/BURSA W/O US: CPT | Performed by: ORTHOPAEDIC SURGERY

## 2021-03-01 PROCEDURE — 99214 OFFICE O/P EST MOD 30 MIN: CPT | Performed by: ORTHOPAEDIC SURGERY

## 2021-03-01 PROCEDURE — 73564 X-RAY EXAM KNEE 4 OR MORE: CPT

## 2021-03-01 RX ORDER — BUPIVACAINE HYDROCHLORIDE 2.5 MG/ML
2 INJECTION, SOLUTION INFILTRATION; PERINEURAL
Status: COMPLETED | OUTPATIENT
Start: 2021-03-01 | End: 2021-03-01

## 2021-03-01 RX ORDER — BETAMETHASONE SODIUM PHOSPHATE AND BETAMETHASONE ACETATE 3; 3 MG/ML; MG/ML
6 INJECTION, SUSPENSION INTRA-ARTICULAR; INTRALESIONAL; INTRAMUSCULAR; SOFT TISSUE
Status: COMPLETED | OUTPATIENT
Start: 2021-03-01 | End: 2021-03-01

## 2021-03-01 RX ADMIN — BUPIVACAINE HYDROCHLORIDE 2 ML: 2.5 INJECTION, SOLUTION INFILTRATION; PERINEURAL at 16:04

## 2021-03-01 RX ADMIN — BETAMETHASONE SODIUM PHOSPHATE AND BETAMETHASONE ACETATE 6 MG: 3; 3 INJECTION, SUSPENSION INTRA-ARTICULAR; INTRALESIONAL; INTRAMUSCULAR; SOFT TISSUE at 16:04

## 2021-03-01 NOTE — PROGRESS NOTES
Assessment  Diagnoses and all orders for this visit:    Bilateral primary osteoarthritis of knee    Chronic pain of both knees      Discussion and Plan:    The patient has an examination and x-rays consistent with bilateral knee OA  I have discussed with the patient the pathophysiology of this diagnosis and reviewed how the examination correlates with this diagnosis  Treatment options were discussed at length and after discussing these treatment options, the patient elected for RIGHT knee CS injection  Injection can be repeated in 4-6 mos if needed  If CS injection fails to provide relief we can consider visco supplementation  Subjective:   Patient ID: Rl Yeung is a 72 y o  female      HPI  The patient presents with a chief complaint of bilateral knee pain  The pain began several year(s) ago and is not associated with an acute injury  The patient describes the pain as aching and dull  It is intermittent in timing, and localizes the pain to the left medial joint line and right lateral joint line  The pain is worse with weight bearing activities and relieved with rest     Patient was seen by Dr Ursula Carey in 2018 for the left knee she was provided with CS injection and visco injection which did help her symptoms  She reports a right knee arthroscopy with microfracture by Dr Sebastian Lyles in 2009  The following portions of the patient's history were reviewed and updated as appropriate: allergies, current medications, past family history, past medical history, past social history, past surgical history and problem list     Review of Systems   Constitutional: Negative for chills and fever  HENT: Negative for drooling and sneezing  Eyes: Negative for redness  Respiratory: Negative for cough and wheezing  Gastrointestinal: Negative for nausea and vomiting  Musculoskeletal:        Please see ortho exam   Psychiatric/Behavioral: Negative for behavioral problems  The patient is not nervous/anxious  Objective:  /74   Pulse (!) 50   Ht 5' 6" (1 676 m)   Wt 63 kg (139 lb)   BMI 22 44 kg/m²       Right Knee Exam     Tenderness   The patient is experiencing tenderness in the lateral joint line  Range of Motion   The patient has normal right knee ROM  Tests   Varus: negative Valgus: negative    Other   Erythema: absent  Sensation: normal  Pulse: present  Swelling: none  Effusion: effusion present      Left Knee Exam     Tenderness   The patient is experiencing tenderness in the medial joint line  Range of Motion   The patient has normal left knee ROM  Tests   Varus: negative Valgus: negative    Other   Erythema: absent  Sensation: normal  Pulse: present  Swelling: none  Effusion: no effusion present            Physical Exam  Vitals signs reviewed  Constitutional:       Appearance: She is well-developed  Eyes:      Pupils: Pupils are equal, round, and reactive to light  Pulmonary:      Effort: Pulmonary effort is normal       Breath sounds: Normal breath sounds  Musculoskeletal:      Right knee: She exhibits effusion  Left knee: She exhibits no effusion  Skin:     General: Skin is warm and dry  Neurological:      Mental Status: She is alert and oriented to person, place, and time  Psychiatric:         Behavior: Behavior normal          Thought Content:  Thought content normal          Judgment: Judgment normal        Large joint arthrocentesis: R knee  Universal Protocol:  Consent given by: patient  Patient understanding: patient states understanding of the procedure being performed  Site marked: the operative site was marked  Patient identity confirmed: verbally with patient    Supporting Documentation  Indications: pain   Procedure Details  Location: knee - R knee  Needle size: 22 G  Ultrasound guidance: no  Approach: lateral  Medications administered: 2 mL bupivacaine 0 25 %; 6 mg betamethasone acetate-betamethasone sodium phosphate 6 (3-3) mg/mL    Patient tolerance: patient tolerated the procedure well with no immediate complications  Dressing:  Sterile dressing applied          I have personally reviewed pertinent films in PACS and my interpretation is as follows  Left knee x-rays demonstrates no fracture or dislocation with tricompartmental degenerative changes greatest over the medial joint line    Right knee x-rays demonstrates no fracture or dislocation with tricompartmental degenerative changes greatest over the lateral joint line    Left knee MRI from 2018 demonstrates moderate medial compartment osteoarthritis with a large medial meniscus horizontal tear with flipped fragment       Scribe Attestation    I,:  Griselda Hammers am acting as a scribe while in the presence of the attending physician :       I,:  Francine Copeland MD personally performed the services described in this documentation    as scribed in my presence :

## 2021-04-05 ENCOUNTER — APPOINTMENT (OUTPATIENT)
Dept: RADIOLOGY | Facility: OTHER | Age: 66
End: 2021-04-05
Payer: COMMERCIAL

## 2021-04-05 ENCOUNTER — OFFICE VISIT (OUTPATIENT)
Dept: OBGYN CLINIC | Facility: OTHER | Age: 66
End: 2021-04-05
Payer: COMMERCIAL

## 2021-04-05 VITALS
HEIGHT: 67 IN | HEART RATE: 61 BPM | SYSTOLIC BLOOD PRESSURE: 141 MMHG | BODY MASS INDEX: 21.82 KG/M2 | WEIGHT: 139 LBS | DIASTOLIC BLOOD PRESSURE: 80 MMHG

## 2021-04-05 DIAGNOSIS — M25.512 LEFT SHOULDER PAIN, UNSPECIFIED CHRONICITY: ICD-10-CM

## 2021-04-05 DIAGNOSIS — M25.512 LEFT SHOULDER PAIN, UNSPECIFIED CHRONICITY: Primary | ICD-10-CM

## 2021-04-05 DIAGNOSIS — M19.012 PRIMARY OSTEOARTHRITIS OF LEFT SHOULDER: ICD-10-CM

## 2021-04-05 PROCEDURE — 99214 OFFICE O/P EST MOD 30 MIN: CPT | Performed by: ORTHOPAEDIC SURGERY

## 2021-04-05 PROCEDURE — 73030 X-RAY EXAM OF SHOULDER: CPT

## 2021-04-05 PROCEDURE — 20610 DRAIN/INJ JOINT/BURSA W/O US: CPT | Performed by: ORTHOPAEDIC SURGERY

## 2021-04-05 RX ORDER — BETAMETHASONE SODIUM PHOSPHATE AND BETAMETHASONE ACETATE 3; 3 MG/ML; MG/ML
6 INJECTION, SUSPENSION INTRA-ARTICULAR; INTRALESIONAL; INTRAMUSCULAR; SOFT TISSUE
Status: COMPLETED | OUTPATIENT
Start: 2021-04-05 | End: 2021-04-05

## 2021-04-05 RX ORDER — BUPIVACAINE HYDROCHLORIDE 2.5 MG/ML
2 INJECTION, SOLUTION INFILTRATION; PERINEURAL
Status: COMPLETED | OUTPATIENT
Start: 2021-04-05 | End: 2021-04-05

## 2021-04-05 RX ADMIN — BUPIVACAINE HYDROCHLORIDE 2 ML: 2.5 INJECTION, SOLUTION INFILTRATION; PERINEURAL at 08:44

## 2021-04-05 RX ADMIN — BETAMETHASONE SODIUM PHOSPHATE AND BETAMETHASONE ACETATE 6 MG: 3; 3 INJECTION, SUSPENSION INTRA-ARTICULAR; INTRALESIONAL; INTRAMUSCULAR; SOFT TISSUE at 08:44

## 2021-04-05 NOTE — PROGRESS NOTES
Assessment  Diagnoses and all orders for this visit:    Primary osteoarthritis of left shoulder      Discussion and Plan:    - New xrays of the left shoulder were obtained today and reviewed with the patient that shows moderate  Glenohumeral joint OA    - On exam, she maintains good active ROM of the left shoulder and strength  - Conservative care options were discussed with the patient that included a localized left glenohumeral joint cortisone injection, which the patient agreed to today  She tolerated the injections well  - Encouraged the patient to continue to maintain her ROM and strength, refrain from heavy lifting    - She can safely have a cortisone injection every 3-4 months as needed  Patient shows understanding with this plan of care and agrees  - Follow up as needed    Subjective:   Patient ID: Cristobal Escobar is a 72 y o  female      Patient states that about her left shoulder had been going on for at least 1 year consistently with no recent injury  She is RHD  She has 'popping' within the anterior aspect of the shoulder  She has catching at times in the shoulder along with waking her up from sleep at night  She does like tow stay active and workout  She takes Tylenol as needed for pain or discomfort  She has a hx of a right total shoulder and has been doing a the home directed exercises she learned from that surgery  She has a hx of a a dislocation years ago, while swinging a golf club  She is an avid   She has a hx of psoriatic arthritis  The following portions of the patient's history were reviewed and updated as appropriate: allergies, current medications, past family history, past medical history, past social history, past surgical history and problem list     Review of Systems   Constitutional: Negative for chills, fever and unexpected weight change  HENT: Negative for hearing loss, nosebleeds and sore throat  Eyes: Negative for pain, redness and visual disturbance  Respiratory: Negative for cough, shortness of breath and wheezing  Cardiovascular: Negative for chest pain, palpitations and leg swelling  Gastrointestinal: Negative for abdominal pain and nausea  Genitourinary: Negative for dyspareunia, dysuria and frequency  Skin: Negative for rash and wound  Neurological: Negative for dizziness, numbness and headaches  Psychiatric/Behavioral: Negative for decreased concentration and suicidal ideas  The patient is not nervous/anxious  Objective:  /80   Pulse 61   Ht 5' 7" (1 702 m)   Wt 63 kg (139 lb)   BMI 21 77 kg/m²       Left Shoulder Exam     Range of Motion   Active abduction: 160   External rotation: 90   Forward flexion: 170   Internal rotation 0 degrees: L1     Muscle Strength   The patient has normal left shoulder strength  Other   Erythema: absent  Sensation: normal  Pulse: present           Large joint arthrocentesis: L glenohumeral  Universal Protocol:  Consent: Verbal consent obtained  Risks and benefits: risks, benefits and alternatives were discussed  Consent given by: patient  Time out: Immediately prior to procedure a "time out" was called to verify the correct patient, procedure, equipment, support staff and site/side marked as required    Timeout called at: 4/5/2021 8:37 AM   Patient understanding: patient states understanding of the procedure being performed  Site marked: the operative site was marked  Patient identity confirmed: verbally with patient    Supporting Documentation  Indications: pain   Procedure Details  Location: shoulder - L glenohumeral  Needle size: 22 G  Ultrasound guidance: no  Approach: posterolateral  Medications administered: 2 mL bupivacaine 0 25 %; 6 mg betamethasone acetate-betamethasone sodium phosphate 6 (3-3) mg/mL    Patient tolerance: patient tolerated the procedure well with no immediate complications  Dressing:  Sterile dressing applied        Physical Exam  Constitutional:       Appearance: She is well-developed  HENT:      Head: Normocephalic  Eyes:      Conjunctiva/sclera: Conjunctivae normal    Neck:      Musculoskeletal: Normal range of motion  Cardiovascular:      Rate and Rhythm: Normal rate  Pulmonary:      Effort: Pulmonary effort is normal    Skin:     General: Skin is warm and dry  Neurological:      Mental Status: She is alert and oriented to person, place, and time  I have personally reviewed pertinent films in PACS and my interpretation is as follows      Xrays of the Left shoulder: Glenohumeral joint arthritis     Scribe Attestation    I,:  Lindy Wolfe am acting as a scribe while in the presence of the attending physician :       I,:  Jacquie Reyes MD personally performed the services described in this documentation    as scribed in my presence :

## 2021-04-05 NOTE — PATIENT INSTRUCTIONS

## 2021-04-09 ENCOUNTER — TRANSCRIBE ORDERS (OUTPATIENT)
Dept: ADMINISTRATIVE | Age: 66
End: 2021-04-09

## 2021-04-09 ENCOUNTER — APPOINTMENT (OUTPATIENT)
Dept: LAB | Age: 66
End: 2021-04-09
Payer: COMMERCIAL

## 2021-04-09 DIAGNOSIS — D72.819 LEUKOPENIA, UNSPECIFIED TYPE: ICD-10-CM

## 2021-04-09 DIAGNOSIS — Z00.8 OTHER SPECIFIED GENERAL MEDICAL EXAMINATION: ICD-10-CM

## 2021-04-09 DIAGNOSIS — D72.819 LEUKOPENIA, UNSPECIFIED TYPE: Primary | ICD-10-CM

## 2021-04-09 DIAGNOSIS — Z00.8 OTHER SPECIFIED GENERAL MEDICAL EXAMINATION: Primary | ICD-10-CM

## 2021-04-09 LAB
BASOPHILS # BLD AUTO: 0.04 THOUSANDS/ΜL (ref 0–0.1)
BASOPHILS NFR BLD AUTO: 1 % (ref 0–1)
CHOLEST SERPL-MCNC: 235 MG/DL (ref 50–200)
EOSINOPHIL # BLD AUTO: 0.01 THOUSAND/ΜL (ref 0–0.61)
EOSINOPHIL NFR BLD AUTO: 0 % (ref 0–6)
ERYTHROCYTE [DISTWIDTH] IN BLOOD BY AUTOMATED COUNT: 12.7 % (ref 11.6–15.1)
EST. AVERAGE GLUCOSE BLD GHB EST-MCNC: 105 MG/DL
HBA1C MFR BLD: 5.3 %
HCT VFR BLD AUTO: 41.5 % (ref 34.8–46.1)
HDLC SERPL-MCNC: 83 MG/DL
HGB BLD-MCNC: 13.7 G/DL (ref 11.5–15.4)
IMM GRANULOCYTES # BLD AUTO: 0.02 THOUSAND/UL (ref 0–0.2)
IMM GRANULOCYTES NFR BLD AUTO: 0 % (ref 0–2)
LDLC SERPL CALC-MCNC: 137 MG/DL (ref 0–100)
LYMPHOCYTES # BLD AUTO: 2.01 THOUSANDS/ΜL (ref 0.6–4.47)
LYMPHOCYTES NFR BLD AUTO: 32 % (ref 14–44)
MCH RBC QN AUTO: 32.1 PG (ref 26.8–34.3)
MCHC RBC AUTO-ENTMCNC: 33 G/DL (ref 31.4–37.4)
MCV RBC AUTO: 97 FL (ref 82–98)
MONOCYTES # BLD AUTO: 0.82 THOUSAND/ΜL (ref 0.17–1.22)
MONOCYTES NFR BLD AUTO: 13 % (ref 4–12)
NEUTROPHILS # BLD AUTO: 3.31 THOUSANDS/ΜL (ref 1.85–7.62)
NEUTS SEG NFR BLD AUTO: 54 % (ref 43–75)
NONHDLC SERPL-MCNC: 152 MG/DL
NRBC BLD AUTO-RTO: 0 /100 WBCS
PLATELET # BLD AUTO: 254 THOUSANDS/UL (ref 149–390)
PMV BLD AUTO: 10 FL (ref 8.9–12.7)
RBC # BLD AUTO: 4.27 MILLION/UL (ref 3.81–5.12)
TRIGL SERPL-MCNC: 75 MG/DL
WBC # BLD AUTO: 6.21 THOUSAND/UL (ref 4.31–10.16)

## 2021-04-09 PROCEDURE — 85025 COMPLETE CBC W/AUTO DIFF WBC: CPT

## 2021-04-09 PROCEDURE — 83036 HEMOGLOBIN GLYCOSYLATED A1C: CPT

## 2021-04-09 PROCEDURE — 80061 LIPID PANEL: CPT

## 2021-04-09 PROCEDURE — 36415 COLL VENOUS BLD VENIPUNCTURE: CPT

## 2021-06-08 DIAGNOSIS — E03.9 ACQUIRED HYPOTHYROIDISM: ICD-10-CM

## 2021-06-08 RX ORDER — LEVOTHYROXINE SODIUM 0.03 MG/1
TABLET ORAL
Qty: 120 TABLET | Refills: 1 | Status: SHIPPED | OUTPATIENT
Start: 2021-06-08 | End: 2021-10-01 | Stop reason: SDUPTHER

## 2021-07-19 ENCOUNTER — APPOINTMENT (OUTPATIENT)
Dept: RADIOLOGY | Facility: OTHER | Age: 66
End: 2021-07-19
Payer: COMMERCIAL

## 2021-07-19 ENCOUNTER — OFFICE VISIT (OUTPATIENT)
Dept: OBGYN CLINIC | Facility: OTHER | Age: 66
End: 2021-07-19
Payer: COMMERCIAL

## 2021-07-19 VITALS
WEIGHT: 140 LBS | HEART RATE: 57 BPM | DIASTOLIC BLOOD PRESSURE: 81 MMHG | BODY MASS INDEX: 21.97 KG/M2 | SYSTOLIC BLOOD PRESSURE: 136 MMHG | HEIGHT: 67 IN

## 2021-07-19 DIAGNOSIS — Z96.611 HISTORY OF TOTAL SHOULDER REPLACEMENT, RIGHT: Primary | ICD-10-CM

## 2021-07-19 DIAGNOSIS — M24.80 JOINT CREPITATION: ICD-10-CM

## 2021-07-19 DIAGNOSIS — Z96.611 HISTORY OF TOTAL SHOULDER REPLACEMENT, RIGHT: ICD-10-CM

## 2021-07-19 PROCEDURE — 73030 X-RAY EXAM OF SHOULDER: CPT

## 2021-07-19 PROCEDURE — 99213 OFFICE O/P EST LOW 20 MIN: CPT | Performed by: ORTHOPAEDIC SURGERY

## 2021-07-19 NOTE — PROGRESS NOTES
Assessment  Diagnoses and all orders for this visit:    History of total shoulder replacement, right    Right Shoulder Crepitation        Discussion and Plan:    · Explained to the patient that the "clicking" sensation that she is feeling in the shoulder is a normal occurrence following a total shoulder arthroplasty as it is not associated with pain  She may continue with exercises in PT if she wishes but avoid painful maneuvers  Continue to perform daily activities as tolerated  · We gave her confidence that the x-rays demonstrate a stable prosthesis with no evidence of complication at this time  · Follow up on an as needed basis    Subjective:   Patient ID: Alda Ventura is a 72 y o  female      HPI  73 y/o female who presents today regarding her right shoulder  She has a history of a total shoulder arthroplasty in 2017 performed by myself  Today, she reports that she has been hearing a "clicking" sound with certain motions of the shoulder  There is no associated pain with this sound and she continues to have great function and performs her daily activities without complication  No numbness or tingling  No fevers or chills  The following portions of the patient's history were reviewed and updated as appropriate: allergies, current medications, past family history, past medical history, past social history, past surgical history and problem list     Review of Systems   Constitutional: Negative for chills, fever and unexpected weight change  HENT: Negative for hearing loss, nosebleeds and sore throat  Eyes: Negative for pain, redness and visual disturbance  Respiratory: Negative for cough, shortness of breath and wheezing  Cardiovascular: Negative for chest pain, palpitations and leg swelling  Gastrointestinal: Negative for abdominal distention, nausea and vomiting  Endocrine: Negative for polydipsia and polyuria  Genitourinary: Negative for dysuria and hematuria     Skin: Negative for rash and wound  Neurological: Negative for dizziness, numbness and headaches  Psychiatric/Behavioral: Negative for decreased concentration and suicidal ideas  Objective:  /81   Pulse 57   Ht 5' 7" (1 702 m)   Wt 63 5 kg (140 lb)   BMI 21 93 kg/m²       Right Shoulder Exam     Tenderness   The patient is experiencing no tenderness  Range of Motion   External rotation: 60   Forward flexion: 160   Internal rotation 0 degrees: Mid thoracic     Muscle Strength   Abduction: 5/5     Tests   Drop arm: negative    Other   Erythema: absent  Sensation: normal  Pulse: present            Physical Exam  Constitutional:       Appearance: She is well-developed  Eyes:      Pupils: Pupils are equal, round, and reactive to light  Pulmonary:      Effort: Pulmonary effort is normal       Breath sounds: Normal breath sounds  Skin:     General: Skin is warm and dry  Neurological:      Mental Status: She is alert and oriented to person, place, and time  Psychiatric:         Behavior: Behavior normal          Thought Content: Thought content normal          Judgment: Judgment normal            I have personally reviewed pertinent films in PACS and my interpretation is as follows  X Ray Right Shoulder 7/19/2021: Total shoulder arthroplasty remains in stable alignment and position without signs of loosening  No other acute osseous abnormalities       Scribe Attestation    I,:  Natalie Canada am acting as a scribe while in the presence of the attending physician :       I,:  Thang Mohan MD personally performed the services described in this documentation    as scribed in my presence :

## 2021-07-22 ENCOUNTER — OFFICE VISIT (OUTPATIENT)
Dept: PHYSICAL THERAPY | Age: 66
End: 2021-07-22
Payer: COMMERCIAL

## 2021-07-22 DIAGNOSIS — M25.311 SHOULDER INSTABILITY, RIGHT: Primary | ICD-10-CM

## 2021-07-22 PROCEDURE — 97110 THERAPEUTIC EXERCISES: CPT | Performed by: PHYSICAL THERAPIST

## 2021-07-22 PROCEDURE — 97161 PT EVAL LOW COMPLEX 20 MIN: CPT | Performed by: PHYSICAL THERAPIST

## 2021-07-22 NOTE — PROGRESS NOTES
PT Evaluation     Today's date: 2021  Patient name: Shahida Butts  : 1955  MRN: [id number]  Referring provider: Kaylene Newby PA-C  Dx:   Encounter Diagnosis     ICD-10-CM    1  Shoulder instability, right  M25 311                   Assessment  Assessment details: Patient s/p R TSR - presents with subtle instability and weakness  Patient with decreased strength, weakness, pain, and flexibility  2-3 visits for HEP instruction  Impairments: abnormal or restricted ROM, impaired physical strength, lacks appropriate home exercise program and pain with function  Understanding of Dx/Px/POC: excellent  Goals  Short Term goals - 4 weeks  1  Patient will be independent HEP  2   Patient will report a 50% decrease in pain complaints  3   Increase strength 1/2 grade  4   Increase ROM 5-10 degrees  Long Term goals - 8 weeks  1  Patient will report elimination of pain complaints  2   Patient will return to all work related activities without restriction  3   Patient will return to all recreational activities without restriction  4   ROM WFL  5   Strength 5/5  Plan  Planned therapy interventions: manual therapy, strengthening and stretching  Frequency: 2x week  Duration in visits: 2  Duration in weeks: 4        Subjective Evaluation    History of Present Illness  Mechanism of injury: Patient with c/o R shoulder clicking and instability  Patient s/p R TSR approx 4 years ago  Patient seen by ortho and cleared    Quality of life: excellent    Pain  Current pain ratin  At best pain ratin  At worst pain ratin  Quality: tight, dull ache and pulling  Aggravating factors: overhead activity and lifting  Progression: no change    Patient Goals  Patient goals for therapy: increased strength, decreased pain, increased motion and independence with ADLs/IADLs          Objective     Passive Range of Motion     Right Shoulder   Flexion: 130 degrees   External rotation 90°: 75 degrees   Internal rotation 45°: 39 degrees     Strength/Myotome Testing     Right Shoulder     Planes of Motion   Flexion: 3+   Abduction: 3+   External rotation at 0°: 3+   Internal rotation at 0°: 3+     Isolated Muscles   Lower trapezius: 3+   Middle trapezius: 3+              Precautions: None        Manuals                                                                 Neuro Re-Ed                                                                                                        Ther Ex             Review HEP             Sidelying ER             TB/Mtaheus IR             Body Blade IR/ER             Physioball push-up             Prone scaption                                         Ther Activity                                       Gait Training                                       Modalities

## 2021-08-05 ENCOUNTER — APPOINTMENT (OUTPATIENT)
Dept: LAB | Facility: CLINIC | Age: 66
End: 2021-08-05
Payer: COMMERCIAL

## 2021-08-05 DIAGNOSIS — M15.0 PRIMARY GENERALIZED HYPERTROPHIC OSTEOARTHROSIS: ICD-10-CM

## 2021-08-05 DIAGNOSIS — E55.9 AVITAMINOSIS D: ICD-10-CM

## 2021-08-05 DIAGNOSIS — Q79.60 EHLERS-DANLOS SYNDROME: ICD-10-CM

## 2021-08-05 DIAGNOSIS — L40.0 PSORIASIS VULGARIS: ICD-10-CM

## 2021-08-05 DIAGNOSIS — L40.59 POLYARTICULAR PSORIATIC ARTHRITIS (HCC): ICD-10-CM

## 2021-08-05 LAB
ALBUMIN SERPL BCP-MCNC: 3.8 G/DL (ref 3.5–5)
ALP SERPL-CCNC: 70 U/L (ref 46–116)
ALT SERPL W P-5'-P-CCNC: 23 U/L (ref 12–78)
ANION GAP SERPL CALCULATED.3IONS-SCNC: 9 MMOL/L (ref 4–13)
AST SERPL W P-5'-P-CCNC: 22 U/L (ref 5–45)
BASOPHILS # BLD AUTO: 0.05 THOUSANDS/ΜL (ref 0–0.1)
BASOPHILS NFR BLD AUTO: 1 % (ref 0–1)
BILIRUB SERPL-MCNC: 0.38 MG/DL (ref 0.2–1)
BUN SERPL-MCNC: 13 MG/DL (ref 5–25)
CALCIUM SERPL-MCNC: 9 MG/DL (ref 8.3–10.1)
CHLORIDE SERPL-SCNC: 106 MMOL/L (ref 100–108)
CO2 SERPL-SCNC: 25 MMOL/L (ref 21–32)
CREAT SERPL-MCNC: 0.8 MG/DL (ref 0.6–1.3)
CRP SERPL QL: <3 MG/L
EOSINOPHIL # BLD AUTO: 0.02 THOUSAND/ΜL (ref 0–0.61)
EOSINOPHIL NFR BLD AUTO: 1 % (ref 0–6)
ERYTHROCYTE [DISTWIDTH] IN BLOOD BY AUTOMATED COUNT: 12.8 % (ref 11.6–15.1)
GFR SERPL CREATININE-BSD FRML MDRD: 78 ML/MIN/1.73SQ M
GLUCOSE P FAST SERPL-MCNC: 65 MG/DL (ref 65–99)
HCT VFR BLD AUTO: 41 % (ref 34.8–46.1)
HGB BLD-MCNC: 13.2 G/DL (ref 11.5–15.4)
IMM GRANULOCYTES # BLD AUTO: 0.02 THOUSAND/UL (ref 0–0.2)
IMM GRANULOCYTES NFR BLD AUTO: 1 % (ref 0–2)
LYMPHOCYTES # BLD AUTO: 1.92 THOUSANDS/ΜL (ref 0.6–4.47)
LYMPHOCYTES NFR BLD AUTO: 43 % (ref 14–44)
MCH RBC QN AUTO: 32.4 PG (ref 26.8–34.3)
MCHC RBC AUTO-ENTMCNC: 32.2 G/DL (ref 31.4–37.4)
MCV RBC AUTO: 101 FL (ref 82–98)
MONOCYTES # BLD AUTO: 0.62 THOUSAND/ΜL (ref 0.17–1.22)
MONOCYTES NFR BLD AUTO: 14 % (ref 4–12)
NEUTROPHILS # BLD AUTO: 1.73 THOUSANDS/ΜL (ref 1.85–7.62)
NEUTS SEG NFR BLD AUTO: 40 % (ref 43–75)
NRBC BLD AUTO-RTO: 0 /100 WBCS
PLATELET # BLD AUTO: 241 THOUSANDS/UL (ref 149–390)
PMV BLD AUTO: 10.2 FL (ref 8.9–12.7)
POTASSIUM SERPL-SCNC: 4.1 MMOL/L (ref 3.5–5.3)
PROT SERPL-MCNC: 7.7 G/DL (ref 6.4–8.2)
RBC # BLD AUTO: 4.08 MILLION/UL (ref 3.81–5.12)
SODIUM SERPL-SCNC: 140 MMOL/L (ref 136–145)
WBC # BLD AUTO: 4.36 THOUSAND/UL (ref 4.31–10.16)

## 2021-08-05 PROCEDURE — 86140 C-REACTIVE PROTEIN: CPT

## 2021-08-05 PROCEDURE — 85025 COMPLETE CBC W/AUTO DIFF WBC: CPT

## 2021-08-05 PROCEDURE — 36415 COLL VENOUS BLD VENIPUNCTURE: CPT

## 2021-08-05 PROCEDURE — 80053 COMPREHEN METABOLIC PANEL: CPT

## 2021-08-10 ENCOUNTER — OFFICE VISIT (OUTPATIENT)
Dept: PHYSICAL THERAPY | Age: 66
End: 2021-08-10
Payer: COMMERCIAL

## 2021-08-10 DIAGNOSIS — M25.311 SHOULDER INSTABILITY, RIGHT: Primary | ICD-10-CM

## 2021-08-10 PROCEDURE — 97110 THERAPEUTIC EXERCISES: CPT | Performed by: PHYSICAL THERAPIST

## 2021-08-10 NOTE — PROGRESS NOTES
Daily Note     Today's date: 8/10/2021  Patient name: Seymour Glass  : 1955  MRN: [id number]  Referring provider: Earl Encinas PA-C  Dx:   Encounter Diagnosis     ICD-10-CM    1  Shoulder instability, right  M25 311                   Subjective: Patient reports no change in clicking - notes increase in strength      Objective: See treatment diary below      Assessment: Tolerated treatment well  Patient would benefit from continued PT      Plan: Continue per plan of care  Precautions: None        Manuals 8/10                                                                Neuro Re-Ed                                                                                                        Ther Ex             Review HEP             Sidelying ER 2# - 2 sets            TB/Matheus IR Blue - 2 sets            Body Blade IR/ER reviewed            Physioball push-up x10            Prone scaption   2# - 2x10                                      Ther Activity                                       Gait Training                                       Modalities

## 2021-08-12 ENCOUNTER — APPOINTMENT (OUTPATIENT)
Dept: PHYSICAL THERAPY | Age: 66
End: 2021-08-12
Payer: COMMERCIAL

## 2021-09-02 ENCOUNTER — APPOINTMENT (OUTPATIENT)
Dept: PHYSICAL THERAPY | Age: 66
End: 2021-09-02
Payer: COMMERCIAL

## 2021-09-07 ENCOUNTER — PROCEDURE VISIT (OUTPATIENT)
Dept: DERMATOLOGY | Facility: CLINIC | Age: 66
End: 2021-09-07
Payer: COMMERCIAL

## 2021-09-07 VITALS — WEIGHT: 137 LBS | BODY MASS INDEX: 22.02 KG/M2 | TEMPERATURE: 96.4 F | HEIGHT: 66 IN

## 2021-09-07 DIAGNOSIS — D48.9 NEOPLASM OF UNCERTAIN BEHAVIOR: Primary | ICD-10-CM

## 2021-09-07 PROCEDURE — 88305 TISSUE EXAM BY PATHOLOGIST: CPT | Performed by: PATHOLOGY

## 2021-09-07 PROCEDURE — 99214 OFFICE O/P EST MOD 30 MIN: CPT | Performed by: DERMATOLOGY

## 2021-09-07 PROCEDURE — 11102 TANGNTL BX SKIN SINGLE LES: CPT | Performed by: DERMATOLOGY

## 2021-09-07 NOTE — PROGRESS NOTES
[facility] Follow Up Note    Patient Name: Kaylie Swenson  Encounter Date: 09/07/21    Today's Chief Concerns:  Pina Diaz Concern #1:  Growth on right eyebrow       Current Medications:    Current Outpatient Medications:     Ascorbic Acid (VITAMIN C PO), Vitamin C, Disp: , Rfl:     Calcium Carb-Cholecalciferol (CALCIUM + D3 PO), Take by mouth daily  , Disp: , Rfl:     Cholecalciferol (VITAMIN D3 PO), Vitamin D3, Disp: , Rfl:     clobetasol (TEMOVATE) 0 05 % cream, , Disp: , Rfl: 3    diclofenac sodium (VOLTAREN) 1 %, Apply 2 g topically 4 (four) times a day, Disp: 3 Tube, Rfl: 1    glucosamine-chondroitin 500-400 MG tablet, Take 1 tablet by mouth 3 (three) times a day, Disp: , Rfl:     levothyroxine 25 mcg tablet, TAKE TWO TABLETS BY MOUTH 3 DAYS PER WEEK AND 1 TABLET 4 DAYS PER WEEK, Disp: 120 tablet, Rfl: 1    Omega-3 Fatty Acids (FISH OIL PO), Fish Oil, Disp: , Rfl:     sulfaSALAzine (AZULFIDINE-EN) 500 mg EC tablet, Take 2 tablets (1,000 mg total) by mouth daily, Disp: 60 tablet, Rfl: 0    CONSTITUTIONAL:   Vitals:    09/07/21 0741   Temp: (!) 96 4 °F (35 8 °C)   TempSrc: Tympanic   Weight: 62 1 kg (137 lb)   Height: 5' 6" (1 676 m)       Specific Alerts:    Have you been seen by a [facility] Dermatologist in the last 3 years? YES    Are you pregnant or planning to become pregnant? No    Are you currently or planning to be nursing or breast feeding? No    No Known Allergies    May we call your Preferred Phone number to discuss your specific medical information? YES    May we leave a detailed message that includes your specific medical information? YES    Have you traveled outside of the [facility] in the past 3 months? No    Do you currently have a pacemaker or defibrillator? No    Do you have any artificial heart valves, joints, plates, screws, rods, stents, pins, etc? No   - If Yes, were any placed within the last 2 years?     Do you require any medications prior to a surgical procedure? No    Are you taking any medications that cause you to bleed more easily ("blood thinners") No    Have you ever experienced a rapid heartbeat with epinephrine? No    Have you ever been treated with "gold" (gold sodium thiomalate) therapy? No    [facility] can help with wrinkles, "laugh lines," facial volume loss, "double chin," "love handles," age spots, and more  Are you interested in learning today about some of the skin enhancement procedures that we offer? (If Yes, please provide more detail) No    Review of Systems:  Have you recently had or currently have any of the following? · Fever or chills: No  · Night Sweats: No  · Headaches: No  · Weight Gain: No  · Weight Loss: No  · Blurry Vision: No  · Nausea: No  · Vomiting: No  · Diarrhea: No  · Blood in Stool: No  · Abdominal Pain: No  · Itchy Skin: No  · Painful Joints: No  · Swollen Joints: No  · Muscle Pain: No  · Irregular Mole: No  · Sun Burn: No  · Dry Skin: No  · Skin Color Changes: No  · Scar or Keloid: No  · Cold Sores/Fever Blisters: No  · Bacterial Infections/MRSA: No  · Anxiety: No  · Depression: No  · Suicidal or Homicidal Thoughts: No      PSYCH: Normal mood and affect  EYES: Normal conjunctiva  ENT: Normal lips and oral mucosa  CARDIOVASCULAR: No edema  RESPIRATORY: Normal respirations  HEME/LYMPH/IMMUNO:  No regional lymphadenopathy except as noted below in ASSESSMENT AND PLAN BY DIAGNOSIS    FOCUSED ORGAN SYSTEM SKIN EXAM (SKIN)    Face Normal except as noted below in Assessment       NEOPLASM OF UNCERTAIN BEHAVIOR OF SKIN    Physical Exam:   (Anatomic Location); (Size and Morphological Description); (Differential Diagnosis):  Specimen A: Location:Right conchal eyebrowl  Skin; Shave biopsy;  72y o  year old  Female with a  0 6 crusted papule; Differential diagnosis: probable irriated Seborrheic keratosis versus Squamous cell carcinoma in situ    Additional History of Present Condition:  Patient keeps scratching at growth on eyebrow and making it bleed    Assessment and Plan:   I have discussed with the patient that a sample of skin via a "skin biopsy would be potentially helpful to further make a specific diagnosis under the microscope   Based on a thorough discussion of this condition and the management approach to it (including a comprehensive discussion of the known risks, side effects and potential benefits of treatment), the patient (family) agrees to implement the following specific plan:    o Procedure:  Skin Biopsy  After a thorough discussion of treatment options and risk/benefits/alternatives (including but not limited to local pain, scarring, dyspigmentation, blistering, possible superinfection, and inability to confirm a diagnosis via histopathology), verbal and written consent were obtained and portion of the rash was biopsied for tissue sample  See below for consent that was obtained from patient and subsequent Procedure Note  Felecia Ramirez PROCEDURE SHAVE BIOPSY NOTE:     Performing Physician: Dr Looney   Anatomic Location; Clinical Description with size (cm); Pre-Op Diagnosis:      Specimen A: Location:Right conchal eyebrowl  Skin; Shave biopsy;  72y o  year old  Female with a  0 6 crusted papule; Differential diagnosis: probable irriated Seborrheic keratosis versus Squamous cell carcinoma in situ     Post-op diagnosis: Same      Local anesthesia: 1% xylocaine with epi       Topical anesthesia: None     Hemostasis: Aluminum chloride       After obtaining informed consent  at which time there was a discussion about the purpose of biopsy  and low risks of infection and bleeding  The area was prepped and draped in the usual fashion  Anesthesia was obtained with 1% lidocaine with epinephrine  A shave biopsy to an appropriate sampling depth was obtained with a sterile blade (such as a 15-blade or DermaBlade)  The resulting wound was covered with surgical ointment and bandaged appropriately       The patient tolerated the procedure well without complications and was without signs of functional compromise  Specimen has been sent for review by Dermatopathology  Standard post-procedure care has been explained and has been included in written form within the patient's copy of Informed Consent  INFORMED CONSENT DISCUSSION AND POST-OPERATIVE INSTRUCTIONS FOR PATIENT    I   RATIONALE FOR PROCEDURE  I understand that a skin biopsy allows the Dermatologist to test a lesion or rash under the microscope to obtain a diagnosis  It usually involves numbing the area with numbing medication and removing a small piece of skin; sometimes the area will be closed with sutures  In this specific procedure, sutures are not usually needed  If any sutures are placed, then they are usually need to be removed in 2 weeks or less  I understand that my Dermatologist recommends that a skin "shave" biopsy be performed today  A local anesthetic, similar to the kind that a dentist uses when filling a cavity, will be injected with a very small needle into the skin area to be sampled  The injected skin and tissue underneath "will go to sleep and become numb so no pain should be felt afterwards  An instrument shaped like a tiny "razor blade" (shave biopsy instrument) will be used to cut a small piece of tissue and skin from the area so that a sample of tissue can be taken and examined more closely under the microscope  A slight amount of bleeding will occur, but it will be stopped with direct pressure and a pressure bandage and any other appropriate methods  I understands that a scar will form where the wound was created  Surgical ointment will be applied to help protect the wound  Sutures are not usually needed      II   RISKS AND POTENTIAL COMPLICATIONS   I understand the risks and potential complications of a skin biopsy include but are not limited to the following:   Bleeding   Infection   Pain   Scar/keloid   Skin discoloration   Incomplete Removal   Recurrence   Nerve Damage/Numbness/Loss of Function   Allergic Reaction to Anesthesia   Biopsies are diagnostic procedures and based on findings additional treatment or evaluation may be required   Loss or destruction of specimen resulting in no additional findings    My Dermatologist has explained to me the nature of the condition, the nature of the procedure, and the benefits to be reasonably expected compared with alternative approaches  My Dermatologist has discussed the likelihood of major risks or complications of this procedure including the specific risks listed above, such as bleeding, infection, and scarring/keloid  I understand that a scar is expected after this procedure  I understand that my physician cannot predict if the scar will form a "keloid," which extends beyond the borders of the wound that is created  A keloid is a thick, painful, and bumpy scar  A keloid can be difficult to treat, as it does not always respond well to therapy, which includes injecting cortisone directly into the keloid every few weeks  While this usually reduces the pain and size of the scar, it does not eliminate it  I understand that photographs may be taken before and after the procedure  These will be maintained as part of the medical providers confidential records and may not be made available to me  I further authorize the medical provider to use the photographs for teaching purposes or to illustrate scientific papers, books, or lectures if in his/her judgment, medical research, education, or science may benefit from its use  I have had an opportunity to fully inquire about the risks and benefits of this procedure and its alternatives  I have been given ample time and opportunity to ask questions and to seek a second opinion if I wished to do so  I acknowledge that there have specifically been no guarantees as to the cosmetic results from the procedure    I am aware that with any procedure there is always the possibility of an unexpected complication  III  POST-PROCEDURAL CARE (WHAT YOU WILL NEED TO DO "AFTER THE BIOPSY" TO OPTIMIZE HEALING)     Keep the area clean and dry  Try NOT to remove the bandage or get it wet for the first 24 hours   Gently clean the area and apply surgical ointment (such as Vaseline petrolatum ointment, which is available "over the counter" and not a prescription) to the biopsy site for up to 2 weeks straight  This acts to protect the wound from the outside world   Sutures are not usually placed in this procedure  If any sutures were placed, return for suture removal as instructed (generally 1 week for the face, 2 weeks for the body)   Take Acetaminophen (Tylenol) for discomfort, if no contraindications  Ibuprofen or aspirin could make bleeding worse   Call our office immediately for signs of infection: fever, chills, increased redness, warmth, tenderness, discomfort/pain, or pus or foul smell coming from the wound  WHAT TO DO IF THERE IS ANY BLEEDING? If a small amount of bleeding is noticed, place a clean cloth over the area and apply firm pressure for ten minutes  Check the wound after 10 minutes of direct pressure  If bleeding persists, try one more time for an additional 10 minutes of direct pressure on the area  If the bleeding becomes heavier or does not stop after the second attempt, or if you have any other questions about this procedure, then please call your [facility] by calling [phone] (SKIN)  I hereby acknowledge that I have reviewed and verified the site with my Dermatologist and have requested and authorized my Dermatologist to proceed with the procedure          Scribe Attestation    I,:  Sandra Cuenca am acting as a scribe while in the presence of the attending physician :       I,:  Herbie Torres MD personally performed the services described in this documentation    as scribed in my presence :

## 2021-09-07 NOTE — PATIENT INSTRUCTIONS
INFORMED CONSENT DISCUSSION AND POST-OPERATIVE INSTRUCTIONS FOR PATIENT    I   RATIONALE FOR PROCEDURE  I understand that a skin biopsy allows the Dermatologist to test a lesion or rash under the microscope to obtain a diagnosis  It usually involves numbing the area with numbing medication and removing a small piece of skin; sometimes the area will be closed with sutures  In this specific procedure, sutures are not usually needed  If any sutures are placed, then they are usually need to be removed in 2 weeks or less  I understand that my Dermatologist recommends that a skin "shave" biopsy be performed today  A local anesthetic, similar to the kind that a dentist uses when filling a cavity, will be injected with a very small needle into the skin area to be sampled  The injected skin and tissue underneath "will go to sleep and become numb so no pain should be felt afterwards  An instrument shaped like a tiny "razor blade" (shave biopsy instrument) will be used to cut a small piece of tissue and skin from the area so that a sample of tissue can be taken and examined more closely under the microscope  A slight amount of bleeding will occur, but it will be stopped with direct pressure and a pressure bandage and any other appropriate methods  I understands that a scar will form where the wound was created  Surgical ointment will be applied to help protect the wound  Sutures are not usually needed      II   RISKS AND POTENTIAL COMPLICATIONS   I understand the risks and potential complications of a skin biopsy include but are not limited to the following:   Bleeding   Infection   Pain   Scar/keloid   Skin discoloration   Incomplete Removal   Recurrence   Nerve Damage/Numbness/Loss of Function   Allergic Reaction to Anesthesia   Biopsies are diagnostic procedures and based on findings additional treatment or evaluation may be required   Loss or destruction of specimen resulting in no additional findings    My Dermatologist has explained to me the nature of the condition, the nature of the procedure, and the benefits to be reasonably expected compared with alternative approaches  My Dermatologist has discussed the likelihood of major risks or complications of this procedure including the specific risks listed above, such as bleeding, infection, and scarring/keloid  I understand that a scar is expected after this procedure  I understand that my physician cannot predict if the scar will form a "keloid," which extends beyond the borders of the wound that is created  A keloid is a thick, painful, and bumpy scar  A keloid can be difficult to treat, as it does not always respond well to therapy, which includes injecting cortisone directly into the keloid every few weeks  While this usually reduces the pain and size of the scar, it does not eliminate it  I understand that photographs may be taken before and after the procedure  These will be maintained as part of the medical providers confidential records and may not be made available to me  I further authorize the medical provider to use the photographs for teaching purposes or to illustrate scientific papers, books, or lectures if in his/her judgment, medical research, education, or science may benefit from its use  I have had an opportunity to fully inquire about the risks and benefits of this procedure and its alternatives  I have been given ample time and opportunity to ask questions and to seek a second opinion if I wished to do so  I acknowledge that there have specifically been no guarantees as to the cosmetic results from the procedure  I am aware that with any procedure there is always the possibility of an unexpected complication  III  POST-PROCEDURAL CARE (WHAT YOU WILL NEED TO DO "AFTER THE BIOPSY" TO OPTIMIZE HEALING)     Keep the area clean and dry    Try NOT to remove the bandage or get it wet for the first 24 hours      Gently clean the area and apply surgical ointment (such as Vaseline petrolatum ointment, which is available "over the counter" and not a prescription) to the biopsy site for up to 2 weeks straight  This acts to protect the wound from the outside world   Sutures are not usually placed in this procedure  If any sutures were placed, return for suture removal as instructed (generally 1 week for the face, 2 weeks for the body)   Take Acetaminophen (Tylenol) for discomfort, if no contraindications  Ibuprofen or aspirin could make bleeding worse   Call our office immediately for signs of infection: fever, chills, increased redness, warmth, tenderness, discomfort/pain, or pus or foul smell coming from the wound  WHAT TO DO IF THERE IS ANY BLEEDING? If a small amount of bleeding is noticed, place a clean cloth over the area and apply firm pressure for ten minutes  Check the wound after 10 minutes of direct pressure  If bleeding persists, try one more time for an additional 10 minutes of direct pressure on the area  If the bleeding becomes heavier or does not stop after the second attempt, or if you have any other questions about this procedure, then please call your [facility] Dermatologist by calling [phone] (SKIN)  I hereby acknowledge that I have reviewed and verified the site with my Dermatologist and have requested and authorized my Dermatologist to proceed with the procedure

## 2021-09-10 NOTE — RESULT ENCOUNTER NOTE
DERMATOPATHOLOGY RESULT NOTE    Results reviewed by ordering physician  Called patient to personally discuss results  Discussed results with patient  Instructions for Clinical Derm Team:   (remember to route Result Note to appropriate staff):    None    Result & Plan by Specimen:    Specimen A: benign  Plan: reassured, benign    Final Diagnosis  A   Skin, right conchal eyebrow, shave biopsy:  Seborrheic keratosis

## 2021-09-14 ENCOUNTER — OFFICE VISIT (OUTPATIENT)
Dept: OBGYN CLINIC | Facility: HOSPITAL | Age: 66
End: 2021-09-14
Payer: COMMERCIAL

## 2021-09-14 VITALS
HEART RATE: 53 BPM | WEIGHT: 138 LBS | DIASTOLIC BLOOD PRESSURE: 76 MMHG | BODY MASS INDEX: 22.18 KG/M2 | HEIGHT: 66 IN | SYSTOLIC BLOOD PRESSURE: 129 MMHG

## 2021-09-14 DIAGNOSIS — M19.012 PRIMARY OSTEOARTHRITIS OF LEFT SHOULDER: ICD-10-CM

## 2021-09-14 DIAGNOSIS — M17.11 PRIMARY OSTEOARTHRITIS OF RIGHT KNEE: Primary | ICD-10-CM

## 2021-09-14 PROCEDURE — 99213 OFFICE O/P EST LOW 20 MIN: CPT | Performed by: ORTHOPAEDIC SURGERY

## 2021-09-14 PROCEDURE — 20610 DRAIN/INJ JOINT/BURSA W/O US: CPT | Performed by: ORTHOPAEDIC SURGERY

## 2021-09-14 RX ORDER — BUPIVACAINE HYDROCHLORIDE 2.5 MG/ML
2 INJECTION, SOLUTION INFILTRATION; PERINEURAL
Status: COMPLETED | OUTPATIENT
Start: 2021-09-14 | End: 2021-09-14

## 2021-09-14 RX ORDER — BETAMETHASONE SODIUM PHOSPHATE AND BETAMETHASONE ACETATE 3; 3 MG/ML; MG/ML
6 INJECTION, SUSPENSION INTRA-ARTICULAR; INTRALESIONAL; INTRAMUSCULAR; SOFT TISSUE
Status: COMPLETED | OUTPATIENT
Start: 2021-09-14 | End: 2021-09-14

## 2021-09-14 RX ADMIN — BUPIVACAINE HYDROCHLORIDE 2 ML: 2.5 INJECTION, SOLUTION INFILTRATION; PERINEURAL at 13:39

## 2021-09-14 RX ADMIN — BETAMETHASONE SODIUM PHOSPHATE AND BETAMETHASONE ACETATE 6 MG: 3; 3 INJECTION, SUSPENSION INTRA-ARTICULAR; INTRALESIONAL; INTRAMUSCULAR; SOFT TISSUE at 13:39

## 2021-09-14 NOTE — PROGRESS NOTES
Assessment  Diagnoses and all orders for this visit:    Primary osteoarthritis of right knee  -     Injection procedure prior authorization; Future    Primary osteoarthritis of left shoulder      Discussion and Plan:  · Right knee CS injection today, patient tolerated procedure well  · Synvisc one ordered today for the right knee  · We will hold on a left shoulder injection today as her symptoms are tolerable  · Follow up for Right knee Synvisc one injection    Subjective:   Patient ID: Williams Mccormack is a 72 y o  female      HPI  Patient presents today for follow up of LEFT shoulder OA and right knee OA  Her last shoulder injection was 4/05/21 and her symptoms are tolerable at this time  Her last knee injection was 3/01/21  She reports she was doing well and tweaked the knee playing pickle ball  She reports she had good relief following her LEFT visco injection  The following portions of the patient's history were reviewed and updated as appropriate: allergies, current medications, past family history, past medical history, past social history, past surgical history and problem list     Review of Systems   Constitutional: Negative for chills and fever  HENT: Negative for drooling and sneezing  Eyes: Negative for redness  Respiratory: Negative for cough and wheezing  Gastrointestinal: Negative for nausea and vomiting  Musculoskeletal:        Please see ortho exam   Psychiatric/Behavioral: Negative for behavioral problems  The patient is not nervous/anxious  Objective:  /76   Pulse (!) 53   Ht 5' 6" (1 676 m)   Wt 62 6 kg (138 lb)   BMI 22 27 kg/m²       Right Knee Exam     Tenderness   The patient is experiencing tenderness in the lateral joint line      Range of Motion   Extension: 0   Flexion: 120     Tests   Varus: negative Valgus: negative    Other   Erythema: absent  Sensation: normal  Pulse: present  Swelling: none  Effusion: no effusion present      Left Shoulder Exam Tenderness   The patient is experiencing no tenderness  Range of Motion   External rotation: 50   Forward flexion: 160   Internal rotation 0 degrees: Lumbar     Muscle Strength   Abduction: 4/5   External rotation: 4/5     Other   Erythema: absent  Sensation: normal             Physical Exam  Vitals reviewed  Constitutional:       Appearance: She is well-developed  Eyes:      Pupils: Pupils are equal, round, and reactive to light  Pulmonary:      Effort: Pulmonary effort is normal       Breath sounds: Normal breath sounds  Musculoskeletal:      Right knee: No effusion  Skin:     General: Skin is warm and dry  Neurological:      Mental Status: She is alert and oriented to person, place, and time  Psychiatric:         Behavior: Behavior normal          Thought Content: Thought content normal          Judgment: Judgment normal        Large joint arthrocentesis: R knee  Universal Protocol:  Consent given by: patient  Patient understanding: patient states understanding of the procedure being performed  Site marked: the operative site was marked  Patient identity confirmed: verbally with patient    Supporting Documentation  Indications: pain   Procedure Details  Location: knee - R knee  Needle size: 22 G  Ultrasound guidance: no  Approach: lateral  Medications administered: 2 mL bupivacaine 0 25 %; 6 mg betamethasone acetate-betamethasone sodium phosphate 6 (3-3) mg/mL    Patient tolerance: patient tolerated the procedure well with no immediate complications  Dressing:  Sterile dressing applied          I have personally reviewed pertinent films in PACS and my interpretation is as follows  Left shoulder x-rays demonstrates moderate GH OA  Right knee x-rays demonstrates moderate tricompartmental degenerative changes           Scribe Attestation    I,:  Sarai Veras am acting as a scribe while in the presence of the attending physician :       I,:  Ayaka Acevedo MD personally performed the services described in this documentation    as scribed in my presence :

## 2021-09-16 ENCOUNTER — OFFICE VISIT (OUTPATIENT)
Dept: PHYSICAL THERAPY | Age: 66
End: 2021-09-16
Payer: COMMERCIAL

## 2021-09-16 DIAGNOSIS — M24.80 JOINT CREPITATION: Primary | ICD-10-CM

## 2021-09-16 PROCEDURE — 97110 THERAPEUTIC EXERCISES: CPT | Performed by: PHYSICAL THERAPIST

## 2021-09-16 PROCEDURE — 97164 PT RE-EVAL EST PLAN CARE: CPT | Performed by: PHYSICAL THERAPIST

## 2021-09-16 NOTE — PROGRESS NOTES
Daily Note     Today's date: 2021  Patient name: Tammy Perez  : 1955  MRN: [id number]  Referring provider: Atanacio Brunner, PA-C  Dx:   Encounter Diagnosis     ICD-10-CM    1  Joint crepitation  M24 80                   Subjective: Patient reports less cracking  Objective: See treatment diary below  R shoulder ROM - flexion 157, ER 90, IR 55  R shoulder strength - flexion 4/5, abd 4/5, IR 3+/5, ER 4/5        Assessment: Tolerated treatment well  Plan: D/C to HEP     Precautions: None        Manuals 8/10                                                                Neuro Re-Ed                                                                                                        Ther Ex             Review HEP             Sidelying ER 2# - 2 sets            TB/[place] IR Blue - 2 sets            [place] IR/ER reviewed            Physioball push-up x10            Prone scaption   2# - 2x10                                      Ther Activity                                       Gait Training                                       Modalities

## 2021-09-20 ENCOUNTER — TELEPHONE (OUTPATIENT)
Dept: INTERNAL MEDICINE CLINIC | Facility: CLINIC | Age: 66
End: 2021-09-20

## 2021-09-20 DIAGNOSIS — Z23 NEED FOR SHINGLES VACCINE: Primary | ICD-10-CM

## 2021-09-20 NOTE — TELEPHONE ENCOUNTER
[facility] but [facility] told her they need a script put into the system  She's not sure which one she is doing to have done  She said she had the original one done years ago  This one is a two step process

## 2021-09-21 RX ORDER — ZOSTER VACCINE RECOMBINANT, ADJUVANTED 50 MCG/0.5
0.5 KIT INTRAMUSCULAR ONCE
Qty: 1 EACH | Refills: 1 | Status: SHIPPED | OUTPATIENT
Start: 2021-09-21 | End: 2021-09-21

## 2021-09-29 ENCOUNTER — ANNUAL EXAM (OUTPATIENT)
Dept: OBGYN CLINIC | Facility: CLINIC | Age: 66
End: 2021-09-29
Payer: COMMERCIAL

## 2021-09-29 VITALS
HEIGHT: 66 IN | WEIGHT: 139.4 LBS | BODY MASS INDEX: 22.4 KG/M2 | DIASTOLIC BLOOD PRESSURE: 84 MMHG | SYSTOLIC BLOOD PRESSURE: 140 MMHG

## 2021-09-29 DIAGNOSIS — Z01.419 WOMEN'S ANNUAL ROUTINE GYNECOLOGICAL EXAMINATION: Primary | ICD-10-CM

## 2021-09-29 DIAGNOSIS — Z12.31 ENCOUNTER FOR SCREENING MAMMOGRAM FOR MALIGNANT NEOPLASM OF BREAST: ICD-10-CM

## 2021-09-29 PROCEDURE — 99397 PER PM REEVAL EST PAT 65+ YR: CPT | Performed by: OBSTETRICS & GYNECOLOGY

## 2021-09-29 NOTE — PATIENT INSTRUCTIONS
No complaints or issues recently remarried do an well very happy  Pelvic examination including benign no evidence of pathology  Return my office in 1 year  Continue to get yearly mammograms

## 2021-09-29 NOTE — PROGRESS NOTES
Assessment/Plan:      The patient was informed of a stable menopausal gyn examination  There is no problem with intimacy  She has received COVID vaccine  Colonoscopies up-to-date  She is happy with her weight  No problem depression or anxiety  Still sexually active  She will continue to get mammograms  She should return my office in 1 year  Subjective:      Patient ID: Kaylie Swenson is a 72 y o  female  HPI     this is a 77-year-old white female, she is a  4 para 3  She is now menopause  Since her last visit she has gotten remarried she is very happy  Does have a history of psoriatic arthritis  Also has a history of psoriasis  History of mitral valve prolapse under control  Colonoscopies up-to-date  She is happy with her weight  There is no problem depression or anxiety  There is no problems sexuality  There are no new major family illnesses report at this time  She has received a COVID vaccine  She has a dentist on a regular basis  The following portions of the patient's history were reviewed and updated as appropriate: allergies, current medications, past family history, past medical history, past social history, past surgical history and problem list     Review of Systems   All other systems reviewed and are negative  Objective:      /84   Ht 5' 6" (1 676 m)   Wt 63 2 kg (139 lb 6 4 oz)   BMI 22 50 kg/m²          Physical Exam  Exam conducted with a chaperone present  Constitutional:       Appearance: Normal appearance  HENT:      Head: Normocephalic  Eyes:      Extraocular Movements: Extraocular movements intact  Cardiovascular:      Rate and Rhythm: Normal rate and regular rhythm  Pulses: Normal pulses  Heart sounds: Normal heart sounds  Pulmonary:      Effort: Pulmonary effort is normal       Breath sounds: Normal breath sounds     Chest:      Breasts: Breasts are symmetrical          Right: Normal  No swelling, bleeding, inverted nipple, mass or nipple discharge  Left: Normal  No swelling, bleeding, inverted nipple, mass or nipple discharge  Abdominal:      General: Abdomen is flat  Bowel sounds are normal  There is no distension  Palpations: Abdomen is soft  There is no shifting dullness, hepatomegaly, splenomegaly or mass  Tenderness: There is no abdominal tenderness  There is no guarding or rebound  Hernia: No hernia is present  There is no hernia in the umbilical area, ventral area, left inguinal area or right inguinal area  Genitourinary:     General: Normal vulva  Pubic Area: No rash or pubic lice  Labia:         Right: No rash, tenderness, lesion or injury  Left: No rash, tenderness, lesion or injury  Urethra: No prolapse, urethral pain, urethral swelling or urethral lesion  Vagina: Normal  No signs of injury and foreign body  No vaginal discharge, erythema, tenderness, bleeding, lesions or prolapsed vaginal walls  Cervix: Normal       Uterus: Normal        Adnexa: Right adnexa normal and left adnexa normal         Right: No mass, tenderness or fullness  Left: No mass, tenderness or fullness  Rectum: Normal       Comments: The external genitalia normal limits the vagina is clean consistent with menopause  The uterus is small mobile nontender adnexa clear  There is no prolapse of the bladder or the uterus or the urethra  A Pap smear was not performed  Musculoskeletal:         General: Normal range of motion  Cervical back: Normal range of motion and neck supple  Lymphadenopathy:      Upper Body:      Right upper body: No supraclavicular or axillary adenopathy  Left upper body: No supraclavicular or axillary adenopathy  Lower Body: No right inguinal adenopathy  No left inguinal adenopathy  Skin:     General: Skin is warm and dry  Neurological:      General: No focal deficit present        Mental Status: She is alert and oriented to person, place, and time  Psychiatric:         Mood and Affect: Mood normal          Behavior: Behavior normal          Thought Content:  Thought content normal

## 2021-10-01 ENCOUNTER — OFFICE VISIT (OUTPATIENT)
Dept: INTERNAL MEDICINE CLINIC | Facility: CLINIC | Age: 66
End: 2021-10-01
Payer: COMMERCIAL

## 2021-10-01 VITALS
HEIGHT: 66 IN | SYSTOLIC BLOOD PRESSURE: 126 MMHG | BODY MASS INDEX: 22.31 KG/M2 | TEMPERATURE: 97.3 F | OXYGEN SATURATION: 97 % | HEART RATE: 54 BPM | WEIGHT: 138.8 LBS | DIASTOLIC BLOOD PRESSURE: 78 MMHG

## 2021-10-01 DIAGNOSIS — H91.90 HEARING LOSS, UNSPECIFIED HEARING LOSS TYPE, UNSPECIFIED LATERALITY: ICD-10-CM

## 2021-10-01 DIAGNOSIS — Z00.00 HEALTH MAINTENANCE EXAMINATION: Primary | ICD-10-CM

## 2021-10-01 DIAGNOSIS — L40.50 PSORIASIS WITH ARTHROPATHY (HCC): ICD-10-CM

## 2021-10-01 DIAGNOSIS — Z79.899 ENCOUNTER FOR LONG-TERM CURRENT USE OF MEDICATION: ICD-10-CM

## 2021-10-01 DIAGNOSIS — E78.49 OTHER HYPERLIPIDEMIA: ICD-10-CM

## 2021-10-01 DIAGNOSIS — E03.9 ACQUIRED HYPOTHYROIDISM: ICD-10-CM

## 2021-10-01 DIAGNOSIS — M85.89 OSTEOPENIA OF MULTIPLE SITES: ICD-10-CM

## 2021-10-01 DIAGNOSIS — M17.0 BILATERAL PRIMARY OSTEOARTHRITIS OF KNEE: ICD-10-CM

## 2021-10-01 PROBLEM — M85.80 OSTEOPENIA: Status: ACTIVE | Noted: 2021-10-01

## 2021-10-01 PROBLEM — R19.5 LOOSE STOOLS: Status: RESOLVED | Noted: 2021-02-23 | Resolved: 2021-10-01

## 2021-10-01 PROBLEM — K59.00 CONSTIPATION: Status: RESOLVED | Noted: 2017-12-05 | Resolved: 2021-10-01

## 2021-10-01 PROCEDURE — 99397 PER PM REEVAL EST PAT 65+ YR: CPT | Performed by: INTERNAL MEDICINE

## 2021-10-01 RX ORDER — LEVOTHYROXINE SODIUM 0.03 MG/1
TABLET ORAL
Qty: 120 TABLET | Refills: 1 | Status: SHIPPED | OUTPATIENT
Start: 2021-10-01 | End: 2021-10-22 | Stop reason: SDUPTHER

## 2021-10-01 RX ORDER — LIDOCAINE HYDROCHLORIDE 10 MG/ML
2 INJECTION, SOLUTION INFILTRATION; PERINEURAL
COMMUNITY
End: 2022-05-10 | Stop reason: ALTCHOICE

## 2021-10-01 RX ORDER — DEXAMETHASONE SODIUM PHOSPHATE 4 MG/ML
1 INJECTION, SOLUTION INTRA-ARTICULAR; INTRALESIONAL; INTRAMUSCULAR; INTRAVENOUS; SOFT TISSUE
COMMUNITY
End: 2022-05-10 | Stop reason: ALTCHOICE

## 2021-10-07 PROBLEM — M85.832 OSTEOPENIA OF LEFT FOREARM: Status: ACTIVE | Noted: 2021-10-01

## 2021-10-07 PROBLEM — L40.59 POLYARTICULAR PSORIATIC ARTHRITIS (HCC): Status: ACTIVE | Noted: 2021-10-07

## 2021-10-07 PROBLEM — M15.0 PRIMARY GENERALIZED (OSTEO)ARTHRITIS: Status: ACTIVE | Noted: 2021-10-07

## 2021-10-15 ENCOUNTER — CLINICAL SUPPORT (OUTPATIENT)
Dept: INTERNAL MEDICINE CLINIC | Facility: CLINIC | Age: 66
End: 2021-10-15
Payer: COMMERCIAL

## 2021-10-15 VITALS — TEMPERATURE: 98 F

## 2021-10-15 DIAGNOSIS — Z23 NEED FOR PNEUMOCOCCAL VACCINATION: ICD-10-CM

## 2021-10-15 DIAGNOSIS — Z23 NEED FOR INFLUENZA VACCINATION: Primary | ICD-10-CM

## 2021-10-15 PROCEDURE — 90670 PCV13 VACCINE IM: CPT

## 2021-10-15 PROCEDURE — 90472 IMMUNIZATION ADMIN EACH ADD: CPT

## 2021-10-15 PROCEDURE — 90662 IIV NO PRSV INCREASED AG IM: CPT

## 2021-10-15 PROCEDURE — 90471 IMMUNIZATION ADMIN: CPT

## 2021-10-21 ENCOUNTER — LAB (OUTPATIENT)
Dept: LAB | Facility: CLINIC | Age: 66
End: 2021-10-21
Payer: COMMERCIAL

## 2021-10-21 DIAGNOSIS — L40.59 POLYARTICULAR PSORIATIC ARTHRITIS (HCC): ICD-10-CM

## 2021-10-21 DIAGNOSIS — Z79.899 ENCOUNTER FOR LONG-TERM (CURRENT) USE OF OTHER MEDICATIONS: ICD-10-CM

## 2021-10-21 DIAGNOSIS — E55.9 VITAMIN D DEFICIENCY: ICD-10-CM

## 2021-10-21 LAB
25(OH)D3 SERPL-MCNC: 39.2 NG/ML (ref 30–100)
ALBUMIN SERPL BCP-MCNC: 3.7 G/DL (ref 3.5–5)
ALP SERPL-CCNC: 75 U/L (ref 46–116)
ALT SERPL W P-5'-P-CCNC: 17 U/L (ref 12–78)
ANION GAP SERPL CALCULATED.3IONS-SCNC: 4 MMOL/L (ref 4–13)
AST SERPL W P-5'-P-CCNC: 21 U/L (ref 5–45)
BASOPHILS # BLD MANUAL: 0 THOUSAND/UL (ref 0–0.1)
BASOPHILS NFR MAR MANUAL: 0 % (ref 0–1)
BILIRUB SERPL-MCNC: 0.37 MG/DL (ref 0.2–1)
BUN SERPL-MCNC: 14 MG/DL (ref 5–25)
CALCIUM SERPL-MCNC: 9.3 MG/DL (ref 8.3–10.1)
CHLORIDE SERPL-SCNC: 106 MMOL/L (ref 100–108)
CHOLEST SERPL-MCNC: 204 MG/DL (ref 50–200)
CO2 SERPL-SCNC: 27 MMOL/L (ref 21–32)
CREAT SERPL-MCNC: 0.79 MG/DL (ref 0.6–1.3)
EOSINOPHIL # BLD MANUAL: 0 THOUSAND/UL (ref 0–0.4)
EOSINOPHIL NFR BLD MANUAL: 0 % (ref 0–6)
ERYTHROCYTE [DISTWIDTH] IN BLOOD BY AUTOMATED COUNT: 12.7 % (ref 11.6–15.1)
ERYTHROCYTE [SEDIMENTATION RATE] IN BLOOD: 12 MM/HOUR (ref 0–29)
GFR SERPL CREATININE-BSD FRML MDRD: 79 ML/MIN/1.73SQ M
GLUCOSE P FAST SERPL-MCNC: 77 MG/DL (ref 65–99)
HCT VFR BLD AUTO: 41.9 % (ref 34.8–46.1)
HDLC SERPL-MCNC: 72 MG/DL
HGB BLD-MCNC: 13.5 G/DL (ref 11.5–15.4)
LDLC SERPL CALC-MCNC: 120 MG/DL (ref 0–100)
LYMPHOCYTES # BLD AUTO: 1.91 THOUSAND/UL (ref 0.6–4.47)
LYMPHOCYTES # BLD AUTO: 47 % (ref 14–44)
MCH RBC QN AUTO: 32.3 PG (ref 26.8–34.3)
MCHC RBC AUTO-ENTMCNC: 32.2 G/DL (ref 31.4–37.4)
MCV RBC AUTO: 100 FL (ref 82–98)
MONOCYTES # BLD AUTO: 0.24 THOUSAND/UL (ref 0–1.22)
MONOCYTES NFR BLD: 6 % (ref 4–12)
NEUTROPHILS # BLD MANUAL: 1.75 THOUSAND/UL (ref 1.85–7.62)
NEUTS BAND NFR BLD MANUAL: 1 % (ref 0–8)
NEUTS SEG NFR BLD AUTO: 42 % (ref 43–75)
NONHDLC SERPL-MCNC: 132 MG/DL
PLATELET # BLD AUTO: 273 THOUSANDS/UL (ref 149–390)
PLATELET BLD QL SMEAR: ADEQUATE
PMV BLD AUTO: 10.1 FL (ref 8.9–12.7)
POTASSIUM SERPL-SCNC: 4.1 MMOL/L (ref 3.5–5.3)
PROT SERPL-MCNC: 8.1 G/DL (ref 6.4–8.2)
RBC # BLD AUTO: 4.18 MILLION/UL (ref 3.81–5.12)
SODIUM SERPL-SCNC: 137 MMOL/L (ref 136–145)
T4 FREE SERPL-MCNC: 0.79 NG/DL (ref 0.76–1.46)
TRIGL SERPL-MCNC: 58 MG/DL
TSH SERPL DL<=0.05 MIU/L-ACNC: 7.98 UIU/ML (ref 0.36–3.74)
VARIANT LYMPHS # BLD AUTO: 4 %
VIT B12 SERPL-MCNC: 317 PG/ML (ref 100–900)
WBC # BLD AUTO: 4.07 THOUSAND/UL (ref 4.31–10.16)

## 2021-10-21 PROCEDURE — 85007 BL SMEAR W/DIFF WBC COUNT: CPT

## 2021-10-21 PROCEDURE — 80053 COMPREHEN METABOLIC PANEL: CPT

## 2021-10-21 PROCEDURE — 84439 ASSAY OF FREE THYROXINE: CPT | Performed by: INTERNAL MEDICINE

## 2021-10-21 PROCEDURE — 82306 VITAMIN D 25 HYDROXY: CPT

## 2021-10-21 PROCEDURE — 80061 LIPID PANEL: CPT | Performed by: INTERNAL MEDICINE

## 2021-10-21 PROCEDURE — 84443 ASSAY THYROID STIM HORMONE: CPT | Performed by: INTERNAL MEDICINE

## 2021-10-21 PROCEDURE — 85652 RBC SED RATE AUTOMATED: CPT

## 2021-10-21 PROCEDURE — 85027 COMPLETE CBC AUTOMATED: CPT

## 2021-10-21 PROCEDURE — 82607 VITAMIN B-12: CPT | Performed by: INTERNAL MEDICINE

## 2021-10-21 PROCEDURE — 36415 COLL VENOUS BLD VENIPUNCTURE: CPT | Performed by: INTERNAL MEDICINE

## 2021-10-22 ENCOUNTER — TELEPHONE (OUTPATIENT)
Dept: INTERNAL MEDICINE CLINIC | Facility: CLINIC | Age: 66
End: 2021-10-22

## 2021-10-22 DIAGNOSIS — E03.9 ACQUIRED HYPOTHYROIDISM: Primary | ICD-10-CM

## 2021-10-22 RX ORDER — LEVOTHYROXINE SODIUM 0.05 MG/1
50 TABLET ORAL DAILY
Qty: 90 TABLET | Refills: 0 | Status: SHIPPED | OUTPATIENT
Start: 2021-10-22 | End: 2021-12-23

## 2021-11-01 ENCOUNTER — HOSPITAL ENCOUNTER (OUTPATIENT)
Dept: RADIOLOGY | Age: 66
Discharge: HOME | End: 2021-11-01
Payer: COMMERCIAL

## 2021-11-01 VITALS — WEIGHT: 138 LBS | BODY MASS INDEX: 22.18 KG/M2 | HEIGHT: 66 IN

## 2021-11-01 DIAGNOSIS — Z12.31 ENCOUNTER FOR SCREENING MAMMOGRAM FOR MALIGNANT NEOPLASM OF BREAST: ICD-10-CM

## 2021-11-01 PROCEDURE — 77067 SCR MAMMO BI INCL CAD: CPT

## 2021-11-01 PROCEDURE — 77063 BREAST TOMOSYNTHESIS BI: CPT

## 2021-11-09 ENCOUNTER — PROCEDURE VISIT (OUTPATIENT)
Dept: OBGYN CLINIC | Facility: HOSPITAL | Age: 66
End: 2021-11-09
Payer: COMMERCIAL

## 2021-11-09 VITALS
HEIGHT: 66 IN | DIASTOLIC BLOOD PRESSURE: 82 MMHG | HEART RATE: 75 BPM | SYSTOLIC BLOOD PRESSURE: 150 MMHG | WEIGHT: 140.2 LBS | BODY MASS INDEX: 22.53 KG/M2

## 2021-11-09 DIAGNOSIS — M17.11 PRIMARY OSTEOARTHRITIS OF RIGHT KNEE: Primary | ICD-10-CM

## 2021-11-09 PROCEDURE — 20610 DRAIN/INJ JOINT/BURSA W/O US: CPT | Performed by: PHYSICIAN ASSISTANT

## 2021-11-12 ENCOUNTER — TELEPHONE (OUTPATIENT)
Dept: DERMATOLOGY | Facility: CLINIC | Age: 66
End: 2021-11-12

## 2021-11-12 DIAGNOSIS — L30.9 DERMATITIS: Primary | ICD-10-CM

## 2021-11-12 RX ORDER — CLOBETASOL PROPIONATE 0.5 MG/G
CREAM TOPICAL 2 TIMES DAILY
Qty: 45 G | Refills: 1 | Status: SHIPPED | OUTPATIENT
Start: 2021-11-12 | End: 2021-11-12

## 2021-11-16 ENCOUNTER — APPOINTMENT (OUTPATIENT)
Dept: LAB | Facility: CLINIC | Age: 66
End: 2021-11-16
Payer: COMMERCIAL

## 2021-11-16 DIAGNOSIS — E03.9 ACQUIRED HYPOTHYROIDISM: ICD-10-CM

## 2021-11-16 DIAGNOSIS — D72.819 LEUKOPENIA, UNSPECIFIED TYPE: ICD-10-CM

## 2021-11-16 LAB
BASOPHILS # BLD AUTO: 0.04 THOUSANDS/ΜL (ref 0–0.1)
BASOPHILS NFR BLD AUTO: 1 % (ref 0–1)
EOSINOPHIL # BLD AUTO: 0 THOUSAND/ΜL (ref 0–0.61)
EOSINOPHIL NFR BLD AUTO: 0 % (ref 0–6)
ERYTHROCYTE [DISTWIDTH] IN BLOOD BY AUTOMATED COUNT: 12.7 % (ref 11.6–15.1)
HCT VFR BLD AUTO: 41.1 % (ref 34.8–46.1)
HGB BLD-MCNC: 13.3 G/DL (ref 11.5–15.4)
IMM GRANULOCYTES # BLD AUTO: 0.01 THOUSAND/UL (ref 0–0.2)
IMM GRANULOCYTES NFR BLD AUTO: 0 % (ref 0–2)
LYMPHOCYTES # BLD AUTO: 1.94 THOUSANDS/ΜL (ref 0.6–4.47)
LYMPHOCYTES NFR BLD AUTO: 44 % (ref 14–44)
MCH RBC QN AUTO: 32.1 PG (ref 26.8–34.3)
MCHC RBC AUTO-ENTMCNC: 32.4 G/DL (ref 31.4–37.4)
MCV RBC AUTO: 99 FL (ref 82–98)
MONOCYTES # BLD AUTO: 0.64 THOUSAND/ΜL (ref 0.17–1.22)
MONOCYTES NFR BLD AUTO: 15 % (ref 4–12)
NEUTROPHILS # BLD AUTO: 1.76 THOUSANDS/ΜL (ref 1.85–7.62)
NEUTS SEG NFR BLD AUTO: 40 % (ref 43–75)
NRBC BLD AUTO-RTO: 0 /100 WBCS
PLATELET # BLD AUTO: 249 THOUSANDS/UL (ref 149–390)
PMV BLD AUTO: 10.2 FL (ref 8.9–12.7)
RBC # BLD AUTO: 4.14 MILLION/UL (ref 3.81–5.12)
TSH SERPL DL<=0.05 MIU/L-ACNC: 2.91 UIU/ML (ref 0.36–3.74)
WBC # BLD AUTO: 4.39 THOUSAND/UL (ref 4.31–10.16)

## 2021-11-16 PROCEDURE — 36415 COLL VENOUS BLD VENIPUNCTURE: CPT

## 2021-11-16 PROCEDURE — 85025 COMPLETE CBC W/AUTO DIFF WBC: CPT

## 2021-11-16 PROCEDURE — 84443 ASSAY THYROID STIM HORMONE: CPT

## 2021-11-20 ENCOUNTER — NURSE TRIAGE (OUTPATIENT)
Dept: OTHER | Facility: OTHER | Age: 66
End: 2021-11-20

## 2021-11-20 DIAGNOSIS — Z20.828 EXPOSURE TO SARS VIRUS: Primary | ICD-10-CM

## 2021-11-20 PROCEDURE — U0003 INFECTIOUS AGENT DETECTION BY NUCLEIC ACID (DNA OR RNA); SEVERE ACUTE RESPIRATORY SYNDROME CORONAVIRUS 2 (SARS-COV-2) (CORONAVIRUS DISEASE [COVID-19]), AMPLIFIED PROBE TECHNIQUE, MAKING USE OF HIGH THROUGHPUT TECHNOLOGIES AS DESCRIBED BY CMS-2020-01-R: HCPCS | Performed by: INTERNAL MEDICINE

## 2021-11-20 PROCEDURE — U0005 INFEC AGEN DETEC AMPLI PROBE: HCPCS | Performed by: INTERNAL MEDICINE

## 2021-11-24 ENCOUNTER — TELEMEDICINE (OUTPATIENT)
Dept: INTERNAL MEDICINE CLINIC | Facility: CLINIC | Age: 66
End: 2021-11-24
Payer: COMMERCIAL

## 2021-11-24 DIAGNOSIS — U07.1 COVID-19: Primary | ICD-10-CM

## 2021-11-24 PROCEDURE — 99213 OFFICE O/P EST LOW 20 MIN: CPT | Performed by: NURSE PRACTITIONER

## 2021-12-06 ENCOUNTER — TELEPHONE (OUTPATIENT)
Dept: INTERNAL MEDICINE CLINIC | Facility: CLINIC | Age: 66
End: 2021-12-06

## 2021-12-06 ENCOUNTER — TELEMEDICINE (OUTPATIENT)
Dept: INTERNAL MEDICINE CLINIC | Facility: CLINIC | Age: 66
End: 2021-12-06
Payer: COMMERCIAL

## 2021-12-06 DIAGNOSIS — J01.10 ACUTE NON-RECURRENT FRONTAL SINUSITIS: Primary | ICD-10-CM

## 2021-12-06 PROCEDURE — 99213 OFFICE O/P EST LOW 20 MIN: CPT | Performed by: NURSE PRACTITIONER

## 2021-12-06 RX ORDER — AMOXICILLIN AND CLAVULANATE POTASSIUM 875; 125 MG/1; MG/1
1 TABLET, FILM COATED ORAL EVERY 12 HOURS SCHEDULED
Qty: 14 TABLET | Refills: 0 | Status: SHIPPED | OUTPATIENT
Start: 2021-12-06 | End: 2021-12-13

## 2021-12-23 DIAGNOSIS — E03.9 ACQUIRED HYPOTHYROIDISM: ICD-10-CM

## 2021-12-23 RX ORDER — LEVOTHYROXINE SODIUM 0.05 MG/1
TABLET ORAL
Qty: 90 TABLET | Refills: 0 | Status: SHIPPED | OUTPATIENT
Start: 2021-12-23 | End: 2022-04-27 | Stop reason: SDUPTHER

## 2022-02-03 ENCOUNTER — APPOINTMENT (OUTPATIENT)
Dept: LAB | Facility: CLINIC | Age: 67
End: 2022-02-03
Payer: COMMERCIAL

## 2022-02-03 DIAGNOSIS — E55.9 AVITAMINOSIS D: ICD-10-CM

## 2022-02-03 DIAGNOSIS — Q79.60 EHLERS-DANLOS SYNDROME: ICD-10-CM

## 2022-02-03 DIAGNOSIS — L40.59 POLYARTICULAR PSORIATIC ARTHRITIS (HCC): ICD-10-CM

## 2022-02-03 DIAGNOSIS — M15.0 PRIMARY GENERALIZED HYPERTROPHIC OSTEOARTHROSIS: ICD-10-CM

## 2022-02-03 DIAGNOSIS — L40.0 PSORIASIS VULGARIS: ICD-10-CM

## 2022-02-03 LAB
25(OH)D3 SERPL-MCNC: 43.6 NG/ML (ref 30–100)
ALBUMIN SERPL BCP-MCNC: 3.9 G/DL (ref 3.5–5)
ALP SERPL-CCNC: 68 U/L (ref 46–116)
ALT SERPL W P-5'-P-CCNC: 20 U/L (ref 12–78)
ANION GAP SERPL CALCULATED.3IONS-SCNC: 4 MMOL/L (ref 4–13)
AST SERPL W P-5'-P-CCNC: 19 U/L (ref 5–45)
BASOPHILS # BLD AUTO: 0.03 THOUSANDS/ΜL (ref 0–0.1)
BASOPHILS NFR BLD AUTO: 1 % (ref 0–1)
BILIRUB SERPL-MCNC: 1.14 MG/DL (ref 0.2–1)
BUN SERPL-MCNC: 15 MG/DL (ref 5–25)
CALCIUM SERPL-MCNC: 9.4 MG/DL (ref 8.3–10.1)
CHLORIDE SERPL-SCNC: 107 MMOL/L (ref 100–108)
CO2 SERPL-SCNC: 27 MMOL/L (ref 21–32)
CREAT SERPL-MCNC: 0.85 MG/DL (ref 0.6–1.3)
CRP SERPL QL: <3 MG/L
EOSINOPHIL # BLD AUTO: 0 THOUSAND/ΜL (ref 0–0.61)
EOSINOPHIL NFR BLD AUTO: 0 % (ref 0–6)
ERYTHROCYTE [DISTWIDTH] IN BLOOD BY AUTOMATED COUNT: 13 % (ref 11.6–15.1)
GFR SERPL CREATININE-BSD FRML MDRD: 71 ML/MIN/1.73SQ M
GLUCOSE P FAST SERPL-MCNC: 98 MG/DL (ref 65–99)
HCT VFR BLD AUTO: 41.6 % (ref 34.8–46.1)
HGB BLD-MCNC: 13.7 G/DL (ref 11.5–15.4)
IMM GRANULOCYTES # BLD AUTO: 0.01 THOUSAND/UL (ref 0–0.2)
IMM GRANULOCYTES NFR BLD AUTO: 0 % (ref 0–2)
LYMPHOCYTES # BLD AUTO: 1.71 THOUSANDS/ΜL (ref 0.6–4.47)
LYMPHOCYTES NFR BLD AUTO: 44 % (ref 14–44)
MCH RBC QN AUTO: 32.3 PG (ref 26.8–34.3)
MCHC RBC AUTO-ENTMCNC: 32.9 G/DL (ref 31.4–37.4)
MCV RBC AUTO: 98 FL (ref 82–98)
MONOCYTES # BLD AUTO: 0.54 THOUSAND/ΜL (ref 0.17–1.22)
MONOCYTES NFR BLD AUTO: 14 % (ref 4–12)
NEUTROPHILS # BLD AUTO: 1.61 THOUSANDS/ΜL (ref 1.85–7.62)
NEUTS SEG NFR BLD AUTO: 41 % (ref 43–75)
NRBC BLD AUTO-RTO: 0 /100 WBCS
PLATELET # BLD AUTO: 256 THOUSANDS/UL (ref 149–390)
PMV BLD AUTO: 10.1 FL (ref 8.9–12.7)
POTASSIUM SERPL-SCNC: 4 MMOL/L (ref 3.5–5.3)
PROT SERPL-MCNC: 7.9 G/DL (ref 6.4–8.2)
RBC # BLD AUTO: 4.24 MILLION/UL (ref 3.81–5.12)
SODIUM SERPL-SCNC: 138 MMOL/L (ref 136–145)
WBC # BLD AUTO: 3.9 THOUSAND/UL (ref 4.31–10.16)

## 2022-02-03 PROCEDURE — 36415 COLL VENOUS BLD VENIPUNCTURE: CPT

## 2022-02-03 PROCEDURE — 85025 COMPLETE CBC W/AUTO DIFF WBC: CPT

## 2022-02-03 PROCEDURE — 80053 COMPREHEN METABOLIC PANEL: CPT

## 2022-02-03 PROCEDURE — 82306 VITAMIN D 25 HYDROXY: CPT

## 2022-02-03 PROCEDURE — 86140 C-REACTIVE PROTEIN: CPT

## 2022-02-25 DIAGNOSIS — H91.90 HEARING LOSS, UNSPECIFIED HEARING LOSS TYPE, UNSPECIFIED LATERALITY: Primary | ICD-10-CM

## 2022-03-16 ENCOUNTER — APPOINTMENT (OUTPATIENT)
Dept: LAB | Facility: CLINIC | Age: 67
End: 2022-03-16
Payer: COMMERCIAL

## 2022-03-16 DIAGNOSIS — R17 ELEVATED BILIRUBIN: ICD-10-CM

## 2022-03-16 LAB
BILIRUB DIRECT SERPL-MCNC: 0.21 MG/DL (ref 0–0.2)
BILIRUB SERPL-MCNC: 0.49 MG/DL (ref 0.2–1)

## 2022-03-16 PROCEDURE — 36415 COLL VENOUS BLD VENIPUNCTURE: CPT

## 2022-03-16 PROCEDURE — 82247 BILIRUBIN TOTAL: CPT

## 2022-03-16 PROCEDURE — 82248 BILIRUBIN DIRECT: CPT

## 2022-03-29 ENCOUNTER — OFFICE VISIT (OUTPATIENT)
Dept: AUDIOLOGY | Age: 67
End: 2022-03-29
Payer: COMMERCIAL

## 2022-03-29 DIAGNOSIS — H91.90 HEARING LOSS, UNSPECIFIED HEARING LOSS TYPE, UNSPECIFIED LATERALITY: ICD-10-CM

## 2022-03-29 DIAGNOSIS — H90.3 SENSORY HEARING LOSS, BILATERAL: Primary | ICD-10-CM

## 2022-03-29 PROCEDURE — 92567 TYMPANOMETRY: CPT | Performed by: AUDIOLOGIST

## 2022-03-29 PROCEDURE — 92557 COMPREHENSIVE HEARING TEST: CPT | Performed by: AUDIOLOGIST

## 2022-03-29 NOTE — PROGRESS NOTES
HEARING EVALUATION/HEARING AID VISIT    Name:  Allison Mcghee  :  1955  Age:  77 y o  Date of Evaluation: 22     History: Known Hearing Loss binaurally  Reason for visit: Allison Mcghee is being seen today at the request of Dr Rosalio Llanos for an evaluation of hearing  Patient reports no issues or concerns  No perceived changed to hearing ability  Otoscopic Evaluation:   Right Ear: Clear and healthy ear canal and tympanic membrane   Left Ear: Clear and healthy ear canal and tympanic membrane    Tympanometry:   Right: Type A - normal middle ear pressure and compliance   Left: Type A - normal middle ear pressure and compliance    Audiogram Results:  Pure tone testing revealed a normal sloping to severe sensorineural hearing loss bilaterally  SRT and PTA are in agreement indicating good test reliability  Word recognition scores were fair bilaterally  *There was decrease in WRS scores, however, previous testing was completed via live voice, and today's testing was completed via recorded voice  Hearing thresholds were stable compared to previous testing in 2019  *see attached audiogram      Allison Mcghee is fit with [employer] Op[employer] S 1 mini [facility] hearing aid(s)  Right serial number [id number]  Left serial number [id number]  Warranty date: 23 (Loss/Damage and repair)  Patient reports some clarity issues  Noted she feels like she struggles to understand speech in noisy environments  Action:  Cleaned and checked hearing aids  Increased high frequency gain to improve speech understanding  Increased noise reduction  Reviewed word understanding testing and realistic expectations        RECOMMENDATIONS:  6 month hearing eval, Annual hearing eval, Return to [facility]  for F/U and Copy to Patient/Caregiver      Suzette Trivedi , [address] [facility]

## 2022-04-11 ENCOUNTER — TELEPHONE (OUTPATIENT)
Dept: OBGYN CLINIC | Facility: HOSPITAL | Age: 67
End: 2022-04-11

## 2022-04-11 DIAGNOSIS — M17.11 PRIMARY OSTEOARTHRITIS OF RIGHT KNEE: Primary | ICD-10-CM

## 2022-04-12 ENCOUNTER — TELEPHONE (OUTPATIENT)
Dept: OBGYN CLINIC | Facility: OTHER | Age: 67
End: 2022-04-12

## 2022-04-12 NOTE — TELEPHONE ENCOUNTER
Patients injection was scheduled for 05-10  She requested to be scheduled with Laure Parr, which I did   Is that okay?

## 2022-04-15 ENCOUNTER — HOSPITAL ENCOUNTER (OUTPATIENT)
Dept: RADIOLOGY | Age: 67
Discharge: HOME | End: 2022-04-15
Payer: COMMERCIAL

## 2022-04-15 DIAGNOSIS — M85.89 OSTEOPENIA OF MULTIPLE SITES: ICD-10-CM

## 2022-04-15 PROCEDURE — 77080 DXA BONE DENSITY AXIAL: CPT

## 2022-04-16 ENCOUNTER — IMMUNIZATIONS (OUTPATIENT)
Dept: FAMILY MEDICINE CLINIC | Facility: HOSPITAL | Age: 67
End: 2022-04-16

## 2022-04-16 PROCEDURE — 91305 COVID-19 PFIZER VACC TRIS-SUCROSE GRAY CAP 0.3 ML: CPT

## 2022-04-27 DIAGNOSIS — E03.9 ACQUIRED HYPOTHYROIDISM: ICD-10-CM

## 2022-04-27 RX ORDER — LEVOTHYROXINE SODIUM 0.05 MG/1
50 TABLET ORAL DAILY
Qty: 90 TABLET | Refills: 0 | Status: SHIPPED | OUTPATIENT
Start: 2022-04-27

## 2022-05-10 ENCOUNTER — PROCEDURE VISIT (OUTPATIENT)
Dept: OBGYN CLINIC | Facility: OTHER | Age: 67
End: 2022-05-10
Payer: COMMERCIAL

## 2022-05-10 ENCOUNTER — OFFICE VISIT (OUTPATIENT)
Dept: DERMATOLOGY | Facility: CLINIC | Age: 67
End: 2022-05-10
Payer: COMMERCIAL

## 2022-05-10 VITALS — WEIGHT: 138 LBS | HEIGHT: 66 IN | TEMPERATURE: 98.2 F | BODY MASS INDEX: 22.18 KG/M2

## 2022-05-10 VITALS
SYSTOLIC BLOOD PRESSURE: 129 MMHG | BODY MASS INDEX: 22.02 KG/M2 | HEIGHT: 66 IN | WEIGHT: 137 LBS | DIASTOLIC BLOOD PRESSURE: 79 MMHG | HEART RATE: 55 BPM

## 2022-05-10 DIAGNOSIS — B35.1 ONYCHOMYCOSIS: Primary | ICD-10-CM

## 2022-05-10 DIAGNOSIS — L30.9 DERMATITIS: ICD-10-CM

## 2022-05-10 DIAGNOSIS — M17.11 PRIMARY OSTEOARTHRITIS OF RIGHT KNEE: Primary | ICD-10-CM

## 2022-05-10 DIAGNOSIS — L40.9 PSORIASIS: ICD-10-CM

## 2022-05-10 PROCEDURE — 20610 DRAIN/INJ JOINT/BURSA W/O US: CPT | Performed by: PHYSICIAN ASSISTANT

## 2022-05-10 PROCEDURE — 99214 OFFICE O/P EST MOD 30 MIN: CPT | Performed by: DERMATOLOGY

## 2022-05-10 RX ORDER — CICLOPIROX OLAMINE 7.7 MG/100ML
SUSPENSION TOPICAL
Qty: 60 ML | Refills: 3 | Status: SHIPPED | OUTPATIENT
Start: 2022-05-10

## 2022-05-10 RX ORDER — CALCIPOTRIENE 50 UG/G
CREAM TOPICAL 2 TIMES DAILY
Qty: 60 G | Refills: 3 | Status: SHIPPED | OUTPATIENT
Start: 2022-05-10

## 2022-05-10 RX ORDER — CLOBETASOL PROPIONATE 0.05 G/100ML
1 SHAMPOO TOPICAL DAILY
Qty: 118 ML | Refills: 1 | Status: SHIPPED | OUTPATIENT
Start: 2022-05-10 | End: 2022-05-24

## 2022-05-10 RX ORDER — BUPIVACAINE HYDROCHLORIDE 2.5 MG/ML
2 INJECTION, SOLUTION INFILTRATION; PERINEURAL
Status: COMPLETED | OUTPATIENT
Start: 2022-05-10 | End: 2022-05-10

## 2022-05-10 RX ADMIN — BUPIVACAINE HYDROCHLORIDE 2 ML: 2.5 INJECTION, SOLUTION INFILTRATION; PERINEURAL at 14:53

## 2022-05-10 NOTE — PROGRESS NOTES
S: Jackie Alcaraz presents to the office for Synvisc One injection  Last injection was 11/9/21  She reports good relief with that injection for 6 months  Denies new injury or trauma  Right knee pain occurs with ambulation  Admits to crepitation with flexion  Pain improves with rest   Denies swelling  O: Right knee  Skin - warm and dry  No effusion  No swelling  Flexion: full  Extension: full  NVI  SI - to light touch    A/P: Right knee osteoarthritis  1  SynVisc One injection  2  Slowly increase activities to tolerance over next 48-72 hours  3  Tylenol PRN  4  Ice - 20 minutes on and 20 minutes off  5  Follow up PRN  Synvisc injection can be repeated as early as 6 months  If pain returns sooner, she will contact office  Large joint arthrocentesis: R knee  Universal Protocol:  Consent: Verbal consent obtained    Consent given by: patient    Supporting Documentation  Indications: pain   Procedure Details  Location: knee - R knee  Preparation: Patient was prepped and draped in the usual sterile fashion  Needle size: 22 G  Ultrasound guidance: no  Approach: superior lateral   Medications administered: 48 mg hylan 48 MG/6ML; 2 mL bupivacaine 0 25 %    Patient tolerance: patient tolerated the procedure well with no immediate complications  Dressing:  Sterile dressing applied    2 ml of 1% lidocaine was used for anesthesia purposes

## 2022-05-10 NOTE — PROGRESS NOTES
[facility] Follow Up Note    Patient Name: Alvina Solano  Encounter Date: 05/10/22    Today's Chief Concerns:  Valeri Ogden Concern #1:  Skin check      Current Medications:    Current Outpatient Medications:     Ascorbic Acid (VITAMIN C PO), Vitamin C, Disp: , Rfl:     Calcium Carb-Cholecalciferol (CALCIUM + D3 PO), Take by mouth daily  , Disp: , Rfl:     Cholecalciferol (VITAMIN D3 PO), Vitamin D3, Disp: , Rfl:     diclofenac sodium (VOLTAREN) 1 %, Apply 2 g topically 4 (four) times a day, Disp: 3 Tube, Rfl: 1    glucosamine-chondroitin 500-400 MG tablet, Take 1 tablet by mouth 3 (three) times a day, Disp: , Rfl:     levothyroxine 50 mcg tablet, Take 1 tablet (50 mcg total) by mouth daily, Disp: 90 tablet, Rfl: 0    Omega-3 Fatty Acids (FISH OIL PO), Fish Oil, Disp: , Rfl:     sulfaSALAzine (AZULFIDINE-EN) 500 mg EC tablet, Take 1 tablet (500 mg total) by mouth 2 (two) times a day, Disp: 180 tablet, Rfl: 1    clobetasol propionate (CLOBEX) 0 05 % shampoo, Apply 1 application topically daily for 14 days, Disp: 118 mL, Rfl: 1    dexamethasone (DECADRON) 4 mg/mL, 1 mL   (Patient not taking: Reported on 2/8/2022 ), Disp: , Rfl:     lidocaine (XYLOCAINE) 1 %, 2 mL   (Patient not taking: Reported on 5/10/2022 ), Disp: , Rfl:     sulfaSALAzine (AZULFIDINE) 500 mg tablet, Take 1 tablet (500 mg total) by mouth 2 (two) times a day, Disp: 180 tablet, Rfl: 1    CONSTITUTIONAL:   Vitals:    05/10/22 1028   Temp: 98 2 °F (36 8 °C)   TempSrc: Temporal   Weight: 62 6 kg (138 lb)   Height: 5' 6" (1 676 m)     Specific Alerts:    Have you been seen by a [facility] Dermatologist in the last 3 years? YES    Are you pregnant or planning to become pregnant? No    Are you currently or planning to be nursing or breast feeding? No    No Known Allergies    May we call your Preferred Phone number to discuss your specific medical information?  YES    May we leave a detailed message that includes your specific medical information? YES    Have you traveled outside of the [facility] in the past 3 months? No    Do you currently have a pacemaker or defibrillator? No    Do you have any artificial heart valves, joints, plates, screws, rods, stents, pins, etc? YES , titanium replacement shoulder   - If Yes, were any placed within the last 2 years? 2017    Do you require any medications prior to a surgical procedure? No    Are you taking any medications that cause you to bleed more easily ("blood thinners") No    Have you ever experienced a rapid heartbeat with epinephrine? No    Have you ever been treated with "gold" (gold sodium thiomalate) therapy? No    [facility] can help with wrinkles, "laugh lines," facial volume loss, "double chin," "love handles," age spots, and more  Are you interested in learning today about some of the skin enhancement procedures that we offer? (If Yes, please provide more detail) No    Review of Systems:  Have you recently had or currently have any of the following?     · Fever or chills: No  · Night Sweats: No  · Headaches: No  · Weight Gain: No  · Weight Loss: No  · Blurry Vision: No  · Nausea: No  · Vomiting: No  · Diarrhea: No  · Blood in Stool: No  · Abdominal Pain: No  · Itchy Skin: No  · Painful Joints: No  · Swollen Joints: No  · Muscle Pain: No  · Irregular Mole: No  · Sun Burn: No  · Dry Skin: YES  · Skin Color Changes: No  · Scar or Keloid: No  · Cold Sores/Fever Blisters: No  · Bacterial Infections/MRSA: No  · Anxiety: No  · Depression: No  · Suicidal or Homicidal Thoughts: No      PSYCH: Normal mood and affect  EYES: Normal conjunctiva  ENT: Normal lips and oral mucosa  CARDIOVASCULAR: No edema  RESPIRATORY: Normal respirations  HEME/LYMPH/IMMUNO:  No regional lymphadenopathy except as noted below in ASSESSMENT AND PLAN BY DIAGNOSIS    FULL ORGAN SYSTEM SKIN EXAM (SKIN)   Hair, Scalp, Ears, Face Normal except as noted below in Assessment   Neck, Cervical Chain Nodes Normal except as noted below in Assessment   Right Arm/Hand/Fingers Normal except as noted below in Assessment   Left Arm/Hand/Fingers Normal except as noted below in Assessment   Chest/Breasts/Axillae Viewed areas Normal except as noted below in Assessment   Abdomen, Umbilicus Normal except as noted below in Assessment   Back/Spine Normal except as noted below in Assessment   Groin/Genitalia/Buttocks Viewed areas Normal except as noted below in Assessment   Right Leg, Foot, Toes Normal except as noted below in Assessment   Left Leg, Foot, Toes Normal except as noted below in Assessment       PSORIASIS    Physical Exam:   Anatomic Location Affected:  Elbows, scalp   Morphological Description:  Pink scaly opatches   Severity: mild   Body Percent Affected: 10%   Pertinent Positives:   Pertinent Negatives: Additional History of Present Condition:  Present for years; patient used clobetasol cream    Assessment and Plan:  Based on a thorough discussion of this condition and the management approach to it (including a comprehensive discussion of the known risks, side effects and potential benefits of treatment), the patient (family) agrees to implement the following specific plan:   ELBOWS: Apply calcipotriene   005% cream twice daily to affected areas of skin   SCALP: apply LCD 10% in green soap tincture solution>Coal tar topical, polysorbate 80 NF, tincture green soap    Yearly follow up     Psoriasis is a chronic inflammatory condition that causes the body to make new skin cells in days rather than weeks  As these cells pile up on the surface of the skin, you may see thick, scaly patches of thickened skin  Psoriasis affects 2-4% of males and females  It can start at any age including childhood, with peaks of onset at 15-25 years and 50-60 years  It tends to persist lifelong, fluctuating in extent and severity  It is particularly common in Caucasians but may affect people of any race   About one-third of patients with psoriasis have family members with psoriasis  Psoriasis is multifactorial  It is classified as an immune-mediated inflammatory disease (IMID)  Genetic factors are important and influence the type of psoriasis and response to treatment  What are the signs and symptoms of psoriasis? There are many different types of psoriasis that each have present uniquely  The types of psoriasis include:    Plaque psoriasis: About 80% to 90% of people who have psoriasis develop this type  When plaque psoriasis appears, you may see:  Plaque psoriasis usually presents with symmetrically distributed, red, scaly plaques with well-defined edges  The scale is typically silvery white, except in skin folds where the plaques often appear shiny and they may have a moist peeling surface  The most common sites are scalp, elbows and knees, but any part of the skin can be involved  The plaques are usually very persistent without treatment  Itch is mostly mild but may be severe in some patients, leading to scratching and lichenification (thickened leathery skin with increased skin markings)  Painful skin cracks or fissures may occur  When psoriatic plaques clear up, they may leave brown or pale marks that can be expected to fade over several months  Guttate psoriasis: When someone gets this type of psoriasis, you often see tiny bumps appear on the skin quite suddenly  The bumps tend to cover much of the torso, legs, and arms  Sometimes, the bumps also develop on the face, scalp, and ears  No matter where they appear, the bumps tend to be:    Small and scaly   [place]-colored to pink   Temporary, clearing in a few weeks or months without treatment  When guttate psoriasis clears, it may never return  Why this happens is still a bit of a mystery  Guttate psoriasis tends to develop in children and young adults who've had an infection, such as strep throat   It's possible that when the infection clears so does guttate psoriasis  It's also possible to have:   Guttate psoriasis for life   See the guttate psoriasis clear and plaque psoriasis develop later in life   Plaque psoriasis when you develop guttate psoriasis  There's no way to predict what will happen after the first flare-up of guttate psoriasis clears  Inverse psoriasis: This type of psoriasis develops in areas where skin touches skin, such as the armpits, genitals, and crease of the buttocks  Where the inverse psoriasis appears, you're likely to notice:   Smooth, red patches of skin that look raw   Little, if any, silvery-white coating   Sore or painful skin  Other names for this type of psoriasis are intertriginous psoriasis or flexural psoriasis  Pustular psoriasis: This type of psoriasis causes pus-filled bumps that usually appear only on the feet and hands  While the pus-filled bumps may look like an infection, the skin is not infected  The bumps don't contain bacteria or anything else that could cause an infection  Where pustular psoriasis appears, you tend to notice:   Red, swollen skin that is dotted with pus-filled bumps   Extremely sore or painful skin   Brown dots (and sometimes scale) appear as the pus-filled bumps dry  Pustular psoriasis can make just about any activity that requires your hands or feet, such as typing or walking, unbearably painful  Pustular psoriasis (generalized): Serious and life-threatening, this rare type of psoriasis causes pus-filled bumps to develop on much of the skin  Also called von Zumbusch psoriasis, a flare-up causes this sequence of events:  1  Skin on most of the body suddenly turns dry, red, and tender  2  Within hours, pus-filled bumps cover most of the skin  3  Often within a day, the pus-filled bumps break open and pools of pus leak onto the skin  4  As the pus dries (usually within 24 to 48 hours), the skin dries out and peels (as shown in this picture)    5  When the dried skin peels off, you see a smooth, glazed surface  6  In a few days or weeks, you may see a new crop of pus-filled bumps covering most of the skin, as the cycle repeats itself  Anyone with pustular psoriasis also feels very sick, and may develop a fever, headache, muscle weakness, and other symptoms  Medical care is often necessary to save the person's life  Erythrodermic psoriasis: Serious and life-threatening, this type of psoriasis requires immediate medical care  When someone develops erythrodermic psoriasis, you may notice:   Skin on most of the body looks burnt   Chills, fever, and the person looks extremely ill   Muscle weakness, a rapid pulse, and severe itch  The person may also be unable to keep warm, so hypothermia can set in quickly  Most people who develop this type of psoriasis already have another type of psoriasis  Before developing erythrodermic psoriasis, they often notice that their psoriasis is worsening or not improving with treatment  If you notice either of these happening, see a board-certified dermatologist     Nails    Nail psoriasis: With any type of psoriasis, you may see changes to your fingernails or toenails  About half of the people who have plaque psoriasis see signs of psoriasis on their fingernails at some point2  When psoriasis affects the nails, you may notice:   Tiny dents in your nails (called nail pits)   White, yellow, or brown discoloration under one or more nails   Crumbling, rough nails   A nail lifting up so that it's no longer attached   Buildup of skin cells beneath one or more nails, which lifts up the nail  Treatment and proper nail care can help you control nail psoriasis  Psoriatic arthritis: If you have psoriasis, it's important to pay attention to your joints  Some people who have psoriasis develop a type of arthritis called psoriatic arthritis  This is more likely to occur if you have severe psoriasis    Most people notice psoriasis on their skin years before they develop psoriatic arthritis  It's also possible to get psoriatic arthritis before psoriasis, but this is less common  When psoriatic arthritis develops, the signs can be subtle  At first, you may notice:   A swollen and tender joint, especially in a finger or toe   Heel pain   Swelling on the back of your leg, just above your heel   Stiffness in the morning that fades during the day  Like psoriasis, psoriatic arthritis cannot be cured  Treatment can prevent psoriatic arthritis from worsening, which is important  Allowed to progress, psoriatic arthritis can become disabling  Diagnosis and treatment of psoriasis   Psoriasis is usually diagnosed by clinical features, and skin biopsy if necessary  It is important to decrease factors that aggravate psoriasis  These include treating streptococcal infections, minimizing skin injuries, avoiding sun exposure if it exacerbates psoriasis, smoking, alcohol usage, decreasing stress, and maintaining an optimal body weight  Certain medications such as lithium, beta blockers, antimalarials, and NSAIDs have also been implicated  Suddenly stopping oral steroids or strong topical steroids can cause rebound disease  There are many categories of psoriasis treatments available  Topical therapy  Mild psoriasis is generally treated with topical agents alone  Which treatment is selected may depend on body site, extent and severity of psoriasis   Emollients   Coal tar preparations   Dithranol   Salicylic acid   Vitamin D analogue (calcipotriol)   Topical corticosteroids   Calcineurin inhibitor (tacrolimus, pimecrolimus)  Phototherapy  Most psoriasis centres offer phototherapy with ultraviolet (UV) radiation, often in combination with topical or systemic agents   Types of phototherapy include   Narrowband UVB   Broadband UVB   Photochemotherapy (PUVA)   Targeted phototherapy  Systemic therapy  Moderate to severe psoriasis warrants treatment with a systemic agent and/or phototherapy  The most common treatments are:   Methotrexate   Ciclosporin   Acitretin  Other medicines occasionally used for psoriasis include:   Mycophenolate   Apremilast   Hydroxyurea   Azathioprine   6-mercaptopurine  Systemic corticosteroids are best avoided due to a risk of severe withdrawal flare of psoriasis and adverse effects  Biologics or targeted therapies are reserved for conventional treatment-resistant severe psoriasis, mainly because of expense, as side effects compare favorably with other systemic agents  These include:   Anti-tumour necrosis factor-alpha antagonists (anti-TNF?) infliximab, adalimumab and etanercept   The interleukin (IL)-12/23 antagonist ustekinumab   IL-17 antagonists such as secukinumab  Many other monoclonal antibodies are under investigation in the treatment of psoriasis  ONYCHOMYCOSIS ("FUNGAL NAIL")    Physical Exam:   Anatomic Location Affected:  Right foot toe nails   Morphological Description:  Darkened toenails   Pertinent Positives:   Pertinent Negatives: Additional History of Present Condition:  Present for years been applying ciclopirox solution      Assessment and Plan:  Based on a thorough discussion of this condition and the management approach to it (including a comprehensive discussion of the known risks, side effects and potential benefits of treatment), the patient (family) agrees to implement the following specific plan:    Apply ciclopirox 0 77% solution to affected toenails twice daily    What are fungal nail infections? Fungal infection of the nails is also known as onychomycosis  It is increasingly common with increased age  It rarely affects children  Which organisms cause onychomycosis? Onychomycosis can be due to:  Dermatophytes such as Trichophyton rubrum (T  rubrum), T  interdigitale (tinea unguium)   Yeasts such as Candida albicans   Molds such as Scopulariopsis brevicaulis and Fusarium species      What are the clinical features of onychomycosis? Onychomycosis may affect one or more toenails and fingernails and most often involves the great toenail or the little toenail  It can present in one or several different patterns   Lateral onychomycosis: a white or yellow opaque streak appears at one side of the nail   Subungual hyperkeratosis: scaling occurs under the nail   Distal onycholysis: the end of the nail lifts  The free edge often crumbles   Superficial white onychomycosis: flaky white patches and pits appear on the top of the nail plate   Proximal onychomycosis:yellow spots appear in the half-moon (lunula)   Onychoma or dermatophytoma:a thick localized area of infection in the nail plate    Destruction of the nail    Tinea unguium often results from untreated tinea pedis (feet) or tinea manuum (hand)  It may follow an injury to the nail or inflammatory disease of the nail  Candida infection of the nail plate generally results from paronychia and starts near the nail fold (the cuticle)  The nail fold is swollen and red, lifted off the nail plate  White, yellow, green or black marks appear on the nearby nail and spread  The nail may lift off its bed and is tender if you press on it  Mold infections are similar in appearance to tinea unguium  Onychomycosis must be distinguished from other nail disorders   Bacterial infection especially Pseudomonas aeruginosa, which turns the nail black or green    Psoriasis    Eczema or dermatitis    Lichen planus    Viral warts    Onycholysis    Onychogryphosis (nail thickening and scaling under the nail), common in the elderly    How is the diagnosis of onychomycosis confirmed? Clippings should be taken from crumbling tissue at the end of the infected nail  The discolored surface of the nails can be scraped off  The debris can be scooped out from under the nail  The clippings and scrapings are sent to a mycology laboratory for microscopy and culture  Previous treatment can reduce the chance of growing the fungus successfully in a culture, so it is best to take the clippings before any treatment is commenced:   To confirm the diagnosis -- antifungal treatment will not be successful if there is another explanation for the nail condition    To identify the responsible organism  Molds and yeasts may require different treatment from dermatophyte fungi   Treatment may be required for a prolonged period and is expensive  Partially treated infection may be impossible to prove for many months as antifungal drugs can be detected even a year later  A nail biopsy may also reveal characteristic histopathological features of onychomycosis  What is the treatment of onychomycosis? Fingernail infections are usually cured more quickly and effectively than toenail infections  Mild infections affecting less than 50% of one or two nails may respond to topical antifungal medications, but cure usually requires an oral antifungal medication for several months  Devices used to treat onychomycosis  Recently, non-drug treatment has been developed to treat onychomycosis thus avoiding the side effects and risks of oral antifungal drugs  Lasers emitting infrared radiation are thought to kill fungi by the production of heat within the infected tissue  Laser treatment is reported to safely eradicate nail fungi with one to three, almost painless, sessions  Several lasers have been approved for this purpose by the FDA and other regulatory authorities  However, high-quality studies of efficacy are lacking, and existing studies indicate that laser treatment is less medically effective than topical or oral antifungal agents    Nd:YAG continuous, long or short-pulsed lasers   Ti:Sapphire modelocked laser   Diode laser      Scribe Attestation    I,:  Alec Mendoza am acting as a scribe while in the presence of the attending physician :       I,:  Farheen Allan MD personally performed the services described in this documentation    as scribed in my presence :

## 2022-05-10 NOTE — PATIENT INSTRUCTIONS
PSORIASIS    Assessment and Plan:  Based on a thorough discussion of this condition and the management approach to it (including a comprehensive discussion of the known risks, side effects and potential benefits of treatment), the patient (family) agrees to implement the following specific plan:   ELBOWS: Apply calcipotriene   005% cream twice daily to affected areas of skin   SCALP: apply LCD 10% in green soap tincture solution>Coal tar topical, polysorbate 80 NF, tincture green soap    Yearly follow up     Psoriasis is a chronic inflammatory condition that causes the body to make new skin cells in days rather than weeks  As these cells pile up on the surface of the skin, you may see thick, scaly patches of thickened skin  Psoriasis affects 2-4% of males and females  It can start at any age including childhood, with peaks of onset at 15-25 years and 50-60 years  It tends to persist lifelong, fluctuating in extent and severity  It is particularly common in Caucasians but may affect people of any race  About one-third of patients with psoriasis have family members with psoriasis  Psoriasis is multifactorial  It is classified as an immune-mediated inflammatory disease (IMID)  Genetic factors are important and influence the type of psoriasis and response to treatment  What are the signs and symptoms of psoriasis? There are many different types of psoriasis that each have present uniquely  The types of psoriasis include:    Plaque psoriasis: About 80% to 90% of people who have psoriasis develop this type  When plaque psoriasis appears, you may see:  Plaque psoriasis usually presents with symmetrically distributed, red, scaly plaques with well-defined edges  The scale is typically silvery white, except in skin folds where the plaques often appear shiny and they may have a moist peeling surface  The most common sites are scalp, elbows and knees, but any part of the skin can be involved   The plaques are usually very persistent without treatment  Itch is mostly mild but may be severe in some patients, leading to scratching and lichenification (thickened leathery skin with increased skin markings)  Painful skin cracks or fissures may occur  When psoriatic plaques clear up, they may leave brown or pale marks that can be expected to fade over several months  Guttate psoriasis: When someone gets this type of psoriasis, you often see tiny bumps appear on the skin quite suddenly  The bumps tend to cover much of the torso, legs, and arms  Sometimes, the bumps also develop on the face, scalp, and ears  No matter where they appear, the bumps tend to be:    Small and scaly   [place]-colored to pink   Temporary, clearing in a few weeks or months without treatment  When guttate psoriasis clears, it may never return  Why this happens is still a bit of a mystery  Guttate psoriasis tends to develop in children and young adults who've had an infection, such as strep throat  It's possible that when the infection clears so does guttate psoriasis  It's also possible to have:   Guttate psoriasis for life   See the guttate psoriasis clear and plaque psoriasis develop later in life   Plaque psoriasis when you develop guttate psoriasis  There's no way to predict what will happen after the first flare-up of guttate psoriasis clears  Inverse psoriasis: This type of psoriasis develops in areas where skin touches skin, such as the armpits, genitals, and crease of the buttocks  Where the inverse psoriasis appears, you're likely to notice:   Smooth, red patches of skin that look raw   Little, if any, silvery-white coating   Sore or painful skin  Other names for this type of psoriasis are intertriginous psoriasis or flexural psoriasis  Pustular psoriasis: This type of psoriasis causes pus-filled bumps that usually appear only on the feet and hands  While the pus-filled bumps may look like an infection, the skin is not infected   The bumps don't contain bacteria or anything else that could cause an infection  Where pustular psoriasis appears, you tend to notice:   Red, swollen skin that is dotted with pus-filled bumps   Extremely sore or painful skin   Brown dots (and sometimes scale) appear as the pus-filled bumps dry  Pustular psoriasis can make just about any activity that requires your hands or feet, such as typing or walking, unbearably painful  Pustular psoriasis (generalized): Serious and life-threatening, this rare type of psoriasis causes pus-filled bumps to develop on much of the skin  Also called von Zumbusch psoriasis, a flare-up causes this sequence of events:  1  Skin on most of the body suddenly turns dry, red, and tender  2  Within hours, pus-filled bumps cover most of the skin  3  Often within a day, the pus-filled bumps break open and pools of pus leak onto the skin  4  As the pus dries (usually within 24 to 48 hours), the skin dries out and peels (as shown in this picture)  5  When the dried skin peels off, you see a smooth, glazed surface  6  In a few days or weeks, you may see a new crop of pus-filled bumps covering most of the skin, as the cycle repeats itself  Anyone with pustular psoriasis also feels very sick, and may develop a fever, headache, muscle weakness, and other symptoms  Medical care is often necessary to save the person's life  Erythrodermic psoriasis: Serious and life-threatening, this type of psoriasis requires immediate medical care  When someone develops erythrodermic psoriasis, you may notice:   Skin on most of the body looks burnt   Chills, fever, and the person looks extremely ill   Muscle weakness, a rapid pulse, and severe itch  The person may also be unable to keep warm, so hypothermia can set in quickly  Most people who develop this type of psoriasis already have another type of psoriasis   Before developing erythrodermic psoriasis, they often notice that their psoriasis is worsening or not improving with treatment  If you notice either of these happening, see a board-certified dermatologist     Nails    Nail psoriasis: With any type of psoriasis, you may see changes to your fingernails or toenails  About half of the people who have plaque psoriasis see signs of psoriasis on their fingernails at some point2  When psoriasis affects the nails, you may notice:   Tiny dents in your nails (called nail pits)   White, yellow, or brown discoloration under one or more nails   Crumbling, rough nails   A nail lifting up so that it's no longer attached   Buildup of skin cells beneath one or more nails, which lifts up the nail  Treatment and proper nail care can help you control nail psoriasis  Psoriatic arthritis: If you have psoriasis, it's important to pay attention to your joints  Some people who have psoriasis develop a type of arthritis called psoriatic arthritis  This is more likely to occur if you have severe psoriasis  Most people notice psoriasis on their skin years before they develop psoriatic arthritis  It's also possible to get psoriatic arthritis before psoriasis, but this is less common  When psoriatic arthritis develops, the signs can be subtle  At first, you may notice:   A swollen and tender joint, especially in a finger or toe   Heel pain   Swelling on the back of your leg, just above your heel   Stiffness in the morning that fades during the day  Like psoriasis, psoriatic arthritis cannot be cured  Treatment can prevent psoriatic arthritis from worsening, which is important  Allowed to progress, psoriatic arthritis can become disabling  Diagnosis and treatment of psoriasis   Psoriasis is usually diagnosed by clinical features, and skin biopsy if necessary  It is important to decrease factors that aggravate psoriasis   These include treating streptococcal infections, minimizing skin injuries, avoiding sun exposure if it exacerbates psoriasis, smoking, alcohol usage, decreasing stress, and maintaining an optimal body weight  Certain medications such as lithium, beta blockers, antimalarials, and NSAIDs have also been implicated  Suddenly stopping oral steroids or strong topical steroids can cause rebound disease  There are many categories of psoriasis treatments available  Topical therapy  Mild psoriasis is generally treated with topical agents alone  Which treatment is selected may depend on body site, extent and severity of psoriasis   Emollients   Coal tar preparations   Dithranol   Salicylic acid   Vitamin D analogue (calcipotriol)   Topical corticosteroids   Calcineurin inhibitor (tacrolimus, pimecrolimus)  Phototherapy  Most psoriasis centres offer phototherapy with ultraviolet (UV) radiation, often in combination with topical or systemic agents  Types of phototherapy include   Narrowband UVB   Broadband UVB   Photochemotherapy (PUVA)   Targeted phototherapy  Systemic therapy  Moderate to severe psoriasis warrants treatment with a systemic agent and/or phototherapy  The most common treatments are:   Methotrexate   Ciclosporin   Acitretin  Other medicines occasionally used for psoriasis include:   Mycophenolate   Apremilast   Hydroxyurea   Azathioprine   6-mercaptopurine  Systemic corticosteroids are best avoided due to a risk of severe withdrawal flare of psoriasis and adverse effects  Biologics or targeted therapies are reserved for conventional treatment-resistant severe psoriasis, mainly because of expense, as side effects compare favorably with other systemic agents  These include:   Anti-tumour necrosis factor-alpha antagonists (anti-TNF?) infliximab, adalimumab and etanercept   The interleukin (IL)-12/23 antagonist ustekinumab   IL-17 antagonists such as secukinumab  Many other monoclonal antibodies are under investigation in the treatment of psoriasis        ONYCHOMYCOSIS ("FUNGAL NAIL")    Assessment and Plan:  Based on a thorough discussion of this condition and the management approach to it (including a comprehensive discussion of the known risks, side effects and potential benefits of treatment), the patient (family) agrees to implement the following specific plan:    Apply ciclopirox 0 77% solution to affected toenails twice daily    What are fungal nail infections? Fungal infection of the nails is also known as onychomycosis  It is increasingly common with increased age  It rarely affects children  Which organisms cause onychomycosis? Onychomycosis can be due to:  Dermatophytes such as Trichophyton rubrum (T  rubrum), T  interdigitale (tinea unguium)   Yeasts such as Candida albicans   Molds such as Scopulariopsis brevicaulis and Fusarium species  What are the clinical features of onychomycosis? Onychomycosis may affect one or more toenails and fingernails and most often involves the great toenail or the little toenail  It can present in one or several different patterns   Lateral onychomycosis: a white or yellow opaque streak appears at one side of the nail   Subungual hyperkeratosis: scaling occurs under the nail   Distal onycholysis: the end of the nail lifts  The free edge often crumbles   Superficial white onychomycosis: flaky white patches and pits appear on the top of the nail plate   Proximal onychomycosis:yellow spots appear in the half-moon (lunula)   Onychoma or dermatophytoma:a thick localized area of infection in the nail plate    Destruction of the nail    Tinea unguium often results from untreated tinea pedis (feet) or tinea manuum (hand)  It may follow an injury to the nail or inflammatory disease of the nail  Candida infection of the nail plate generally results from paronychia and starts near the nail fold (the cuticle)  The nail fold is swollen and red, lifted off the nail plate  White, yellow, green or black marks appear on the nearby nail and spread   The nail may lift off its bed and is tender if you press on it  Mold infections are similar in appearance to tinea unguium  Onychomycosis must be distinguished from other nail disorders   Bacterial infection especially Pseudomonas aeruginosa, which turns the nail black or green    Psoriasis    Eczema or dermatitis    Lichen planus    Viral warts    Onycholysis    Onychogryphosis (nail thickening and scaling under the nail), common in the elderly    How is the diagnosis of onychomycosis confirmed? Clippings should be taken from crumbling tissue at the end of the infected nail  The discolored surface of the nails can be scraped off  The debris can be scooped out from under the nail  The clippings and scrapings are sent to a mycology laboratory for microscopy and culture  Previous treatment can reduce the chance of growing the fungus successfully in a culture, so it is best to take the clippings before any treatment is commenced:   To confirm the diagnosis -- antifungal treatment will not be successful if there is another explanation for the nail condition    To identify the responsible organism  Molds and yeasts may require different treatment from dermatophyte fungi   Treatment may be required for a prolonged period and is expensive  Partially treated infection may be impossible to prove for many months as antifungal drugs can be detected even a year later  A nail biopsy may also reveal characteristic histopathological features of onychomycosis  What is the treatment of onychomycosis? Fingernail infections are usually cured more quickly and effectively than toenail infections  Mild infections affecting less than 50% of one or two nails may respond to topical antifungal medications, but cure usually requires an oral antifungal medication for several months      Devices used to treat onychomycosis  Recently, non-drug treatment has been developed to treat onychomycosis thus avoiding the side effects and risks of oral antifungal drugs  Lasers emitting infrared radiation are thought to kill fungi by the production of heat within the infected tissue  Laser treatment is reported to safely eradicate nail fungi with one to three, almost painless, sessions  Several lasers have been approved for this purpose by the FDA and other regulatory authorities  However, high-quality studies of efficacy are lacking, and existing studies indicate that laser treatment is less medically effective than topical or oral antifungal agents    Nd:YAG continuous, long or short-pulsed lasers   Ti:Sapphire modelocked laser   Diode laser

## 2022-05-13 ENCOUNTER — LAB (OUTPATIENT)
Dept: LAB | Facility: CLINIC | Age: 67
End: 2022-05-13
Payer: COMMERCIAL

## 2022-05-13 DIAGNOSIS — L40.59 POLYARTICULAR PSORIATIC ARTHRITIS (HCC): ICD-10-CM

## 2022-05-13 LAB
ALBUMIN SERPL BCP-MCNC: 3.6 G/DL (ref 3.5–5)
ALP SERPL-CCNC: 64 U/L (ref 46–116)
ALT SERPL W P-5'-P-CCNC: 21 U/L (ref 12–78)
ANION GAP SERPL CALCULATED.3IONS-SCNC: 7 MMOL/L (ref 4–13)
AST SERPL W P-5'-P-CCNC: 20 U/L (ref 5–45)
BACTERIA UR QL AUTO: ABNORMAL /HPF
BASOPHILS # BLD AUTO: 0.03 THOUSANDS/ΜL (ref 0–0.1)
BASOPHILS NFR BLD AUTO: 1 % (ref 0–1)
BILIRUB SERPL-MCNC: 0.47 MG/DL (ref 0.2–1)
BILIRUB UR QL STRIP: NEGATIVE
BUN SERPL-MCNC: 16 MG/DL (ref 5–25)
CALCIUM SERPL-MCNC: 9 MG/DL (ref 8.3–10.1)
CHLORIDE SERPL-SCNC: 106 MMOL/L (ref 100–108)
CLARITY UR: CLEAR
CO2 SERPL-SCNC: 26 MMOL/L (ref 21–32)
COLOR UR: ABNORMAL
CREAT SERPL-MCNC: 0.92 MG/DL (ref 0.6–1.3)
EOSINOPHIL # BLD AUTO: 0 THOUSAND/ΜL (ref 0–0.61)
EOSINOPHIL NFR BLD AUTO: 0 % (ref 0–6)
ERYTHROCYTE [DISTWIDTH] IN BLOOD BY AUTOMATED COUNT: 12.7 % (ref 11.6–15.1)
ERYTHROCYTE [SEDIMENTATION RATE] IN BLOOD: 17 MM/HOUR (ref 0–29)
GFR SERPL CREATININE-BSD FRML MDRD: 65 ML/MIN/1.73SQ M
GLUCOSE P FAST SERPL-MCNC: 91 MG/DL (ref 65–99)
GLUCOSE UR STRIP-MCNC: NEGATIVE MG/DL
HCT VFR BLD AUTO: 41.8 % (ref 34.8–46.1)
HGB BLD-MCNC: 13.3 G/DL (ref 11.5–15.4)
HGB UR QL STRIP.AUTO: NEGATIVE
IMM GRANULOCYTES # BLD AUTO: 0.01 THOUSAND/UL (ref 0–0.2)
IMM GRANULOCYTES NFR BLD AUTO: 0 % (ref 0–2)
KETONES UR STRIP-MCNC: NEGATIVE MG/DL
LEUKOCYTE ESTERASE UR QL STRIP: ABNORMAL
LYMPHOCYTES # BLD AUTO: 1.76 THOUSANDS/ΜL (ref 0.6–4.47)
LYMPHOCYTES NFR BLD AUTO: 42 % (ref 14–44)
MCH RBC QN AUTO: 31.8 PG (ref 26.8–34.3)
MCHC RBC AUTO-ENTMCNC: 31.8 G/DL (ref 31.4–37.4)
MCV RBC AUTO: 100 FL (ref 82–98)
MONOCYTES # BLD AUTO: 0.68 THOUSAND/ΜL (ref 0.17–1.22)
MONOCYTES NFR BLD AUTO: 16 % (ref 4–12)
NEUTROPHILS # BLD AUTO: 1.76 THOUSANDS/ΜL (ref 1.85–7.62)
NEUTS SEG NFR BLD AUTO: 41 % (ref 43–75)
NITRITE UR QL STRIP: NEGATIVE
NON-SQ EPI CELLS URNS QL MICRO: ABNORMAL /HPF
NRBC BLD AUTO-RTO: 0 /100 WBCS
PH UR STRIP.AUTO: 7 [PH]
PLATELET # BLD AUTO: 255 THOUSANDS/UL (ref 149–390)
PMV BLD AUTO: 10.4 FL (ref 8.9–12.7)
POTASSIUM SERPL-SCNC: 3.9 MMOL/L (ref 3.5–5.3)
PROT SERPL-MCNC: 7.4 G/DL (ref 6.4–8.2)
PROT UR STRIP-MCNC: ABNORMAL MG/DL
RBC # BLD AUTO: 4.18 MILLION/UL (ref 3.81–5.12)
RBC #/AREA URNS AUTO: ABNORMAL /HPF
SODIUM SERPL-SCNC: 139 MMOL/L (ref 136–145)
SP GR UR STRIP.AUTO: 1.01 (ref 1–1.03)
UROBILINOGEN UR STRIP-ACNC: <2 MG/DL
WBC # BLD AUTO: 4.24 THOUSAND/UL (ref 4.31–10.16)
WBC #/AREA URNS AUTO: ABNORMAL /HPF

## 2022-05-13 PROCEDURE — 36415 COLL VENOUS BLD VENIPUNCTURE: CPT

## 2022-05-13 PROCEDURE — 80053 COMPREHEN METABOLIC PANEL: CPT

## 2022-05-13 PROCEDURE — 85652 RBC SED RATE AUTOMATED: CPT

## 2022-05-13 PROCEDURE — 81001 URINALYSIS AUTO W/SCOPE: CPT

## 2022-05-13 PROCEDURE — 85025 COMPLETE CBC W/AUTO DIFF WBC: CPT

## 2022-05-31 ENCOUNTER — TELEPHONE (OUTPATIENT)
Dept: OBGYN CLINIC | Facility: HOSPITAL | Age: 67
End: 2022-05-31

## 2022-05-31 NOTE — TELEPHONE ENCOUNTER
Patient had an injection on 5/10  She states that she received   a bill for her copy and should not have  She already spoke to billing and was advised to call the office           CB# [phone]

## 2022-07-12 PROBLEM — L40.0 PLAQUE PSORIASIS: Status: ACTIVE | Noted: 2022-07-12

## 2022-08-22 ENCOUNTER — OFFICE VISIT (OUTPATIENT)
Dept: OBGYN CLINIC | Facility: OTHER | Age: 67
End: 2022-08-22
Payer: MEDICARE

## 2022-08-22 VITALS
BODY MASS INDEX: 22.34 KG/M2 | SYSTOLIC BLOOD PRESSURE: 121 MMHG | WEIGHT: 139 LBS | HEIGHT: 66 IN | HEART RATE: 54 BPM | DIASTOLIC BLOOD PRESSURE: 66 MMHG

## 2022-08-22 DIAGNOSIS — M17.11 PRIMARY OSTEOARTHRITIS OF RIGHT KNEE: Primary | ICD-10-CM

## 2022-08-22 PROCEDURE — 20610 DRAIN/INJ JOINT/BURSA W/O US: CPT | Performed by: PHYSICIAN ASSISTANT

## 2022-08-22 PROCEDURE — 99213 OFFICE O/P EST LOW 20 MIN: CPT | Performed by: PHYSICIAN ASSISTANT

## 2022-08-22 RX ORDER — BETAMETHASONE SODIUM PHOSPHATE AND BETAMETHASONE ACETATE 3; 3 MG/ML; MG/ML
6 INJECTION, SUSPENSION INTRA-ARTICULAR; INTRALESIONAL; INTRAMUSCULAR; SOFT TISSUE
Status: COMPLETED | OUTPATIENT
Start: 2022-08-22 | End: 2022-08-22

## 2022-08-22 RX ORDER — BUPIVACAINE HYDROCHLORIDE 2.5 MG/ML
2 INJECTION, SOLUTION INFILTRATION; PERINEURAL
Status: COMPLETED | OUTPATIENT
Start: 2022-08-22 | End: 2022-08-22

## 2022-08-22 RX ADMIN — BETAMETHASONE SODIUM PHOSPHATE AND BETAMETHASONE ACETATE 6 MG: 3; 3 INJECTION, SUSPENSION INTRA-ARTICULAR; INTRALESIONAL; INTRAMUSCULAR; SOFT TISSUE at 15:07

## 2022-08-22 RX ADMIN — BUPIVACAINE HYDROCHLORIDE 2 ML: 2.5 INJECTION, SOLUTION INFILTRATION; PERINEURAL at 15:07

## 2022-08-22 NOTE — PATIENT INSTRUCTIONS

## 2022-08-22 NOTE — PROGRESS NOTES
Assessment  Diagnoses and all orders for this visit:    Primary osteoarthritis of right knee      Discussion and Plan:    Maggie Hagen is symptomatic for her right knee osteoarthritis  Steroid injection was provided for pain relief    1  Aspiration and Steroid injection - right knee  2  Slowly resume activities to tolerance in 24-48 hours  3  Ice - 20 minutes on and 20 minutes off  4  Tylenol PRN  5  Follow up as needed  Steroid injection can be repeated in 3 months  Visco can be considered in 3 months as well pending which provided most pain improvement      Also complaining of left heel pain after increase in walking  Uses sandals - recommended more supportive shoe and cutting back on activities until symptoms improve  If symptoms persist, then she can see one of our foot/ankle specialist    Subjective:   Patient ID: Juanjose Cornelius is a 77 y o  female      Maggie Hagen presents to the office with right knee pain  She had viscoelastic supplementation 3 month ago  She reports good but temporary relief  Reports pain with ambulation  Admits to crepitation of the right knee  Pain improves with rest   Denies new injury or trauma  Denies mechanical symptoms  Also notes crepitation of the left shoulder  Some times at night she feels as though the shoulder subluxates or moves around and that is painful  Denies pain that interferes with ADLs      The following portions of the patient's history were reviewed and updated as appropriate: allergies, current medications, past family history, past medical history, past social history, past surgical history and problem list     Review of Systems   Constitutional: Negative for chills and fever  HENT: Negative for hearing loss  Eyes: Negative for visual disturbance  Respiratory: Negative for shortness of breath  Cardiovascular: Negative for chest pain  Gastrointestinal: Negative for abdominal pain     Musculoskeletal:        As reviewed in the HPI   Skin: Negative for rash    Neurological:        As reviewed in the HPI   Psychiatric/Behavioral: Negative for agitation  Objective:  /66 (BP Location: Right arm, Patient Position: Sitting, Cuff Size: Standard)   Pulse (!) 54   Ht 5' 6" (1 676 m)   Wt 63 kg (139 lb)   BMI 22 44 kg/m²       Right Knee Exam     Tenderness   The patient is experiencing tenderness in the lateral joint line and medial joint line  Range of Motion   The patient has normal right knee ROM  Tests   Jasiel:  Medial - negative Lateral - negative    Other   Erythema: absent  Sensation: normal  Pulse: present  Swelling: mild  Effusion: effusion present              Physical Exam  Constitutional:       Appearance: She is well-developed  HENT:      Head: Normocephalic  Pulmonary:      Breath sounds: Normal breath sounds  No wheezing  Musculoskeletal:      Cervical back: Normal range of motion  Right knee: Effusion present  Instability Tests: Medial Jasiel test negative and lateral Jasiel test negative  Skin:     General: Skin is warm and dry  Neurological:      Mental Status: She is alert and oriented to person, place, and time  Psychiatric:         Behavior: Behavior normal          Thought Content: Thought content normal          Judgment: Judgment normal        Large joint arthrocentesis: R knee  Universal Protocol:  Consent: Verbal consent obtained    Consent given by: patient    Supporting Documentation  Indications: pain and joint swelling   Procedure Details  Location: knee - R knee  Preparation: Patient was prepped and draped in the usual sterile fashion  Needle size: 18 G  Ultrasound guidance: no  Approach: superior lateral   Medications administered: 6 mg betamethasone acetate-betamethasone sodium phosphate 6 (3-3) mg/mL; 2 mL bupivacaine 0 25 %    Aspirate amount: 5 mL  Aspirate: clear and yellow    Patient tolerance: patient tolerated the procedure well with no immediate complications  Dressing:  Sterile dressing applied

## 2022-09-06 ENCOUNTER — ANNUAL EXAM (OUTPATIENT)
Dept: OBGYN CLINIC | Facility: CLINIC | Age: 67
End: 2022-09-06
Payer: MEDICARE

## 2022-09-06 VITALS
WEIGHT: 140.4 LBS | SYSTOLIC BLOOD PRESSURE: 110 MMHG | BODY MASS INDEX: 22.56 KG/M2 | DIASTOLIC BLOOD PRESSURE: 70 MMHG | HEIGHT: 66 IN

## 2022-09-06 DIAGNOSIS — Z01.419 WOMEN'S ANNUAL ROUTINE GYNECOLOGICAL EXAMINATION: Primary | ICD-10-CM

## 2022-09-06 DIAGNOSIS — Z12.31 ENCOUNTER FOR SCREENING MAMMOGRAM FOR MALIGNANT NEOPLASM OF BREAST: ICD-10-CM

## 2022-09-06 PROCEDURE — G0101 CA SCREEN;PELVIC/BREAST EXAM: HCPCS | Performed by: OBSTETRICS & GYNECOLOGY

## 2022-09-06 NOTE — PROGRESS NOTES
Assessment/Plan:    The patient was informed of a stable menopausal gyn examination  The pelvic exam was benign  She will continue to see her dentist on a regular basis  She will continue with her primary care physician or yearly basis  Continue get yearly mammograms  She is happily remarried  No problem with menopause  She is content with her weight  She feels safe at home  She should return my office in 2 years unless new issues occur  Subjective:      Patient ID: Juanjose Cornelius is a 77 y o  female  HPI    This is a 24-year-old white female, she is a  3 para 3 with 3 prior vaginal deliveries  She is now menopausal   She has got remarried within the last 2 years  This is going well  There is no problem with intimacy  She does require lubrication  She denies any other major gynecological  GI complaints  Colonoscopies up-to-date  She has a history of benign polyps  Denies any problem bladder control  She is content with her weight  She has a dentist on a regular basis  She feels safe at home  She has received COVID vaccine  There are no new major family illnesses report at this time  Denies any problem depression or anxiety  She is now gone per DM working for our network  The following portions of the patient's history were reviewed and updated as appropriate: allergies, current medications, past family history, past medical history, past social history, past surgical history and problem list     Review of Systems   HENT: Negative for rhinorrhea  All other systems reviewed and are negative  Objective:      /70   Ht 5' 6" (1 676 m)   Wt 63 7 kg (140 lb 6 4 oz)   BMI 22 66 kg/m²          Physical Exam  Vitals reviewed  Exam conducted with a chaperone present  Constitutional:       Appearance: Normal appearance  HENT:      Head: Normocephalic and atraumatic  Eyes:      Extraocular Movements: Extraocular movements intact        Conjunctiva/sclera: Conjunctivae normal       Pupils: Pupils are equal, round, and reactive to light  Cardiovascular:      Rate and Rhythm: Normal rate and regular rhythm  Pulses: Normal pulses  Heart sounds: Normal heart sounds  Pulmonary:      Effort: Pulmonary effort is normal       Breath sounds: Normal breath sounds  Chest:   Breasts:      Right: Normal  No swelling, bleeding, inverted nipple, mass, nipple discharge, skin change, tenderness, axillary adenopathy or supraclavicular adenopathy  Left: Normal  No swelling, bleeding, inverted nipple, mass, nipple discharge, skin change, tenderness, axillary adenopathy or supraclavicular adenopathy  Abdominal:      General: Abdomen is flat  Bowel sounds are normal  There is no distension  Palpations: Abdomen is soft  There is no hepatomegaly, splenomegaly or mass  Tenderness: There is no abdominal tenderness  There is no guarding or rebound  Hernia: No hernia is present  There is no hernia in the umbilical area, ventral area, left inguinal area or right inguinal area  Genitourinary:     General: Normal vulva  Pubic Area: No rash  Labia:         Right: No rash, tenderness, lesion or injury  Left: No rash, tenderness, lesion or injury  Urethra: No prolapse, urethral pain, urethral swelling or urethral lesion  Vagina: Normal  No signs of injury and foreign body  No vaginal discharge, erythema, tenderness, bleeding, lesions or prolapsed vaginal walls  Cervix: Normal       Uterus: Normal  Not deviated, not enlarged, not fixed, not tender and no uterine prolapse  Adnexa: Right adnexa normal         Right: No mass, tenderness or fullness  Left: No mass, tenderness or fullness  Rectum: Normal       Comments: The external genitalia normal limits the vagina is clean consistent with menopause  Cervix closed  Uterus is small mobile nontender there is no cervical motion tenderness    The adnexa clear bilaterally  There is no evidence of prolapse  A Pap smear was not performed  Urethra bladder are normal in appearance and location  Musculoskeletal:         General: Normal range of motion  Cervical back: Normal range of motion and neck supple  Lymphadenopathy:      Upper Body:      Right upper body: No supraclavicular, axillary or pectoral adenopathy  Left upper body: No supraclavicular, axillary or pectoral adenopathy  Skin:     General: Skin is warm and dry  Neurological:      General: No focal deficit present  Mental Status: She is alert and oriented to person, place, and time  Psychiatric:         Mood and Affect: Mood normal          Behavior: Behavior normal          Thought Content:  Thought content normal

## 2022-09-29 ENCOUNTER — OFFICE VISIT (OUTPATIENT)
Dept: AUDIOLOGY | Age: 67
End: 2022-09-29

## 2022-09-29 DIAGNOSIS — H90.3 SENSORY HEARING LOSS, BILATERAL: Primary | ICD-10-CM

## 2022-09-29 NOTE — PROGRESS NOTES
Hearing Aid Visit:    Name:  Chance Boothe  :  1955  Age:  77 y o  Date of Evaluation: 22     Chance Boothe is being seen for a hearing aid visit  Chance Boothe is fit with [employer] Opn S 1 mini [facility] hearing aid(s)  Right serial [id number]  Left serial [id number]  Warranty date: 23 (Loss/Damage and repair)  Patient reports no major issues or concerns  Patient noted some difficulty understanding soft speech in back ground noise  Action:  Cleaned and checked hearing aids  Increased high frequency gain to help with speech understanding  Recommendations: Follow up PRN, new hearing test is scheduled for 2023        Suzette Laird , CCC-A  Clinical Audiologist

## 2022-10-07 ENCOUNTER — OFFICE VISIT (OUTPATIENT)
Dept: INTERNAL MEDICINE CLINIC | Facility: CLINIC | Age: 67
End: 2022-10-07
Payer: MEDICARE

## 2022-10-07 VITALS
BODY MASS INDEX: 21.86 KG/M2 | HEART RATE: 83 BPM | DIASTOLIC BLOOD PRESSURE: 78 MMHG | TEMPERATURE: 97.3 F | OXYGEN SATURATION: 98 % | HEIGHT: 66 IN | SYSTOLIC BLOOD PRESSURE: 114 MMHG | WEIGHT: 136 LBS

## 2022-10-07 DIAGNOSIS — M85.832 OSTEOPENIA OF LEFT FOREARM: ICD-10-CM

## 2022-10-07 DIAGNOSIS — Z00.00 HEALTH MAINTENANCE EXAMINATION: ICD-10-CM

## 2022-10-07 DIAGNOSIS — M17.0 BILATERAL PRIMARY OSTEOARTHRITIS OF KNEE: ICD-10-CM

## 2022-10-07 DIAGNOSIS — E03.9 ACQUIRED HYPOTHYROIDISM: ICD-10-CM

## 2022-10-07 DIAGNOSIS — Z79.899 ENCOUNTER FOR LONG-TERM CURRENT USE OF MEDICATION: ICD-10-CM

## 2022-10-07 DIAGNOSIS — E78.49 OTHER HYPERLIPIDEMIA: ICD-10-CM

## 2022-10-07 DIAGNOSIS — D70.9 NEUTROPENIA, UNSPECIFIED TYPE (HCC): ICD-10-CM

## 2022-10-07 DIAGNOSIS — Z23 NEED FOR INFLUENZA VACCINATION: ICD-10-CM

## 2022-10-07 DIAGNOSIS — L40.59 POLYARTICULAR PSORIATIC ARTHRITIS (HCC): Primary | ICD-10-CM

## 2022-10-07 PROCEDURE — 99214 OFFICE O/P EST MOD 30 MIN: CPT | Performed by: INTERNAL MEDICINE

## 2022-10-07 PROCEDURE — 90662 IIV NO PRSV INCREASED AG IM: CPT | Performed by: INTERNAL MEDICINE

## 2022-10-07 PROCEDURE — G0402 INITIAL PREVENTIVE EXAM: HCPCS | Performed by: INTERNAL MEDICINE

## 2022-10-07 PROCEDURE — G0008 ADMIN INFLUENZA VIRUS VAC: HCPCS | Performed by: INTERNAL MEDICINE

## 2022-10-07 NOTE — PROGRESS NOTES
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     On sulfasalazine  Relevant Orders    Vitamin D 25 hydroxy       Other    Leukopenia     Repeat CBC  Other hyperlipidemia     Lipids due, low risk  Taking omega-3 only  Relevant Orders    Lipid panel      Other Visit Diagnoses     Encounter for long-term current use of medication        Relevant Orders    Vitamin B12    Health maintenance examination        Flu vaccine today  Need for influenza vaccination        Relevant Orders    influenza vaccine, high-dose, PF 0 7 mL (FLUZONE HIGH-DOSE) (Completed)          Depression Screening and Follow-up Plan: Patient was screened for depression during today's encounter  They screened negative with a PHQ-2 score of 0  Preventive health issues were discussed with patient, and age appropriate screening tests were ordered as noted in patient's After Visit Summary  Personalized health advice and appropriate referrals for health education or preventive services given if needed, as noted in patient's After Visit Summary  History of Present Illness:     Patient presents for a Medicare Wellness Visit and follow up visit  She feels well, has no complaints  She reports her sleep has improved since she retired  She remains very active, plays pickle ball, tennis and exercises regularly  She reports intermittent bilateral knee pain  She applies Voltaren and uses a knee brace when she plays tennis  She does receive injections from Orthopedics    No recent injury or falls  Patient Care Team:  Sylvia Arnold MD as PCP - MD Ольга Stewart MD Giles Fail, MD Giles Fail, MD as Endoscopist     Review of Systems:     Review of Systems   Constitutional: Negative for appetite change and fatigue  HENT: Positive for hearing loss  Negative for congestion, ear pain and postnasal drip  Eyes: Negative for visual disturbance  Respiratory: Negative for cough and shortness of breath  Cardiovascular: Negative for chest pain and leg swelling  Gastrointestinal: Negative for abdominal pain, constipation and diarrhea  Genitourinary: Negative for dysuria, frequency and urgency  Musculoskeletal: Positive for arthralgias  Negative for gait problem, joint swelling and myalgias  Skin: Negative for rash and wound  Neurological: Negative for dizziness, numbness and headaches  Psychiatric/Behavioral: Negative for confusion          Problem List:     Patient Active Problem List   Diagnosis    Osteoarthritis of right glenohumeral joint    Arthritis    Chondromalacia patellae    Current tear of medial cartilage or meniscus of knee    Dense breasts    Hearing loss    Chronic pain of left knee    Leukopenia    Primary osteoarthritis of right knee    Localized secondary osteoarthritis of right shoulder region    Varicose veins without complication    Psoriasis with arthropathy ([address] )    Acquired hypothyroidism    Primary osteoarthritis of left knee    Degenerative tear of meniscus, left    History of total shoulder replacement, right    Other hyperlipidemia    Abdominal cramps    Bilateral primary osteoarthritis of knee    Primary osteoarthritis of left shoulder    Joint crepitation    Osteopenia of left forearm    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[address] )    Primary generalized (osteo)arthritis    Plaque psoriasis      Past Medical and Surgical History:     Past Medical History:   Diagnosis Date    Abnormal thyroid function test     R    5/8/17     Arthritis     Bilateral hearing loss, unspecified hearing loss type     Colon polyps     Dislocated shoulder     Right / [place]  Kelsey Soumelanieer Kelsey Soulier 1/30/13     Effusion of knee     LA    5/21/14   R    5/8/17     Hypothyroidism     LA    2/6/13   R   3/31/15     Primary osteoarthritis of right knee     LA   4/9/14   R    5/21/14     Prolapsing mitral leaflet syndrome     [place]    1/28/13   [place]  Kelsey Dorothylier Kelsey Soulier 3/31/15     Psoriasis     Psoriatic arthritis (HCC)     Tinnitus     ringing in both ears    Vaginal Pap smear, abnormal     Vitamin D deficiency     [place]    5/8/17  R  Kelsey Soulier Kelsey Soulier 3/31/15      Past Surgical History:   Procedure Laterality Date    CERVICAL BIOPSY  W/ LOOP ELECTRODE EXCISION      COLONOSCOPY      COLONOSCOPY W/ POLYPECTOMY      KNEE ARTHROSCOPY Right     right knee, right shoulder , open right shoulder     HI COLONOSCOPY FLX DX W/COLLJ SPEC WHEN PFRMD N/A 10/24/2017    Procedure: COLONOSCOPY;  Surgeon: An Jj MD;  Location: [facility];   Service: Gastroenterology    HI RECONSTR TOTAL SHOULDER IMPLANT Right 4/25/2017    Procedure: TOTAL SHOULDER ARTHROPLASTY ;  Surgeon: Michael Shahid MD;  Location:  MAIN OR;  Service: Orthopedics    SHOULDER SURGERY Right     total shoulder replacement     SKIN BIOPSY        Family History:     Family History   Problem Relation Age of Onset    Arthritis Mother     Diabetes type II Mother     Arthritis Father     Throat cancer Father     Diabetes type II Maternal Grandmother     Colon cancer Maternal Uncle     Diabetes Family     Osteoarthritis Family     No Known Problems Sister     No Known Problems Daughter     Prostate cancer Maternal Grandfather     Breast cancer Paternal Grandmother 54    No Known Problems Paternal Grandfather     Breast cancer Paternal Aunt 50      Social History:     Social History     Socioeconomic History    Marital status: /Civil Union     Spouse name: None    Number of children: None    Years of education: None    Highest education level: None   Occupational History    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      Comment: working full time   Tobacco Use    Smoking status: Never Smoker    Smokeless tobacco: Never Used   Vaping Use    Vaping Use: Never used   Substance and Sexual Activity    Alcohol use: Yes     Comment: socially    Drug use: No    Sexual activity: Yes     Partners: Male     Birth control/protection: Post-menopausal   Other Topics Concern    None   Social History Narrative    , remarried 12/20    Working - respiratory therapist     Social Determinants of Health     Financial Resource Strain: Low Risk     Difficulty of Paying Living Expenses: Not hard at all   Food Insecurity: Not on file   Transportation Needs: No Transportation Needs    Lack of Transportation (Medical): No    Lack of Transportation (Non-Medical):  No   Physical Activity: Not on file   Stress: Not on file   Social Connections: Not on file   Intimate Partner Violence: Not on file   Housing Stability: Not on file      Medications and Allergies:     Current Outpatient Medications   Medication Sig Dispense Refill    Ascorbic Acid (VITAMIN C PO) Vitamin C      calcipotriene (DOVONEX) 0 005 % cream Apply topically 2 (two) times a day (Patient not taking: Reported on 9/6/2022) 60 g 3    Calcium Carb-Cholecalciferol (CALCIUM + D3 PO) Take by mouth daily        Cholecalciferol (VITAMIN D3 PO) Vitamin D3      ciclopirox (LOPROX) 0 77 % SUSP Apply twice daily to affected toenails 60 mL 3    clobetasol propionate (CLOBEX) 0 05 % shampoo Apply 1 application topically daily for 14 days 118 mL 1    diclofenac sodium (VOLTAREN) 1 % Apply 2 g topically 4 (four) times a day 3 Tube 1    glucosamine-chondroitin 500-400 MG tablet Take 1 tablet by mouth 3 (three) times a day      levothyroxine 50 mcg tablet Take 1 tablet (50 mcg total) by mouth daily 90 tablet 0    Omega-3 Fatty Acids (FISH OIL PO) Fish Oil      sulfaSALAzine (AZULFIDINE-EN) 500 mg EC tablet Take 1 tablet (500 mg total) by mouth 2 (two) times a day 180 tablet 1     No current facility-administered medications for this visit  No Known Allergies   Immunizations:     Immunization History   Administered Date(s) Administered    COVID-19 MODERNA VACC 0 5 ML IM 12/29/2020, 01/27/2021    COVID-19 Pfizer vac ([place]-sucrose, gray cap) 12 yr+ IM 04/16/2022    INFLUENZA 10/09/2013, 10/15/2018    Influenza, high dose seasonal 0 7 mL 10/15/2021, 10/07/2022    Influenza, seasonal, injectable 10/09/2013    Pneumococcal Conjugate 13-Valent 10/15/2021    Tuberculin Skin Test-PPD Intradermal 10/25/2016    Zoster 10/27/2016      Health Maintenance:         Topic Date Due    Breast Cancer Screening: Mammogram  11/01/2022    Colorectal Cancer Screening  11/12/2030    Hepatitis C Screening  Completed         Topic Date Due    COVID-19 Vaccine (4 - Booster for Arnold Farmer series) 08/16/2022      Medicare Screening Tests and Risk Assessments:     Alon Subramanian is here for her Welcome to Medicare visit  Health Risk Assessment:   Patient rates overall health as very good  Patient feels that their physical health rating is slightly better  Patient is satisfied with their life  Eyesight was rated as same  Hearing was rated as slightly worse  Patient feels that their emotional and mental health rating is same  Patients states they are never, rarely angry  Patient states they are never, rarely unusually tired/fatigued  Patient states that she has experienced no weight loss or gain in last 6 months  Depression Screening:   PHQ-2 Score: 0      Fall Risk Screening: In the past year, patient has experienced: no history of falling in past year      Urinary Incontinence Screening:   Patient has not leaked urine accidently in the last six months  Home Safety:  Patient does not have trouble with stairs inside or outside of their home   Patient has working smoke alarms and has working carbon monoxide detector  Home safety hazards include: none  Medications:   Patient is currently taking over-the-counter supplements  OTC medications include: see medication list  Patient is able to manage medications  Activities of Daily Living (ADLs)/Instrumental Activities of Daily Living (IADLs):   Walk and transfer into and out of bed and chair?: Yes  Dress and groom yourself?: Yes    Bathe or shower yourself?: Yes    Feed yourself? Yes  Do your laundry/housekeeping?: Yes  Manage your money, pay your bills and track your expenses?: Yes  Make your own meals?: Yes    Do your own shopping?: Yes    Previous Hospitalizations:   Any hospitalizations or ED visits within the last 12 months?: No      Advance Care Planning:   Living will: Yes    Durable POA for healthcare: Yes    Advanced directive: Yes    End of Life Decisions reviewed with patient: Yes      Cognitive Screening:   Provider or family/friend/caregiver concerned regarding cognition?: No    PREVENTIVE SCREENINGS      Cardiovascular Screening:    General: History Lipid Disorder    Due for: Lipid Panel      Diabetes Screening:     General: Screening Current    Due for: Blood Glucose      Colorectal Cancer Screening:     General: Screening Current      Breast Cancer Screening:     General: Screening Current    Due for: Mammogram        Cervical Cancer Screening:    General: Screening Not Indicated and Screening Current      Osteoporosis Screening:    General: Screening Current      Abdominal Aortic Aneurysm (AAA) Screening:        General: Screening Not Indicated      Lung Cancer Screening:     General: Screening Not Indicated      Hepatitis C Screening:    General: Screening Current    Screening, Brief Intervention, and Referral to Treatment (SBIRT)    Screening  Typical number of drinks in a day: 0  Typical number of drinks in a week: 0  Interpretation: Low risk drinking behavior      Single Item Drug Screening:  How often have you used an illegal drug (including marijuana) or a prescription medication for non-medical reasons in the past year? never    Single Item Drug Screen Score: 0  Interpretation: Negative screen for possible drug use disorder    Other Counseling Topics:   Regular weightbearing exercise and calcium and vitamin D intake  Visual Acuity Screening    Right eye Left eye Both eyes   Without correction: 20/20 20/20 20/20   With correction:           Physical Exam:     /78   Pulse 83   Temp (!) 97 3 °F (36 3 °C)   Ht 5' 6" (1 676 m)   Wt 61 7 kg (136 lb)   SpO2 98%   BMI 21 95 kg/m²     Physical Exam  Constitutional:       Appearance: She is well-developed  HENT:      Head: Normocephalic and atraumatic  Right Ear: Tympanic membrane, ear canal and external ear normal       Left Ear: Tympanic membrane, ear canal and external ear normal    Eyes:      Pupils: Pupils are equal, round, and reactive to light  Cardiovascular:      Rate and Rhythm: Normal rate and regular rhythm  Pulmonary:      Effort: Pulmonary effort is normal       Breath sounds: Normal breath sounds  Abdominal:      General: Bowel sounds are normal       Palpations: Abdomen is soft  Musculoskeletal:         General: Normal range of motion  Cervical back: Normal range of motion  Right lower leg: No edema  Left lower leg: No edema  Skin:     General: Skin is warm  Neurological:      General: No focal deficit present  Mental Status: She is alert and oriented to person, place, and time     Psychiatric:         Mood and Affect: Mood and affect normal          Behavior: Behavior normal           Idalia Colorado MD

## 2022-10-12 ENCOUNTER — TELEPHONE (OUTPATIENT)
Dept: INTERNAL MEDICINE CLINIC | Facility: CLINIC | Age: 67
End: 2022-10-12

## 2022-10-12 ENCOUNTER — APPOINTMENT (OUTPATIENT)
Dept: LAB | Facility: CLINIC | Age: 67
End: 2022-10-12
Payer: MEDICARE

## 2022-10-12 DIAGNOSIS — L40.59 POLYARTICULAR PSORIATIC ARTHRITIS (HCC): ICD-10-CM

## 2022-10-12 PROBLEM — E53.8 VITAMIN B12 DEFICIENCY: Status: ACTIVE | Noted: 2022-10-12

## 2022-10-12 LAB
25(OH)D3 SERPL-MCNC: 70.5 NG/ML (ref 30–100)
ALBUMIN SERPL BCP-MCNC: 3.6 G/DL (ref 3.5–5)
ALP SERPL-CCNC: 72 U/L (ref 46–116)
ALT SERPL W P-5'-P-CCNC: 20 U/L (ref 12–78)
ANION GAP SERPL CALCULATED.3IONS-SCNC: 6 MMOL/L (ref 4–13)
AST SERPL W P-5'-P-CCNC: 18 U/L (ref 5–45)
BASOPHILS # BLD AUTO: 0.05 THOUSANDS/ΜL (ref 0–0.1)
BASOPHILS NFR BLD AUTO: 1 % (ref 0–1)
BILIRUB SERPL-MCNC: 0.38 MG/DL (ref 0.2–1)
BUN SERPL-MCNC: 14 MG/DL (ref 5–25)
CALCIUM SERPL-MCNC: 9.4 MG/DL (ref 8.3–10.1)
CHLORIDE SERPL-SCNC: 106 MMOL/L (ref 96–108)
CHOLEST SERPL-MCNC: 200 MG/DL
CO2 SERPL-SCNC: 26 MMOL/L (ref 21–32)
CREAT SERPL-MCNC: 0.9 MG/DL (ref 0.6–1.3)
CRP SERPL QL: <3 MG/L
EOSINOPHIL # BLD AUTO: 0.01 THOUSAND/ΜL (ref 0–0.61)
EOSINOPHIL NFR BLD AUTO: 0 % (ref 0–6)
ERYTHROCYTE [DISTWIDTH] IN BLOOD BY AUTOMATED COUNT: 12.4 % (ref 11.6–15.1)
ERYTHROCYTE [SEDIMENTATION RATE] IN BLOOD: 23 MM/HOUR (ref 0–29)
GFR SERPL CREATININE-BSD FRML MDRD: 66 ML/MIN/1.73SQ M
GLUCOSE P FAST SERPL-MCNC: 84 MG/DL (ref 65–99)
HCT VFR BLD AUTO: 41.1 % (ref 34.8–46.1)
HDLC SERPL-MCNC: 73 MG/DL
HGB BLD-MCNC: 13 G/DL (ref 11.5–15.4)
IMM GRANULOCYTES # BLD AUTO: 0.02 THOUSAND/UL (ref 0–0.2)
IMM GRANULOCYTES NFR BLD AUTO: 0 % (ref 0–2)
LDLC SERPL CALC-MCNC: 118 MG/DL (ref 0–100)
LYMPHOCYTES # BLD AUTO: 1.9 THOUSANDS/ΜL (ref 0.6–4.47)
LYMPHOCYTES NFR BLD AUTO: 40 % (ref 14–44)
MCH RBC QN AUTO: 31.7 PG (ref 26.8–34.3)
MCHC RBC AUTO-ENTMCNC: 31.6 G/DL (ref 31.4–37.4)
MCV RBC AUTO: 100 FL (ref 82–98)
MONOCYTES # BLD AUTO: 0.57 THOUSAND/ΜL (ref 0.17–1.22)
MONOCYTES NFR BLD AUTO: 12 % (ref 4–12)
NEUTROPHILS # BLD AUTO: 2.23 THOUSANDS/ΜL (ref 1.85–7.62)
NEUTS SEG NFR BLD AUTO: 47 % (ref 43–75)
NONHDLC SERPL-MCNC: 127 MG/DL
NRBC BLD AUTO-RTO: 0 /100 WBCS
PLATELET # BLD AUTO: 290 THOUSANDS/UL (ref 149–390)
PMV BLD AUTO: 10.2 FL (ref 8.9–12.7)
POTASSIUM SERPL-SCNC: 4 MMOL/L (ref 3.5–5.3)
PROT SERPL-MCNC: 7.9 G/DL (ref 6.4–8.4)
RBC # BLD AUTO: 4.1 MILLION/UL (ref 3.81–5.12)
SODIUM SERPL-SCNC: 138 MMOL/L (ref 135–147)
T4 FREE SERPL-MCNC: 0.9 NG/DL (ref 0.76–1.46)
TRIGL SERPL-MCNC: 45 MG/DL
TSH SERPL DL<=0.05 MIU/L-ACNC: 5.44 UIU/ML (ref 0.45–4.5)
VIT B12 SERPL-MCNC: 277 PG/ML (ref 100–900)
WBC # BLD AUTO: 4.78 THOUSAND/UL (ref 4.31–10.16)

## 2022-10-12 PROCEDURE — 80053 COMPREHEN METABOLIC PANEL: CPT

## 2022-10-12 PROCEDURE — 85652 RBC SED RATE AUTOMATED: CPT

## 2022-10-12 PROCEDURE — 84439 ASSAY OF FREE THYROXINE: CPT | Performed by: INTERNAL MEDICINE

## 2022-10-12 PROCEDURE — 80061 LIPID PANEL: CPT | Performed by: INTERNAL MEDICINE

## 2022-10-12 PROCEDURE — 86140 C-REACTIVE PROTEIN: CPT

## 2022-10-12 PROCEDURE — 85025 COMPLETE CBC W/AUTO DIFF WBC: CPT

## 2022-10-12 PROCEDURE — 82306 VITAMIN D 25 HYDROXY: CPT | Performed by: INTERNAL MEDICINE

## 2022-10-12 PROCEDURE — 84443 ASSAY THYROID STIM HORMONE: CPT | Performed by: INTERNAL MEDICINE

## 2022-10-12 PROCEDURE — 36415 COLL VENOUS BLD VENIPUNCTURE: CPT | Performed by: INTERNAL MEDICINE

## 2022-10-12 PROCEDURE — 82607 VITAMIN B-12: CPT | Performed by: INTERNAL MEDICINE

## 2022-10-12 NOTE — TELEPHONE ENCOUNTER
Lab results: Your thyroid test a bit abnormal  If you are feeling well, no dose change needed  Lipids about the same  Kidney function borderline  Avoid NSAIDs such as ibuprofen, naproxen, Advil, Aleve or Motrin  Take Tylenol for pain  Your vitamin B12 levels are low  We can start B12 injections and / or oral supplement, 1000 mcg daily  Let me know

## 2022-10-12 NOTE — TELEPHONE ENCOUNTER
Pt notified     Patient is feeling fine, anything she can do for the thyroid other than the medication ?  What causes it to keep going up and down

## 2022-10-12 NOTE — TELEPHONE ENCOUNTER
Nothing really to do differently  You are already taking your thyroid medication appropriately, first thing in the morning on an empty stomach  Thyroid function fluctuates, may need more or sometimes less medication as you grow older

## 2022-10-28 ENCOUNTER — TELEPHONE (OUTPATIENT)
Dept: OBGYN CLINIC | Facility: HOSPITAL | Age: 67
End: 2022-10-28

## 2022-10-28 DIAGNOSIS — M17.11 PRIMARY OSTEOARTHRITIS OF RIGHT KNEE: Primary | ICD-10-CM

## 2022-10-28 NOTE — TELEPHONE ENCOUNTER
Caller: daly     Doctor/Office: davide Warren required for: [address]: medicare  Member id:[id number]    [phone]

## 2022-11-14 ENCOUNTER — HOSPITAL ENCOUNTER (OUTPATIENT)
Dept: MAMMOGRAPHY | Facility: MEDICAL CENTER | Age: 67
Discharge: HOME | End: 2022-11-14

## 2022-11-14 VITALS — BODY MASS INDEX: 21.86 KG/M2 | WEIGHT: 136.02 LBS | HEIGHT: 66 IN

## 2022-11-14 DIAGNOSIS — Z12.31 ENCOUNTER FOR SCREENING MAMMOGRAM FOR MALIGNANT NEOPLASM OF BREAST: ICD-10-CM

## 2022-11-16 DIAGNOSIS — E03.9 ACQUIRED HYPOTHYROIDISM: ICD-10-CM

## 2022-11-16 RX ORDER — LEVOTHYROXINE SODIUM 0.05 MG/1
50 TABLET ORAL DAILY
Qty: 90 TABLET | Refills: 0 | Status: SHIPPED | OUTPATIENT
Start: 2022-11-16

## 2022-11-17 ENCOUNTER — PROCEDURE VISIT (OUTPATIENT)
Dept: OBGYN CLINIC | Facility: OTHER | Age: 67
End: 2022-11-17

## 2022-11-17 VITALS
BODY MASS INDEX: 21.9 KG/M2 | HEART RATE: 61 BPM | DIASTOLIC BLOOD PRESSURE: 73 MMHG | SYSTOLIC BLOOD PRESSURE: 135 MMHG | WEIGHT: 136.3 LBS | HEIGHT: 66 IN

## 2022-11-17 DIAGNOSIS — M17.11 PRIMARY OSTEOARTHRITIS OF RIGHT KNEE: Primary | ICD-10-CM

## 2022-11-17 NOTE — PROGRESS NOTES
S: patient presents to the office today for visco supplementation of the RIGHT knee  Reports good but temporary relief with the steroid injection 3 months ago  She has been stretching and noted some decrease in ROM of the knee  Pain occurs with prolonged activities and stairs  /73 (BP Location: Left arm, Patient Position: Sitting, Cuff Size: Adult)   Pulse 61   Ht 5' 6" (1 676 m)   Wt 61 8 kg (136 lb 4 8 oz)   BMI 22 00 kg/m²     O: RIGHT knee    Skin - warm and dry  Good ROM  TTP: medial and lateral joint lines  mild swelling  No effusion  NVI   SI    A/P: Right knee primary osteoarthritis    1  Viscoelastic supplementation (Synvisc) right knee  2  Tylenol and Ice if needed for pain  3  Slowly resume activities to tolerance over the next 36-48 hours  4  Follow up: PRN    Synvisc can be repeated in 6 months  If pain returns sooner, then steroid injections can be considered to help with symptoms  Large joint arthrocentesis: R knee  Universal Protocol:  Consent: Verbal consent obtained    Consent given by: patient    Supporting Documentation  Indications: pain   Procedure Details  Location: knee - R knee  Preparation: Patient was prepped and draped in the usual sterile fashion  Needle size: 18 G  Ultrasound guidance: no  Approach: superior lateral   Medications administered: 48 mg hylan 48 MG/6ML    Patient tolerance: patient tolerated the procedure well with no immediate complications  Dressing:  Sterile dressing applied

## 2023-02-13 DIAGNOSIS — E03.9 ACQUIRED HYPOTHYROIDISM: ICD-10-CM

## 2023-02-13 RX ORDER — LEVOTHYROXINE SODIUM 0.05 MG/1
TABLET ORAL
Qty: 90 TABLET | Refills: 0 | Status: SHIPPED | OUTPATIENT
Start: 2023-02-13

## 2023-03-23 ENCOUNTER — OFFICE VISIT (OUTPATIENT)
Dept: AUDIOLOGY | Age: 68
End: 2023-03-23

## 2023-03-23 DIAGNOSIS — H90.3 SENSORY HEARING LOSS, BILATERAL: Primary | ICD-10-CM

## 2023-03-23 NOTE — PROGRESS NOTES
Progress Note    Name:  Twanna Fleischer  :  1955  Age:  79 y o  Date of Evaluation: 23     Zonia Boyle is fit with Oticon Opn S 1 mini [facility] hearing aid(s)  Right serial [id number]  Left serial [id number]  Warranty date: 23 (Loss/Damage and repair)      Sending right hearing aid in for repair, prior to warranty ending  Will swap and send left hearing aid when right returns       Suzette Perry , CCC-A  Clinical Audiologist

## 2023-03-23 NOTE — PROGRESS NOTES
HEARING EVALUATION    Name:  Rob Jones  :  1955  Age:  79 y o  Date of Evaluation: 23     History: Known Hearing Loss binaurally  Reason for visit: Rob Jones is being seen today at the request of Dr Odessa Larsen for an evaluation of hearing  Patient reports no issues or concerns regarding hearing ability  EVALUATION:    Otoscopic Evaluation:   Right Ear: Clear and healthy ear canal and tympanic membrane   Left Ear: Clear and healthy ear canal and tympanic membrane    Tympanometry:   Right: Type A - normal middle ear pressure and compliance   Left: Type A - normal middle ear pressure and compliance    Audiogram Results:  Pure tone testing revealed a normal sloping to severe sensorineural hearing loss bilaterally  SRT and PTA are in agreement indicating good test reliability  Word recognition scores were fair bilaterally  *see attached audiogram      RECOMMENDATIONS:  Annual hearing eval, Return to [facility]  for F/U and Copy to Patient/Caregiver    PATIENT EDUCATION:   Discussed results and recommendations with patient  Questions were addressed and the patient was encouraged to contact our department should concerns arise        Suzette Doshi , CCC-A  Clinical Audiologist

## 2023-05-05 ENCOUNTER — OFFICE VISIT (OUTPATIENT)
Dept: AUDIOLOGY | Age: 68
End: 2023-05-05

## 2023-05-05 DIAGNOSIS — H90.3 SENSORY HEARING LOSS, BILATERAL: Primary | ICD-10-CM

## 2023-05-05 NOTE — PROGRESS NOTES
Progress Note    Name:  Rufina Cole  :  1955  Age:  79 y o  Date of Evaluation: 23      Patient picked up repaired right hearing aid and dropped off the left hearing aid to go in for repair  Hearing aid returned from repair    [id number]  Warranty: 2023    Suzette Galvez , CCC-A  Clinical Audiologist Plan: Patient used tugs for one week in April and has not been consistent with use since. NG counsels on waiting until the wart heals from the cryotherapy today, and applying this topical consistently until next visit. Render In Strict Bullet Format?: No Continue Regimen: Fluorouracil 5% Detail Level: Zone

## 2023-05-14 DIAGNOSIS — E03.9 ACQUIRED HYPOTHYROIDISM: ICD-10-CM

## 2023-05-14 RX ORDER — LEVOTHYROXINE SODIUM 0.05 MG/1
TABLET ORAL
Qty: 90 TABLET | Refills: 0 | Status: SHIPPED | OUTPATIENT
Start: 2023-05-14

## 2023-05-15 ENCOUNTER — APPOINTMENT (OUTPATIENT)
Dept: LAB | Facility: CLINIC | Age: 68
End: 2023-05-15

## 2023-05-15 DIAGNOSIS — L40.59 POLYARTICULAR PSORIATIC ARTHRITIS (HCC): ICD-10-CM

## 2023-05-15 LAB
ALBUMIN SERPL BCP-MCNC: 3.7 G/DL (ref 3.5–5)
ALP SERPL-CCNC: 67 U/L (ref 46–116)
ALT SERPL W P-5'-P-CCNC: 23 U/L (ref 12–78)
ANION GAP SERPL CALCULATED.3IONS-SCNC: 1 MMOL/L (ref 4–13)
AST SERPL W P-5'-P-CCNC: 17 U/L (ref 5–45)
BASOPHILS # BLD AUTO: 0.06 THOUSANDS/ÂΜL (ref 0–0.1)
BASOPHILS NFR BLD AUTO: 1 % (ref 0–1)
BILIRUB SERPL-MCNC: 0.46 MG/DL (ref 0.2–1)
BUN SERPL-MCNC: 17 MG/DL (ref 5–25)
CALCIUM SERPL-MCNC: 9.3 MG/DL (ref 8.3–10.1)
CHLORIDE SERPL-SCNC: 109 MMOL/L (ref 96–108)
CO2 SERPL-SCNC: 27 MMOL/L (ref 21–32)
CREAT SERPL-MCNC: 0.92 MG/DL (ref 0.6–1.3)
CRP SERPL QL: <3 MG/L
EOSINOPHIL # BLD AUTO: 0.01 THOUSAND/ÂΜL (ref 0–0.61)
EOSINOPHIL NFR BLD AUTO: 0 % (ref 0–6)
ERYTHROCYTE [DISTWIDTH] IN BLOOD BY AUTOMATED COUNT: 13 % (ref 11.6–15.1)
ERYTHROCYTE [SEDIMENTATION RATE] IN BLOOD: 18 MM/HOUR (ref 0–29)
GFR SERPL CREATININE-BSD FRML MDRD: 64 ML/MIN/1.73SQ M
GLUCOSE P FAST SERPL-MCNC: 81 MG/DL (ref 65–99)
HCT VFR BLD AUTO: 42.4 % (ref 34.8–46.1)
HGB BLD-MCNC: 13.2 G/DL (ref 11.5–15.4)
IMM GRANULOCYTES # BLD AUTO: 0.01 THOUSAND/UL (ref 0–0.2)
IMM GRANULOCYTES NFR BLD AUTO: 0 % (ref 0–2)
LYMPHOCYTES # BLD AUTO: 1.83 THOUSANDS/ÂΜL (ref 0.6–4.47)
LYMPHOCYTES NFR BLD AUTO: 44 % (ref 14–44)
MCH RBC QN AUTO: 31.2 PG (ref 26.8–34.3)
MCHC RBC AUTO-ENTMCNC: 31.1 G/DL (ref 31.4–37.4)
MCV RBC AUTO: 100 FL (ref 82–98)
MONOCYTES # BLD AUTO: 0.58 THOUSAND/ÂΜL (ref 0.17–1.22)
MONOCYTES NFR BLD AUTO: 14 % (ref 4–12)
NEUTROPHILS # BLD AUTO: 1.71 THOUSANDS/ÂΜL (ref 1.85–7.62)
NEUTS SEG NFR BLD AUTO: 41 % (ref 43–75)
NRBC BLD AUTO-RTO: 0 /100 WBCS
PLATELET # BLD AUTO: 308 THOUSANDS/UL (ref 149–390)
PMV BLD AUTO: 10 FL (ref 8.9–12.7)
POTASSIUM SERPL-SCNC: 4.2 MMOL/L (ref 3.5–5.3)
PROT SERPL-MCNC: 7.7 G/DL (ref 6.4–8.4)
RBC # BLD AUTO: 4.23 MILLION/UL (ref 3.81–5.12)
SODIUM SERPL-SCNC: 137 MMOL/L (ref 135–147)
WBC # BLD AUTO: 4.2 THOUSAND/UL (ref 4.31–10.16)

## 2023-05-19 ENCOUNTER — OFFICE VISIT (OUTPATIENT)
Dept: AUDIOLOGY | Age: 68
End: 2023-05-19

## 2023-05-19 DIAGNOSIS — H90.3 SENSORY HEARING LOSS, BILATERAL: Primary | ICD-10-CM

## 2023-05-19 NOTE — PROGRESS NOTES
Progress Note    Name:  Nicki Archer  :  1955  Age:  79 y o  Date of Evaluation: 23     Bertha Gamez [place] is fit with Oticon Opn S 1 mini [facility] hearing aid(s)  Right serial number [id number]  Left serial number [id number]  Warranty date: 23 (Loss/Damage and repair)      Patient picked up repaired left hearing aid  Hearing aids were paired  Right high frequencies were decreased due to patient comment of sharpness  Patient was happy with adjustments  Identified her Android is not compatible with her hearing aids       Suzette Burris , CCC-A  Clinical Audiologist

## 2023-06-07 ENCOUNTER — OFFICE VISIT (OUTPATIENT)
Dept: DERMATOLOGY | Facility: CLINIC | Age: 68
End: 2023-06-07

## 2023-06-07 VITALS — HEIGHT: 66 IN | WEIGHT: 136 LBS | BODY MASS INDEX: 21.86 KG/M2

## 2023-06-07 DIAGNOSIS — D48.5 NEOPLASM OF UNCERTAIN BEHAVIOR OF SKIN: Primary | ICD-10-CM

## 2023-06-07 DIAGNOSIS — L30.9 DERMATITIS: ICD-10-CM

## 2023-06-07 PROCEDURE — 88305 TISSUE EXAM BY PATHOLOGIST: CPT | Performed by: STUDENT IN AN ORGANIZED HEALTH CARE EDUCATION/TRAINING PROGRAM

## 2023-06-07 PROCEDURE — 88342 IMHCHEM/IMCYTCHM 1ST ANTB: CPT | Performed by: STUDENT IN AN ORGANIZED HEALTH CARE EDUCATION/TRAINING PROGRAM

## 2023-06-07 RX ORDER — CLOBETASOL PROPIONATE 0.05 G/100ML
1 SHAMPOO TOPICAL DAILY
Qty: 118 ML | Refills: 1 | Status: SHIPPED | OUTPATIENT
Start: 2023-06-07 | End: 2023-06-21

## 2023-06-07 NOTE — PROGRESS NOTES
"[facility] Note     Patient Name: Lizbeth Gould  Encounter Date: 06/07/2023     Have you been cared for by a [facility] Dermatologist in the last 3 years and, if so, which description applies to you? Yes  I have been here within the last 3 years, and my medical history has NOT changed since that time  I am FEMALE/of child-bearing potential     REVIEW OF SYSTEMS:  Have you recently had or currently have any of the following? No changes in my recent health  PAST MEDICAL HISTORY:  Have you personally ever had or currently have any of the following? If \"YES,\" then please provide more detail  No changes in my medical history  FAMILY HISTORY:  Any \"first degree relatives\" (parent, brother, sister, or child) with the following? No changes in my family's known health  PATIENT EXPERIENCE:    Do you want the Dermatologist to perform a COMPLETE skin exam today including a clinical examination under the \"bra and underwear\" areas? Yes  If necessary, do we have your permission to call and leave a detailed message on your Preferred Phone number that includes your specific medical information? Yes      No Known Allergies   Current Outpatient Medications:   •  Ascorbic Acid (VITAMIN C PO), Vitamin C, Disp: , Rfl:   •  Calcium Carb-Cholecalciferol (CALCIUM + D3 PO), Take by mouth daily  , Disp: , Rfl:   •  Cholecalciferol (VITAMIN D3 PO), Vitamin D3, Disp: , Rfl:   •  ciclopirox (LOPROX) 0 77 % SUSP, Apply twice daily to affected toenails, Disp: 60 mL, Rfl: 3  •  clobetasol propionate (CLOBEX) 0 05 % shampoo, Apply 1 application   topically daily for 14 days, Disp: 118 mL, Rfl: 1  •  Cyanocobalamin (B-12) 1000 MCG TABS, , Disp: , Rfl:   •  diclofenac sodium (VOLTAREN) 1 %, Apply 2 g topically 4 (four) times a day, Disp: 3 Tube, Rfl: 1  •  glucosamine-chondroitin 500-400 MG tablet, Take 1 tablet by mouth 3 (three) times a day, Disp: , Rfl:   •  levothyroxine 50 mcg tablet, TAKE 1 TABLET BY MOUTH EVERY " "DAY, Disp: 90 tablet, Rfl: 0  •  Omega-3 Fatty Acids (FISH OIL PO), Fish Oil, Disp: , Rfl:   •  sulfaSALAzine (AZULFIDINE-EN) 500 mg EC tablet, Take 1 tablet (500 mg total) by mouth 2 (two) times a day, Disp: 180 tablet, Rfl: 1          Whom besides the patient is providing clinical information about today's encounter? NO ADDITIONAL HISTORIAN (patient alone provided history)    Physical Exam and Assessment/Plan by Diagnosis:     NEOPLASM OF UNCERTAIN BEHAVIOR OF SKIN    Physical Exam:  (Anatomic Location); (Size and Morphological Description); (Differential Diagnosis):  Left posterior shoulder; 0 5 cm pink/red papule with rim of pigment ; DDx Irritated seborrheic keratosis versus question spitz  Pertinent Positives:  Pertinent Negatives:    Assessment and Plan:  I have discussed with the patient that a sample of skin via a \"skin biopsy” would be potentially helpful to further make a specific diagnosis under the microscope  Based on a thorough discussion of this condition and the management approach to it (including a comprehensive discussion of the known risks, side effects and potential benefits of treatment), the patient (family) agrees to implement the following specific plan:    Procedure:  Skin Biopsy  After a thorough discussion of treatment options and risk/benefits/alternatives (including but not limited to local pain, scarring, dyspigmentation, blistering, possible superinfection, and inability to confirm a diagnosis via histopathology), verbal and written consent were obtained and portion of the rash was biopsied for tissue sample  See below for consent that was obtained from patient and subsequent Procedure Note    PROCEDURE TANGENTIAL (SHAVE) BIOPSY NOTE:    Performing Physician: Dr Looney  Anatomic Location; Clinical Description with size (cm); Pre-Op Diagnosis:   Left posterior shoulder; 0 5 cm pink/red papule with rim of pigment ; DDx Irritated seborrheic keratosis versus question spitz  Post-op " "diagnosis: Same     Local anesthesia: 1% xylocaine with epi      Topical anesthesia: None    Hemostasis: Aluminum chloride       After obtaining informed consent  at which time there was a discussion about the purpose of biopsy  and low risks of infection and bleeding  The area was prepped and draped in the usual fashion  Anesthesia was obtained with 1% lidocaine with epinephrine  A shave biopsy to an appropriate sampling depth was obtained by Shave (Dermablade or 15 blade) The resulting wound was covered with surgical ointment and bandaged appropriately  The patient tolerated the procedure well without complications and was without signs of functional compromise  Specimen has been sent for review by Dermatopathology  Standard post-procedure care has been explained and has been included in written form within the patient's copy of Informed Consent  INFORMED CONSENT DISCUSSION AND POST-OPERATIVE INSTRUCTIONS FOR PATIENT    I   RATIONALE FOR PROCEDURE  I understand that a skin biopsy allows the Dermatologist to test a lesion or rash under the microscope to obtain a diagnosis  It usually involves numbing the area with numbing medication and removing a small piece of skin; sometimes the area will be closed with sutures  In this specific procedure, sutures are not usually needed  If any sutures are placed, then they are usually need to be removed in 2 weeks or less  I understand that my Dermatologist recommends that a skin \"shave\" biopsy be performed today  A local anesthetic, similar to the kind that a dentist uses when filling a cavity, will be injected with a very small needle into the skin area to be sampled  The injected skin and tissue underneath \"will go to sleep” and become numb so no pain should be felt afterwards    An instrument shaped like a tiny \"razor blade\" (shave biopsy instrument) will be used to cut a small piece of tissue and skin from the area so that a sample of tissue can be " "taken and examined more closely under the microscope  A slight amount of bleeding will occur, but it will be stopped with direct pressure and a pressure bandage and any other appropriate methods  I understands that a scar will form where the wound was created  Surgical ointment will be applied to help protect the wound  Sutures are not usually needed  II   RISKS AND POTENTIAL COMPLICATIONS   I understand the risks and potential complications of a skin biopsy include but are not limited to the following:  Bleeding  Infection  Pain  Scar/keloid  Skin discoloration  Incomplete Removal  Recurrence  Nerve Damage/Numbness/Loss of Function  Allergic Reaction to Anesthesia  Biopsies are diagnostic procedures and based on findings additional treatment or evaluation may be required  Loss or destruction of specimen resulting in no additional findings    My Dermatologist has explained to me the nature of the condition, the nature of the procedure, and the benefits to be reasonably expected compared with alternative approaches  My Dermatologist has discussed the likelihood of major risks or complications of this procedure including the specific risks listed above, such as bleeding, infection, and scarring/keloid  I understand that a scar is expected after this procedure  I understand that my physician cannot predict if the scar will form a \"keloid,\" which extends beyond the borders of the wound that is created  A keloid is a thick, painful, and bumpy scar  A keloid can be difficult to treat, as it does not always respond well to therapy, which includes injecting cortisone directly into the keloid every few weeks  While this usually reduces the pain and size of the scar, it does not eliminate it  I understand that photographs may be taken before and after the procedure  These will be maintained as part of the medical providers confidential records and may not be made available to me    I further authorize the " "medical provider to use the photographs for teaching purposes or to illustrate scientific papers, books, or lectures if in his/her judgment, medical research, education, or science may benefit from its use  I have had an opportunity to fully inquire about the risks and benefits of this procedure and its alternatives  I have been given ample time and opportunity to ask questions and to seek a second opinion if I wished to do so  I acknowledge that there have specifically been no guarantees as to the cosmetic results from the procedure  I am aware that with any procedure there is always the possibility of an unexpected complication  III  POST-PROCEDURAL CARE (WHAT YOU WILL NEED TO DO \"AFTER THE BIOPSY\" TO OPTIMIZE HEALING)    Keep the area clean and dry  Try NOT to remove the bandage or get it wet for the first 24 hours  Gently clean the area and apply surgical ointment (such as Vaseline petrolatum ointment, which is available \"over the counter\" and not a prescription) to the biopsy site for up to 2 weeks straight  This acts to protect the wound from the outside world  Sutures are not usually placed in this procedure  If any sutures were placed, return for suture removal as instructed (generally 1 week for the face, 2 weeks for the body)  Take Acetaminophen (Tylenol) for discomfort, if no contraindications  Ibuprofen or aspirin could make bleeding worse  Call our office immediately for signs of infection: fever, chills, increased redness, warmth, tenderness, discomfort/pain, or pus or foul smell coming from the wound  WHAT TO DO IF THERE IS ANY BLEEDING? If a small amount of bleeding is noticed, place a clean cloth over the area and apply firm pressure for ten minutes  Check the wound after 10 minutes of direct pressure  If bleeding persists, try one more time for an additional 10 minutes of direct pressure on the area    If the bleeding becomes heavier or does not stop after the " second attempt, or if you have any other questions about this procedure, then please call your [facility]'s Dermatologist by calling [phone] (SKIN)  I hereby acknowledge that I have reviewed and verified the site with my Dermatologist and have requested and authorized my Dermatologist to proceed with the procedure      Scribe Attestation    I,:  Shera Schwab am acting as a scribe while in the presence of the attending physician :       I,:  Sergio Callaway MD personally performed the services described in this documentation    as scribed in my presence :

## 2023-06-07 NOTE — PATIENT INSTRUCTIONS
"  INFORMED CONSENT DISCUSSION AND POST-OPERATIVE INSTRUCTIONS FOR PATIENT    I   RATIONALE FOR PROCEDURE  I understand that a skin biopsy allows the Dermatologist to test a lesion or rash under the microscope to obtain a diagnosis  It usually involves numbing the area with numbing medication and removing a small piece of skin; sometimes the area will be closed with sutures  In this specific procedure, sutures are not usually needed  If any sutures are placed, then they are usually need to be removed in 2 weeks or less  I understand that my Dermatologist recommends that a skin \"shave\" biopsy be performed today  A local anesthetic, similar to the kind that a dentist uses when filling a cavity, will be injected with a very small needle into the skin area to be sampled  The injected skin and tissue underneath \"will go to sleep” and become numb so no pain should be felt afterwards  An instrument shaped like a tiny \"razor blade\" (shave biopsy instrument) will be used to cut a small piece of tissue and skin from the area so that a sample of tissue can be taken and examined more closely under the microscope  A slight amount of bleeding will occur, but it will be stopped with direct pressure and a pressure bandage and any other appropriate methods  I understands that a scar will form where the wound was created  Surgical ointment will be applied to help protect the wound  Sutures are not usually needed      II   RISKS AND POTENTIAL COMPLICATIONS   I understand the risks and potential complications of a skin biopsy include but are not limited to the following:  Bleeding  Infection  Pain  Scar/keloid  Skin discoloration  Incomplete Removal  Recurrence  Nerve Damage/Numbness/Loss of Function  Allergic Reaction to Anesthesia  Biopsies are diagnostic procedures and based on findings additional treatment or evaluation may be required  Loss or destruction of specimen resulting in no additional findings    My " "Dermatologist has explained to me the nature of the condition, the nature of the procedure, and the benefits to be reasonably expected compared with alternative approaches  My Dermatologist has discussed the likelihood of major risks or complications of this procedure including the specific risks listed above, such as bleeding, infection, and scarring/keloid  I understand that a scar is expected after this procedure  I understand that my physician cannot predict if the scar will form a \"keloid,\" which extends beyond the borders of the wound that is created  A keloid is a thick, painful, and bumpy scar  A keloid can be difficult to treat, as it does not always respond well to therapy, which includes injecting cortisone directly into the keloid every few weeks  While this usually reduces the pain and size of the scar, it does not eliminate it  I understand that photographs may be taken before and after the procedure  These will be maintained as part of the medical providers confidential records and may not be made available to me  I further authorize the medical provider to use the photographs for teaching purposes or to illustrate scientific papers, books, or lectures if in his/her judgment, medical research, education, or science may benefit from its use  I have had an opportunity to fully inquire about the risks and benefits of this procedure and its alternatives  I have been given ample time and opportunity to ask questions and to seek a second opinion if I wished to do so  I acknowledge that there have specifically been no guarantees as to the cosmetic results from the procedure  I am aware that with any procedure there is always the possibility of an unexpected complication  III  POST-PROCEDURAL CARE (WHAT YOU WILL NEED TO DO \"AFTER THE BIOPSY\" TO OPTIMIZE HEALING)    Keep the area clean and dry  Try NOT to remove the bandage or get it wet for the first 24 hours      Gently clean the area " "and apply surgical ointment (such as Vaseline petrolatum ointment, which is available \"over the counter\" and not a prescription) to the biopsy site for up to 2 weeks straight  This acts to protect the wound from the outside world  Sutures are not usually placed in this procedure  If any sutures were placed, return for suture removal as instructed (generally 1 week for the face, 2 weeks for the body)  Take Acetaminophen (Tylenol) for discomfort, if no contraindications  Ibuprofen or aspirin could make bleeding worse  Call our office immediately for signs of infection: fever, chills, increased redness, warmth, tenderness, discomfort/pain, or pus or foul smell coming from the wound  WHAT TO DO IF THERE IS ANY BLEEDING? If a small amount of bleeding is noticed, place a clean cloth over the area and apply firm pressure for ten minutes  Check the wound after 10 minutes of direct pressure  If bleeding persists, try one more time for an additional 10 minutes of direct pressure on the area  If the bleeding becomes heavier or does not stop after the second attempt, or if you have any other questions about this procedure, then please call your [facility]'s Dermatologist by calling [phone] (SKIN)  I hereby acknowledge that I have reviewed and verified the site with my Dermatologist and have requested and authorized my Dermatologist to proceed with the procedure      "

## 2023-06-12 VITALS — BODY MASS INDEX: 21.31 KG/M2 | WEIGHT: 132.6 LBS | HEIGHT: 66 IN

## 2023-06-15 PROCEDURE — 88305 TISSUE EXAM BY PATHOLOGIST: CPT | Performed by: STUDENT IN AN ORGANIZED HEALTH CARE EDUCATION/TRAINING PROGRAM

## 2023-06-15 PROCEDURE — 88342 IMHCHEM/IMCYTCHM 1ST ANTB: CPT | Performed by: STUDENT IN AN ORGANIZED HEALTH CARE EDUCATION/TRAINING PROGRAM

## 2023-06-16 NOTE — RESULT ENCOUNTER NOTE
DERMATOPATHOLOGY RESULT NOTE    Results reviewed by ordering physician  Called patient to personally discuss results  Discussed results with patient  Instructions for Clinical Derm Team:   (remember to route Result Note to appropriate staff):    None    Result & Plan by Specimen:    Specimen A: benign  Plan: reassured, benign    Final Diagnosis  A  Skin, left posterior shoulder, shave biopsy:     SEBORRHEIC KERATOSIS, IRRITATED AND INFLAMED (see note)      Note: Mild squamous atypia is seen, though much of the squamous atypia appears reactive (due to trauma and/or inflammation)   [facility]

## 2023-07-06 ENCOUNTER — OFFICE VISIT (OUTPATIENT)
Dept: OBGYN CLINIC | Facility: OTHER | Age: 68
End: 2023-07-06
Payer: MEDICARE

## 2023-07-06 VITALS
WEIGHT: 132 LBS | HEIGHT: 66 IN | HEART RATE: 80 BPM | DIASTOLIC BLOOD PRESSURE: 73 MMHG | SYSTOLIC BLOOD PRESSURE: 111 MMHG | BODY MASS INDEX: 21.21 KG/M2

## 2023-07-06 DIAGNOSIS — M19.012 PRIMARY OSTEOARTHRITIS OF LEFT SHOULDER: Primary | ICD-10-CM

## 2023-07-06 PROCEDURE — 20610 DRAIN/INJ JOINT/BURSA W/O US: CPT | Performed by: ORTHOPAEDIC SURGERY

## 2023-07-06 PROCEDURE — 99214 OFFICE O/P EST MOD 30 MIN: CPT | Performed by: ORTHOPAEDIC SURGERY

## 2023-07-06 RX ORDER — BETAMETHASONE SODIUM PHOSPHATE AND BETAMETHASONE ACETATE 3; 3 MG/ML; MG/ML
6 INJECTION, SUSPENSION INTRA-ARTICULAR; INTRALESIONAL; INTRAMUSCULAR; SOFT TISSUE
Status: COMPLETED | OUTPATIENT
Start: 2023-07-06 | End: 2023-07-06

## 2023-07-06 RX ORDER — BUPIVACAINE HYDROCHLORIDE 2.5 MG/ML
2 INJECTION, SOLUTION INFILTRATION; PERINEURAL
Status: COMPLETED | OUTPATIENT
Start: 2023-07-06 | End: 2023-07-06

## 2023-07-06 RX ADMIN — BETAMETHASONE SODIUM PHOSPHATE AND BETAMETHASONE ACETATE 6 MG: 3; 3 INJECTION, SUSPENSION INTRA-ARTICULAR; INTRALESIONAL; INTRAMUSCULAR; SOFT TISSUE at 09:30

## 2023-07-06 RX ADMIN — BUPIVACAINE HYDROCHLORIDE 2 ML: 2.5 INJECTION, SOLUTION INFILTRATION; PERINEURAL at 09:30

## 2023-07-06 NOTE — PROGRESS NOTES
Assessment  Diagnoses and all orders for this visit:    Primary osteoarthritis of left shoulder    Discussion and Plan:    · Explained to the patient that her symptoms continue to be consistent with moderate glenohumeral joint osteoarthritis. · She was provided with a repeat cortisone injection today in the office. This is documented approprietly below  · May perform activities as tolerated with modifications to avoid pain  · Follow up on an as needed basis    Subjective:   Patient ID: Marky Arredondo is a 79 y.o. female    80 y/o female who presents today for a follow up visit for her left shoulder. She was last seen in 2021 and has known moderate glenohumeral joint osteoarthritis of her left shoulder. Today, she reports intermittent nightly symptoms/pain that does wake her up but not every night. Her pain is localized about the anterolateral aspect of the shoulder. She is still able to play tennis and perform her daily activities with minimal pain. She also reports significant benefit from previous cortisone injection in 2021. No numbness or tingling. No fevers or chills. The following portions of the patient's history were reviewed and updated as appropriate: allergies, current medications, past family history, past medical history, past social history, past surgical history and problem list.    Objective:  /73 (BP Location: Left arm, Patient Position: Sitting, Cuff Size: Adult)   Pulse 80   Ht 5' 6" (1.676 m) Comment: verbal  Wt 59.9 kg (132 lb)   BMI 21.31 kg/m²       Left Shoulder Exam     Range of Motion   External rotation: 50   Forward flexion: 150   Internal rotation 0 degrees: Sacrum     Muscle Strength   Abduction: 5/5     Other   Erythema: absent  Sensation: normal  Pulse: present             Physical Exam  Constitutional:       Appearance: She is well-developed. Eyes:      Pupils: Pupils are equal, round, and reactive to light.    Pulmonary:      Effort: Pulmonary effort is normal. Breath sounds: Normal breath sounds. Skin:     General: Skin is warm and dry. Neurological:      Mental Status: She is alert and oriented to person, place, and time. Psychiatric:         Behavior: Behavior normal.         Thought Content: Thought content normal.         Judgment: Judgment normal.         Large joint arthrocentesis: L glenohumeral  Universal Protocol:  Consent: Verbal consent obtained. Risks and benefits: risks, benefits and alternatives were discussed  Consent given by: patient  Time out: Immediately prior to procedure a "time out" was called to verify the correct patient, procedure, equipment, support staff and site/side marked as required. Site marked: the operative site was marked  Radiology Images displayed and confirmed. If images not available, report reviewed: imaging studies available  Patient identity confirmed: verbally with patient    Supporting Documentation  Indications: pain and diagnostic evaluation   Procedure Details  Location: shoulder - L glenohumeral  Preparation: Patient was prepped and draped in the usual sterile fashion  Needle size: 22 G  Ultrasound guidance: no  Approach: posterior  Medications administered: 2 mL bupivacaine 0.25 %; 6 mg betamethasone acetate-betamethasone sodium phosphate 6 (3-3) mg/mL    Patient tolerance: patient tolerated the procedure well with no immediate complications  Dressing:  Sterile dressing applied          I have personally reviewed pertinent films in PACS and my interpretation is as follows. X Ray Left Shoulder 4/2021: Moderate glenohumeral joint osteoarthritis.      Scribe Attestation    I,:  Rosi Card am acting as a scribe while in the presence of the attending physician.:       I,:  Naveen Savage MD personally performed the services described in this documentation    as scribed in my presence.:

## 2023-07-06 NOTE — PATIENT INSTRUCTIONS
CORTICOSTEROID INJECTION  What is a corticosteroid? Injuries or disease such as arthritis, bursitis or tendonitis result in inflammation. In turn, this inflammation can cause swelling and pain. A local injection of a corticosteroid is provided to diminish inflammation. By doing so, it will also decrease pain and swelling which is making you uncomfortable. Is this the same thing as a Cortisone Injection? Cortisone® is a brand name of a corticosteroid used commonly in the past.  Today I commonly use a more water-soluble corticosteroid named Celestone®. Will the injection hurt? As with any injection, you may feel pain at the time of the injection. Typically, I use a local anesthetic (Trenna Gelineau) in addition to the corticosteroid to determine if the injection has been placed in the appropriate location. Hence it is important to monitor your symptoms 4-6 hours after the injection, as the area will be anesthetized (numb) while the local anesthetic is working. Once the local anesthetic wears off, the intensity of pain can be the same as it was prior to the injection, or even worse. This does not mean that the injection is not working. The corticosteroid may take 24-72 hours to begin having a positive effect. If you do experience an increase in pain, the use of an ice pack on the area for 20 minutes at a time should help. It is also helpful to take an oral anti-inflammatory such as Tylenol® or Motrin® if you are able to medically do so. For this reason it is best to avoid activities that put stress on the area the first 24 hours after the injection. How long will pain relief last?  This will vary according to the type and severity of the symptoms being treated and the severity of the condition. Symptom relief may last weeks to months. I typically couple injections with physical therapy so that the underlying problem causing the inflammation may be treated as the pain diminishes.   If the combination is not successful, you may be a surgical candidate. I have read bad things about steroids. Will these things happen to me? Corticosteroids, when utilized properly, are safe and effective drugs. When used in a low dose, potential adverse reactions are very rare. Some patients may experience a sensation of flushing for several days. Very rarely, there can be a local reaction which may include increased discomfort for a period of time in the areas that has been injected. A steroid should not be used over and over again. Multiple injections in the same area can produce adverse effects such as tissue atrophy and degeneration of tendon or cartilage. A small percentage of patients (< 0.1%) may develop an infection in the joint after injection. This is a treatable problem, but if neglected, may result in permanent disability. Signs of infection include redness, swelling, discharge, fevers, increasing pain and drainage from the injection site. This represents an emergency and you should contact our office immediately or seek treatment in the ER if after hours. If I have diabetes, will this injection affect me? If you are diabetic, an injection of a corticosteroid can raise your blood sugar level, requiring more insulin for a brief period of time. This may necessitate careful blood sugar maintenance. If the elevated sugars are not able to be controlled, contact your diabetic doctor for guidance.

## 2023-07-10 ENCOUNTER — APPOINTMENT (OUTPATIENT)
Dept: RADIOLOGY | Facility: AMBULARY SURGERY CENTER | Age: 68
End: 2023-07-10
Attending: ORTHOPAEDIC SURGERY
Payer: MEDICARE

## 2023-07-10 ENCOUNTER — OFFICE VISIT (OUTPATIENT)
Dept: OBGYN CLINIC | Facility: CLINIC | Age: 68
End: 2023-07-10
Payer: MEDICARE

## 2023-07-10 VITALS
WEIGHT: 132 LBS | BODY MASS INDEX: 21.21 KG/M2 | HEIGHT: 66 IN | HEART RATE: 65 BPM | SYSTOLIC BLOOD PRESSURE: 129 MMHG | DIASTOLIC BLOOD PRESSURE: 74 MMHG

## 2023-07-10 DIAGNOSIS — M17.11 PRIMARY OSTEOARTHRITIS OF RIGHT KNEE: Primary | ICD-10-CM

## 2023-07-10 DIAGNOSIS — M71.21 SYNOVIAL CYST OF POPLITEAL SPACE (BAKER), RIGHT KNEE: ICD-10-CM

## 2023-07-10 DIAGNOSIS — M25.561 RIGHT KNEE PAIN, UNSPECIFIED CHRONICITY: ICD-10-CM

## 2023-07-10 PROCEDURE — 99214 OFFICE O/P EST MOD 30 MIN: CPT | Performed by: PHYSICIAN ASSISTANT

## 2023-07-10 PROCEDURE — 73562 X-RAY EXAM OF KNEE 3: CPT

## 2023-07-10 PROCEDURE — 20610 DRAIN/INJ JOINT/BURSA W/O US: CPT | Performed by: PHYSICIAN ASSISTANT

## 2023-07-10 RX ORDER — BETAMETHASONE SODIUM PHOSPHATE AND BETAMETHASONE ACETATE 3; 3 MG/ML; MG/ML
12 INJECTION, SUSPENSION INTRA-ARTICULAR; INTRALESIONAL; INTRAMUSCULAR; SOFT TISSUE
Status: COMPLETED | OUTPATIENT
Start: 2023-07-10 | End: 2023-07-10

## 2023-07-10 RX ORDER — LIDOCAINE HYDROCHLORIDE 10 MG/ML
2 INJECTION, SOLUTION INFILTRATION; PERINEURAL
Status: COMPLETED | OUTPATIENT
Start: 2023-07-10 | End: 2023-07-10

## 2023-07-10 RX ADMIN — BETAMETHASONE SODIUM PHOSPHATE AND BETAMETHASONE ACETATE 12 MG: 3; 3 INJECTION, SUSPENSION INTRA-ARTICULAR; INTRALESIONAL; INTRAMUSCULAR; SOFT TISSUE at 12:30

## 2023-07-10 RX ADMIN — LIDOCAINE HYDROCHLORIDE 2 ML: 10 INJECTION, SOLUTION INFILTRATION; PERINEURAL at 12:30

## 2023-07-10 NOTE — PROGRESS NOTES
Assessment:  1. Primary osteoarthritis of right knee  Large joint arthrocentesis: R knee    Injection Procedure Prior Authorization    Lateral  Knee Brace      2. Right knee pain, unspecified chronicity  XR knee 3 vw right non injury      3. Synovial cyst of popliteal space (Baker), right knee  Large joint arthrocentesis: R knee    Injection Procedure Prior Authorization        Patient Active Problem List   Diagnosis   • Osteoarthritis of right glenohumeral joint   • Arthritis   • Chondromalacia patellae   • Current tear of medial cartilage or meniscus of knee   • Dense breasts   • Hearing loss   • Chronic pain of left knee   • Leukopenia   • Primary osteoarthritis of right knee   • Localized secondary osteoarthritis of right shoulder region   • Varicose veins without complication   • Psoriasis with arthropathy (HCC)   • Acquired hypothyroidism   • Primary osteoarthritis of left knee   • Degenerative tear of meniscus, left   • History of total shoulder replacement, right   • Other hyperlipidemia   • Abdominal cramps   • Bilateral primary osteoarthritis of knee   • Primary osteoarthritis of left shoulder   • Joint crepitation   • Osteopenia of left forearm   • 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HCC)   • Primary generalized (osteo)arthritis   • Plaque psoriasis   • Vitamin B12 deficiency       Plan:    79 y.o. female with right knee osteoarthritis worst in lateral compartment, R knee baker cyst    • Corticosteroid injection was offered, accepted, and administered into the right knee joint. Risk benefits and alternatives were discussed prior to injection. Patient tolerated the procedure well.    • Visco prior auth ordered  • Lateral  ordered  • Avoid any squatting kneeling running high impact activities  • May take OTC analgesics PRN, NSAIDs if ok with PCP  • Will contact us if she would like the baker cyst drained we will refer to Dr Madelin Ng    Patient has reported improvements from previous visco injections. Pain is rated at 8/10. The patient was seen and examined by Dr. Felicitas Clemente and myself. The assessment and plan were formulated by Dr. Felicitas Clemente and I assisted in carrying it out. Subjective:   Patient ID: Baldomero Valle is a 79 y.o. female . HPI    Patient comes in today with regards to right knee pain. Patient is referred to us by Stef Ramos MD for further evaluation. The patient reports that the pain been going on for years. Patient rates their pain as 8/10. Injury or trauma prior to onset of pain: None recently. Pain is located in the anterior and lateral knee primarily. It is worsened with squatting and kneeling as well as getting up  For prolonged sitting and getting up in the morning prolonged ambulation, and is made better with rest.  Treatments tried: Biofreeze, glucosamine, cortisone and Visco injections. The pain does not radiate . Old injuries or prior surgeries: Had a prior microfracture surgery and chondroplasty meniscus surgery by Dr. Diego Maradiaga in 2009. Numbness or tingling: None.       The following portions of the patient's history were reviewed and updated as appropriate: allergies, current medications, past family history, past social history, past surgical history and problem list.    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      Comment: working full time   Tobacco Use   • Smoking status: Never   • Smokeless tobacco: Never   Vaping Use   • Vaping Use: Never used   Substance and Sexual Activity   • Alcohol use: Yes     Comment: socially   • Drug use: No   • Sexual activity: Yes     Partners: Male     Birth control/protection: Post-menopausal   Other Topics Concern   • Not on file   Social History Narrative , remarried 12/20    Working - respiratory therapist     Social Determinants of Health     Financial Resource Strain: Low Risk  (10/7/2022)    Overall Financial Resource Strain (CARDIA)    • Difficulty of Paying Living Expenses: Not hard at all   Food Insecurity: Not on file   Transportation Needs: No Transportation Needs (10/7/2022)    PRAPARE - Transportation    • Lack of Transportation (Medical): No    • Lack of Transportation (Non-Medical): No   Physical Activity: Not on file   Stress: Not on file   Social Connections: Not on file   Intimate Partner Violence: Not on file   Housing Stability: Not on file     Past Medical History:   Diagnosis Date   • Abnormal thyroid function test     R. ... 5/8/17    • Arthritis    • Bilateral hearing loss, unspecified hearing loss type    • Colon polyps    • Dislocated shoulder     Right / [place]. [place] 1/30/13    • Effusion of knee     LA. .. 5/21/14   R. .. 5/8/17    • Hypothyroidism     LA. .. 2/6/13   R. ...3/31/15    • Primary osteoarthritis of right knee     LA. ..4/9/14   R. .. 5/21/14    • Prolapsing mitral leaflet syndrome     LA. .. 1/28/13   AR. [place] 3/31/15    • Psoriasis    • Psoriatic arthritis (HCC)    • Tinnitus     ringing in both ears   • Vaginal Pap smear, abnormal    • Vitamin D deficiency     LA. .. 5/8/17  R. [place] 3/31/15      Past Surgical History:   Procedure Laterality Date   • CERVICAL BIOPSY  W/ LOOP ELECTRODE EXCISION     • COLONOSCOPY     • COLONOSCOPY W/ POLYPECTOMY     • KNEE ARTHROSCOPY Right     right knee, right shoulder , open right shoulder    • KS ARTHROPLASTY GLENOHUMERAL JOINT TOTAL SHOULDER Right 4/25/2017    Procedure: TOTAL SHOULDER ARTHROPLASTY ;  Surgeon: Sidra Hernandez MD;  Location: [facility];  Service: Orthopedics   • KS COLONOSCOPY FLX DX W/COLLJ SPEC WHEN PFRMD N/A 10/24/2017    Procedure: COLONOSCOPY;  Surgeon: John Romero MD;  Location: [facility];   Service: Gastroenterology   • SHOULDER SURGERY Right     total shoulder replacement    • SKIN BIOPSY       No Known Allergies  Current Outpatient Medications on File Prior to Visit   Medication Sig Dispense Refill   • Ascorbic Acid (VITAMIN C PO) Vitamin C     • Calcium Carb-Cholecalciferol (CALCIUM + D3 PO) Take by mouth daily       • Cholecalciferol (VITAMIN D3 PO) Vitamin D3     • ciclopirox (LOPROX) 0.77 % SUSP Apply twice daily to affected toenails 60 mL 3   • Cyanocobalamin (B-12) 1000 MCG TABS      • diclofenac sodium (VOLTAREN) 1 % Apply 2 g topically 4 (four) times a day 3 Tube 1   • glucosamine-chondroitin 500-400 MG tablet Take 1 tablet by mouth 3 (three) times a day     • levothyroxine 50 mcg tablet TAKE 1 TABLET BY MOUTH EVERY DAY 90 tablet 0   • Omega-3 Fatty Acids (FISH OIL PO) Fish Oil     • sulfaSALAzine (AZULFIDINE-EN) 500 mg EC tablet Take 1 tablet (500 mg total) by mouth 2 (two) times a day 180 tablet 1   • clobetasol propionate (CLOBEX) 0.05 % shampoo Apply 1 application. topically daily for 14 days 118 mL 1     No current facility-administered medications on file prior to visit. Review of Systems      Objective:    Vitals:    07/10/23 1245   BP: 129/74   Pulse: 65       Physical Exam  Musculoskeletal:      Right knee: Effusion (trace) present. Instability Tests: Medial Jasiel test negative and lateral Jasiel test negative. Right Knee Exam     Tenderness   The patient is experiencing tenderness in the patella and lateral joint line. Range of Motion   Extension:  -5 abnormal   Flexion: normal     Tests   Jasiel:  Medial - negative Lateral - negative  Varus: negative Valgus: negative    Other   Erythema: absent  Scars: present  Sensation: normal  Pulse: present  Swelling: none  Effusion: effusion (trace) present    Comments:  Palpable popliteal cyst            I have personally reviewed pertinent films in PACS and my interpretation is XR R knee done today shows moderate DJD with well preserved joint space, worst in lateral compartment.     Large joint arthrocentesis: R knee  Universal Protocol:  Consent given by: patient  Time out: Immediately prior to procedure a "time out" was called to verify the correct patient, procedure, equipment, support staff and site/side marked as required. Site marked: the operative site was marked  Supporting Documentation  Indications: pain   Procedure Details  Location: knee - R knee  Preparation: Patient was prepped and draped in the usual sterile fashion  Needle size: 22 G  Approach: superior  Medications administered: 2 mL lidocaine 1 %; 12 mg betamethasone acetate-betamethasone sodium phosphate 6 (3-3) mg/mL    Patient tolerance: patient tolerated the procedure well with no immediate complications  Dressing:  Sterile dressing applied            Scribe Attestation    I,:  Francisco Guy PA-C am acting as a scribe while in the presence of the attending physician.:       I,:  Yaakov Jackman DO personally performed the services described in this documentation    as scribed in my presence.:             Portions of the record may have been created with voice recognition software. Occasional wrong word or "sound a like" substitutions may have occurred due to the inherent limitations of voice recognition software. Read the chart carefully and recognize, using context, where substitutions have occurred.

## 2023-07-26 ENCOUNTER — PROCEDURE VISIT (OUTPATIENT)
Dept: OBGYN CLINIC | Facility: CLINIC | Age: 68
End: 2023-07-26
Payer: MEDICARE

## 2023-07-26 VITALS — HEIGHT: 66 IN | BODY MASS INDEX: 21.21 KG/M2 | WEIGHT: 132 LBS

## 2023-07-26 DIAGNOSIS — G89.29 CHRONIC PAIN OF RIGHT KNEE: ICD-10-CM

## 2023-07-26 DIAGNOSIS — M17.11 PRIMARY OSTEOARTHRITIS OF RIGHT KNEE: Primary | ICD-10-CM

## 2023-07-26 DIAGNOSIS — M25.561 CHRONIC PAIN OF RIGHT KNEE: ICD-10-CM

## 2023-07-26 PROCEDURE — 20610 DRAIN/INJ JOINT/BURSA W/O US: CPT | Performed by: ORTHOPAEDIC SURGERY

## 2023-07-26 RX ORDER — HYALURONATE SODIUM 10 MG/ML
20 SYRINGE (ML) INTRAARTICULAR
Status: COMPLETED | OUTPATIENT
Start: 2023-07-26 | End: 2023-07-26

## 2023-07-26 RX ADMIN — Medication 20 MG: at 13:45

## 2023-07-26 NOTE — PROGRESS NOTES
1. Primary osteoarthritis of right knee        2. Chronic pain of right knee          Patient is here for her 1st injection of Euflexxa into the right knee. Patient rates their pain as 1-2/10 today    Physical exam of the knee shows no effusion no ecchymosis. Large joint arthrocentesis: R knee  Universal Protocol:  Consent: Verbal consent obtained.   Risks and benefits: risks, benefits and alternatives were discussed  Consent given by: patient  Timeout called at: 7/26/2023 1:55 PM.  Patient understanding: patient states understanding of the procedure being performed  Patient identity confirmed: verbally with patient    Supporting Documentation  Indications: pain and diagnostic evaluation   Procedure Details  Location: knee - R knee  Needle size: 22 G  Ultrasound guidance: no  Approach: anterolateral  Medications administered: 20 mg Sodium Hyaluronate (Viscosup) 20 MG/2ML    Patient tolerance: patient tolerated the procedure well with no immediate complications  Dressing:  Sterile dressing applied          Patient tolerated procedure follow up in one week

## 2023-08-02 ENCOUNTER — PROCEDURE VISIT (OUTPATIENT)
Dept: OBGYN CLINIC | Facility: CLINIC | Age: 68
End: 2023-08-02
Payer: MEDICARE

## 2023-08-02 VITALS — BODY MASS INDEX: 21.21 KG/M2 | HEIGHT: 66 IN | WEIGHT: 132 LBS

## 2023-08-02 DIAGNOSIS — M25.561 CHRONIC PAIN OF RIGHT KNEE: ICD-10-CM

## 2023-08-02 DIAGNOSIS — G89.29 CHRONIC PAIN OF RIGHT KNEE: ICD-10-CM

## 2023-08-02 DIAGNOSIS — M17.11 PRIMARY OSTEOARTHRITIS OF RIGHT KNEE: Primary | ICD-10-CM

## 2023-08-02 PROCEDURE — 20610 DRAIN/INJ JOINT/BURSA W/O US: CPT | Performed by: ORTHOPAEDIC SURGERY

## 2023-08-02 RX ORDER — HYALURONATE SODIUM 10 MG/ML
20 SYRINGE (ML) INTRAARTICULAR
Status: COMPLETED | OUTPATIENT
Start: 2023-08-02 | End: 2023-08-02

## 2023-08-02 RX ADMIN — Medication 20 MG: at 13:45

## 2023-08-02 NOTE — PROGRESS NOTES
1. Primary osteoarthritis of right knee  Large joint arthrocentesis: R knee      2. Chronic pain of right knee  Large joint arthrocentesis: R knee        Patient is here for her 2nd injection of Euflexxa into the right knee. Physical exam of the knee shows no effusion no ecchymosis. Large joint arthrocentesis: R knee  Universal Protocol:  Consent: Verbal consent obtained.   Risks and benefits: risks, benefits and alternatives were discussed  Consent given by: patient  Timeout called at: 8/2/2023 1:38 PM.  Patient understanding: patient states understanding of the procedure being performed  Patient identity confirmed: verbally with patient    Supporting Documentation  Indications: pain and diagnostic evaluation   Procedure Details  Location: knee - R knee  Needle size: 22 G  Ultrasound guidance: no  Approach: superior  Medications administered: 20 mg Sodium Hyaluronate (Viscosup) 20 MG/2ML    Patient tolerance: patient tolerated the procedure well with no immediate complications  Dressing:  Sterile dressing applied            Patient tolerated procedure follow up in one week

## 2023-08-09 ENCOUNTER — PROCEDURE VISIT (OUTPATIENT)
Dept: OBGYN CLINIC | Facility: CLINIC | Age: 68
End: 2023-08-09
Payer: MEDICARE

## 2023-08-09 VITALS
HEIGHT: 66 IN | DIASTOLIC BLOOD PRESSURE: 73 MMHG | BODY MASS INDEX: 21.21 KG/M2 | HEART RATE: 62 BPM | WEIGHT: 132 LBS | SYSTOLIC BLOOD PRESSURE: 117 MMHG

## 2023-08-09 DIAGNOSIS — M17.11 PRIMARY OSTEOARTHRITIS OF RIGHT KNEE: Primary | ICD-10-CM

## 2023-08-09 PROCEDURE — 20610 DRAIN/INJ JOINT/BURSA W/O US: CPT | Performed by: PHYSICIAN ASSISTANT

## 2023-08-09 RX ORDER — HYALURONATE SODIUM 10 MG/ML
20 SYRINGE (ML) INTRAARTICULAR
Status: COMPLETED | OUTPATIENT
Start: 2023-08-09 | End: 2023-08-09

## 2023-08-09 RX ADMIN — Medication 20 MG: at 13:00

## 2023-08-09 NOTE — PROGRESS NOTES
1. Primary osteoarthritis of right knee  Large joint arthrocentesis: R knee    Sodium Hyaluronate (Viscosup) 20 mg        Patient is here for her third injection of Euflexxa into the right knee. Physical exam of the knee shows no effusion no ecchymosis. Large joint arthrocentesis: R knee  Universal Protocol:  Consent given by: patient  Time out: Immediately prior to procedure a "time out" was called to verify the correct patient, procedure, equipment, support staff and site/side marked as required.   Site marked: the operative site was marked  Supporting Documentation  Indications: pain   Procedure Details  Location: knee - R knee  Preparation: Patient was prepped and draped in the usual sterile fashion  Needle size: 22 G  Ultrasound guidance: no  Approach: anterolateral  Medications administered: 20 mg Sodium Hyaluronate (Viscosup) 20 MG/2ML    Patient tolerance: patient tolerated the procedure well with no immediate complications  Dressing:  Sterile dressing applied            Patient tolerated procedure follow up PRN may f/u in October for CS if needed

## 2023-08-12 DIAGNOSIS — E03.9 ACQUIRED HYPOTHYROIDISM: ICD-10-CM

## 2023-08-12 RX ORDER — LEVOTHYROXINE SODIUM 0.05 MG/1
TABLET ORAL
Qty: 90 TABLET | Refills: 0 | Status: SHIPPED | OUTPATIENT
Start: 2023-08-12

## 2023-09-07 ENCOUNTER — TELEPHONE (OUTPATIENT)
Age: 68
End: 2023-09-07

## 2023-09-07 ENCOUNTER — ANNUAL EXAM (OUTPATIENT)
Dept: OBGYN CLINIC | Facility: CLINIC | Age: 68
End: 2023-09-07
Payer: MEDICARE

## 2023-09-07 VITALS
WEIGHT: 132.8 LBS | HEIGHT: 66 IN | SYSTOLIC BLOOD PRESSURE: 120 MMHG | BODY MASS INDEX: 21.34 KG/M2 | DIASTOLIC BLOOD PRESSURE: 68 MMHG

## 2023-09-07 DIAGNOSIS — Z01.419 WOMEN'S ANNUAL ROUTINE GYNECOLOGICAL EXAMINATION: ICD-10-CM

## 2023-09-07 DIAGNOSIS — Z12.31 ENCOUNTER FOR SCREENING MAMMOGRAM FOR BREAST CANCER: Primary | ICD-10-CM

## 2023-09-07 PROCEDURE — G0101 CA SCREEN;PELVIC/BREAST EXAM: HCPCS | Performed by: OBSTETRICS & GYNECOLOGY

## 2023-09-07 NOTE — PATIENT INSTRUCTIONS
The patient was informed of a stable menopausal GYN examination. A Pap smear was not performed because of low risk. Her colonoscopies and mammograms up-to-date. She feels safe at home. She sees a dentist on a regular basis. She should return to my office in 1 to 2 years unless new issues occur.

## 2023-09-07 NOTE — PROGRESS NOTES
Assessment/Plan:    Patient was informed of a stable menopausal GYN examination. Pap smear was not performed. She will continue her colonoscopies and mammograms as directed. She is happy with her weight. There is no problem sexuality. She should return to my office in  2 years unless new issues occur. Subjective:      Patient ID: Srikanth Mcbride is a 79 y.o. female. HPI    This is a 70-year-old white female, she is a  3 para 3 with 3 prior vaginal deliveries. She is now menopausal.  She been remarried for over 3 years. There is no problem with intimacy. Menopause symptoms are under control. She is content with her weight. She feels safe at home. She sees a dentist on a regular basis. Denies any prior depression or anxiety. Colonoscopies and mammograms to date. There are no new major family illnesses to report. There is no major  complaint. The following portions of the patient's history were reviewed and updated as appropriate: allergies, current medications, past family history, past medical history, past social history, past surgical history and problem list.    Review of Systems   All other systems reviewed and are negative. Objective:      /68   Ht 5' 6" (1.676 m)   Wt 60.2 kg (132 lb 12.8 oz)   BMI 21.43 kg/m²          Physical Exam  Vitals reviewed. Exam conducted with a chaperone present. Constitutional:       Appearance: Normal appearance. She is normal weight. HENT:      Head: Normocephalic and atraumatic. Mouth/Throat:      Mouth: Mucous membranes are moist.   Eyes:      Pupils: Pupils are equal, round, and reactive to light. Cardiovascular:      Rate and Rhythm: Normal rate and regular rhythm. Pulses: Normal pulses. Heart sounds: Normal heart sounds. Pulmonary:      Effort: Pulmonary effort is normal.      Breath sounds: Normal breath sounds.    Chest:   Breasts:     Breasts are symmetrical.      Right: Normal. No swelling, bleeding, inverted nipple, mass, nipple discharge, skin change or tenderness. Left: Normal. No swelling, bleeding, inverted nipple, mass, nipple discharge, skin change or tenderness. Abdominal:      General: Abdomen is flat. Bowel sounds are normal.      Palpations: Abdomen is soft. There is no hepatomegaly or splenomegaly. Hernia: No hernia is present. There is no hernia in the left inguinal area or right inguinal area. Genitourinary:     General: Normal vulva. Pubic Area: No rash or pubic lice. Labia:         Right: No rash, tenderness, lesion or injury. Left: No rash, tenderness, lesion or injury. Urethra: No prolapse, urethral pain, urethral swelling or urethral lesion. Vagina: Normal. No signs of injury and foreign body. No vaginal discharge, erythema, tenderness, bleeding, lesions or prolapsed vaginal walls. Cervix: Normal.      Uterus: Normal.       Adnexa: Right adnexa normal and left adnexa normal.        Right: No mass, tenderness or fullness. Left: No mass, tenderness or fullness. Rectum: Normal.      Comments: The external genitalia normal limits the vagina is clean the uterus is small mobile nontender adnexa clear bilaterally. There is no evidence of prolapse. A Pap smear was not performed because of low risk. The urethra and bladder normal working relationship. Musculoskeletal:         General: Normal range of motion. Cervical back: Normal range of motion and neck supple. Lymphadenopathy:      Upper Body:      Right upper body: No supraclavicular or axillary adenopathy. Left upper body: No supraclavicular or axillary adenopathy. Skin:     General: Skin is warm and dry. Neurological:      General: No focal deficit present. Mental Status: She is alert and oriented to person, place, and time.    Psychiatric:         Mood and Affect: Mood normal.         Behavior: Behavior normal.

## 2023-09-07 NOTE — TELEPHONE ENCOUNTER
Caller: Patient     Doctor: Eulogio Rivas     Reason for call: Patient would like a recommendation of who you would recommend for her knee      Call back#: [phone]

## 2023-10-12 ENCOUNTER — OFFICE VISIT (OUTPATIENT)
Dept: INTERNAL MEDICINE CLINIC | Facility: CLINIC | Age: 68
End: 2023-10-12
Payer: MEDICARE

## 2023-10-12 VITALS
SYSTOLIC BLOOD PRESSURE: 106 MMHG | DIASTOLIC BLOOD PRESSURE: 72 MMHG | BODY MASS INDEX: 21.38 KG/M2 | OXYGEN SATURATION: 98 % | TEMPERATURE: 96.2 F | HEART RATE: 63 BPM | HEIGHT: 66 IN | WEIGHT: 133 LBS

## 2023-10-12 DIAGNOSIS — M85.832 OSTEOPENIA OF LEFT FOREARM: ICD-10-CM

## 2023-10-12 DIAGNOSIS — E53.8 VITAMIN B12 DEFICIENCY: ICD-10-CM

## 2023-10-12 DIAGNOSIS — L40.59 POLYARTICULAR PSORIATIC ARTHRITIS (HCC): ICD-10-CM

## 2023-10-12 DIAGNOSIS — M17.11 PRIMARY OSTEOARTHRITIS OF RIGHT KNEE: ICD-10-CM

## 2023-10-12 DIAGNOSIS — Z00.00 HEALTH MAINTENANCE EXAMINATION: ICD-10-CM

## 2023-10-12 DIAGNOSIS — Z23 NEED FOR PNEUMOCOCCAL VACCINATION: ICD-10-CM

## 2023-10-12 DIAGNOSIS — Z23 ENCOUNTER FOR IMMUNIZATION: ICD-10-CM

## 2023-10-12 DIAGNOSIS — E03.9 ACQUIRED HYPOTHYROIDISM: Primary | ICD-10-CM

## 2023-10-12 DIAGNOSIS — D70.9 NEUTROPENIA, UNSPECIFIED TYPE (HCC): ICD-10-CM

## 2023-10-12 DIAGNOSIS — E78.49 OTHER HYPERLIPIDEMIA: ICD-10-CM

## 2023-10-12 PROCEDURE — G0009 ADMIN PNEUMOCOCCAL VACCINE: HCPCS | Performed by: INTERNAL MEDICINE

## 2023-10-12 PROCEDURE — 90677 PCV20 VACCINE IM: CPT | Performed by: INTERNAL MEDICINE

## 2023-10-12 PROCEDURE — G0439 PPPS, SUBSEQ VISIT: HCPCS | Performed by: INTERNAL MEDICINE

## 2023-10-12 PROCEDURE — 99214 OFFICE O/P EST MOD 30 MIN: CPT | Performed by: INTERNAL MEDICINE

## 2023-10-12 NOTE — PROGRESS NOTES
Assessment and Plan:     Problem List Items Addressed This Visit        Endocrine    Acquired hypothyroidism - Primary     Repeat labs due. Taking medication appropriately. Relevant Orders    TSH, 3rd generation with Free T4 reflex       Musculoskeletal and Integument    Osteopenia of left forearm     Continue daily walks and supplements. Relevant Orders    DXA bone density spine hip and pelvis    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HCC)     Stable, on sulfasalazine. Per rheum. Primary osteoarthritis of right knee     S/p injections, considering surgery. Per Ortho. Other    Leukopenia     Stable. Relevant Orders    CBC and differential    Other hyperlipidemia     Repeat lipids, not on statin. Taking omega-3. Relevant Orders    Comprehensive metabolic panel    Lipid panel    Vitamin B12 deficiency     Taking daily B12. Relevant Orders    Vitamin B12   Other Visit Diagnoses     Encounter for immunization        Need for pneumococcal vaccination        Relevant Orders    Pneumococcal Conjugate Vaccine 20-valent (Pcv20) (Completed)    Health maintenance examination        Prevnar today, flu vaccine updated. Recommend COVID, RSV vaccine. Reviewed high protein diet, recommend carbs during work out days. Depression Screening and Follow-up Plan: Patient was screened for depression during today's encounter. They screened negative with a PHQ-2 score of 0. Preventive health issues were discussed with patient, and age appropriate screening tests were ordered as noted in patient's After Visit Summary. Personalized health advice and appropriate referrals for health education or preventive services given if needed, as noted in patient's After Visit Summary. History of Present Illness:     Patient presents for a Medicare Wellness Visit and follow up visit. She feels well. She reports more frequent right knee pain, has had the injections.  She is considering surgery. She also reports more frequent left shoulder pain, worse when sleeping. No recent injury or fall. She had lost some weight, not intentional. She does not eat a lot of meat. She remains very active, exercises regularly. No chest pain, shortness of breath, palpitations or other symptoms. She sees rheum regularly, no medication changes, doing well. Patient Care Team:  Kaz Rodriguez MD as PCP - MD Rock Leola Rick MD Jeana Doe, MD Jeana Doe, MD as Endoscopist     Review of Systems:     Review of Systems   Constitutional:  Negative for activity change, appetite change and fatigue. HENT:  Negative for congestion, ear pain and postnasal drip. Eyes:  Negative for visual disturbance. Respiratory:  Negative for cough and shortness of breath. Cardiovascular:  Negative for chest pain and leg swelling. Gastrointestinal:  Negative for abdominal pain, constipation and diarrhea. Genitourinary:  Negative for dysuria, frequency and urgency. Musculoskeletal:  Positive for arthralgias. Negative for joint swelling and myalgias. Skin:  Negative for rash and wound. Neurological:  Negative for dizziness, numbness and headaches. Hematological:  Does not bruise/bleed easily. Psychiatric/Behavioral:  Negative for confusion. The patient is not nervous/anxious.          Problem List:     Patient Active Problem List   Diagnosis   • Osteoarthritis of right glenohumeral joint   • Arthritis   • Chondromalacia patellae   • Current tear of medial cartilage or meniscus of knee   • Dense breasts   • Hearing loss   • Chronic pain of left knee   • Leukopenia   • Primary osteoarthritis of right knee   • Localized secondary osteoarthritis of right shoulder region   • Varicose veins without complication   • Acquired hypothyroidism   • Primary osteoarthritis of left knee   • Degenerative tear of meniscus, left   • History of total shoulder replacement, right   • Other hyperlipidemia   • Abdominal cramps   • Bilateral primary osteoarthritis of knee   • Primary osteoarthritis of left shoulder   • Joint crepitation   • Osteopenia of left forearm   • 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HCC)   • Primary generalized (osteo)arthritis   • Plaque psoriasis   • Vitamin B12 deficiency      Past Medical and Surgical History:     Past Medical History:   Diagnosis Date   • Abnormal thyroid function test     R. ... 17    • Arthritis    • Bilateral hearing loss, unspecified hearing loss type    • Colon polyps    • Disease of thyroid gland    • Dislocated shoulder     Right / LA. Diamond Phalen Diamond Phalen 13    • Effusion of knee     LA. .. 14   R. .. 17    • HL (hearing loss)    • Hypothyroidism     LA. .. 13   R. ...3/31/15    • Primary osteoarthritis of right knee     LA. ..14   R. .. 14    • Prolapsing mitral leaflet syndrome     LA. .. 13   AR. Diamond Phalen Diamond Phalen 3/31/15    • Psoriasis    • Psoriatic arthritis (HCC)    • Tinnitus     ringing in both ears   • Vaginal Pap smear, abnormal    • Vitamin D deficiency     LA. .. 17  R. Diamond Phalen Diamond Phalen 3/31/15      Past Surgical History:   Procedure Laterality Date   • CERVICAL BIOPSY  W/ LOOP ELECTRODE EXCISION     • COLONOSCOPY     • COLONOSCOPY W/ POLYPECTOMY     • JOINT REPLACEMENT  2017    R shoulder   • KNEE ARTHROSCOPY Right     right knee, right shoulder , open right shoulder    • ND ARTHROPLASTY GLENOHUMERAL JOINT TOTAL SHOULDER Right 2017    Procedure: TOTAL SHOULDER ARTHROPLASTY ;  Surgeon: Trish Ferrara MD;  Location:  MAIN OR;  Service: Orthopedics   • ND COLONOSCOPY FLX DX W/COLLJ [facility] [place] WHEN PFRMD N/A 10/24/2017    Procedure: COLONOSCOPY;  Surgeon: Bj Zacarias MD;  Location: [facility] GI LAB;   Service: Gastroenterology   • SHOULDER SURGERY Right     total shoulder replacement    • SKIN BIOPSY        Family History:     Family History   Problem Relation Age of Onset   • Arthritis Mother          - brain lesion    • Diabetes type II Mother • Diabetes Mother    • Arthritis Father            • Throat cancer Father    • Diabetes type II Maternal Grandmother    • Colon cancer Maternal Uncle    • Diabetes Family    • Osteoarthritis Family    • No Known Problems Sister    • No Known Problems Daughter    • Prostate cancer Maternal Grandfather    • Colon cancer Maternal Grandfather    • Breast cancer Paternal Grandmother 54   • No Known Problems Paternal Grandfather    • Breast cancer Paternal Aunt 50      Social History:     Social History     Socioeconomic History   • Marital status: /Civil Union     Spouse name: None   • Number of children: None   • Years of education: None   • Highest education level: None   Occupational History   • 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      Comment: working full time   Tobacco Use   • Smoking status: Never   • Smokeless tobacco: Never   Vaping Use   • Vaping Use: Never used   Substance and Sexual Activity   • Alcohol use: Yes     Alcohol/week: 3.0 - 4.0 standard drinks of alcohol     Types: 3 - 4 Glasses of wine per week     Comment: socially   • Drug use: No   • Sexual activity: Yes     Partners: Male     Birth control/protection: Post-menopausal   Other Topics Concern   • None   Social History Narrative    , remarried     Working - respiratory therapist     Social Determinants of Health     Financial Resource Strain: [address] [facility]  (10/11/2023)    Overall Financial Resource Strain (CARDIA)    • Difficulty of Paying Living Expenses: Not hard at all   Food Insecurity: Not on file   Transportation Needs: No Transportation Needs (10/11/2023)    PRAPARE - Transportation    • Lack of Transportation (Medical): No    • Lack of Transportation (Non-Medical):  No   Physical Activity: Not on file   Stress: Not on file   Social Connections: Not on file   Intimate Partner Violence: Not on file   Housing Stability: Not on file      Medications and Allergies:     Current Outpatient Medications   Medication Sig Dispense Refill   • Ascorbic Acid (VITAMIN C PO) Vitamin C     • Calcium Carb-Cholecalciferol (CALCIUM + D3 PO) Take by mouth daily       • Cholecalciferol (VITAMIN D3 PO) Vitamin D3     • ciclopirox (LOPROX) 0.77 % SUSP Apply twice daily to affected toenails 60 mL 3   • clobetasol propionate (CLOBEX) 0.05 % shampoo Apply 1 application. topically daily for 14 days (Patient not taking: Reported on 9/7/2023) 118 mL 1   • Cyanocobalamin (B-12) 1000 MCG TABS      • diclofenac sodium (VOLTAREN) 1 % Apply 2 g topically 4 (four) times a day 3 Tube 1   • glucosamine-chondroitin 500-400 MG tablet Take 1 tablet by mouth 3 (three) times a day     • levothyroxine 50 mcg tablet TAKE 1 TABLET BY MOUTH EVERY DAY 90 tablet 0   • Omega-3 Fatty Acids (FISH OIL PO) Fish Oil     • sulfaSALAzine (AZULFIDINE-EN) 500 mg EC tablet Take 1 tablet (500 mg total) by mouth 2 (two) times a day 180 tablet 1     No current facility-administered medications for this visit. No Known Allergies   Immunizations:     Immunization History   Administered Date(s) Administered   • COVID-19 MODERNA VACC 0.5 ML IM 12/29/2020, 01/27/2021   • COVID-19 Pfizer vac ([place]-sucrose, gray cap) 12 yr+ IM 04/16/2022   • INFLUENZA 10/09/2013, 10/15/2018   • Influenza, high dose seasonal 0.7 mL 10/15/2021, 10/07/2022, 10/11/2023   • Influenza, seasonal, injectable 10/09/2013   • Pneumococcal Conjugate 13-Valent 10/15/2021   • Pneumococcal Conjugate Vaccine 20-valent (Pcv20), Polysace 10/12/2023   • Tuberculin Skin Test-PPD Intradermal 10/25/2016   • Zoster 10/27/2016      Health Maintenance:         Topic Date Due   • Breast Cancer Screening: Mammogram  11/14/2023   • Colorectal Cancer Screening  11/12/2030   • Hepatitis C Screening  Completed         Topic Date Due   • COVID-19 Vaccine (4 - Signa Holy series) 06/11/2022      Medicare Screening Tests and Risk Assessments:     Shilpi Butler is here for her Subsequent Wellness visit.  Last Medicare Wellness visit information reviewed, patient interviewed and updates made to the record today. Health Risk Assessment:   Patient rates overall health as very good. Patient feels that their physical health rating is same. Patient is very satisfied with their life. Eyesight was rated as same. Hearing was rated as same. Patient feels that their emotional and mental health rating is same. Patients states they are never, rarely angry. Patient states they are never, rarely unusually tired/fatigued. Pain experienced in the last 7 days has been some. Patient's pain rating has been 6/10. Patient states that she has experienced no weight loss or gain in last 6 months. Depression Screening:   PHQ-2 Score: 0      Fall Risk Screening: In the past year, patient has experienced: no history of falling in past year      Urinary Incontinence Screening:   Patient has not leaked urine accidently in the last six months. Home Safety:  Patient does not have trouble with stairs inside or outside of their home. Patient has working smoke alarms and has working carbon monoxide detector. Home safety hazards include: none. Nutrition:   Current diet is Regular. Medications:   Patient is currently taking over-the-counter supplements. OTC medications include: see medication list. Patient is able to manage medications. Activities of Daily Living (ADLs)/Instrumental Activities of Daily Living (IADLs):   Walk and transfer into and out of bed and chair?: Yes  Dress and groom yourself?: Yes    Bathe or shower yourself?: Yes    Feed yourself? Yes  Do your laundry/housekeeping?: Yes  Manage your money, pay your bills and track your expenses?: Yes  Make your own meals?: Yes    Do your own shopping?: Yes    Durable Medical Equipment Suppliers  [employer]    Previous Hospitalizations:   Any hospitalizations or ED visits within the last 12 months?: No      Advance Care Planning:   Living will: Yes    Durable POA for healthcare:  Yes Advanced directive: Yes    End of Life Decisions reviewed with patient: No      Cognitive Screening:   Provider or family/friend/caregiver concerned regarding cognition?: No    PREVENTIVE SCREENINGS      Cardiovascular Screening:    General: History Lipid Disorder and Screening Current      Diabetes Screening:     General: Screening Current      Colorectal Cancer Screening:     General: Screening Current      Breast Cancer Screening:     General: Screening Current      Cervical Cancer Screening:    General: Screening Not Indicated      Osteoporosis Screening:    General: Screening Current      Abdominal Aortic Aneurysm (AAA) Screening:        General: Screening Not Indicated      Lung Cancer Screening:     General: Screening Not Indicated      Hepatitis C Screening:    General: Screening Current    Screening, Brief Intervention, and Referral to Treatment (SBIRT)    Screening  Typical number of drinks in a day: 1  Typical number of drinks in a week: 3  Interpretation: Low risk drinking behavior. AUDIT-C Screenin) How often did you have a drink containing alcohol in the past year? 2 to 3 times a week  2) How many drinks did you have on a typical day when you were drinking in the past year? 1 to 2  3) How often did you have 6 or more drinks on one occasion in the past year? never    AUDIT-C Score: 3  Interpretation: Score 3-12 (female): POSITIVE screen for alcohol misuse    AUDIT Screenin) How often during the last year have you found that you were not able to stop drinking once you had started? 0 - never  5) How often during the last year have you failed to do what was normally expected from you because of drinking? 0 - never  6) How often during the last year have you needed a first drink in the morning to get yourself going after a heavy drinking session?  0 - never  7) How often during the last year have you had a feeling of guilt or remorse after drinking? 0 - never  8) How often during the last year have you been unable to remember what happened the night before because you had been drinking? 0 - never  9) Have you or someone else been injured as a result of your drinking? 0 - no  10) Has a relative or friend or a doctor or another health worker been concerned about your drinking or suggested you cut down? 0 - no    AUDIT Score: 3  Interpretation: Low risk alcohol consumption    Single Item Drug Screening:  How often have you used an illegal drug (including marijuana) or a prescription medication for non-medical reasons in the past year? never    Single Item Drug Screen Score: 0  Interpretation: Negative screen for possible drug use disorder    Other Counseling Topics:   Calcium and vitamin D intake and regular weightbearing exercise. No results found. Physical Exam:     /72   Pulse 63   Temp (!) 96.2 °F (35.7 °C)   Ht 5' 6" (1.676 m)   Wt 60.3 kg (133 lb)   SpO2 98%   BMI 21.47 kg/m²     Physical Exam  Vitals and nursing note reviewed. Constitutional:       General: She is not in acute distress. Appearance: She is well-developed. HENT:      Head: Normocephalic and atraumatic. Right Ear: Tympanic membrane, ear canal and external ear normal.      Left Ear: Tympanic membrane, ear canal and external ear normal.      Mouth/Throat:      Mouth: Mucous membranes are moist.   Eyes:      Conjunctiva/sclera: Conjunctivae normal.   Cardiovascular:      Rate and Rhythm: Normal rate and regular rhythm. Heart sounds: No murmur heard. Pulmonary:      Effort: Pulmonary effort is normal. No respiratory distress. Breath sounds: Normal breath sounds. Abdominal:      Palpations: Abdomen is soft. Tenderness: There is no abdominal tenderness. Musculoskeletal:         General: No swelling. Left shoulder: Normal range of motion. Cervical back: Neck supple. Right knee: Crepitus present. No bony tenderness. No tenderness. Left knee: No bony tenderness or crepitus. No tenderness. Skin:     General: Skin is warm and dry. Neurological:      General: No focal deficit present. Mental Status: She is alert and oriented to person, place, and time. Psychiatric:         Mood and Affect: Mood normal.         Behavior: Behavior normal.          Ronnie Schmitz MD    Labs & imaging results reviewed with patient.

## 2023-10-13 ENCOUNTER — APPOINTMENT (OUTPATIENT)
Dept: LAB | Facility: CLINIC | Age: 68
End: 2023-10-13
Payer: MEDICARE

## 2023-10-13 DIAGNOSIS — L40.59 POLYARTICULAR PSORIATIC ARTHRITIS (HCC): ICD-10-CM

## 2023-10-13 DIAGNOSIS — Z79.899 ENCOUNTER FOR LONG-TERM (CURRENT) USE OF OTHER MEDICATIONS: ICD-10-CM

## 2023-10-13 LAB
ALBUMIN SERPL BCP-MCNC: 4.2 G/DL (ref 3.5–5)
ALP SERPL-CCNC: 59 U/L (ref 34–104)
ALT SERPL W P-5'-P-CCNC: 16 U/L (ref 7–52)
ANION GAP SERPL CALCULATED.3IONS-SCNC: 8 MMOL/L
AST SERPL W P-5'-P-CCNC: 29 U/L (ref 13–39)
BASOPHILS # BLD AUTO: 0.04 THOUSANDS/ÂΜL (ref 0–0.1)
BASOPHILS NFR BLD AUTO: 1 % (ref 0–1)
BILIRUB SERPL-MCNC: 0.41 MG/DL (ref 0.2–1)
BUN SERPL-MCNC: 15 MG/DL (ref 5–25)
CALCIUM SERPL-MCNC: 9.3 MG/DL (ref 8.4–10.2)
CHLORIDE SERPL-SCNC: 103 MMOL/L (ref 96–108)
CHOLEST SERPL-MCNC: 210 MG/DL
CO2 SERPL-SCNC: 27 MMOL/L (ref 21–32)
CREAT SERPL-MCNC: 0.8 MG/DL (ref 0.6–1.3)
CRP SERPL QL: 2.5 MG/L
EOSINOPHIL # BLD AUTO: 0.03 THOUSAND/ÂΜL (ref 0–0.61)
EOSINOPHIL NFR BLD AUTO: 1 % (ref 0–6)
ERYTHROCYTE [DISTWIDTH] IN BLOOD BY AUTOMATED COUNT: 12.2 % (ref 11.6–15.1)
ERYTHROCYTE [SEDIMENTATION RATE] IN BLOOD: 19 MM/HOUR (ref 0–29)
GFR SERPL CREATININE-BSD FRML MDRD: 76 ML/MIN/1.73SQ M
GLUCOSE P FAST SERPL-MCNC: 81 MG/DL (ref 65–99)
HCT VFR BLD AUTO: 42.9 % (ref 34.8–46.1)
HDLC SERPL-MCNC: 77 MG/DL
HGB BLD-MCNC: 14 G/DL (ref 11.5–15.4)
IMM GRANULOCYTES # BLD AUTO: 0.02 THOUSAND/UL (ref 0–0.2)
IMM GRANULOCYTES NFR BLD AUTO: 0 % (ref 0–2)
LDLC SERPL CALC-MCNC: 122 MG/DL (ref 0–100)
LYMPHOCYTES # BLD AUTO: 1.92 THOUSANDS/ÂΜL (ref 0.6–4.47)
LYMPHOCYTES NFR BLD AUTO: 29 % (ref 14–44)
MCH RBC QN AUTO: 32.7 PG (ref 26.8–34.3)
MCHC RBC AUTO-ENTMCNC: 32.6 G/DL (ref 31.4–37.4)
MCV RBC AUTO: 100 FL (ref 82–98)
MONOCYTES # BLD AUTO: 0.89 THOUSAND/ÂΜL (ref 0.17–1.22)
MONOCYTES NFR BLD AUTO: 13 % (ref 4–12)
NEUTROPHILS # BLD AUTO: 3.76 THOUSANDS/ÂΜL (ref 1.85–7.62)
NEUTS SEG NFR BLD AUTO: 56 % (ref 43–75)
NONHDLC SERPL-MCNC: 133 MG/DL
NRBC BLD AUTO-RTO: 0 /100 WBCS
PLATELET # BLD AUTO: 298 THOUSANDS/UL (ref 149–390)
PMV BLD AUTO: 10.1 FL (ref 8.9–12.7)
POTASSIUM SERPL-SCNC: 4.6 MMOL/L (ref 3.5–5.3)
PROT SERPL-MCNC: 7.3 G/DL (ref 6.4–8.4)
RBC # BLD AUTO: 4.28 MILLION/UL (ref 3.81–5.12)
SODIUM SERPL-SCNC: 138 MMOL/L (ref 135–147)
T4 FREE SERPL-MCNC: 0.85 NG/DL (ref 0.61–1.12)
TRIGL SERPL-MCNC: 53 MG/DL
TSH SERPL DL<=0.05 MIU/L-ACNC: 6.01 UIU/ML (ref 0.45–4.5)
VIT B12 SERPL-MCNC: 918 PG/ML (ref 180–914)
WBC # BLD AUTO: 6.66 THOUSAND/UL (ref 4.31–10.16)

## 2023-10-13 PROCEDURE — 86140 C-REACTIVE PROTEIN: CPT

## 2023-10-13 PROCEDURE — 36415 COLL VENOUS BLD VENIPUNCTURE: CPT

## 2023-10-13 PROCEDURE — 85652 RBC SED RATE AUTOMATED: CPT

## 2023-10-13 PROCEDURE — 84443 ASSAY THYROID STIM HORMONE: CPT | Performed by: INTERNAL MEDICINE

## 2023-10-13 PROCEDURE — 80061 LIPID PANEL: CPT | Performed by: INTERNAL MEDICINE

## 2023-10-13 PROCEDURE — 82607 VITAMIN B-12: CPT | Performed by: INTERNAL MEDICINE

## 2023-10-13 PROCEDURE — 80053 COMPREHEN METABOLIC PANEL: CPT | Performed by: INTERNAL MEDICINE

## 2023-10-13 PROCEDURE — 84439 ASSAY OF FREE THYROXINE: CPT | Performed by: INTERNAL MEDICINE

## 2023-10-13 PROCEDURE — 85025 COMPLETE CBC W/AUTO DIFF WBC: CPT | Performed by: INTERNAL MEDICINE

## 2023-10-14 ENCOUNTER — TELEPHONE (OUTPATIENT)
Dept: INTERNAL MEDICINE CLINIC | Facility: CLINIC | Age: 68
End: 2023-10-14

## 2023-10-14 DIAGNOSIS — E03.9 ACQUIRED HYPOTHYROIDISM: Primary | ICD-10-CM

## 2023-10-14 NOTE — TELEPHONE ENCOUNTER
Please call patient for lab results: Your cholesterol is slightly worse. Thyroid test is a bit abnormal. If you are feeling well, no need to change medication dose. You can take your B12 3 days a week only.     The rest of your labs were normal.

## 2023-10-16 NOTE — TELEPHONE ENCOUNTER
Patient returning a call from the office regarding her lab orders.  Message was relayed and patient aware of lab orders

## 2023-11-10 DIAGNOSIS — E03.9 ACQUIRED HYPOTHYROIDISM: ICD-10-CM

## 2023-11-10 RX ORDER — LEVOTHYROXINE SODIUM 0.05 MG/1
TABLET ORAL
Qty: 90 TABLET | Refills: 0 | Status: SHIPPED | OUTPATIENT
Start: 2023-11-10

## 2023-11-20 ENCOUNTER — HOSPITAL ENCOUNTER (OUTPATIENT)
Dept: MAMMOGRAPHY | Facility: MEDICAL CENTER | Age: 68
Discharge: HOME | End: 2023-11-20
Payer: MEDICARE

## 2023-11-20 VITALS — WEIGHT: 133 LBS | HEIGHT: 66 IN | BODY MASS INDEX: 21.38 KG/M2

## 2023-11-20 DIAGNOSIS — Z12.31 ENCOUNTER FOR SCREENING MAMMOGRAM FOR BREAST CANCER: ICD-10-CM

## 2023-11-20 PROCEDURE — 77067 SCR MAMMO BI INCL CAD: CPT

## 2023-11-20 PROCEDURE — 77063 BREAST TOMOSYNTHESIS BI: CPT

## 2023-12-15 ENCOUNTER — APPOINTMENT (OUTPATIENT)
Dept: LAB | Facility: CLINIC | Age: 68
End: 2023-12-15
Payer: MEDICARE

## 2023-12-15 DIAGNOSIS — L40.59 POLYARTICULAR PSORIATIC ARTHRITIS (HCC): ICD-10-CM

## 2023-12-15 DIAGNOSIS — Z79.899 ENCOUNTER FOR LONG-TERM (CURRENT) USE OF OTHER MEDICATIONS: ICD-10-CM

## 2023-12-15 DIAGNOSIS — E03.9 ACQUIRED HYPOTHYROIDISM: ICD-10-CM

## 2023-12-15 LAB — TSH SERPL DL<=0.05 MIU/L-ACNC: 2.71 UIU/ML (ref 0.45–4.5)

## 2023-12-15 PROCEDURE — 84443 ASSAY THYROID STIM HORMONE: CPT

## 2023-12-15 PROCEDURE — 36415 COLL VENOUS BLD VENIPUNCTURE: CPT

## 2024-01-11 DIAGNOSIS — Z00.6 ENCOUNTER FOR EXAMINATION FOR NORMAL COMPARISON OR CONTROL IN CLINICAL RESEARCH PROGRAM: ICD-10-CM

## 2024-01-22 ENCOUNTER — APPOINTMENT (OUTPATIENT)
Dept: LAB | Facility: CLINIC | Age: 69
End: 2024-01-22
Payer: MEDICARE

## 2024-01-22 ENCOUNTER — TELEPHONE (OUTPATIENT)
Age: 69
End: 2024-01-22

## 2024-01-22 DIAGNOSIS — Z00.6 ENCOUNTER FOR EXAMINATION FOR NORMAL COMPARISON OR CONTROL IN CLINICAL RESEARCH PROGRAM: ICD-10-CM

## 2024-01-22 LAB
ALBUMIN SERPL BCP-MCNC: 4.2 G/DL (ref 3.5–5)
ALP SERPL-CCNC: 56 U/L (ref 34–104)
ALT SERPL W P-5'-P-CCNC: 13 U/L (ref 7–52)
ANION GAP SERPL CALCULATED.3IONS-SCNC: 8 MMOL/L
AST SERPL W P-5'-P-CCNC: 22 U/L (ref 13–39)
BASOPHILS # BLD AUTO: 0.05 THOUSANDS/ÂΜL (ref 0–0.1)
BASOPHILS NFR BLD AUTO: 1 % (ref 0–1)
BILIRUB SERPL-MCNC: 0.44 MG/DL (ref 0.2–1)
BUN SERPL-MCNC: 14 MG/DL (ref 5–25)
CALCIUM SERPL-MCNC: 9.4 MG/DL (ref 8.4–10.2)
CHLORIDE SERPL-SCNC: 104 MMOL/L (ref 96–108)
CO2 SERPL-SCNC: 29 MMOL/L (ref 21–32)
CREAT SERPL-MCNC: 0.86 MG/DL (ref 0.6–1.3)
CRP SERPL QL: 1.2 MG/L
EOSINOPHIL # BLD AUTO: 0.04 THOUSAND/ÂΜL (ref 0–0.61)
EOSINOPHIL NFR BLD AUTO: 1 % (ref 0–6)
ERYTHROCYTE [DISTWIDTH] IN BLOOD BY AUTOMATED COUNT: 12.3 % (ref 11.6–15.1)
ERYTHROCYTE [SEDIMENTATION RATE] IN BLOOD: 9 MM/HOUR (ref 0–29)
GFR SERPL CREATININE-BSD FRML MDRD: 69 ML/MIN/1.73SQ M
GLUCOSE SERPL-MCNC: 87 MG/DL (ref 65–140)
HCT VFR BLD AUTO: 40.8 % (ref 34.8–46.1)
HGB BLD-MCNC: 13.2 G/DL (ref 11.5–15.4)
IMM GRANULOCYTES # BLD AUTO: 0.04 THOUSAND/UL (ref 0–0.2)
IMM GRANULOCYTES NFR BLD AUTO: 1 % (ref 0–2)
LYMPHOCYTES # BLD AUTO: 2.09 THOUSANDS/ÂΜL (ref 0.6–4.47)
LYMPHOCYTES NFR BLD AUTO: 29 % (ref 14–44)
MCH RBC QN AUTO: 32 PG (ref 26.8–34.3)
MCHC RBC AUTO-ENTMCNC: 32.4 G/DL (ref 31.4–37.4)
MCV RBC AUTO: 99 FL (ref 82–98)
MONOCYTES # BLD AUTO: 0.81 THOUSAND/ÂΜL (ref 0.17–1.22)
MONOCYTES NFR BLD AUTO: 11 % (ref 4–12)
NEUTROPHILS # BLD AUTO: 4.24 THOUSANDS/ÂΜL (ref 1.85–7.62)
NEUTS SEG NFR BLD AUTO: 57 % (ref 43–75)
NRBC BLD AUTO-RTO: 0 /100 WBCS
PLATELET # BLD AUTO: 311 THOUSANDS/UL (ref 149–390)
PMV BLD AUTO: 10.1 FL (ref 8.9–12.7)
POTASSIUM SERPL-SCNC: 3.9 MMOL/L (ref 3.5–5.3)
PROT SERPL-MCNC: 6.7 G/DL (ref 6.4–8.4)
RBC # BLD AUTO: 4.12 MILLION/UL (ref 3.81–5.12)
SODIUM SERPL-SCNC: 141 MMOL/L (ref 135–147)
WBC # BLD AUTO: 7.27 THOUSAND/UL (ref 4.31–10.16)

## 2024-01-22 PROCEDURE — 80053 COMPREHEN METABOLIC PANEL: CPT

## 2024-01-22 PROCEDURE — 36415 COLL VENOUS BLD VENIPUNCTURE: CPT

## 2024-01-22 PROCEDURE — 85025 COMPLETE CBC W/AUTO DIFF WBC: CPT

## 2024-01-22 PROCEDURE — 85652 RBC SED RATE AUTOMATED: CPT

## 2024-01-22 PROCEDURE — 86140 C-REACTIVE PROTEIN: CPT

## 2024-02-08 DIAGNOSIS — E03.9 ACQUIRED HYPOTHYROIDISM: ICD-10-CM

## 2024-02-08 RX ORDER — LEVOTHYROXINE SODIUM 0.05 MG/1
TABLET ORAL
Qty: 90 TABLET | Refills: 1 | Status: SHIPPED | OUTPATIENT
Start: 2024-02-08

## 2024-02-18 LAB
APOB+LDLR+PCSK9 GENE MUT ANL BLD/T: NOT DETECTED
BRCA1+BRCA2 DEL+DUP + FULL MUT ANL BLD/T: NOT DETECTED
MLH1+MSH2+MSH6+PMS2 GN DEL+DUP+FUL M: NOT DETECTED

## 2024-05-20 ENCOUNTER — HOSPITAL ENCOUNTER (OUTPATIENT)
Dept: RADIOLOGY | Age: 69
Discharge: HOME | End: 2024-05-20
Payer: MEDICARE

## 2024-05-20 DIAGNOSIS — M85.832 OSTEOPENIA OF LEFT FOREARM: ICD-10-CM

## 2024-05-20 PROCEDURE — 77080 DXA BONE DENSITY AXIAL: CPT

## 2024-06-11 ENCOUNTER — OFFICE VISIT (OUTPATIENT)
Age: 69
End: 2024-06-11

## 2024-06-11 VITALS
DIASTOLIC BLOOD PRESSURE: 62 MMHG | SYSTOLIC BLOOD PRESSURE: 116 MMHG | OXYGEN SATURATION: 98 % | HEART RATE: 75 BPM | HEIGHT: 66 IN | BODY MASS INDEX: 21.53 KG/M2 | WEIGHT: 134 LBS | TEMPERATURE: 98.2 F

## 2024-06-11 DIAGNOSIS — M15.0 PRIMARY GENERALIZED (OSTEO)ARTHRITIS: ICD-10-CM

## 2024-06-11 DIAGNOSIS — Z79.899 ENCOUNTER FOR LONG-TERM (CURRENT) USE OF OTHER MEDICATIONS: ICD-10-CM

## 2024-06-11 DIAGNOSIS — L40.59 POLYARTICULAR PSORIATIC ARTHRITIS (HCC): Primary | ICD-10-CM

## 2024-06-11 DIAGNOSIS — M47.816 LUMBAR SPONDYLOSIS: ICD-10-CM

## 2024-06-11 DIAGNOSIS — M85.832 OSTEOPENIA OF LEFT FOREARM: ICD-10-CM

## 2024-06-11 RX ORDER — SULFASALAZINE 500 MG/1
500 TABLET, DELAYED RELEASE ORAL 2 TIMES DAILY
Qty: 180 TABLET | Refills: 1 | Status: SHIPPED | OUTPATIENT
Start: 2024-06-11

## 2024-06-11 NOTE — ASSESSMENT & PLAN NOTE
Lower back pain with history of lumbar spondylosis seen on prior x-rays.  Encouraged regular home exercise program.  If symptoms would worsen could consider updated imaging with a course of physical therapy.

## 2024-06-11 NOTE — ASSESSMENT & PLAN NOTE
Her psoriatic arthritis symptoms are well-controlled with sulfasalazine.  No signs of active inflammation or synovitis we will continue to monitor her response to treatment.  Labs as ordered to monitor for medication side effects and toxicity.  Plan follow-up in 6 months or sooner if needed.

## 2024-06-11 NOTE — PROGRESS NOTES
Ambulatory Visit  Name: Bianka Boyle      : 1955      MRN: [id number]  Encounter Provider: Jennifer Ramirez PA-C  Encounter Date: 2024   Encounter department: [facility] RHEUMATOLOGY [facility] [place]    Assessment & Plan   1. 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HCC)  Assessment & Plan:  Her psoriatic arthritis symptoms are well-controlled with sulfasalazine.  No signs of active inflammation or synovitis we will continue to monitor her response to treatment.  Labs as ordered to monitor for medication side effects and toxicity.  Plan follow-up in 6 months or sooner if needed.  Orders:  -     CBC and differential; Future  -     Comprehensive metabolic panel; Future  -     Sedimentation rate, automated; Future  -     C-reactive protein; Future  -     sulfaSALAzine (AZULFIDINE-EN) 500 mg EC tablet; Take 1 tablet (500 mg total) by mouth 2 (two) times a day  2. Primary generalized (osteo)arthritis  Assessment & Plan:  Generalized osteoarthritis symptoms are stable.  She is following with orthopedics for OA in her right knee and is considering knee replacement in the future.  Encouraged regular home exercise program as well.  3. Lumbar spondylosis  Assessment & Plan:  Lower back pain with history of lumbar spondylosis seen on prior x-rays.  Encouraged regular home exercise program.  If symptoms would worsen could consider updated imaging with a course of physical therapy.  4. Osteopenia of left forearm  Assessment & Plan:  History of osteopenia with low FRAX risk.  Most recent DEXA scan was stable.  We will continue to monitor her bone density every 2 years.  She will be due for an updated DEXA in May 2026.  5. Encounter for long-term (current) use of other medications  -     CBC and differential; Future  -     Comprehensive metabolic panel; Future  -     Sedimentation rate, automated; Future  -     C-reactive protein; Future      History of Present Illness     Bianka Boyle is a 68 y.o. female.   She is here for follow-up of her psoriatic arthritis.  She was initially diagnosed in 2010 and started on sulfasalazine.  She is currently taking 500 mg twice daily.  Her psoriatic arthritis symptoms are well-controlled.  She does have some stiffness in the morning in her lower back.  She does have a history of lumbar degenerative disc disease.  She has no swelling in her joints.  She has no signs of dactylitis or enthesitis.  Her psoriasis is generally quiescent.  She does occasionally have some dry patches on her arms and legs and uses topical agents for this.     She has a history of osteoarthritis as well.  She follows with orthopedics for OA in her right knee.  She has had injections in the past.   She has had viscosupplementation injections in the past however these did not provide any significant relief for her.  She is getting cortisone injections which do help with her symptoms.  She has discussed joint replacement with orthopedics which she may consider at some point in the future.     She has a history of osteopenia.  She had a DEXA scan done on 5/20/2024.  Lumbar spine T-score (L1, L2) +1.4.  This is artificially elevated due to spondylosis and scoliosis.  Left total hip T-score -0.3.  Left femoral neck T-score 0.  Left forearm T-score -1.5.  FRAX hip fracture risk 0.3%, major fracture risk 6.5%.      She had labs done on 1/22/2024.  CBC essentially unremarkable.  Creatinine 0.86, GFR 69.  ESR, CRP within normal limits.    Review of Systems   Constitutional:  Negative for chills, fatigue and fever.   HENT:  Negative for hearing loss and sore throat.    Eyes:  Negative for pain and visual disturbance.   Respiratory:  Negative for cough and shortness of breath.    Cardiovascular:  Negative for chest pain and palpitations.   Gastrointestinal:  Negative for abdominal pain, nausea and vomiting.   Genitourinary:  Negative for difficulty urinating.   Musculoskeletal:  Positive for arthralgias and back pain  "(occasional). Negative for gait problem, joint swelling, myalgias, neck pain and neck stiffness.   Skin:  Negative for rash.   Neurological:  Negative for dizziness, seizures, weakness, numbness and headaches.   Psychiatric/Behavioral:  Negative for confusion, decreased concentration and sleep disturbance.      Current Outpatient Medications on File Prior to Visit   Medication Sig Dispense Refill    Ascorbic Acid (VITAMIN C PO) Vitamin C      Cholecalciferol (VITAMIN D3 PO) Vitamin D3      ciclopirox (LOPROX) 0.77 % SUSP Apply twice daily to affected toenails 60 mL 3    Cyanocobalamin (B-12) 1000 MCG TABS       diclofenac sodium (VOLTAREN) 1 % Apply 2 g topically 4 (four) times a day 3 Tube 1    glucosamine-chondroitin 500-400 MG tablet Take 1 tablet by mouth 3 (three) times a day      levothyroxine 50 mcg tablet TAKE 1 TABLET BY MOUTH EVERY DAY 90 tablet 1    Omega-3 Fatty Acids (FISH OIL PO) Fish Oil      [DISCONTINUED] sulfaSALAzine (AZULFIDINE-EN) 500 mg EC tablet TAKE 1 TABLET BY MOUTH TWICE A  tablet 1    Calcium Carb-Cholecalciferol (CALCIUM + D3 PO) Take by mouth daily   (Patient not taking: Reported on 12/5/2023)      clobetasol propionate (CLOBEX) 0.05 % shampoo Apply 1 application. topically daily for 14 days (Patient not taking: Reported on 9/7/2023) 118 mL 1     No current facility-administered medications on file prior to visit.      Objective     /62 (BP Location: Left arm, Patient Position: Sitting, Cuff Size: Adult)   Pulse 75   Temp 98.2 °F (36.8 °C) (Temporal)   Ht 5' 5.5\" (1.664 m)   Wt 60.8 kg (134 lb)   SpO2 98%   BMI 21.96 kg/m²     Physical Exam  Constitutional:       Appearance: Normal appearance.   Cardiovascular:      Rate and Rhythm: Normal rate and regular rhythm.   Pulmonary:      Breath sounds: Normal breath sounds.   Musculoskeletal:      Comments:  Full range of motion neck.  Limited abduction right greater than left shoulder.  Full range of motion bilateral " elbows, wrists.  Hypermobility noted bilateral elbows.  Interphalangeal OA changes, squaring 1st CMC joints, Heberden's nodes, Ravindra's nodes bilateral hands.  No synovitis noted.  Full range of motion bilateral hips, knees, ankles.  Patellofemoral crepitus noted bilateral knees.  Hyperpronation, slight hammertoe deformities, hallux valgus deformities bilateral feet.  No dactylitis noted.    Skin:     General: Skin is warm and dry.   Neurological:      General: No focal deficit present.      Mental Status: She is alert.       Administrative Statements       Dragon Dictation software was used to dictate this note. It may contain errors with dictating incorrect words/spelling. Please contact provider directly for any questions.

## 2024-06-11 NOTE — ASSESSMENT & PLAN NOTE
Generalized osteoarthritis symptoms are stable.  She is following with orthopedics for OA in her right knee and is considering knee replacement in the future.  Encouraged regular home exercise program as well.

## 2024-06-11 NOTE — ASSESSMENT & PLAN NOTE
History of osteopenia with low FRAX risk.  Most recent DEXA scan was stable.  We will continue to monitor her bone density every 2 years.  She will be due for an updated DEXA in May 2026.

## 2024-06-21 ENCOUNTER — TELEPHONE (OUTPATIENT)
Age: 69
End: 2024-06-21

## 2024-06-21 NOTE — TELEPHONE ENCOUNTER
Pt called to schedule a pre op for 9/18 surgery. Dr. Morales did not have any openings so I scheduled with Radha. Please, call pt if appt must be changed. Thank you.

## 2024-08-03 DIAGNOSIS — E03.9 ACQUIRED HYPOTHYROIDISM: ICD-10-CM

## 2024-08-03 RX ORDER — LEVOTHYROXINE SODIUM 0.05 MG/1
TABLET ORAL
Qty: 90 TABLET | Refills: 1 | Status: SHIPPED | OUTPATIENT
Start: 2024-08-03

## 2024-08-13 ENCOUNTER — TELEPHONE (OUTPATIENT)
Age: 69
End: 2024-08-13

## 2024-08-13 NOTE — TELEPHONE ENCOUNTER
Received call from patient to inform provider of recent changes in her bowel habits; reports history of constipation. Had colonoscopy 11/2020 with slow changes in bowel habits since then.   Reports over the past 6 months stools soft and loose with urgency with no episodes of incontinence. No diet changes; no raw foods eaten. Also has increased stomach grumbling and   lower intermittent abdominal cramping that is tolerable occurring several hours post meal.   Pre-op labs to be done in next week for surgery planned for 9/18. Please follow up with patient for provider question, concerns, or new lab orders.

## 2024-08-13 NOTE — TELEPHONE ENCOUNTER
Offer appt if she'd like.    Recommend to start food diary, to see what is causing the abdominal cramping. You may be developing lactose and/or gluten intolerance.

## 2024-09-03 ENCOUNTER — CONSULT (OUTPATIENT)
Dept: INTERNAL MEDICINE CLINIC | Facility: CLINIC | Age: 69
End: 2024-09-03
Payer: MEDICARE

## 2024-09-03 VITALS
DIASTOLIC BLOOD PRESSURE: 72 MMHG | HEIGHT: 66 IN | OXYGEN SATURATION: 97 % | SYSTOLIC BLOOD PRESSURE: 122 MMHG | BODY MASS INDEX: 21.44 KG/M2 | HEART RATE: 60 BPM | TEMPERATURE: 97.4 F | WEIGHT: 133.4 LBS

## 2024-09-03 DIAGNOSIS — E03.9 ACQUIRED HYPOTHYROIDISM: ICD-10-CM

## 2024-09-03 DIAGNOSIS — M17.11 PRIMARY OSTEOARTHRITIS OF RIGHT KNEE: ICD-10-CM

## 2024-09-03 DIAGNOSIS — R10.9 ABDOMINAL CRAMPS: ICD-10-CM

## 2024-09-03 DIAGNOSIS — E78.49 OTHER HYPERLIPIDEMIA: ICD-10-CM

## 2024-09-03 DIAGNOSIS — Z01.818 PRE-OP EVALUATION: Primary | ICD-10-CM

## 2024-09-03 PROCEDURE — 99214 OFFICE O/P EST MOD 30 MIN: CPT | Performed by: NURSE PRACTITIONER

## 2024-09-03 PROCEDURE — G2211 COMPLEX E/M VISIT ADD ON: HCPCS | Performed by: NURSE PRACTITIONER

## 2024-09-03 RX ORDER — MUPIROCIN 20 MG/G
OINTMENT TOPICAL
COMMUNITY
Start: 2024-08-27

## 2024-09-03 NOTE — PROGRESS NOTES
"Ambulatory Visit  Name: Bianka Boyle      : 1955      MRN: [id number]  Encounter Provider: JOSE ALEJANDRO Scherer  Encounter Date: 9/3/2024   Encounter department: [facility] INTERNAL MEDICINE    Assessment & Plan   1. Pre-op evaluation  2. Primary osteoarthritis of right knee  Assessment & Plan:  Scheduled for right TKA on .   Reviewed pre op labs/EKG- stable.  She is at an acceptable risk for surgery.   3. Acquired hypothyroidism  Assessment & Plan:  On levothyroxine.   4. Other hyperlipidemia  Assessment & Plan:  Not on a statin.   Takes fish oil.     5. Abdominal cramps  Assessment & Plan:  Symptoms improving  Avoid dairy for 3-4 weeks.        History of Present Illness     Bianka is here today for pre op evaluation  She is scheduled for right TKA on .   She denies any issues with anesthesia in the past.   No cardiopulmonary complaints.     She has been having abdominal cramping and loose stools for a few weeks.   She gave up her meliton latte and her abdominal cramping has improved. Still has occasional loose stools in the morning.           Review of Systems   Constitutional:  Negative for activity change, appetite change and fatigue.   Respiratory:  Negative for cough and shortness of breath.    Cardiovascular:  Negative for chest pain, palpitations and leg swelling.   Gastrointestinal:  Positive for abdominal pain and diarrhea. Negative for constipation, nausea and vomiting.   Genitourinary:  Negative for difficulty urinating and dysuria.   Musculoskeletal:  Positive for arthralgias.   Neurological:  Negative for dizziness, light-headedness and headaches.       Objective     /72   Pulse 60   Temp (!) 97.4 °F (36.3 °C)   Ht 5' 5.5\" (1.664 m)   Wt 60.5 kg (133 lb 6.4 oz)   SpO2 97%   BMI 21.86 kg/m²     Physical Exam  Vitals reviewed.   Constitutional:       Appearance: Normal appearance.   HENT:      Head: Normocephalic and atraumatic.   Eyes:      Conjunctiva/sclera: " Conjunctivae normal.   Cardiovascular:      Rate and Rhythm: Normal rate and regular rhythm.      Heart sounds: Normal heart sounds.   Pulmonary:      Effort: Pulmonary effort is normal.      Breath sounds: Normal breath sounds.   Abdominal:      General: Bowel sounds are normal.      Palpations: Abdomen is soft.   Musculoskeletal:         General: Normal range of motion.      Right lower leg: No edema.      Left lower leg: No edema.   Skin:     General: Skin is warm and dry.   Neurological:      Mental Status: She is alert and oriented to person, place, and time.   Psychiatric:         Mood and Affect: Mood normal.         Behavior: Behavior normal.       Administrative Statements

## 2024-09-03 NOTE — ASSESSMENT & PLAN NOTE
Scheduled for right TKA on 9/18.   Reviewed pre op labs/EKG- stable.  She is at an acceptable risk for surgery.

## 2024-09-20 ENCOUNTER — EVALUATION (OUTPATIENT)
Dept: PHYSICAL THERAPY | Facility: CLINIC | Age: 69
End: 2024-09-20
Payer: MEDICARE

## 2024-09-20 DIAGNOSIS — M25.561 ACUTE POSTOPERATIVE PAIN OF RIGHT KNEE: Primary | ICD-10-CM

## 2024-09-20 DIAGNOSIS — G89.18 ACUTE POSTOPERATIVE PAIN OF RIGHT KNEE: Primary | ICD-10-CM

## 2024-09-20 PROCEDURE — 97140 MANUAL THERAPY 1/> REGIONS: CPT | Performed by: PHYSICAL THERAPIST

## 2024-09-20 PROCEDURE — 97161 PT EVAL LOW COMPLEX 20 MIN: CPT | Performed by: PHYSICAL THERAPIST

## 2024-09-20 NOTE — PROGRESS NOTES
PT Evaluation     Today's date: 2024  Patient name: Bianka Boyle  : 1955  MRN: [id number]  Referring provider: Ted Rahman MD  Dx:   Encounter Diagnosis     ICD-10-CM    1. Acute postoperative pain of right knee  G89.18     M25.561                      Assessment    Assessment details: Pt is a 68 y.o. female who presents to outpatient PT s/p TKA with pain, decreased rom, decreased strength and decreased functional mobility. She will benefit from skilled PT to address these deficits in order to achieve her goals and maximize her functional mobility.           Goals  Short Term Goals:   Independent performance of initial hep  Decrease pain 2 points on VAS      Long Term Goals:  Independent performance of comprehensive hep  Work performance is returned to max level of function  Performance of IADL's is returned to max level of function  Performance in recreational activities is improved to max level of function  Able to walk community distances with no AD  Able to climb stairs with reciprocal gait pattern and single rail.      Plan    Planned therapy interventions: strengthening, stretching, transfer training, therapeutic training, therapeutic exercise, therapeutic activities, home exercise program and IADL retraining    Frequency: 2x week  Duration in weeks: 8        Subjective Evaluation    History of Present Illness  Mechanism of injury: 24 underwent R TKA.    Currently walking with RW.  Reports that she would like to return to playing tennis, pickleball      Patient Goals  Patient goals for therapy: decreased edema, decreased pain, increased motion, increased strength, independence with ADLs/IADLs and return to sport/leisure activities    Pain  Current pain rating: 3  At worst pain ratin          Objective  R knee      ROM   Flexion: 89   Extension: -7    Strength:   Flexion:  3/5   Extension:  3-/5      Incision is covered with post-op bandaging  No sig eccymosis or erythema noted  Neg  howman's sign  Unable to initiate SLR               Precautions: TKA      Manuals 9/20            prom kl                                                   Neuro Re-Ed                                                                                                        Ther Ex             Heel slides             saq             Slr flexion             laq             Prone TKE             Ht/tr             Gastroc stretch             Cone taps             Step ups             Ther Activity             bike                          Gait Training                                       Modalities             Cp with extension hang 10'

## 2024-09-20 NOTE — LETTER
2024    Ted Rahman MD  [address]  [address]  [place] [address]    Patient: Bianka Boyle   YOB: 1955   Date of Visit: 2024     Encounter Diagnosis     ICD-10-CM    1. Acute postoperative pain of right knee  G89.18     M25.561           Dear Dr. Rahman:    Thank you for your recent referral of Bianka Boyle. Please review the attached evaluation summary from Bianka's recent visit.     Please verify that you agree with the plan of care by signing the attached order.     If you have any questions or concerns, please do not hesitate to call.     I sincerely appreciate the opportunity to share in the care of one of your patients and hope to have another opportunity to work with you in the near future.       Sincerely,    Freddie Krishnamurthy, PT      Referring Provider:      I certify that I have read the below Plan of Care and certify the need for these services furnished under this plan of treatment while under my care.                    Ted Rahman MD  [address]  [address]  [place] [address]  Via Fax: [phone]          PT Evaluation     Today's date: 2024  Patient name: Bianka Boyle  : 1955  MRN: [id number]  Referring provider: Ted Rahman MD  Dx:   Encounter Diagnosis     ICD-10-CM    1. Acute postoperative pain of right knee  G89.18     M25.561                      Assessment    Assessment details: Pt is a 68 y.o. female who presents to outpatient PT s/p TKA with pain, decreased rom, decreased strength and decreased functional mobility. She will benefit from skilled PT to address these deficits in order to achieve her goals and maximize her functional mobility.           Goals  Short Term Goals:   Independent performance of initial hep  Decrease pain 2 points on VAS      Long Term Goals:  Independent performance of comprehensive hep  Work performance is returned to max level of function  Performance of IADL's is returned to max level  of function  Performance in recreational activities is improved to max level of function  Able to walk community distances with no AD  Able to climb stairs with reciprocal gait pattern and single rail.      Plan    Planned therapy interventions: strengthening, stretching, transfer training, therapeutic training, therapeutic exercise, therapeutic activities, home exercise program and IADL retraining    Frequency: 2x week  Duration in weeks: 8        Subjective Evaluation    History of Present Illness  Mechanism of injury: 24 underwent R TKA.    Currently walking with RW.  Reports that she would like to return to playing tennis, pickleball      Patient Goals  Patient goals for therapy: decreased edema, decreased pain, increased motion, increased strength, independence with ADLs/IADLs and return to sport/leisure activities    Pain  Current pain rating: 3  At worst pain ratin          Objective  R knee      ROM   Flexion: 89   Extension: -7    Strength:   Flexion:  3/5   Extension:  3-/5      Incision is covered with post-op bandaging  No sig eccymosis or erythema noted  Neg howman's sign  Unable to initiate SLR               Precautions: TKA      Manuals             prom kl                                                   Neuro Re-Ed                                                                                                        Ther Ex             Heel slides             saq             Slr flexion             laq             Prone TKE             Ht/tr             Gastroc stretch             Cone taps             Step ups             Ther Activity             bike                          Gait Training                                       Modalities             Cp with extension hang 10'

## 2024-09-23 ENCOUNTER — OFFICE VISIT (OUTPATIENT)
Dept: PHYSICAL THERAPY | Facility: CLINIC | Age: 69
End: 2024-09-23
Payer: MEDICARE

## 2024-09-23 DIAGNOSIS — M25.561 ACUTE POSTOPERATIVE PAIN OF RIGHT KNEE: Primary | ICD-10-CM

## 2024-09-23 DIAGNOSIS — G89.18 ACUTE POSTOPERATIVE PAIN OF RIGHT KNEE: Primary | ICD-10-CM

## 2024-09-23 PROCEDURE — 97110 THERAPEUTIC EXERCISES: CPT | Performed by: PHYSICAL THERAPIST

## 2024-09-23 PROCEDURE — 97140 MANUAL THERAPY 1/> REGIONS: CPT | Performed by: PHYSICAL THERAPIST

## 2024-09-23 NOTE — PROGRESS NOTES
"Daily Note     Today's date: 2024  Patient name: Bianka Boyle  : 1955  MRN: [id number]  Referring provider: Ted Rahman MD  Dx:   Encounter Diagnosis     ICD-10-CM    1. Acute postoperative pain of right knee  G89.18     M25.561                      Subjective: pt reports compliance with her hep and that she is having no difficulty with it. Reports that she is no longer taking Rx pain medication.       Objective: See treatment diary below      Assessment: initiated tx as listed. Pt is making steady gain on rom and has good tolerance to manual tx. Initially requires cues for form when ambulating with SPC but this improves with reps. Instructed her to start ambulating within her home with SPC if she feels comfortable but to continue to use RW for community ambulation until quad set and endurance improve.        Plan: Continue per plan of care.      Precautions: TKA      Manuals            prom kl kl                                                  Neuro Re-Ed                                                                                                        Ther Ex             Heel slides  5\"x20           saq  5\"x10           Slr flexion  AA x5 indep x5           laq             Prone TKE  5\"x10           Ht/tr  x10ea           Gastroc stretch  15'x3           Cone taps             Step ups  4\"x20           Cane walking   100t           Ther Activity             bike  5'                        Gait Training                                       Modalities             Cp with extension hang 10' 8'                             "

## 2024-09-25 ENCOUNTER — OFFICE VISIT (OUTPATIENT)
Dept: PHYSICAL THERAPY | Facility: CLINIC | Age: 69
End: 2024-09-25
Payer: MEDICARE

## 2024-09-25 DIAGNOSIS — G89.18 ACUTE POSTOPERATIVE PAIN OF RIGHT KNEE: Primary | ICD-10-CM

## 2024-09-25 DIAGNOSIS — M25.561 ACUTE POSTOPERATIVE PAIN OF RIGHT KNEE: Primary | ICD-10-CM

## 2024-09-25 PROCEDURE — 97110 THERAPEUTIC EXERCISES: CPT

## 2024-09-25 PROCEDURE — 97140 MANUAL THERAPY 1/> REGIONS: CPT

## 2024-09-25 NOTE — PROGRESS NOTES
"Daily Note     Today's date: 2024  Patient name: Bianka Boyle  : 1955  MRN: [id number]  Referring provider: Ted Rahman MD  Dx:   Encounter Diagnosis     ICD-10-CM    1. Acute postoperative pain of right knee  G89.18     M25.561           Start Time: 931  Stop Time: 102  Total time in clinic (min): 50 minutes      Subjective: Patient reports she had a little more right knee pain after last visit and has been noticing more bruising on her right leg.  Patient reports she's been trying to increase her steps and was able to get up to 1,200 steps yesterday.  Patient reports she is taking Tylenol, muscle relaxer, baby aspirin, and anti inflammatory medications.      Objective: See treatment diary below.  Right knee flexion  degrees when performing heel slides.      Assessment: Patient is 1 week post surgery and recovering extremely well.  Patient has good tolerance to TE program and manual stretching.  Patient would benefit from continued PT to restore right knee ROM and strength.      Plan: Continue treatment as per PT plan of care.       Precautions: R TKA 24      Manuals           prom kl kl JLW                                                 Neuro Re-Ed                                                                                                        Ther Ex             Heel slides  5\"x20 5\"x20          saq  5\"x10 5\"x15          Slr flexion  AA x5 indep x5 2x10          laq   5\"x15          Prone TKE  5\"x10           Ht/tr  x10ea 20 ea          standing slr x3   20 ea          Gastroc stretch  15'x3           Cone taps   15 ea          Step ups  4\"x20 4\"  15          Cane walking   100t           bike    7'                                    Ther Activity             bike  5'                        Gait Training                                       Modalities             CP with extension hang 10' 8' 10'                              "

## 2024-09-30 ENCOUNTER — OFFICE VISIT (OUTPATIENT)
Dept: PHYSICAL THERAPY | Facility: CLINIC | Age: 69
End: 2024-09-30
Payer: MEDICARE

## 2024-09-30 DIAGNOSIS — M25.561 ACUTE POSTOPERATIVE PAIN OF RIGHT KNEE: Primary | ICD-10-CM

## 2024-09-30 DIAGNOSIS — G89.18 ACUTE POSTOPERATIVE PAIN OF RIGHT KNEE: Primary | ICD-10-CM

## 2024-09-30 PROCEDURE — 97140 MANUAL THERAPY 1/> REGIONS: CPT | Performed by: PHYSICAL THERAPIST

## 2024-09-30 PROCEDURE — 97110 THERAPEUTIC EXERCISES: CPT | Performed by: PHYSICAL THERAPIST

## 2024-09-30 NOTE — PROGRESS NOTES
"Daily Note     Today's date: 2024  Patient name: Bianka Boyle  : 1955  MRN: [id number]  Referring provider: Ted Rahman MD  Dx:   Encounter Diagnosis     ICD-10-CM    1. Acute postoperative pain of right knee  G89.18     M25.561                        Subjective: Patient reports that she continues to be sore along her quad and anterior knee with leg lifts but otherwise feels she is improving. Reports compliance with her hep.        Objective: See treatment diary below.      Assessment: pt continues to make steady progress. She is able to achieve full knee extension after manual tx but continues to have difficulty maintaining it during stance phase.        Plan: Continue treatment as per PT plan of care.       Precautions: R TKA 24      Manuals          prom kl kl JLW kl         Contract/relax for extension    kl                                   Neuro Re-Ed                                                                                                        Ther Ex             Heel slides  5\"x20 5\"x20          saq  5\"x10 5\"x15          Slr flexion  AA x5 indep x5 2x10          laq   5\"x15 5\"x20 with ankle DF         Prone TKE  5\"x10  5\"x20         Ht/tr  x10ea 20 ea x20         standing slr x3   20 ea          Gastroc stretch  15'x3           Cone taps   15 ea x20         Step ups  4\"x20 4\"  15 6\"x20 with march         Cane walking   100t           bike    7'          Leg press    30#2x10         sls    15\"x3         Stair knee flexion    10\"x10         Ther Activity             bike  5'  8'                      Gait Training                                       Modalities             CP with extension hang 10' 8' 10' 10'                             "

## 2024-10-02 ENCOUNTER — OFFICE VISIT (OUTPATIENT)
Dept: PHYSICAL THERAPY | Facility: CLINIC | Age: 69
End: 2024-10-02
Payer: MEDICARE

## 2024-10-02 DIAGNOSIS — M25.561 ACUTE POSTOPERATIVE PAIN OF RIGHT KNEE: Primary | ICD-10-CM

## 2024-10-02 DIAGNOSIS — G89.18 ACUTE POSTOPERATIVE PAIN OF RIGHT KNEE: Primary | ICD-10-CM

## 2024-10-02 PROCEDURE — 97110 THERAPEUTIC EXERCISES: CPT | Performed by: PHYSICAL THERAPIST

## 2024-10-02 PROCEDURE — 97140 MANUAL THERAPY 1/> REGIONS: CPT | Performed by: PHYSICAL THERAPIST

## 2024-10-02 NOTE — PROGRESS NOTES
"Daily Note     Today's date: 10/2/2024  Patient name: Bianka Boyle  : 1955  MRN: [id number]  Referring provider: Ted Rahman MD  Dx:   Encounter Diagnosis     ICD-10-CM    1. Acute postoperative pain of right knee  G89.18     M25.561                          Subjective: Pt reports \"I'm getting there\". Reports that she was able to ride recumbent bike for 1/2 hour at the gym.        Objective: See treatment diary below.      Assessment: pt continues to demonstrate restricted flexion and extension but this is improving steadily. Flexion 115*.   She is able to achieve full extension after manual stretching but continues to have difficulty maintaining this t/o the session.  Improved TKE noted on TM when instructed to exaggerate L stride length      Plan: Continue treatment as per PT plan of care.       Precautions: R TKA 24      Manuals 9/20 9/23 9/25 9/30 10/2        prom kl kl JLW kl kl        Contract/relax for extension    kl kl                                  Neuro Re-Ed                                                                                                        Ther Ex             Heel slides  5\"x20 5\"x20  5\"x20        saq  5\"x10 5\"x15  2#x20        Slr flexion  AA x5 indep x5 2x10          laq   5\"x15 5\"x20 with ankle DF 5\"x20        Prone TKE  5\"x10  5\"x20         Ht/tr  x10ea 20 ea x20 x20        standing slr x3   20 ea          Gastroc stretch  15'x3           Cone taps   15 ea x20         Step ups  4\"x20 4\"  15 6\"x20 with march         Cane walking   100t           bike    7'          Leg press    30#2x10 30#2x15        sls    15\"x3 15\"x4        Stair knee flexion    10\"x10         TM walking     2' wth instructed to aggagerate L stride length 2' comfortable walking        Prone quad stretch     30\"x4        Ther Activity             bike  5'  8' 8'                     Gait Training                                       Modalities             CP with extension hang 10' 8' 10' 10'       "

## 2024-10-07 ENCOUNTER — EVALUATION (OUTPATIENT)
Dept: PHYSICAL THERAPY | Facility: CLINIC | Age: 69
End: 2024-10-07
Payer: MEDICARE

## 2024-10-07 DIAGNOSIS — M25.561 ACUTE POSTOPERATIVE PAIN OF RIGHT KNEE: Primary | ICD-10-CM

## 2024-10-07 DIAGNOSIS — G89.18 ACUTE POSTOPERATIVE PAIN OF RIGHT KNEE: Primary | ICD-10-CM

## 2024-10-07 PROCEDURE — 97140 MANUAL THERAPY 1/> REGIONS: CPT | Performed by: PHYSICAL THERAPIST

## 2024-10-07 PROCEDURE — 97110 THERAPEUTIC EXERCISES: CPT | Performed by: PHYSICAL THERAPIST

## 2024-10-07 NOTE — PROGRESS NOTES
PT Re-Evaluation       Today's date: 10/7/2024  Patient name: Bianka Boyle  : 1955  MRN: [id number]  Referring provider: Ted Rahman MD  Dx:   Encounter Diagnosis     ICD-10-CM    1. Acute postoperative pain of right knee  G89.18     M25.561                      Assessment    Assessment details: Pt is being treated with outpatient PT. She has  made good gains in rom, strength, pain reduction and functional mobility but continues to have deficits. She will benefit from continued skilled PT to address these deficits. Thank you for this referral.            Goals  Short Term Goals:  met  Independent performance of initial hep  Decrease pain 2 points on VAS      Long Term Goals: ongoing  Independent performance of comprehensive hep  Work performance is returned to max level of function  Performance of IADL's is returned to max level of function  Performance in recreational activities is improved to max level of function  Able to walk community distances with no AD  Able to climb stairs with reciprocal gait pattern and single rail.      Plan    Planned therapy interventions: strengthening, stretching, transfer training, therapeutic training, therapeutic exercise, therapeutic activities, home exercise program and IADL retraining    Frequency: 2x week  Duration in weeks: 6        Subjective Evaluation    History of Present Illness  Mechanism of injury: 24 underwent R TKA.    Reports that she feel she is improving but continues to have deficits. She is ot longer walking with AD in her home but will use it for walking community distances. Reports anterior knee pain but this is decreasing. Is able to control pain with OTC pain medication alone. Reports compliance with her current hep.  Reports that she would like to return to playing tennis, pickleball      Patient Goals  Patient goals for therapy: decreased edema, decreased pain, increased motion, increased strength, independence with ADLs/IADLs and return to  "sport/leisure activities    Pain  Current pain rating: 3  At worst pain ratin          Objective      R knee      ROM   Flexion: 115   Extension: -4    Strength:   Flexion:  4/5   Extension:  4/5      Incision is covered with post-op bandaging  Mod ecchymosis noted  Good quad set with no lag noted during SLR  Ambulates with antalgic gait pattern including decreased stance time on R               Precautions: TKA    recautions: R TKA 24      Manuals 9/20 9/23 9/25 9/30 10/2 10/7       prom kl kl JLW kl kl kl       Contract/relax for extension    kl kl kl                                 Neuro Re-Ed                                                                                                        Ther Ex             Heel slides  5\"x20 5\"x20  5\"x20 5;x20       saq  5\"x10 5\"x15  2#x20        Slr flexion  AA x5 indep x5 2x10   x20       laq   5\"x15 5\"x20 with ankle DF 5\"x20        Prone TKE  5\"x10  5\"x20         Ht/tr  x10ea 20 ea x20 x20 x20       standing slr x3   20 ea          Gastroc stretch  15'x3           Cone taps   15 ea x20         Step ups  4\"x20 4\"  15 6\"x20 with march         Cane walking   100t           bike    7'          Leg press    30#2x10 30#2x15 40# 3x10       sls    15\"x3 15\"x4 Airex 15\"x4       Stair knee flexion    10\"x10         TM walking     2' wth instructed to aggagerate L stride length 2' comfortable walking 4' 1.7        Prone quad stretch     30\"x4 30\"x4       Lateral step down      6\"2x10       Ther Activity             bike  5'  8' 8' 8'                    Gait Training                                       Modalities             CP with extension hang 10' 8' 10' 10'                               "

## 2024-10-07 NOTE — LETTER
2024    Ted Rahman MD  [address]  [address]  [facility] [address]    Patient: Bianka Boyle   YOB: 1955   Date of Visit: 10/7/2024     Encounter Diagnosis     ICD-10-CM    1. Acute postoperative pain of right knee  G89.18     M25.561           Dear Dr. Rahman:    Thank you for your recent referral of Bianka Boyle. Please review the attached evaluation summary from Bianka's recent visit.     Please verify that you agree with the plan of care by signing the attached order.     If you have any questions or concerns, please do not hesitate to call.     I sincerely appreciate the opportunity to share in the care of one of your patients and hope to have another opportunity to work with you in the near future.       Sincerely,    Freddie Krishnamurthy, PT      Referring Provider:      I certify that I have read the below Plan of Care and certify the need for these services furnished under this plan of treatment while under my care.                    Ted Rahman MD  [address]  [address]  [facility] [address]  Via Fax: [phone]          PT Re-Evaluation       Today's date: 10/7/2024  Patient name: Bianka Boyle  : 1955  MRN: [id number]  Referring provider: Ted Rahman MD  Dx:   Encounter Diagnosis     ICD-10-CM    1. Acute postoperative pain of right knee  G89.18     M25.561                      Assessment    Assessment details: Pt is being treated with outpatient PT. She has  made good gains in rom, strength, pain reduction and functional mobility but continues to have deficits. She will benefit from continued skilled PT to address these deficits. Thank you for this referral.            Goals  Short Term Goals:  met  Independent performance of initial hep  Decrease pain 2 points on VAS      Long Term Goals: ongoing  Independent performance of comprehensive hep  Work performance is returned to max level of function  Performance of IADL's is returned to max level  "of function  Performance in recreational activities is improved to max level of function  Able to walk community distances with no AD  Able to climb stairs with reciprocal gait pattern and single rail.      Plan    Planned therapy interventions: strengthening, stretching, transfer training, therapeutic training, therapeutic exercise, therapeutic activities, home exercise program and IADL retraining    Frequency: 2x week  Duration in weeks: 6        Subjective Evaluation    History of Present Illness  Mechanism of injury: 24 underwent R TKA.    Reports that she feel she is improving but continues to have deficits. She is ot longer walking with AD in her home but will use it for walking community distances. Reports anterior knee pain but this is decreasing. Is able to control pain with OTC pain medication alone. Reports compliance with her current hep.  Reports that she would like to return to playing tennis, pickleball      Patient Goals  Patient goals for therapy: decreased edema, decreased pain, increased motion, increased strength, independence with ADLs/IADLs and return to sport/leisure activities    Pain  Current pain rating: 3  At worst pain ratin          Objective      R knee      ROM   Flexion: 115   Extension: -4    Strength:   Flexion:  4/5   Extension:  4/5      Incision is covered with post-op bandaging  Mod ecchymosis noted  Good quad set with no lag noted during SLR  Ambulates with antalgic gait pattern including decreased stance time on R               Precautions: TKA    recautions: R TKA 24      Manuals 9/20 9/23 9/25 9/30 10/2 10/7       prom kl kl JLW kl kl kl       Contract/relax for extension    kl kl kl                                 Neuro Re-Ed                                                                                                        Ther Ex             Heel slides  5\"x20 5\"x20  5\"x20 5;x20       saq  5\"x10 5\"x15  2#x20        Slr flexion  AA x5 indep x5 2x10   x20   " "    laq   5\"x15 5\"x20 with ankle DF 5\"x20        Prone TKE  5\"x10  5\"x20         Ht/tr  x10ea 20 ea x20 x20 x20       standing slr x3   20 ea          Gastroc stretch  15'x3           Cone taps   15 ea x20         Step ups  4\"x20 4\"  15 6\"x20 with march         Cane walking   100t           bike    7'          Leg press    30#2x10 30#2x15 40# 3x10       sls    15\"x3 15\"x4 Airex 15\"x4       Stair knee flexion    10\"x10         TM walking     2' wth instructed to aggagerate L stride length 2' comfortable walking 4' 1.7        Prone quad stretch     30\"x4 30\"x4       Lateral step down      6\"2x10       Ther Activity             bike  5'  8' 8' 8'                    Gait Training                                       Modalities             CP with extension hang 10' 8' 10' 10'                                               "

## 2024-10-09 ENCOUNTER — OFFICE VISIT (OUTPATIENT)
Dept: PHYSICAL THERAPY | Facility: CLINIC | Age: 69
End: 2024-10-09
Payer: MEDICARE

## 2024-10-09 DIAGNOSIS — G89.18 ACUTE POSTOPERATIVE PAIN OF RIGHT KNEE: Primary | ICD-10-CM

## 2024-10-09 DIAGNOSIS — M25.561 ACUTE POSTOPERATIVE PAIN OF RIGHT KNEE: Primary | ICD-10-CM

## 2024-10-09 PROCEDURE — 97140 MANUAL THERAPY 1/> REGIONS: CPT | Performed by: PHYSICAL THERAPIST

## 2024-10-09 PROCEDURE — 97110 THERAPEUTIC EXERCISES: CPT | Performed by: PHYSICAL THERAPIST

## 2024-10-09 NOTE — PROGRESS NOTES
"Daily Note     Today's date: 10/9/2024  Patient name: Bianka Boyle  : 1955  MRN: [id number]  Referring provider: Ted Rahman MD  Dx:   Encounter Diagnosis     ICD-10-CM    1. Acute postoperative pain of right knee  G89.18     M25.561                      Subjective: pt reports \"every day it gets a a little better\". Reports that she continues to be sore when she performs self-stretching. Reports stair climbing is improving but still not fluid. Reports that she was able to ride recumbent bike 7 miles. Recommended slow introduction of resistance to her bike rides. She has a f/u with her surgeon latera today      Objective: See treatment diary below      Assessment: rom is steadily improving.  Continues to have slight deficits in terminal knee extension during stance phase. Progressed therex as listed with fatigue reported but no sig pain. Continue to progress as freedom Nv.        Plan: Continue per plan of care.      Precautions: TKA    recautions: R TKA 24      Manuals 9/20 9/23 9/25 9/30 10/2 10/7 10/9      prom kl kl JLW kl kl kl kl      Contract/relax for extension    kl kl kl kl                                Neuro Re-Ed                                                                                                        Ther Ex             Heel slides  5\"x20 5\"x20  5\"x20 5;x20       saq  5\"x10 5\"x15  2#x20        Slr flexion  AA x5 indep x5 2x10   x20 x20      laq   5\"x15 5\"x20 with ankle DF 5\"x20  5\" hold 5#x20      Prone TKE  5\"x10  5\"x20         Ht/tr  x10ea 20 ea x20 x20 x20 Single leg hr x20      standing slr x3   20 ea          Gastroc stretch  15'x3           Cone taps   15 ea x20         Step ups  4\"x20 4\"  15 6\"x20 with march         Cane walking   100t           bike    7'          Leg press    30#2x10 30#2x15 40# 3x10 np      sls    15\"x3 15\"x4 Airex 15\"x4 15\"x4      Stair knee flexion    10\"x10         TM walking     2' wth instructed to aggagerate L stride length 2' comfortable walking 4' " "1.7        Prone quad stretch     30\"x4 30\"x4 30\"x4      Lateral step down      6\"2x10 6\"2x10      Ther Activity             bike  5'  8' 8' 8' 8'      Split squats       x20      Single leg bridge       x10      Gait Training                                       Modalities             CP with extension hang 10' 8' 10' 10'                                    "

## 2024-10-11 ENCOUNTER — OFFICE VISIT (OUTPATIENT)
Dept: OBGYN CLINIC | Facility: CLINIC | Age: 69
End: 2024-10-11
Payer: MEDICARE

## 2024-10-11 ENCOUNTER — APPOINTMENT (OUTPATIENT)
Dept: RADIOLOGY | Facility: AMBULARY SURGERY CENTER | Age: 69
End: 2024-10-11
Attending: ORTHOPAEDIC SURGERY
Payer: MEDICARE

## 2024-10-11 VITALS — BODY MASS INDEX: 21.38 KG/M2 | HEIGHT: 66 IN | WEIGHT: 133 LBS

## 2024-10-11 DIAGNOSIS — M19.012 PRIMARY OSTEOARTHRITIS OF LEFT SHOULDER: ICD-10-CM

## 2024-10-11 DIAGNOSIS — M25.512 LEFT SHOULDER PAIN, UNSPECIFIED CHRONICITY: Primary | ICD-10-CM

## 2024-10-11 DIAGNOSIS — M75.102 TEAR OF LEFT ROTATOR CUFF, UNSPECIFIED TEAR EXTENT, UNSPECIFIED WHETHER TRAUMATIC: ICD-10-CM

## 2024-10-11 DIAGNOSIS — M25.512 LEFT SHOULDER PAIN, UNSPECIFIED CHRONICITY: ICD-10-CM

## 2024-10-11 PROCEDURE — 99214 OFFICE O/P EST MOD 30 MIN: CPT | Performed by: ORTHOPAEDIC SURGERY

## 2024-10-11 PROCEDURE — 73030 X-RAY EXAM OF SHOULDER: CPT

## 2024-10-11 RX ORDER — METHYLPREDNISOLONE 4 MG
TABLET, DOSE PACK ORAL
Qty: 21 EACH | Refills: 0 | Status: SHIPPED | OUTPATIENT
Start: 2024-10-11

## 2024-10-11 NOTE — PROGRESS NOTES
Assessment:  1. Left shoulder pain, unspecified chronicity  MRI shoulder left wo contrast    XR shoulder 2+ vw left      2. Primary osteoarthritis of left shoulder  MRI shoulder left wo contrast      3. Tear of left rotator cuff, unspecified tear extent, unspecified whether traumatic  MRI shoulder left wo contrast        Patient Active Problem List   Diagnosis    Osteoarthritis of right glenohumeral joint    Arthritis    Chondromalacia patellae    Current tear of medial cartilage or meniscus of knee    Dense breasts    Hearing loss    Chronic pain of left knee    Leukopenia    Primary osteoarthritis of right knee    Localized secondary osteoarthritis of right shoulder region    Varicose veins without complication    Acquired hypothyroidism    Primary osteoarthritis of left knee    Degenerative tear of meniscus, left    History of total shoulder replacement, right    Other hyperlipidemia    Abdominal cramps    Bilateral primary osteoarthritis of knee    Primary osteoarthritis of left shoulder    Joint crepitation    Osteopenia of left forearm    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HCC)    Primary generalized (osteo)arthritis    Plaque psoriasis    Vitamin B12 deficiency    Lumbar spondylosis           Plan:  68 year old female with left shoulder severe osteoarthritis, probable rotator cuff tear  Diagnostics reviewed and physical exam performed.  Diagnosis, treatment options and associated risks were discussed with the patient including no treatment, nonsurgical treatment and potential for surgical intervention.  The patient was given the opportunity to ask questions regarding each.   Discussed with Bianka that the acuity of her pain could indicate partial/complete tear of her rotator cuff.   Cortisone injection discussed today however Bianka would like to defer at this time to avoid hindered healing effects of the cortisone if she did pursue surgery in the near future  MRI of the left shoulder ordered today to further  evaluate the rotator cuff  Order placed for Medrol Dose Dmitry to be taken as prescribed to aid in pain reduction  Follow up with Dr. Fiore for review MRI and possible surgical discussion       Subjective:     Patient ID:  Bianka Boyle 68 y.o. female    HPI  Bianka Boyle is a 68 y.o. female who presents today for initial evaluation of left shoulder pain that began this morning.  She notes she awoke with severe pain in the left shoulder and associated dead feeling/pain in the entire arm.  She notes this has happened to her twice prior, once a few years ago, and once 1 year ago, both self resolved within hours.  She feels like something in her shoulder is kinked and wishes it could be manipulated back into place.  She does have a history of right total shoulder arthroplasty performed by Dr. Fiore for 25 2017.  She denies mechanical symptoms, distal paresthesias, sensation of instability.  She complains of limited range of motion, weakness especially when lifting items or reaching for items above shoulder height.        The following portions of the patient's history were reviewed and updated as appropriate: allergies, current medications, past family history, past social history, past surgical history and problem list.        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      Comment: working full time   Tobacco Use    Smoking status: Never    Smokeless tobacco: Never   Vaping Use    Vaping status: Never Used   Substance and Sexual Activity    Alcohol use: Yes     Alcohol/week: 3.0 - 4.0 standard drinks of alcohol     Types: 3 - 4 Glasses of wine per week     Comment: socially    Drug use: No    Sexual activity: Yes     Partners: Male     Birth  control/protection: Post-menopausal   Other Topics Concern    Not on file   Social History Narrative    , remarried 12/20    Working - respiratory therapist     Social Determinants of Health     Financial Resource Strain: Low Risk  (9/18/2024)    Received from [facility]    Overall Financial Resource Strain (CARDIA)     Difficulty of Paying Living Expenses: Not hard at all   Food Insecurity: No Food Insecurity (9/18/2024)    Received from [facility]    Hunger Vital Sign     Worried About Running Out of Food in the Last Year: Never true     Ran Out of Food in the Last Year: Never true   Transportation Needs: No Transportation Needs (9/18/2024)    Received from [facility]    PRAPARE - Transportation     Lack of Transportation (Medical): No     Lack of Transportation (Non-Medical): No   Physical Activity: Not on file   Stress: Not on file   Social Connections: Not on file   Intimate Partner Violence: Not At Risk (9/18/2024)    Received from [facility]    Humiliation, Afraid, Rape, and Kick questionnaire     Fear of Current or Ex-Partner: No     Emotionally Abused: No     Physically Abused: No     Sexually Abused: No   Housing Stability: Low Risk  (9/18/2024)    Received from [facility]    Housing Stability Vital Sign     Unable to Pay for Housing in the Last Year: No     Number of Times Moved in the Last Year: 0     Homeless in the Last Year: No     Past Medical History:   Diagnosis Date    Abnormal thyroid function test     R....5/8/17     Arthritis     Bilateral hearing loss, unspecified hearing loss type     Colon polyps     Disease of thyroid gland     Dislocated shoulder     Right / LA...1/30/13     Effusion of knee     LA...5/21/14   R...5/8/17     HL (hearing loss)     Hypothyroidism     LA...2/6/13   R....3/31/15     Primary osteoarthritis of right knee     LA...4/9/14   R...5/21/14     Prolapsing mitral leaflet  syndrome     LA...1/28/13   AR...3/31/15     Psoriasis     Psoriatic arthritis (HCC)     Tinnitus     ringing in both ears    Vaginal Pap smear, abnormal     Vitamin D deficiency     LA...5/8/17  R...3/31/15      Past Surgical History:   Procedure Laterality Date    CERVICAL BIOPSY  W/ LOOP ELECTRODE EXCISION      COLONOSCOPY      COLONOSCOPY W/ POLYPECTOMY      JOINT REPLACEMENT  4/22/2017    R shoulder    KNEE ARTHROSCOPY Right     right knee, right shoulder , open right shoulder     MT ARTHROPLASTY GLENOHUMERAL JOINT TOTAL SHOULDER Right 04/25/2017    Procedure: TOTAL SHOULDER ARTHROPLASTY ;  Surgeon: Luis Alfredo Fiore MD;  Location:  MAIN OR;  Service: Orthopedics    MT COLONOSCOPY FLX DX W/COLLJ SPEC WHEN PFRMD N/A 10/24/2017    Procedure: COLONOSCOPY;  Surgeon: Nicho Churchill MD;  Location: [facility];  Service: Gastroenterology    SHOULDER SURGERY Right     total shoulder replacement     SKIN BIOPSY       No Known Allergies  Current Outpatient Medications on File Prior to Visit   Medication Sig Dispense Refill    Ascorbic Acid (VITAMIN C PO) Vitamin C      Calcium Carb-Cholecalciferol (CALCIUM + D3 PO) Take by mouth daily   (Patient not taking: Reported on 12/5/2023)      Cholecalciferol (VITAMIN D3 PO) Vitamin D3      ciclopirox (LOPROX) 0.77 % SUSP Apply twice daily to affected toenails 60 mL 3    clobetasol propionate (CLOBEX) 0.05 % shampoo Apply 1 application. topically daily for 14 days (Patient not taking: Reported on 9/7/2023) 118 mL 1    Cyanocobalamin (B-12) 1000 MCG TABS       diclofenac sodium (VOLTAREN) 1 % Apply 2 g topically 4 (four) times a day 3 Tube 1    glucosamine-chondroitin 500-400 MG tablet Take 1 tablet by mouth 3 (three) times a day      levothyroxine 50 mcg tablet TAKE 1 TABLET BY MOUTH EVERY DAY 90 tablet 1    mupirocin (BACTROBAN) 2 % ointment APPLY TOPICALLY TO NOSTRILS TWICE A DAY BEGIN 5 DAYS PRIOR TO SURGERY      Omega-3 Fatty Acids (FISH OIL PO) Fish Oil      sulfaSALAzine  "(AZULFIDINE-EN) 500 mg EC tablet Take 1 tablet (500 mg total) by mouth 2 (two) times a day 180 tablet 1     No current facility-administered medications on file prior to visit.              Objective:    Review of Systems  Pertinent items are noted in HPI.  All other systems were reviewed and are negative.    Physical Exam  Ht 5' 5.5\" (1.664 m)   Wt 60.3 kg (133 lb)   BMI 21.80 kg/m²   Cons: Appears well.  No apparent distress.  Psych: Alert. Oriented x3.  Mood and affect normal.  Eyes: PERRLA, EOMI  Resp: Normal effort.  No audible wheezing or stridor.  CV: Palpable pulse.  No discernable arrhythmia.  No LE edema.  Lymph:  No palpable cervical, axillary, or inguinal lymphadenopathy.  Skin: Warm.  No palpable masses.  No visible lesions.  Neuro: Normal muscle tone.  Normal and symmetric DTR's.    Left Shoulder Exam     Range of Motion   Passive abduction:  90   External rotation:  60   Internal rotation 90 degrees:  90     Muscle Strength   Abduction: 4/5   Internal rotation: 4/5   External rotation: 3/5   Supraspinatus: 4/5   Subscapularis: 4/5     Other   Erythema: absent  Scars: absent  Sensation: normal  Pulse: present     Comments:    + Empty Can  + Orellana  Brisk capillary refill  Sensation intact to Ax/R/M/U nerve distributions              Procedures  Procedures   None performed today    Diagnostics, reviewed and taken today if performed as documented:  I have personally reviewed pertinent films in PACS and my interpretation is X-ray of left shoulder reviewed and demonstrates severe glenohumeral osteoarthritis with loss of joint space, sclerotic changes, osteophyte formation.        Scribe Attestation      I,:  Luzmaria Faulkner am acting as a scribe while in the presence of the attending physician.:       I,:  Tristan Young, DO personally performed the services described in this documentation    as scribed in my presence.:             Portions of the record may have been created with voice recognition " "software.  Occasional wrong word or \"sound a like\" substitutions may have occurred due to the inherent limitations of voice recognition software.  Read the chart carefully and recognize, using context, where substitutions have occurred.  "

## 2024-10-14 ENCOUNTER — OFFICE VISIT (OUTPATIENT)
Dept: PHYSICAL THERAPY | Facility: CLINIC | Age: 69
End: 2024-10-14
Payer: MEDICARE

## 2024-10-14 DIAGNOSIS — G89.18 ACUTE POSTOPERATIVE PAIN OF RIGHT KNEE: Primary | ICD-10-CM

## 2024-10-14 DIAGNOSIS — M25.561 ACUTE POSTOPERATIVE PAIN OF RIGHT KNEE: Primary | ICD-10-CM

## 2024-10-14 PROCEDURE — 97140 MANUAL THERAPY 1/> REGIONS: CPT

## 2024-10-14 PROCEDURE — 97110 THERAPEUTIC EXERCISES: CPT

## 2024-10-14 NOTE — PROGRESS NOTES
"Daily Note     Today's date: 10/14/2024  Patient name: Bianka Boyle  : 1955  MRN: [id number]  Referring provider: Ted Rahman MD  Dx:   Encounter Diagnosis     ICD-10-CM    1. Acute postoperative pain of right knee  G89.18     M25.561                      Subjective: Pt returns from having f/u with her surgeon, he is pleased with her progress, refer to [facility] for report.   Notes on Friday she had L shoulder \"impingement\" pain, was able to see orthopedic Dr., refer to [facility] for report.  Today reports some R knee pain and swelling cont.    Objective: See treatment diary below      Assessment: Performed exercise program w/o difficulty or discomfort. Able to freedom PROM to R knee. Tolerated treatment well. Patient would benefit from continued PT to decrease pain and swelling, increase ROM and LOF.      Plan: Continue per plan of care.      Precautions: TKA    recautions: R TKA 24      Manuals 9/20 9/23 9/25 9/30 10/2 10/7 10/9 10/14     prom kl kl JLW kl kl kl kl MO     Contract/relax for extension    kl kl kl kl MO                               Neuro Re-Ed                                                                                                        Ther Ex             Heel slides  5\"x20 5\"x20  5\"x20 5;x20       saq  5\"x10 5\"x15  2#x20        Slr flexion  AA x5 indep x5 2x10   x20 x20 x20     laq   5\"x15 5\"x20 with ankle DF 5\"x20  5\" hold 5#x20 5\" hold   5#x20     Prone TKE  5\"x10  5\"x20         Ht/tr  x10ea 20 ea x20 x20 x20 Single leg hr x20 SL- HR x20     standing slr x3   20 ea          Gastroc stretch  15'x3           Cone taps   15 ea x20         Step ups  4\"x20 4\"  15 6\"x20 with march         Cane walking   100t           bike    7'          Leg press    30#2x10 30#2x15 40# 3x10 np      sls    15\"x3 15\"x4 Airex 15\"x4 15\"x4 Airex 15\"x4     Stair knee flexion    10\"x10         TM walking     2' wth instructed to aggagerate L stride length 2' comfortable walking 4' 1.7        Prone quad stretch     " "30\"x4 30\"x4 30\"x4 30\"x4     Lateral step down      6\"2x10 6\"2x10 6\" 2x10     Ther Activity             bike  5'  8' 8' 8' 8' 8'     Split squats       x20 x20     Single leg bridge       x10  x10     Gait Training                                       Modalities             CP with extension hang 10' 8' 10' 10'                                      "

## 2024-10-16 ENCOUNTER — APPOINTMENT (OUTPATIENT)
Dept: PHYSICAL THERAPY | Facility: CLINIC | Age: 69
End: 2024-10-16
Payer: MEDICARE

## 2024-10-17 ENCOUNTER — OFFICE VISIT (OUTPATIENT)
Dept: PHYSICAL THERAPY | Facility: CLINIC | Age: 69
End: 2024-10-17
Payer: MEDICARE

## 2024-10-17 DIAGNOSIS — G89.18 ACUTE POSTOPERATIVE PAIN OF RIGHT KNEE: Primary | ICD-10-CM

## 2024-10-17 DIAGNOSIS — M25.561 ACUTE POSTOPERATIVE PAIN OF RIGHT KNEE: Primary | ICD-10-CM

## 2024-10-17 PROCEDURE — 97140 MANUAL THERAPY 1/> REGIONS: CPT | Performed by: PHYSICAL THERAPIST

## 2024-10-17 PROCEDURE — 97110 THERAPEUTIC EXERCISES: CPT | Performed by: PHYSICAL THERAPIST

## 2024-10-17 NOTE — PROGRESS NOTES
Daily Note     Today's date: 10/17/2024  Patient name: Bianka Boyle  : 1955  MRN: [id number]  Referring provider: Ted Rahman MD  Dx:   Encounter Diagnosis     ICD-10-CM    1. Acute postoperative pain of right knee  G89.18     M25.561                      Subjective: pt reports that she is imporving but feels she has hit a plateau in her strength training.  Reports that she was also evaluated by Dr Young of [facility] for ongoing shoulder pain. X-ray revealed advance arthritis and is suspicious for RTC pathology.  Is undergoing prednisone taper for this.     Objective: See treatment diary below      Assessment: progressed therex as listed. Pt reports fatigue post-tx. Continues to experience pain in popliteal space with end range  knee flexion.        Provided pt with initial hep to maintain shoulder rom and strength.            Access Code: [email or web address]  URL: [email or web address]  Date: 10/17/2024  Prepared by: Freddie Krishnamurthy    Program Notes  KEEP ALL EXERCISESI IN A COMFORTABLE RANGE OF MOTIONDO NOT FORCE INTO PAIN    Exercises  - Standing Scapular Retraction  - 2-3 x daily - 7 x weekly - 10 reps - 5 seconds hold  - Seated Shoulder Flexion Towel Slide at Table Top  - 2-3 x daily - 7 x weekly - 10 reps - 5 seconds hold  - Seated Shoulder Abduction Towel Slide at Table Top with Forearm in Neutral  - 2-3 x daily - 7 x weekly - 10 reps - 5 seconds hold  - Supine Shoulder External Rotation in 45 Degrees Abduction AAROM with Dowel  - 2-3 x daily - 7 x weekly - 10 reps - 5 seconds hold  - Standing Isometric Shoulder External Rotation with Doorway and Towel Roll  - 1 x daily - 7 x weekly - 10 reps - 5 seconds hold  - Standing Isometric Shoulder Internal Rotation with Towel Roll at Doorway  - 1 x daily - 7 x weekly - 10 reps - 5 seconds hold    Plan: Continue per plan of care.      Precautions: TKA    recautions: R TKA 24      Manuals 9/20 9/23 9/25 9/30 10/2 10/7 10/9 10/14 10/17    prom kl kl JLW  "kl kl kl kl MO kl    Contract/relax for extension    kl kl kl kl MO                               Neuro Re-Ed                                                                                                        Ther Ex             Heel slides  5\"x20 5\"x20  5\"x20 5;x20       saq  5\"x10 5\"x15  2#x20        Slr flexion  AA x5 indep x5 2x10   x20 x20 x20     laq   5\"x15 5\"x20 with ankle DF 5\"x20  5\" hold 5#x20 5\" hold   5#x20     Prone TKE  5\"x10  5\"x20         Ht/tr  x10ea 20 ea x20 x20 x20 Single leg hr x20 SL- HR x20     standing slr x3   20 ea          Gastroc stretch  15'x3           Cone taps   15 ea x20         Step ups  4\"x20 4\"  15 6\"x20 with march         Cane walking   100t           bike    7'          Leg press    30#2x10 30#2x15 40# 3x10 np      sls    15\"x3 15\"x4 Airex 15\"x4 15\"x4 Airex 15\"x4     Stair knee flexion    10\"x10         TM walking     2' wth instructed to aggagerate L stride length 2' comfortable walking 4' 1.7        Prone quad stretch     30\"x4 30\"x4 30\"x4 30\"x4 30\"x4    Lateral step down      6\"2x10 6\"2x10 6\" 2x10     Ther Activity             bike  5'  8' 8' 8' 8' 8' 8' lvl 3    Split squats       x20 x20     Single leg bridge       x10  x10     lunges         [id number]    Slr x4         [place] 11.0 x20ea B/L    Sustained squat         15\"x4 wobble board B/L    Gait Training                                       Modalities             CP with extension hang 10' 8' 10' 10'                                      "

## 2024-10-21 ENCOUNTER — OFFICE VISIT (OUTPATIENT)
Dept: PHYSICAL THERAPY | Facility: CLINIC | Age: 69
End: 2024-10-21
Payer: MEDICARE

## 2024-10-21 DIAGNOSIS — M25.561 ACUTE POSTOPERATIVE PAIN OF RIGHT KNEE: Primary | ICD-10-CM

## 2024-10-21 DIAGNOSIS — G89.18 ACUTE POSTOPERATIVE PAIN OF RIGHT KNEE: Primary | ICD-10-CM

## 2024-10-21 PROCEDURE — 97140 MANUAL THERAPY 1/> REGIONS: CPT

## 2024-10-21 PROCEDURE — 97110 THERAPEUTIC EXERCISES: CPT

## 2024-10-21 NOTE — PROGRESS NOTES
"Daily Note     Today's date: 10/21/2024  Patient name: Bianka Boyle  : 1955  MRN: [id number]  Referring provider: Ted Rahman MD  Dx:   Encounter Diagnosis     ICD-10-CM    1. Acute postoperative pain of right knee  G89.18     M25.561           Start Time: 0803  Stop Time: 0847  Total time in clinic (min): 44 minutes      Subjective: Patient reports experiencing some pain behind her right knee - \"Maybe it's swollen.\"      Objective: See treatment diary below.      Assessment: Patient is doing well with TE program.  Right knee ROM and strength improving.      Plan: Continue treatment as per PT plan of care.       Precautions: R TKA 24      Manuals 9/20 9/23 9/25 9/30 10/2 10/7 10/9 10/14 10/17 10/21   prom kl kl JLW kl kl kl kl MO kl JLW   Contract/relax for extension    kl kl kl kl MO                               Neuro Re-Ed                                                                                                        Ther Ex             Heel slides  5\"x20 5\"x20  5\"x20 5;x20       saq  5\"x10 5\"x15  2#x20     5\"x15   Slr flexion  AA x5 indep x5 2x10   x20 x20 x20  2#  20   laq   5\"x15 5\"x20 with ankle DF 5\"x20  5\" hold 5#x20 5\" hold   5#x20     Prone TKE  5\"x10  5\"x20         Ht/tr  x10ea 20 ea x20 x20 x20 Single leg hr x20 SL- HR x20     standing slr x3   20 ea          Gastroc stretch  15'x3           Cone taps   15 ea x20         Step ups  4\"x20 4\"  15 6\"x20 with march         Cane walking   100t           bike    7'          Leg press    30#2x10 30#2x15 40# 3x10 np    double  leg   50# x15   single leg   40# x20   sls    15\"x3 15\"x4 Airex 15\"x4 15\"x4 Airex 15\"x4   airex   30\"X3   Stair knee flexion    10\"x10         TM walking     2' wth instructed to aggagerate L stride length 2' comfortable walking 4' 1.7        Prone quad stretch     30\"x4 30\"x4 30\"x4 30\"x4 30\"x4  30\"x4   Lateral step down      6\"2x10 6\"2x10 6\" 2x10   6\"   20   Ther Activity             bike  5'  8' 8' 8' 8' 8' 8' lvl 3 " "8'   Split squats       x20 x20     Single leg bridge       x10  x10     lunges         bosux20  bosu   20 ea   Slr x4         [place] 11.0 x20ea B/L matheus 11.4   20 ea B/L   Sustained squat         15\"x4 wobble board B/L                 Gait Training                                       Modalities             CP with extension hang 10' 8' 10' 10'                             "

## 2024-10-23 ENCOUNTER — OFFICE VISIT (OUTPATIENT)
Dept: PHYSICAL THERAPY | Facility: CLINIC | Age: 69
End: 2024-10-23
Payer: MEDICARE

## 2024-10-23 DIAGNOSIS — M25.561 ACUTE POSTOPERATIVE PAIN OF RIGHT KNEE: Primary | ICD-10-CM

## 2024-10-23 DIAGNOSIS — G89.18 ACUTE POSTOPERATIVE PAIN OF RIGHT KNEE: Primary | ICD-10-CM

## 2024-10-23 PROCEDURE — 97110 THERAPEUTIC EXERCISES: CPT | Performed by: PHYSICAL THERAPIST

## 2024-10-23 NOTE — PROGRESS NOTES
"Daily Note     Today's date: 10/23/2024  Patient name: Bianka Boyle  : 1955  MRN: [id number]  Referring provider: Ted Rahman MD  Dx:   Encounter Diagnosis     ICD-10-CM    1. Acute postoperative pain of right knee  G89.18     M25.561                      Subjective: Patient continues to report pinching posterior R knee pain. Patient has been keeping up with home program with no reported issues.       Objective: See treatment diary below      Assessment: Patient tolerated treatment well evidence by report of appropriate level of quad and hip musculature fatigue following completion of exercise. Patient would continue from skilled PT to improve strength and ROM of the lower extremity.    Plan: Continue per plan of care.     Treatment performed by  Justin ADDISON, I attest that this treatment was directly supervised by Smooth BOWERT.           Precautions: R TKA 24      Manuals 9/20 9/23 9/25 9/30 10/2 10/7 10/9 10/14 10/17 10/21 10/23   prom kl kl JLW kl kl kl kl MO kl JLW MR   Contract/relax for extension    kl kl kl kl MO   MR                               Neuro Re-Ed                                                                                                                Ther Ex              Heel slides  5\"x20 5\"x20  5\"x20 5;x20        saq  5\"x10 5\"x15  2#x20     5\"x15    Slr flexion  AA x5 indep x5 2x10   x20 x20 x20  2#  20    laq   5\"x15 5\"x20 with ankle DF 5\"x20  5\" hold 5#x20 5\" hold   5#x20   5\" hold 5#x20   Prone TKE  5\"x10  5\"x20          Ht/tr  x10ea 20 ea x20 x20 x20 Single leg hr x20 SL- HR x20      standing slr x3   20 ea           Gastroc stretch  15'x3            Cone taps   15 ea x20          Step ups  4\"x20 4\"  15 6\"x20 with march          Cane walking   100t            bike    7'           Leg press    30#2x10 30#2x15 40# 3x10 np    double  leg   50# x15   single leg   40# x20 DL 50#x10 60#x10  SL 40#x20   sls    15\"x3 15\"x4 Airex 15\"x4 15\"x4 Airex 15\"x4   airex   " "30\"X3 Airex 20\"x3   Stair knee flexion    10\"x10          TM walking     2' wth instructed to aggagerate L stride length 2' comfortable walking 4' 1.7         Prone quad stretch     30\"x4 30\"x4 30\"x4 30\"x4 30\"x4  30\"x4 30\"x4   Lateral step down      6\"2x10 6\"2x10 6\" 2x10   6\"   20    Ther Activity              bike  5'  8' 8' 8' 8' 8' 8' lvl 3 8' 8'    Split squats       x20 x20      Single leg bridge       x10  x10      lunges         bosux20  bosu   20 ea Bosux20   Slr x4         [place] 11.0 x20ea B/L braden 11.4   20 ea B/L [place] 11.0 20ea B/L   Sustained squat         15\"x4 wobble board B/L     sidelunge           nv   sls           rebounded                 Gait Training                                          Modalities              CP with extension hang 10' 8' 10' 10'                                 "

## 2024-10-28 ENCOUNTER — OFFICE VISIT (OUTPATIENT)
Dept: PHYSICAL THERAPY | Facility: CLINIC | Age: 69
End: 2024-10-28
Payer: MEDICARE

## 2024-10-28 DIAGNOSIS — M25.561 ACUTE POSTOPERATIVE PAIN OF RIGHT KNEE: Primary | ICD-10-CM

## 2024-10-28 DIAGNOSIS — G89.18 ACUTE POSTOPERATIVE PAIN OF RIGHT KNEE: Primary | ICD-10-CM

## 2024-10-28 PROCEDURE — 97110 THERAPEUTIC EXERCISES: CPT

## 2024-10-28 NOTE — PROGRESS NOTES
"Daily Note     Today's date: 10/28/2024  Patient name: Bianka Boyle  : 1955  MRN: [id number]  Referring provider: Ted Rahman MD  Dx:   Encounter Diagnosis     ICD-10-CM    1. Acute postoperative pain of right knee  G89.18     M25.561           Start Time: 0803  Stop Time: 0849  Total time in clinic (min): 46 minutes      Subjective: Patient reports her right knee is sore after stationary biking and walking on the treadmill at home.      Objective: See treatment diary below.      Assessment: Progression of TE program is tolerated well.  Right knee ROM and strength improving.      Plan: Continue treatment as per PT plan of care.       Precautions: R TKA 24      Manuals 10/28  9/25 9/30 10/2 10/7 10/9 10/14 10/17 10/21 10/23   prom JLW  JLW kl kl kl kl MO kl JLW MR   Contract/relax for extension    kl kl kl kl MO   MR                               Neuro Re-Ed                                                                                                                Ther Ex              Heel slides   5\"x20  5\"x20 5;x20        saq   5\"x15  2#x20     5\"x15    Slr flexion   2x10   x20 x20 x20  2#  20    laq 5\" hold  5#  20  5\"x15 5\"x20 with ankle DF 5\"x20  5\" hold 5#x20 5\" hold   5#x20   5\" hold 5#x20   Prone TKE    5\"x20          Ht/tr   20 ea x20 x20 x20 Single leg hr x20 SL- HR x20      standing slr x3   20 ea           sidestepping black  12ft x6             Cone taps   15 ea x20          Step ups 8\"  20  4\"  15 6\"x20 with march          bike 8'   7'           Leg press SL  40#  2x15   30#2x10 30#2x15 40# 3x10 np    double  leg   50# x15   single leg   40# x20 DL 50#x10 60#x10  SL 40#x20   sls airex  30\"x3 rebounder  15\"x3 15\"x4 Airex 15\"x4 15\"x4 Airex 15\"x4   airex   30\"X3 Airex 20\"x3   Stair knee flexion    10\"x10          TM walking     2' wth instructed to aggagerate L stride length 2' comfortable walking 4' 1.7         Prone quad stretch 30\"x4    30\"x4 30\"x4 30\"x4 30\"x4 30\"x4  30\"x4 30\"x4 " "  Lateral step down      6\"2x10 6\"2x10 6\" 2x10   6\"   20    bike 8'   8' 8' 8' 8' 8' 8' lvl 3 8' 8'    Split squats       x20 x20      Single leg bridge       x10  x10      lunges bosu  20 ea        bosux20  bosu   20 ea Bosux20   Slr x4         [place] 11.0 x20ea B/L braden 11.4   20 ea B/L [place] 11.0 20ea B/L   Sustained squat         15\"x4 wobble board B/L     sidelunge on slider  20 ea          nv                               Ther Act                                          Gait Training                                          Modalities              CP with extension hang   10' 10'                                   "

## 2024-10-30 ENCOUNTER — OFFICE VISIT (OUTPATIENT)
Dept: PHYSICAL THERAPY | Facility: CLINIC | Age: 69
End: 2024-10-30
Payer: MEDICARE

## 2024-10-30 DIAGNOSIS — G89.18 ACUTE POSTOPERATIVE PAIN OF RIGHT KNEE: Primary | ICD-10-CM

## 2024-10-30 DIAGNOSIS — M25.561 ACUTE POSTOPERATIVE PAIN OF RIGHT KNEE: Primary | ICD-10-CM

## 2024-10-30 PROCEDURE — 97140 MANUAL THERAPY 1/> REGIONS: CPT | Performed by: PHYSICAL THERAPIST

## 2024-10-30 PROCEDURE — 97110 THERAPEUTIC EXERCISES: CPT | Performed by: PHYSICAL THERAPIST

## 2024-10-30 NOTE — PROGRESS NOTES
"Daily Note     Today's date: 10/30/2024  Patient name: Bianka Boyle  : 1955  MRN: [id number]  Referring provider: Ted Rahman MD  Dx:   Encounter Diagnosis     ICD-10-CM    1. Acute postoperative pain of right knee  G89.18     M25.561             Start Time: 08  Stop Time: 0848  Total time in clinic (min): 45 minutes      Subjective: Patient reports feeling good today. Reporting soreness following last session that lasted a few hours post.      Objective: See treatment diary below.      Assessment: Patient tolerated treatment well demonstrating good control of her knee, not collapsing into valgus during exercises. Patient noted some difficulty with the SLS rebounder exercise but improved as exercise went on. Patient reported appropriate levels of muscle fatigue following exercises, especially with the quadriceps muscle group. Patient continues to experience pain the R popliteal space when at end range flexion, likely due to residual inflammation of the area.      Plan: Continue treatment as per PT plan of care.    Treatment performed by  Justin Khalil  SPT, I attest that this treatment was directly supervised by Smooth Krishnamurthy DPT.         Precautions: R TKA 24      Manuals 10/28 10/30 9/25 9/30 10/2 10/7 10/9 10/14 10/17 10/21 10/23   prom JLW mr JLW kl kl kl kl MO kl JLW MR   Contract/relax for extension  mr  kl kl kl kl MO   MR                               Neuro Re-Ed                                                                                                                Ther Ex              Heel slides   5\"x20  5\"x20 5;x20        saq   5\"x15  2#x20     5\"x15    Slr flexion   2x10   x20 x20 x20  2#  20    laq 5\" hold  5#  20  5\"x15 5\"x20 with ankle DF 5\"x20  5\" hold 5#x20 5\" hold   5#x20   5\" hold 5#x20   Prone TKE    5\"x20          Ht/tr   20 ea x20 x20 x20 Single leg hr x20 SL- HR x20      standing slr x3   20 ea           sidestepping black  12ft x6             Cone taps   15 ea x20     " "     Step ups 8\"  20 8\" 20 4\"  15 6\"x20 with march          bike 8'   7'           Leg press SL  40#  2x15 SL 40# 2x15  30#2x10 30#2x15 40# 3x10 np    double  leg   50# x15   single leg   40# x20 DL 50#x10 60#x10  SL 40#x20   sls airex  30\"x3 Rebounder 30\" x 3    15\"x3 15\"x4 Airex 15\"x4 15\"x4 Airex 15\"x4   airex   30\"X3 Airex 20\"x3   Stair knee flexion    10\"x10          TM walking     2' wth instructed to aggagerate L stride length 2' comfortable walking 4' 1.7         Prone quad stretch 30\"x4 30\"x4   30\"x4 30\"x4 30\"x4 30\"x4 30\"x4  30\"x4 30\"x4   Lateral step down      6\"2x10 6\"2x10 6\" 2x10   6\"   20    bike 8' 8'  8' 8' 8' 8' 8' 8' lvl 3 8' 8'    Split squats       x20 x20      Single leg bridge       x10  x10      lunges bosu  20 ea Bosu 20 ea       bosux20  bosu   20 ea Bosux20   Slr x4  Abd 15 ea       [place] 11.0 x20ea B/L braden 11.4   20 ea B/L [place] 11.0 20ea B/L   Sustained squat         15\"x4 wobble board B/L     sidelunge on slider  20 ea On slider 20 ea         nv   sideshuffle  nv            skaters  n            Ther Act                                          Gait Training                                          Modalities              CP with extension hang   10' 10'                                   "

## 2024-11-04 ENCOUNTER — OFFICE VISIT (OUTPATIENT)
Dept: PHYSICAL THERAPY | Facility: CLINIC | Age: 69
End: 2024-11-04
Payer: MEDICARE

## 2024-11-04 ENCOUNTER — HOSPITAL ENCOUNTER (OUTPATIENT)
Dept: RADIOLOGY | Facility: HOSPITAL | Age: 69
Discharge: HOME | End: 2024-11-04
Attending: ORTHOPAEDIC SURGERY
Payer: MEDICARE

## 2024-11-04 DIAGNOSIS — G89.18 ACUTE POSTOPERATIVE PAIN OF RIGHT KNEE: Primary | ICD-10-CM

## 2024-11-04 DIAGNOSIS — M19.012 PRIMARY OSTEOARTHRITIS OF LEFT SHOULDER: ICD-10-CM

## 2024-11-04 DIAGNOSIS — M75.102 TEAR OF LEFT ROTATOR CUFF, UNSPECIFIED TEAR EXTENT, UNSPECIFIED WHETHER TRAUMATIC: ICD-10-CM

## 2024-11-04 DIAGNOSIS — M25.512 LEFT SHOULDER PAIN, UNSPECIFIED CHRONICITY: ICD-10-CM

## 2024-11-04 DIAGNOSIS — M25.561 ACUTE POSTOPERATIVE PAIN OF RIGHT KNEE: Primary | ICD-10-CM

## 2024-11-04 PROCEDURE — 97110 THERAPEUTIC EXERCISES: CPT

## 2024-11-04 PROCEDURE — 73221 MRI JOINT UPR EXTREM W/O DYE: CPT

## 2024-11-04 PROCEDURE — 97140 MANUAL THERAPY 1/> REGIONS: CPT

## 2024-11-04 NOTE — PROGRESS NOTES
"Daily Note     Today's date: 2024  Patient name: Bianka Boyle  : 1955  MRN: [id number]  Referring provider: Ted Rahman MD  Dx:   Encounter Diagnosis     ICD-10-CM    1. Acute postoperative pain of right knee  G89.18     M25.561           Start Time: 0802  Stop Time: 0846  Total time in clinic (min): 44 minutes      Subjective: Patient feels her progress is slow.  Patient reports standing for a long time \"does make it ache.\"      Objective: See treatment diary below.      Assessment: Progression of TE program is tolerated well.  Right knee ROM and strength improving.      Plan: Continue treatment as per PT plan of care.       Precautions: R TKA 24      Manuals 10/28 10/30 11/4  10/2 10/7 10/9 10/14 10/17 10/21 10/23   prom JLW mr JLW  kl kl kl MO kl JLW MR   Contract/relax for extension  mr   kl kl kl MO   MR                               Neuro Re-Ed                                                                                                                Ther Ex              Heel slides     5\"x20 5;x20        saq     2#x20     5\"x15    Slr flexion      x20 x20 x20  2#  20    laq 5\" hold  5#  20    5\"x20  5\" hold 5#x20 5\" hold   5#x20   5\" hold 5#x20   Prone TKE              Ht/tr     x20 x20 Single leg hr x20 SL- HR x20      standing slr x3              sidestepping black  12ft x6             Cone taps              Step ups 8\"  20 8\" 20 bosu  20           bike 8'             Leg press SL  40#  2x15 SL 40# 2x15  SL   40#   2x15  30#2x15 40# 3x10 np    double  leg   50# x15   single leg   40# x20 DL 50#x10 60#x10  SL 40#x20   sls airex  30\"x3 Rebounder 30\" x 3    airex  rebounder ball toss  red ball  30  15\"x4 Airex 15\"x4 15\"x4 Airex 15\"x4   airex   30\"X3 Airex 20\"x3   Stair knee flexion              TM walking     2' wth instructed to aggagerate L stride length 2' comfortable walking 4' 1.7         Prone quad stretch 30\"x4 30\"x4  30\"x4  30\"x4 30\"x4 30\"x4 30\"x4 30\"x4  30\"x4 30\"x4   Lateral step " "down      6\"2x10 6\"2x10 6\" 2x10   6\"   20    bike 8' 8' 8'  8' 8' 8' 8' 8' lvl 3 8' 8'    Split squats       x20 x20      Single leg bridge       x10  x10      lunges bosu  20 ea Bosu 20 ea  bosu   20 ea      bosux20  bosu   20 ea Bosux20   Slr x4  Abd 15 ea       [place] 11.0 x20ea B/L braden 11.4   20 ea B/L [place] 11.0 20ea B/L   Sustained squat   wobble board   20\"x4      15\"x4 wobble board B/L     sidelunge on slider  20 ea On slider 20 ea on slider  20 ea        nv   sideshuffle  nv  15ft x8           skaters                                          Ther Act                                          Gait Training                                          Modalities              CP with extension hang                                         "

## 2024-11-06 ENCOUNTER — OFFICE VISIT (OUTPATIENT)
Dept: PHYSICAL THERAPY | Facility: CLINIC | Age: 69
End: 2024-11-06
Payer: MEDICARE

## 2024-11-06 DIAGNOSIS — M25.561 ACUTE POSTOPERATIVE PAIN OF RIGHT KNEE: Primary | ICD-10-CM

## 2024-11-06 DIAGNOSIS — G89.18 ACUTE POSTOPERATIVE PAIN OF RIGHT KNEE: Primary | ICD-10-CM

## 2024-11-06 PROCEDURE — 97110 THERAPEUTIC EXERCISES: CPT

## 2024-11-06 PROCEDURE — 97140 MANUAL THERAPY 1/> REGIONS: CPT

## 2024-11-06 NOTE — PROGRESS NOTES
"Daily Note     Today's date: 2024  Patient name: Bianka Boyle  : 1955  MRN: [id number]  Referring provider: Ted Rahman MD  Dx:   Encounter Diagnosis     ICD-10-CM    1. Acute postoperative pain of right knee  G89.18     M25.561           Start Time: 0800  Stop Time: 0845  Total time in clinic (min): 45 minutes      Subjective: Patient reports her right knee gets sore with prolonged standing and walking.      Objective: See treatment diary below.      Assessment: Patient is doing well with TE program.  Although painful during the manual stretching, right knee flexion ROM is improving.      Plan: Continue treatment as per PT plan of care.       Precautions: R TKA 24      Manuals 10/28 10/30 11/4 11/6  10/7 10/9 10/14 10/17 10/21 10/23   prom JLW mr JLW JLW  kl kl MO kl JLW MR   Contract/relax for extension  mr    kl kl MO   MR                               Neuro Re-Ed                                                                                                                Ther Ex              Heel slides      5;x20        saq          5\"x15    Slr flexion    2.5#  20  x20 x20 x20  2#  20    laq 5\" hold  5#  20      5\" hold 5#x20 5\" hold   5#x20   5\" hold 5#x20   Prone TKE              Ht/tr      x20 Single leg hr x20 SL- HR x20      standing slr x3              sidestepping black  12ft x6   black  12ft x8          Cone taps              Step ups 8\"  20 8\" 20 bosu  20 bosu  20          bike 8'             Leg press SL  40#  2x15 SL 40# 2x15  SL   40#   2x15  SL   40#   2x15  40# 3x10 np    double  leg   50# x15   single leg   40# x20 DL 50#x10 60#x10  SL 40#x20   sls airex  30\"x3 Rebounder 30\" x 3    airex  rebounder ball toss  red ball  30 airex  rebounder ball toss  red ball  30  Airex 15\"x4 15\"x4 Airex 15\"x4   airex   30\"X3 Airex 20\"x3   Stair knee flexion              TM walking      4' 1.7         Prone quad stretch 30\"x4 30\"x4  30\"x4  30\"x4  30\"x4 30\"x4 30\"x4 30\"x4  30\"x4 30\"x4   Lateral " "step down      6\"2x10 6\"2x10 6\" 2x10   6\"   20    bike 8' 8' 8' 8'  8' 8' 8' 8' lvl 3 8' 8'    Split squats       x20 x20      Single leg bridge       x10  x10      lunges bosu  20 ea Bosu 20 ea  bosu   20 ea  bosu   20 ea     bosux20  bosu   20 ea Bosux20   Slr x4  Abd 15 ea       [place] 11.0 x20ea B/L matheus 11.4   20 ea B/L [place] 11.0 20ea B/L   Sustained squat   wobble board   20\"x4 wobble board   20\"x4     15\"x4 wobble board B/L     sidelunge on slider  20 ea On slider 20 ea on slider  20 ea on slider  20 ea       nv   sideshuffle  nv  15ft x8           skaters                                          Ther Act                                          Gait Training                                          Modalities              CP with extension hang                                     "

## 2024-11-08 ENCOUNTER — OFFICE VISIT (OUTPATIENT)
Dept: INTERNAL MEDICINE CLINIC | Facility: CLINIC | Age: 69
End: 2024-11-08
Payer: MEDICARE

## 2024-11-08 VITALS
WEIGHT: 133.4 LBS | HEART RATE: 60 BPM | DIASTOLIC BLOOD PRESSURE: 68 MMHG | SYSTOLIC BLOOD PRESSURE: 114 MMHG | OXYGEN SATURATION: 98 % | TEMPERATURE: 97.7 F | BODY MASS INDEX: 21.86 KG/M2

## 2024-11-08 DIAGNOSIS — Z96.651 S/P TOTAL KNEE ARTHROPLASTY, RIGHT: Primary | ICD-10-CM

## 2024-11-08 DIAGNOSIS — E53.8 VITAMIN B12 DEFICIENCY: ICD-10-CM

## 2024-11-08 DIAGNOSIS — Z12.31 ENCOUNTER FOR SCREENING MAMMOGRAM FOR BREAST CANCER: ICD-10-CM

## 2024-11-08 DIAGNOSIS — M85.9 DISORDER OF BONE DENSITY AND STRUCTURE, UNSPECIFIED: ICD-10-CM

## 2024-11-08 DIAGNOSIS — M85.832 OSTEOPENIA OF LEFT FOREARM: ICD-10-CM

## 2024-11-08 DIAGNOSIS — E78.49 OTHER HYPERLIPIDEMIA: ICD-10-CM

## 2024-11-08 DIAGNOSIS — Z00.00 MEDICARE ANNUAL WELLNESS VISIT, SUBSEQUENT: ICD-10-CM

## 2024-11-08 DIAGNOSIS — E03.9 ACQUIRED HYPOTHYROIDISM: ICD-10-CM

## 2024-11-08 DIAGNOSIS — L40.59 POLYARTICULAR PSORIATIC ARTHRITIS (HCC): ICD-10-CM

## 2024-11-08 DIAGNOSIS — D70.9 NEUTROPENIA, UNSPECIFIED TYPE (HCC): ICD-10-CM

## 2024-11-08 PROCEDURE — G0439 PPPS, SUBSEQ VISIT: HCPCS | Performed by: INTERNAL MEDICINE

## 2024-11-08 PROCEDURE — 99214 OFFICE O/P EST MOD 30 MIN: CPT | Performed by: INTERNAL MEDICINE

## 2024-11-08 NOTE — PATIENT INSTRUCTIONS
Medicare Preventive Visit Patient Instructions  Thank you for completing your Welcome to Medicare Visit or Medicare Annual Wellness Visit today. Your next wellness visit will be due in one year (11/9/2025).  The screening/preventive services that you may require over the next 5-10 years are detailed below. Some tests may not apply to you based off risk factors and/or age. Screening tests ordered at today's visit but not completed yet may show as past due. Also, please note that scanned in results may not display below.  Preventive Screenings:  Service Recommendations Previous Testing/Comments   Colorectal Cancer Screening  * Colonoscopy    * Fecal Occult Blood Test (FOBT)/Fecal Immunochemical Test (FIT)  * Fecal DNA/Cologuard Test  * Flexible Sigmoidoscopy Age: 45-75 years old   Colonoscopy: every 10 years (may be performed more frequently if at higher risk)  OR  FOBT/FIT: every 1 year  OR  Cologuard: every 3 years  OR  Sigmoidoscopy: every 5 years  Screening may be recommended earlier than age 45 if at higher risk for colorectal cancer. Also, an individualized decision between you and your healthcare provider will decide whether screening between the ages of 76-85 would be appropriate. Colonoscopy: 11/12/2020  FOBT/FIT: Not on file  Cologuard: Not on file  Sigmoidoscopy: Not on file    Screening Current     Breast Cancer Screening Age: 40+ years old  Frequency: every 1-2 years  Not required if history of left and right mastectomy Mammogram: 11/20/2023    Screening Current   Cervical Cancer Screening Between the ages of 21-29, pap smear recommended once every 3 years.   Between the ages of 30-65, can perform pap smear with HPV co-testing every 5 years.   Recommendations may differ for women with a history of total hysterectomy, cervical cancer, or abnormal pap smears in past. Pap Smear: 09/07/2023    Screening Not Indicated   Hepatitis C Screening Once for adults born between 1945 and 1965  More frequently in  patients at high risk for Hepatitis C Hep C Antibody: 02/19/2020    Screening Current   Diabetes Screening 1-2 times per year if you're at risk for diabetes or have pre-diabetes Fasting glucose: 81 mg/dL (10/13/2023)  A1C: 5.3 % (4/9/2021)  Screening Current   Cholesterol Screening Once every 5 years if you don't have a lipid disorder. May order more often based on risk factors. Lipid panel: 10/13/2023    Screening Not Indicated  History Lipid Disorder     Other Preventive Screenings Covered by Medicare:  Abdominal Aortic Aneurysm (AAA) Screening: covered once if your at risk. You're considered to be at risk if you have a family history of AAA.  Lung Cancer Screening: covers low dose CT scan once per year if you meet all of the following conditions: (1) Age 55-77; (2) No signs or symptoms of lung cancer; (3) Current smoker or have quit smoking within the last 15 years; (4) You have a tobacco smoking history of at least 20 pack years (packs per day multiplied by number of years you smoked); (5) You get a written order from a healthcare provider.  Glaucoma Screening: covered annually if you're considered high risk: (1) You have diabetes OR (2) Family history of glaucoma OR (3)  aged 50 and older OR (4)  American aged 65 and older  Osteoporosis Screening: covered every 2 years if you meet one of the following conditions: (1) You're estrogen deficient and at risk for osteoporosis based off medical history and other findings; (2) Have a vertebral abnormality; (3) On glucocorticoid therapy for more than 3 months; (4) Have primary hyperparathyroidism; (5) On osteoporosis medications and need to assess response to drug therapy.   Last bone density test (DXA Scan): 05/20/2024.  HIV Screening: covered annually if you're between the age of 15-65. Also covered annually if you are younger than 15 and older than 65 with risk factors for HIV infection. For pregnant patients, it is covered up to 3 times per  pregnancy.    Immunizations:  Immunization Recommendations   Influenza Vaccine Annual influenza vaccination during flu season is recommended for all persons aged >= 6 months who do not have contraindications   Pneumococcal Vaccine   * Pneumococcal conjugate vaccine = PCV13 (Prevnar 13), PCV15 (Vaxneuvance), PCV20 (Prevnar 20)  * Pneumococcal polysaccharide vaccine = PPSV23 (Pneumovax) Adults 19-63 yo with certain risk factors or if 65+ yo  If never received any pneumonia vaccine: recommend Prevnar 20 (PCV20)  Give PCV20 if previously received 1 dose of PCV13 or PPSV23   Hepatitis B Vaccine 3 dose series if at intermediate or high risk (ex: diabetes, end stage renal disease, liver disease)   Respiratory syncytial virus (RSV) Vaccine - COVERED BY MEDICARE PART D  * RSVPreF3 (Arexvy) CDC recommends that adults 60 years of age and older may receive a single dose of RSV vaccine using shared clinical decision-making (SCDM)   Tetanus (Td) Vaccine - COST NOT COVERED BY MEDICARE PART B Following completion of primary series, a booster dose should be given every 10 years to maintain immunity against tetanus. Td may also be given as tetanus wound prophylaxis.   Tdap Vaccine - COST NOT COVERED BY MEDICARE PART B Recommended at least once for all adults. For pregnant patients, recommended with each pregnancy.   Shingles Vaccine (Shingrix) - COST NOT COVERED BY MEDICARE PART B  2 shot series recommended in those 19 years and older who have or will have weakened immune systems or those 50 years and older     Health Maintenance Due:      Topic Date Due   • Breast Cancer Screening: Mammogram  11/20/2024   • Colorectal Cancer Screening  11/12/2030   • Hepatitis C Screening  Completed   • Cervical Cancer Screening  Discontinued     Immunizations Due:      Topic Date Due   • Influenza Vaccine (1) 09/01/2024   • COVID-19 Vaccine (4 - 2023-24 season) 09/01/2024     Advance Directives   What are advance directives?  Advance directives are  legal documents that state your wishes and plans for medical care. These plans are made ahead of time in case you lose your ability to make decisions for yourself. Advance directives can apply to any medical decision, such as the treatments you want, and if you want to donate organs.   What are the types of advance directives?  There are many types of advance directives, and each state has rules about how to use them. You may choose a combination of any of the following:  Living will:  This is a written record of the treatment you want. You can also choose which treatments you do not want, which to limit, and which to stop at a certain time. This includes surgery, medicine, IV fluid, and tube feedings.   Durable power of  for healthcare (DPAHC):  This is a written record that states who you want to make healthcare choices for you when you are unable to make them for yourself. This person, called a proxy, is usually a family member or a friend. You may choose more than 1 proxy.  Do not resuscitate (DNR) order:  A DNR order is used in case your heart stops beating or you stop breathing. It is a request not to have certain forms of treatment, such as CPR. A DNR order may be included in other types of advance directives.  Medical directive:  This covers the care that you want if you are in a coma, near death, or unable to make decisions for yourself. You can list the treatments you want for each condition. Treatment may include pain medicine, surgery, blood transfusions, dialysis, IV or tube feedings, and a ventilator (breathing machine).  Values history:  This document has questions about your views, beliefs, and how you feel and think about life. This information can help others choose the care that you would choose.  Why are advance directives important?  An advance directive helps you control your care. Although spoken wishes may be used, it is better to have your wishes written down. Spoken wishes can be  "misunderstood, or not followed. Treatments may be given even if you do not want them. An advance directive may make it easier for your family to make difficult choices about your care.   Alcohol Use and Your Health    Drinking too much can harm your health.  Excessive alcohol use leads to about 88,000 death in the [place] each year, and shortens the life of those who diet by almost 30 years.  Further, excessive drinking cost the economy $249 billion in 2010.  Most excessive drinkers are not alcohol dependent.    Excessive alcohol use has immediate effects that increase the risk of many harmful health conditions.  These are most often the result of binge drinking.  Over time, excessive alcohol use can lead to the development of chronic diseases and other series health problems.    What is considered a \"drink\"?        Excessive alcohol use includes:  Binge Drinking: For women, 4 or more drinks consumed on one occasion. For men, 5 or more drinks consumed on one occasion.  Heavy Drinking: For women, 8 or more drinks per week. For men, 15 or more drinks per week  Any alcohol used by pregnant women  Any alcohol used by those under the age of 21 years    If you choose to drink, do so in moderation:  Do not drink at all if you are under the age of 21, or if you are or may be pregnant, or have health problems that could be made worse by drinking.  For women, up to 1 drink per day  For men, up to 2 drinks a day    No one should begin drinking or drink more frequently based on potential health benefits    Short-Term Health Risks:  Injuries: motor vehicle crashes, falls, drownings, burns  Violence: homicide, suicide, sexual assault, intimate partner violence  Alcohol poisoning  Reproductive health: risky sexual behaviors, unintended prengnacy, sexually transmitted diseases, miscarriage, stillbirth, fetal alcohol syndrome    Long-Term Health Risks:  Chronic diseases: high blood pressure, heart disease, stroke, liver " disease, digestive problems  Cancers: breast, mouth and throat, liver, colon  Learning and memory problems: dementia, poor school performance  Mental health: depression, anxiety, insomnia  Social problems: lost productivity, family problems, unemployment  Alcohol dependence    For support and more information:  Substance Abuse and Mental Health Services Administration  [address]  [place] [address]  Web Address: http://www.[facility].gov    Alcoholics Anonymous        Web Address: http://www.aa.org    https://www.cdc.gov/alcohol/fact-sheets/alcohol-use.htm     © Copyright [employer] 2018 Information is for End User's use only and may not be sold, redistributed or otherwise used for commercial purposes. All illustrations and images included in CareNotes® are the copyrighted property of [employer].D.A[employer]M., Inc. or [employer]

## 2024-11-08 NOTE — ASSESSMENT & PLAN NOTE
Lipids due.  Taking omega-3 only.  Discussed risk.    Orders:    Lipid panel; Future    Comprehensive metabolic panel; Future

## 2024-11-08 NOTE — ASSESSMENT & PLAN NOTE
Doing well, ongoing physical therapy.  Follow up with Ortho.  Instructed to limit daily exercise/activity.

## 2024-11-08 NOTE — PROGRESS NOTES
"Ambulatory Visit  Name: Bianka Boyle      : 1955      MRN: [id number]  Encounter Provider: Erica Morales MD  Encounter Date: 2024   Encounter department: [facility]    Assessment & Plan  S/P total knee arthroplasty, right  Doing well, ongoing physical therapy.  Follow up with Ortho.  Instructed to limit daily exercise/activity.         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HCC)  On sulfasalazine.  Per rheum.         Other hyperlipidemia  Lipids due.  Taking omega-3 only.  Discussed risk.    Orders:    Lipid panel; Future    Comprehensive metabolic panel; Future    Vitamin B12 deficiency  Taking B12.    Orders:    Vitamin B12; Future    Neutropenia, unspecified type (HCC)  Repeat CBC.    Orders:    CBC and differential; Future    Acquired hypothyroidism  TFT's due.    Orders:    TSH, 3rd generation with Free T4 reflex; Future    Osteopenia of left forearm  Continue daily walks and supplements.    Orders:    Vitamin D 25 hydroxy; Future    Encounter for screening mammogram for breast cancer    Orders:    Mammo screening bilateral w 3d and cad; Future    Medicare annual wellness visit, subsequent         Disorder of bone density and structure, unspecified    Orders:    Vitamin D 25 hydroxy; Future     Follow up in 1 year or as needed.    Preventive health issues were discussed with patient, and age appropriate screening tests were ordered as noted in patient's After Visit Summary. Personalized health advice and appropriate referrals for health education or preventive services given if needed, as noted in patient's After Visit Summary.    History of Present Illness     She reports right knee surgery went well. She has minimal pain. She is trying not to exercise or \"do too much\" which she finds it difficult. She does not apply or take any medication for it. She is walking, bicycling and staying active.    She reports an episode of severe pain in her left shoulder one day, " occurred when she woke up in the morning. She saw Ortho, had an MRI recently. She had right shoulder surgery, thinks she may need her left one done.    Denies any chest pain, shortness of breath, palpitations.  Denies any GI symptoms such as constipation.       Patient Care Team:  Erica Morales MD as PCP - General  MD Yuniel Rausch MD Berhanu Geme, MD Berhanu Geme, MD as Endoscopist    Review of Systems   Constitutional:  Negative for appetite change and fatigue.   HENT:  Negative for congestion, ear pain and postnasal drip.    Eyes:  Negative for visual disturbance.   Respiratory:  Negative for cough and shortness of breath.    Cardiovascular:  Negative for chest pain and leg swelling.   Gastrointestinal:  Negative for abdominal pain, constipation and diarrhea.   Genitourinary:  Negative for dysuria, frequency and urgency.   Musculoskeletal:  Positive for arthralgias and joint swelling. Negative for myalgias.   Skin:  Negative for rash and wound.   Neurological:  Negative for dizziness, numbness and headaches.   Hematological:  Does not bruise/bleed easily.   Psychiatric/Behavioral:  Negative for confusion and dysphoric mood. The patient is not nervous/anxious.      Medical History Reviewed by provider this encounter:       Annual Wellness Visit Questionnaire   Bianka is here for her Subsequent Wellness visit. Last Medicare Wellness visit information reviewed, patient interviewed and updates made to the record today.      Health Risk Assessment:   Patient rates overall health as very good. Patient feels that their physical health rating is same. Patient is very satisfied with their life. Eyesight was rated as same. Hearing was rated as same. Patient feels that their emotional and mental health rating is same. Patients states they are never, rarely angry. Patient states they are never, rarely unusually tired/fatigued. Pain experienced in the last 7 days has been some. Patient's pain  rating has been 4/10. Patient states that she has experienced no weight loss or gain in last 6 months.     Depression Screening:   PHQ-2 Score: 0      Fall Risk Screening:   In the past year, patient has experienced: no history of falling in past year      Urinary Incontinence Screening:   Patient has not leaked urine accidently in the last six months.     Home Safety:  Patient does not have trouble with stairs inside or outside of their home. Patient has working smoke alarms and has working carbon monoxide detector. Home safety hazards include: none.     Nutrition:   Current diet is Regular.     Medications:   Patient is currently taking over-the-counter supplements. OTC medications include: see medication list. Patient is able to manage medications.     Activities of Daily Living (ADLs)/Instrumental Activities of Daily Living (IADLs):   Walk and transfer into and out of bed and chair?: Yes  Dress and groom yourself?: Yes    Bathe or shower yourself?: Yes    Feed yourself? Yes  Do your laundry/housekeeping?: Yes  Manage your money, pay your bills and track your expenses?: Yes  Make your own meals?: Yes    Do your own shopping?: Yes    Durable Medical Equipment Suppliers  [employer]    Previous Hospitalizations:   Any hospitalizations or ED visits within the last 12 months?: Yes    How many hospitalizations have you had in the last year?: 1-2    Hospitalization Comments: Knee replacement surgery    Advance Care Planning:   Living will: Yes    Durable POA for healthcare: Yes    Advanced directive: Yes    End of Life Decisions reviewed with patient: Yes      Cognitive Screening:   Provider or family/friend/caregiver concerned regarding cognition?: No    PREVENTIVE SCREENINGS      Cardiovascular Screening:    General: History Lipid Disorder    Due for: Lipid Panel      Diabetes Screening:       Due for: Blood Glucose      Colorectal Cancer Screening:     General: Screening Current      Breast Cancer Screening:       Due  for: Mammogram        Cervical Cancer Screening:    General: Screening Not Indicated      Osteoporosis Screening:    General: Screening Current      Abdominal Aortic Aneurysm (AAA) Screening:        General: Screening Not Indicated      Lung Cancer Screening:     General: Screening Not Indicated      Hepatitis C Screening:    General: Screening Current    Screening, Brief Intervention, and Referral to Treatment (SBIRT)    Screening  Typical number of drinks in a day: 1  Typical number of drinks in a week: 4  Interpretation: Low risk drinking behavior.    AUDIT-C Screenin) How often did you have a drink containing alcohol in the past year? 2 to 3 times a week  2) How many drinks did you have on a typical day when you were drinking in the past year? 1 to 2  3) How often did you have 6 or more drinks on one occasion in the past year? never    AUDIT-C Score: 3  Interpretation: Score 3-12 (female): POSITIVE screen for alcohol misuse    AUDIT Screenin) How often during the last year have you found that you were not able to stop drinking once you had started? 0 - never  5) How often during the last year have you failed to do what was normally expected from you because of drinking? 0 - never  6) How often during the last year have you needed a first drink in the morning to get yourself going after a heavy drinking session? 0 - never  7) How often during the last year have you had a feeling of guilt or remorse after drinking? 0 - never  8) How often during the last year have you been unable to remember what happened the night before because you had been drinking? 0 - never  9) Have you or someone else been injured as a result of your drinking? 0 - no  10) Has a relative or friend or a doctor or another health worker been concerned about your drinking or suggested you cut down? 0 - no    AUDIT Score: 3  Interpretation: Low risk alcohol consumption    Single Item Drug Screening:  How often have you used an illegal  drug (including marijuana) or a prescription medication for non-medical reasons in the past year? never    Single Item Drug Screen Score: 0  Interpretation: Negative screen for possible drug use disorder    Other Counseling Topics:   Calcium and vitamin D intake and regular weightbearing exercise.     Social Determinants of Health     Financial Resource Strain: Low Risk  (9/18/2024)    Received from [facility]    Overall Financial Resource Strain (CARDIA)     Difficulty of Paying Living Expenses: Not hard at all   Food Insecurity: No Food Insecurity (11/8/2024)    Hunger Vital Sign     Worried About Running Out of Food in the Last Year: Never true     Ran Out of Food in the Last Year: Never true   Transportation Needs: No Transportation Needs (11/8/2024)    PRAPARE - Transportation     Lack of Transportation (Medical): No     Lack of Transportation (Non-Medical): No   Housing Stability: Low Risk  (11/8/2024)    Housing Stability Vital Sign     Unable to Pay for Housing in the Last Year: No     Number of Times Moved in the Last Year: 0     Homeless in the Last Year: No   Utilities: Not At Risk (11/8/2024)    [facility] Utilities     Threatened with loss of utilities: No     No results found.    Objective     /68 (BP Location: Left arm, Patient Position: Sitting, Cuff Size: Standard)   Pulse 60   Temp 97.7 °F (36.5 °C) (Temporal)   Wt 60.5 kg (133 lb 6.4 oz)   SpO2 98%   Breastfeeding No   BMI 21.86 kg/m²     Physical Exam  Vitals and nursing note reviewed.   Constitutional:       General: She is not in acute distress.     Appearance: She is well-developed.   HENT:      Head: Normocephalic and atraumatic.      Right Ear: Tympanic membrane, ear canal and external ear normal.      Left Ear: Ear canal normal. There is impacted cerumen.      Mouth/Throat:      Mouth: Mucous membranes are moist.   Eyes:      Pupils: Pupils are equal, round, and reactive to light.   Cardiovascular:      Rate and  Rhythm: Normal rate and regular rhythm.      Heart sounds: Normal heart sounds.   Pulmonary:      Effort: Pulmonary effort is normal.      Breath sounds: Normal breath sounds. No wheezing.   Abdominal:      General: Bowel sounds are normal.      Palpations: Abdomen is soft.   Musculoskeletal:         General: No swelling.      Right knee: Effusion present. No swelling.      Right lower leg: No edema.      Left lower leg: No edema.        Legs:    Skin:     General: Skin is warm.      Findings: No rash.   Neurological:      General: No focal deficit present.      Mental Status: She is alert and oriented to person, place, and time.   Psychiatric:         Mood and Affect: Mood and affect normal. Mood is not anxious or depressed.         Behavior: Behavior normal.           Labs & imaging results reviewed with patient.

## 2024-11-11 ENCOUNTER — EVALUATION (OUTPATIENT)
Dept: PHYSICAL THERAPY | Facility: CLINIC | Age: 69
End: 2024-11-11
Payer: MEDICARE

## 2024-11-11 DIAGNOSIS — G89.18 ACUTE POSTOPERATIVE PAIN OF RIGHT KNEE: Primary | ICD-10-CM

## 2024-11-11 DIAGNOSIS — M25.561 ACUTE POSTOPERATIVE PAIN OF RIGHT KNEE: Primary | ICD-10-CM

## 2024-11-11 PROCEDURE — 97110 THERAPEUTIC EXERCISES: CPT | Performed by: PHYSICAL THERAPIST

## 2024-11-11 NOTE — PROGRESS NOTES
PT Re-Evaluation       Today's date: 2024  Patient name: Bianka Boyle  : 1955  MRN: [id number]  Referring provider: Ted Rahman MD  Dx:   Encounter Diagnosis     ICD-10-CM    1. Acute postoperative pain of right knee  G89.18     M25.561             Start Time: 0800  Stop Time: 0845  Total time in clinic (min): 45 minutes    Assessment    Assessment details: Patient is being treated with outpatient PT. From last re-evaluation, she has improved in extension ROM, knee flexion and extension strength, and reduced pain. Patient still reports limitations in function, expressing some uncertainty with using her knee, feeling the need to move slowly with activity. Treatment will focus on sport-specific and plyometrics to increase patient's confidence in knee since strength and ROM is adequate. She will benefit from continued PT services to accomplish these goals.    Reassessment performed by Justin Khalil SPT, supervised by CHELI SandsT.      Goals  Short Term Goals:  met  Independent performance of initial hep  Decrease pain 2 points on VAS.       Long Term Goals:   Independent performance of comprehensive hep. Met  Work performance is returned to max level of function. Ongoing  Performance of IADL's is returned to max level of function. Ongoing   Performance in recreational activities is improved to max level of function. Ongoing   Able to walk community distances with no AD. Met  Able to climb stairs with reciprocal gait pattern and single rail. Met     Plan    Planned therapy interventions: strengthening, stretching, transfer training, therapeutic training, therapeutic exercise, therapeutic activities, home exercise program and IADL retraining    Frequency: 2x week  Duration in weeks: 6  Plan details: Progress therapy to sport-specific interventions & light plyometrics to improve patient's confidence in knee. Anticipating D/C in a few weeks/visits.        Subjective Evaluation    History of Present  "Illness  Mechanism of injury: 24 underwent R TKA.    Patient reports that she is 65% recovered in regards to returning full function. She still feels limited due to her pain and perceived unsteadiness. She states she is able to do all of her ADLs but has to do it slow. She expresses some worry that her progress has slowed since it seemed she was improving quickly at first. She is still experiencing swelling and reports posterior knee pain with flexion. She reports no issues with performing HEP and is keeping up with it. She reports ability to walk for 1 mile without an AD. She would like to return to tennis, reduce pain with bending her knee, and walk without feeling loose.       Patient Goals  Patient goals for therapy: decreased edema, decreased pain, return to sport/leisure activities and return to work    Pain  Current pain ratin  At best pain ratin  At worst pain ratin (Only when bending knee/walking for long periods)          Objective      R knee      ROM   Flexion: 115   Extension: 0    Strength:   Flexion: 5/5   Extension: 5/5    Moderate swelling compared to unaffected leg   Improved quad set & SLR  Able to ascend/descend stairs with reciprocal pattern & no use of railing; landing heavy on L foot when descending stairs             Precautions: TKA    recautions: R TKA 24        Manuals 10/28 10/30 11/4 11/6  11/11  10/9 10/14 10/17 10/21 10/23   prom JLW mr JLW JLW    kl MO kl JLW MR   Contract/relax for extension   mr        kl MO     MR    massage          consider nv                                       Neuro Re-Ed                                                                                                                                                                                                       Ther Ex                        Heel slides                        saq                  5\"x15     Slr flexion       2.5#  20    x20 x20   2#  20     laq 5\" hold  5#  20        " "  5\" hold 5#x20 5\" hold   5#x20     5\" hold 5#x20   Prone TKE                        Ht/tr            Single leg hr x20 SL- HR x20         standing slr x3                        sidestepping black  12ft x6     black  12ft x8                Cone taps                        Step ups 8\"  20 8\" 20 bosu  20 bosu  20  bosu with march 20ea              bike 8'                      Leg press SL  40#  2x15 SL 40# 2x15  SL   40#   2x15  SL   40#   2x15  SL 50# 2x10  np      double  leg   50# x15   single leg   40# x20 DL 50#x10 60#x10  SL 40#x20   sls airex  30\"x3 Rebounder 30\" x 3     airex  rebounder ball toss  red ball  30 airex  rebounder ball toss  red ball  30    15\"x4 Airex 15\"x4    airex   30\"X3 Airex 20\"x3   Stair knee flexion                        TM walking                        Prone quad stretch 30\"x4 30\"x4  30\"x4  30\"x4  30\"x4  30\"x4 30\"x4 30\"x4  30\"x4 30\"x4   Lateral step down            6\"2x10 6\" 2x10    6\"   20     bike 8' 8' 8' 8'    8' 8' 8' lvl 3 8' 8'    Split squats            x20 x20         Single leg bridge            x10  x10         lunges bosu  20 ea Bosu 20 ea  bosu   20 ea  bosu   20 ea  bosu 5# 10 ea      bosux20  bosu   20 ea Bosux20   Slr x4   Abd 15 ea            [place] 11.0 x20ea B/L braden 11.4   20 ea B/L [place] 11.0 20ea B/L   Sustained squat     wobble board   20\"x4 wobble board   20\"x4        15\"x4 wobble board B/L       sidelunge on slider  20 ea On slider 20 ea on slider  20 ea on slider  20 ea  on slider 20ea          nv   sideshuffle   nv  15ft x8    15ft x10              skaters          2x20                                                                Ther Act                                                                          Gait Training                                                                          Modalities                        CP with extension hang                                                           "

## 2024-11-11 NOTE — LETTER
2024    Ted Rahman MD  [address]  [address]  [facility] [address]    Patient: Bianka Boyle   YOB: 1955   Date of Visit: 2024     Encounter Diagnosis     ICD-10-CM    1. Acute postoperative pain of right knee  G89.18     M25.561           Dear Dr. Rahman:    Thank you for your recent referral of Bianka Boyle. Please review the attached evaluation summary from Bianka's recent visit.     Please verify that you agree with the plan of care by signing the attached order.     If you have any questions or concerns, please do not hesitate to call.     I sincerely appreciate the opportunity to share in the care of one of your patients and hope to have another opportunity to work with you in the near future.       Sincerely,    Justin Khalil      Referring Provider:      I certify that I have read the below Plan of Care and certify the need for these services furnished under this plan of treatment while under my care.                    Ted Rahman MD  [address]  [address]  [facility] [address]  Via Fax: [phone]          PT Re-Evaluation       Today's date: 2024  Patient name: Bianka Boyle  : 1955  MRN: [id number]  Referring provider: Ted Rahman MD  Dx:   Encounter Diagnosis     ICD-10-CM    1. Acute postoperative pain of right knee  G89.18     M25.561             Start Time: 0800  Stop Time: 0845  Total time in clinic (min): 45 minutes    Assessment    Assessment details: Patient is being treated with outpatient PT. From last re-evaluation, she has improved in extension ROM, knee flexion and extension strength, and reduced pain. Patient still reports limitations in function, expressing some uncertainty with using her knee, feeling the need to move slowly with activity. Treatment will focus on sport-specific and plyometrics to increase patient's confidence in knee since strength and ROM is adequate. She will benefit from continued PT  services to accomplish these goals.    Reassessment performed by Justin Khalil SPT, supervised by Smooth Krishnamurthy DPT.      Goals  Short Term Goals:  met  Independent performance of initial hep  Decrease pain 2 points on VAS.       Long Term Goals:   Independent performance of comprehensive hep. Met  Work performance is returned to max level of function. Ongoing  Performance of IADL's is returned to max level of function. Ongoing   Performance in recreational activities is improved to max level of function. Ongoing   Able to walk community distances with no AD. Met  Able to climb stairs with reciprocal gait pattern and single rail. Met     Plan    Planned therapy interventions: strengthening, stretching, transfer training, therapeutic training, therapeutic exercise, therapeutic activities, home exercise program and IADL retraining    Frequency: 2x week  Duration in weeks: 6  Plan details: Progress therapy to sport-specific interventions & light plyometrics to improve patient's confidence in knee. Anticipating D/C in a few weeks/visits.        Subjective Evaluation    History of Present Illness  Mechanism of injury: 9/18/24 underwent R TKA.    Patient reports that she is 65% recovered in regards to returning full function. She still feels limited due to her pain and perceived unsteadiness. She states she is able to do all of her ADLs but has to do it slow. She expresses some worry that her progress has slowed since it seemed she was improving quickly at first. She is still experiencing swelling and reports posterior knee pain with flexion. She reports no issues with performing HEP and is keeping up with it. She reports ability to walk for 1 mile without an AD. She would like to return to tennis, reduce pain with bending her knee, and walk without feeling loose.       Patient Goals  Patient goals for therapy: decreased edema, decreased pain, return to sport/leisure activities and return to work    Pain  Current pain  "ratin  At best pain ratin  At worst pain ratin (Only when bending knee/walking for long periods)          Objective      R knee      ROM   Flexion: 115   Extension: 0    Strength:   Flexion: 5/5   Extension: 5/5    Moderate swelling compared to unaffected leg   Improved quad set & SLR  Able to ascend/descend stairs with reciprocal pattern & no use of railing; landing heavy on L foot when descending stairs             Precautions: TKA    recautions: R TKA 24        Manuals 10/28 10/30 11/4 11/6  11/11  10/9 10/14 10/17 10/21 10/23   prom JLW mr JLW JLW    kl MO kl JLW MR   Contract/relax for extension   mr        kl MO     MR    massage          consider nv                                       Neuro Re-Ed                                                                                                                                                                                                       Ther Ex                        Heel slides                        saq                  5\"x15     Slr flexion       2.5#  20    x20 x20   2#  20     laq 5\" hold  5#  20          5\" hold 5#x20 5\" hold   5#x20     5\" hold 5#x20   Prone TKE                        Ht/tr            Single leg hr x20 SL- HR x20         standing slr x3                        sidestepping black  12ft x6     black  12ft x8                Cone taps                        Step ups 8\"  20 8\" 20 bosu  20 bosu  20  bosu with ea              bike 8'                      Leg press SL  40#  2x15 SL 40# 2x15  SL   40#   2x15  SL   40#   2x15  SL 50# 2x10  np      double  leg   50# x15   single leg   40# x20 DL 50#x10 60#x10  SL 40#x20   sls airex  30\"x3 Rebounder 30\" x 3     airex  rebounder ball toss  red ball  30 airex  rebounder ball toss  red ball  30    15\"x4 Airex 15\"x4    airex   30\"X3 Airex 20\"x3   Stair knee flexion                        TM walking                        Prone quad stretch 30\"x4 30\"x4  30\"x4  30\"x4  30\"x4 " " 30\"x4 30\"x4 30\"x4  30\"x4 30\"x4   Lateral step down            6\"2x10 6\" 2x10    6\"   20     bike 8' 8' 8' 8'    8' 8' 8' lvl 3 8' 8'    Split squats            x20 x20         Single leg bridge            x10  x10         lunges bosu  20 ea Bosu 20 ea  bosu   20 ea  bosu   20 ea  bosu 5# 10 ea      bosux20  bosu   20 ea Bosux20   Slr x4   Abd 15 ea            [place] 11.0 x20ea B/L [place] 11.4   20 ea B/L [place] 11.0 20ea B/L   Sustained squat     wobble board   20\"x4 wobble board   20\"x4        15\"x4 wobble board B/L       sidelunge on slider  20 ea On slider 20 ea on slider  20 ea on slider  20 ea  on slider 20ea          nv   sideshuffle   nv  15ft x8    15ft x10              skaters          2x20                                                                Ther Act                                                                          Gait Training                                                                          Modalities                        CP with extension hang                                                                              "

## 2024-11-12 ENCOUNTER — APPOINTMENT (OUTPATIENT)
Dept: LAB | Facility: CLINIC | Age: 69
End: 2024-11-12
Payer: MEDICARE

## 2024-11-12 DIAGNOSIS — M85.9 DISORDER OF BONE DENSITY AND STRUCTURE, UNSPECIFIED: ICD-10-CM

## 2024-11-12 DIAGNOSIS — L40.59 POLYARTICULAR PSORIATIC ARTHRITIS (HCC): ICD-10-CM

## 2024-11-12 DIAGNOSIS — E03.9 ACQUIRED HYPOTHYROIDISM: ICD-10-CM

## 2024-11-12 DIAGNOSIS — E53.8 VITAMIN B12 DEFICIENCY: ICD-10-CM

## 2024-11-12 DIAGNOSIS — D70.9 NEUTROPENIA, UNSPECIFIED TYPE (HCC): ICD-10-CM

## 2024-11-12 DIAGNOSIS — E78.49 OTHER HYPERLIPIDEMIA: ICD-10-CM

## 2024-11-12 DIAGNOSIS — M85.832 OSTEOPENIA OF LEFT FOREARM: ICD-10-CM

## 2024-11-12 DIAGNOSIS — Z79.899 ENCOUNTER FOR LONG-TERM (CURRENT) USE OF OTHER MEDICATIONS: ICD-10-CM

## 2024-11-12 LAB
25(OH)D3 SERPL-MCNC: 54.7 NG/ML (ref 30–100)
ALBUMIN SERPL BCG-MCNC: 4.2 G/DL (ref 3.5–5)
ALP SERPL-CCNC: 53 U/L (ref 34–104)
ALT SERPL W P-5'-P-CCNC: 10 U/L (ref 7–52)
ANION GAP SERPL CALCULATED.3IONS-SCNC: 9 MMOL/L (ref 4–13)
AST SERPL W P-5'-P-CCNC: 21 U/L (ref 13–39)
BASOPHILS # BLD AUTO: 0.05 THOUSANDS/ÂΜL (ref 0–0.1)
BASOPHILS NFR BLD AUTO: 1 % (ref 0–1)
BILIRUB SERPL-MCNC: 0.5 MG/DL (ref 0.2–1)
BUN SERPL-MCNC: 16 MG/DL (ref 5–25)
CALCIUM SERPL-MCNC: 9.3 MG/DL (ref 8.4–10.2)
CHLORIDE SERPL-SCNC: 103 MMOL/L (ref 96–108)
CHOLEST SERPL-MCNC: 194 MG/DL
CO2 SERPL-SCNC: 28 MMOL/L (ref 21–32)
CREAT SERPL-MCNC: 0.85 MG/DL (ref 0.6–1.3)
CRP SERPL QL: 2.7 MG/L
EOSINOPHIL # BLD AUTO: 0.02 THOUSAND/ÂΜL (ref 0–0.61)
EOSINOPHIL NFR BLD AUTO: 0 % (ref 0–6)
ERYTHROCYTE [DISTWIDTH] IN BLOOD BY AUTOMATED COUNT: 12.4 % (ref 11.6–15.1)
ERYTHROCYTE [SEDIMENTATION RATE] IN BLOOD: 19 MM/HOUR (ref 0–29)
GFR SERPL CREATININE-BSD FRML MDRD: 70 ML/MIN/1.73SQ M
GLUCOSE P FAST SERPL-MCNC: 92 MG/DL (ref 65–99)
HCT VFR BLD AUTO: 43.2 % (ref 34.8–46.1)
HDLC SERPL-MCNC: 72 MG/DL
HGB BLD-MCNC: 13.9 G/DL (ref 11.5–15.4)
IMM GRANULOCYTES # BLD AUTO: 0.02 THOUSAND/UL (ref 0–0.2)
IMM GRANULOCYTES NFR BLD AUTO: 0 % (ref 0–2)
LDLC SERPL CALC-MCNC: 110 MG/DL (ref 0–100)
LYMPHOCYTES # BLD AUTO: 1.53 THOUSANDS/ÂΜL (ref 0.6–4.47)
LYMPHOCYTES NFR BLD AUTO: 28 % (ref 14–44)
MCH RBC QN AUTO: 32.5 PG (ref 26.8–34.3)
MCHC RBC AUTO-ENTMCNC: 32.2 G/DL (ref 31.4–37.4)
MCV RBC AUTO: 101 FL (ref 82–98)
MONOCYTES # BLD AUTO: 0.64 THOUSAND/ÂΜL (ref 0.17–1.22)
MONOCYTES NFR BLD AUTO: 12 % (ref 4–12)
NEUTROPHILS # BLD AUTO: 3.14 THOUSANDS/ÂΜL (ref 1.85–7.62)
NEUTS SEG NFR BLD AUTO: 59 % (ref 43–75)
NONHDLC SERPL-MCNC: 122 MG/DL
NRBC BLD AUTO-RTO: 0 /100 WBCS
PLATELET # BLD AUTO: 290 THOUSANDS/UL (ref 149–390)
PMV BLD AUTO: 10.3 FL (ref 8.9–12.7)
POTASSIUM SERPL-SCNC: 4.2 MMOL/L (ref 3.5–5.3)
PROT SERPL-MCNC: 7.3 G/DL (ref 6.4–8.4)
RBC # BLD AUTO: 4.28 MILLION/UL (ref 3.81–5.12)
SODIUM SERPL-SCNC: 140 MMOL/L (ref 135–147)
TRIGL SERPL-MCNC: 59 MG/DL
TSH SERPL DL<=0.05 MIU/L-ACNC: 3.76 UIU/ML (ref 0.45–4.5)
VIT B12 SERPL-MCNC: 613 PG/ML (ref 180–914)
WBC # BLD AUTO: 5.4 THOUSAND/UL (ref 4.31–10.16)

## 2024-11-12 PROCEDURE — 85025 COMPLETE CBC W/AUTO DIFF WBC: CPT

## 2024-11-12 PROCEDURE — 84443 ASSAY THYROID STIM HORMONE: CPT

## 2024-11-12 PROCEDURE — 86140 C-REACTIVE PROTEIN: CPT

## 2024-11-12 PROCEDURE — 82306 VITAMIN D 25 HYDROXY: CPT

## 2024-11-12 PROCEDURE — 80053 COMPREHEN METABOLIC PANEL: CPT

## 2024-11-12 PROCEDURE — 80061 LIPID PANEL: CPT

## 2024-11-12 PROCEDURE — 85652 RBC SED RATE AUTOMATED: CPT

## 2024-11-12 PROCEDURE — 36415 COLL VENOUS BLD VENIPUNCTURE: CPT

## 2024-11-12 PROCEDURE — 82607 VITAMIN B-12: CPT

## 2024-11-13 ENCOUNTER — OFFICE VISIT (OUTPATIENT)
Dept: PHYSICAL THERAPY | Facility: CLINIC | Age: 69
End: 2024-11-13
Payer: MEDICARE

## 2024-11-13 DIAGNOSIS — M25.561 ACUTE POSTOPERATIVE PAIN OF RIGHT KNEE: Primary | ICD-10-CM

## 2024-11-13 DIAGNOSIS — G89.18 ACUTE POSTOPERATIVE PAIN OF RIGHT KNEE: Primary | ICD-10-CM

## 2024-11-13 PROCEDURE — 97140 MANUAL THERAPY 1/> REGIONS: CPT | Performed by: PHYSICAL THERAPIST

## 2024-11-13 PROCEDURE — 97110 THERAPEUTIC EXERCISES: CPT | Performed by: PHYSICAL THERAPIST

## 2024-11-13 NOTE — PROGRESS NOTES
"Daily Note     Today's date: 2024  Patient name: Bianka Boyle  : 1955  MRN: [id number]  Referring provider: Ted Rahman MD  Dx:   Encounter Diagnosis     ICD-10-CM    1. Acute postoperative pain of right knee  G89.18     M25.561                      Subjective: pt reports that her thigh muscle were sore after her LV but that it was \"a good sore.        Objective: See treatment diary below      Assessment: progressed therex as listed. Pt initially has difficulty coordinating ball tap but this improves with reps. Instructed her to add this to her hep.  Tiger tail performed to posterior knee and well and fibular head mobilization, improved flexion rom to 118* post-tx.        Plan: Continue per plan of care.      Precautions: TKA            Manuals 10/28 10/30 11/4 11/6  11/11 11/13 10/9 10/14 10/17 10/21 10/23   prom JLW mr JLW JLW    kl MO kl JLW MR   Contract/relax for extension   mr        kl MO     MR    massage          consider nv Tiger tail              fibular head mobilization           kl             Neuro Re-Ed                                                                                                                                                                                                       Ther Ex                        Heel slides                        saq                  5\"x15     Slr flexion       2.5#  20    x20 x20   2#  20     laq 5\" hold  5#  20          5\" hold 5#x20 5\" hold   5#x20     5\" hold 5#x20   Prone TKE                        Ht/tr            Single leg hr x20 SL- HR x20         standing slr x3                        sidestepping black  12ft x6     black  12ft x8                Cone taps                        Step ups 8\"  20 8\" 20 bosu  20 bosu  20  bosu with ea              bike 8'                      Leg press SL  40#  2x15 SL 40# 2x15  SL   40#   2x15  SL   40#   2x15  SL 50# 2x10  np      double  leg   50# x15   single leg   40# x20 DL 50#x10 " "60#x10  SL 40#x20   sls airex  30\"x3 Rebounder 30\" x 3     airex  rebounder ball toss  red ball  30 airex  rebounder ball toss  red ball  30   Y ball 30 fwd  on airex 15ea with rotations on ground 15\"x4 Airex 15\"x4    airex   30\"X3 Airex 20\"x3   Stair knee flexion                        TM walking                        Prone quad stretch 30\"x4 30\"x4  30\"x4  30\"x4  30\"x4  30\"x4 30\"x4 30\"x4  30\"x4 30\"x4   Lateral step down            6\"2x10 6\" 2x10    6\"   20     bike 8' 8' 8' 8'   8' 8' 8' 8' lvl 3 8' 8'    Split squats            x20 x20         Single leg bridge            x10  x10         lunges bosu  20 ea Bosu 20 ea  bosu   20 ea  bosu   20 ea  bosu 5# 10 ea      bosux20  bosu   20 ea Bosux20   Slr x4   Abd 15 ea            [place] 11.0 x20ea B/L braden 11.4   20 ea B/L [place] 11.0 20ea B/L   Sustained squat     wobble board   20\"x4 wobble board   20\"x4   Bosu 30\"x4     15\"x4 wobble board B/L       sidelunge on slider  20 ea On slider 20 ea on slider  20 ea on slider  20 ea  on slider 20ea x20ea         nv   sideshuffle   nv  15ft x8    15ft x10              skaters          2x20 2x20              ball taps           2x30             Curtsy lunge           Slider 2x10             Ther Act                                                                          Gait Training                                                                          Modalities                        CP with extension hang                                                                "

## 2024-11-14 ENCOUNTER — OFFICE VISIT (OUTPATIENT)
Dept: OBGYN CLINIC | Facility: OTHER | Age: 69
End: 2024-11-14
Payer: MEDICARE

## 2024-11-14 VITALS
BODY MASS INDEX: 21.31 KG/M2 | HEIGHT: 66 IN | WEIGHT: 132.6 LBS | DIASTOLIC BLOOD PRESSURE: 80 MMHG | HEART RATE: 81 BPM | SYSTOLIC BLOOD PRESSURE: 127 MMHG

## 2024-11-14 DIAGNOSIS — M19.012 PRIMARY OSTEOARTHRITIS OF LEFT SHOULDER: Primary | ICD-10-CM

## 2024-11-14 PROCEDURE — 99214 OFFICE O/P EST MOD 30 MIN: CPT | Performed by: ORTHOPAEDIC SURGERY

## 2024-11-14 RX ORDER — CHLORHEXIDINE GLUCONATE ORAL RINSE 1.2 MG/ML
15 SOLUTION DENTAL ONCE
OUTPATIENT
Start: 2024-11-14 | End: 2024-11-14

## 2024-11-15 ENCOUNTER — RESULTS FOLLOW-UP (OUTPATIENT)
Dept: INTERNAL MEDICINE CLINIC | Facility: CLINIC | Age: 69
End: 2024-11-15

## 2024-11-18 ENCOUNTER — OFFICE VISIT (OUTPATIENT)
Dept: PHYSICAL THERAPY | Facility: CLINIC | Age: 69
End: 2024-11-18
Payer: MEDICARE

## 2024-11-18 DIAGNOSIS — M25.561 ACUTE POSTOPERATIVE PAIN OF RIGHT KNEE: Primary | ICD-10-CM

## 2024-11-18 DIAGNOSIS — G89.18 ACUTE POSTOPERATIVE PAIN OF RIGHT KNEE: Primary | ICD-10-CM

## 2024-11-18 PROCEDURE — 97110 THERAPEUTIC EXERCISES: CPT

## 2024-11-18 PROCEDURE — 97140 MANUAL THERAPY 1/> REGIONS: CPT

## 2024-11-18 NOTE — PROGRESS NOTES
"Daily Note     Today's date: 2024  Patient name: Bianka Boyle  : 1955  MRN: [id number]  Referring provider: Ted Rahman MD  Dx:   Encounter Diagnosis     ICD-10-CM    1. Acute postoperative pain of right knee  G89.18     M25.561           Start Time: 0845  Stop Time: 0930  Total time in clinic (min): 45 minutes      Subjective: Patient states, \"It's amazing how much strength you lose in your quadriceps with this surgery, but it's getting there.\"      Objective: See treatment diary below.      Assessment: Treatment is tolerated well.  Right knee ROM and strength improving.      Plan: Continue treatment as per PT plan of care.       Precautions: TKA      Manuals 10/28 10/30 11/4 11/6  11/11 11/13  11/18  10/17 10/21 10/23   prom JLW mr JLW JLW    JLW  kl JLW MR   Contract/relax for extension   mr              MR    massage          consider nv Tiger tail            fibular head mobilization           kl                         Neuro Re-Ed                                                                                                                                                                                       Ther Ex                      Heel slides                      saq                5\"x15     Slr flexion       2.5#  20        2#  20     laq 5\" hold  5#  20                5\" hold 5#x20   Prone TKE                      Ht/tr                      standing slr x3                      sidestepping black  12ft x6     black  12ft x8    black  12ft x8          Cone taps                      Step ups 8\"  20 8\" 20 bosu  20 bosu  20  bosu with march 20ea   bosu   20          Leg press SL  40#  2x15 SL 40# 2x15  SL   40#   2x15  SL   40#   2x15  SL 50# 2x10  SL 50# 3x10     double  leg   50# x15   single leg   40# x20 DL 50#x10 60#x10  SL 40#x20   sls airex  30\"x3 Rebounder 30\" x 3     airex  rebounder ball toss  red ball  30 airex  rebounder ball toss  red ball  30   Y ball 30 fwd  on airex 15ea with " "rotations on ground Y ball 30 fwd  on airex 20 ea with rotations on ground     airex   30\"X3 Airex 20\"x3   Stair knee flexion                      TM walking                      Prone quad stretch 30\"x4 30\"x4  30\"x4  30\"x4  30\"x4  30\"x4  30\"x4  30\"x4 30\"x4   Lateral step down                 6\"   20     bike 8' 8' 8' 8'   8'  8'  8' lvl 3 8' 8'    Split squats                      Single leg bridge                      lunges bosu  20 ea Bosu 20 ea  bosu   20 ea  bosu   20 ea  bosu 5# 10 ea    bosux20  bosu   20 ea Bosux20   Slr x4   Abd 15 ea          [place] 11.0 x20ea B/L braden 11.4   20 ea B/L [place] 11.0 20ea B/L   Sustained squat     wobble board   20\"x4 wobble board   20\"x4   Bosu 30\"x4  bosu  20\"x4  15\"x4 wobble board B/L       sidelunge on slider  20 ea On slider 20 ea on slider  20 ea on slider  20 ea  on slider 20ea x20ea       nv   sideshuffle   nv  15ft x8    15ft x10            skaters          2x20 2x20 2x20           ball taps           2x30           Curtsy lunge           Slider 2x10  slider   2x10 ea                                      Ther Act                                                                    Gait Training                                                                    Modalities                      CP with extension hang                                                       "

## 2024-11-20 ENCOUNTER — HOSPITAL ENCOUNTER (OUTPATIENT)
Dept: RADIOLOGY | Facility: HOSPITAL | Age: 69
Discharge: HOME | End: 2024-11-20
Attending: ORTHOPAEDIC SURGERY
Payer: MEDICARE

## 2024-11-20 ENCOUNTER — OFFICE VISIT (OUTPATIENT)
Dept: PHYSICAL THERAPY | Facility: CLINIC | Age: 69
End: 2024-11-20
Payer: MEDICARE

## 2024-11-20 DIAGNOSIS — G89.18 ACUTE POSTOPERATIVE PAIN OF RIGHT KNEE: Primary | ICD-10-CM

## 2024-11-20 DIAGNOSIS — M25.561 ACUTE POSTOPERATIVE PAIN OF RIGHT KNEE: Primary | ICD-10-CM

## 2024-11-20 DIAGNOSIS — M19.012 PRIMARY OSTEOARTHRITIS OF LEFT SHOULDER: ICD-10-CM

## 2024-11-20 PROCEDURE — 97110 THERAPEUTIC EXERCISES: CPT | Performed by: PHYSICAL THERAPIST

## 2024-11-20 PROCEDURE — 73200 CT UPPER EXTREMITY W/O DYE: CPT

## 2024-11-20 NOTE — PROGRESS NOTES
Daily Note/DC summary    Today's date: 2024  Patient name: Bianka Boyle  : 1955  MRN: [id number]  Referring provider: Ted Rahman MD  Dx:   Encounter Diagnosis     ICD-10-CM    1. Acute postoperative pain of right knee  G89.18     M25.561                          Subjective: Patient reports feeling good overall. Patient still reports swelling in front and back of knee; the front is mild and has gone down significantly from her first days. She still reports a sharp pain in the back of her knee with certain movements that has been there for a while. She has recently started going back into the gym, gradually trying more exercises.     Objective: See treatment diary below.    Assessment: Patient tolerated treatment well and was able to perform all HEP with good form, allowed to ask questions.    Treatment was performed by CYN Ricardo, directly supervised by Smooth Krishnamurthy PT.    Plan: Patient will be discharged to [facility]. Patient will be undergoing a total shoulder in January and will be in touch regarding that.  Access Code: [email or web address]  URL: [email or web address]  Date: 2024  Prepared by: Freddie Krishnamurthy    Exercises  - Prone Quadriceps Stretch with Strap  - 1-3 x daily - 7 x weekly - 3 sets - 5 reps - 30 seconds hold  - Supine Hamstring Stretch  - 1-3 x daily - 7 x weekly - 3 sets - 5 reps - 30 seconds hold  - Squat  - 1-2 x daily - 7 x weekly - 4 sets - 3 reps - 20 seconds hold  - Kettlebell Deadlift  - 1-2 x daily - 7 x weekly - 4 sets - 10 reps - 2 second hold  - Lateral Lunge  - 1-2 x daily - 7 x weekly - 3 sets - 10 reps  - Curtsy Lunge to Lateral Lunge with TRX®  - 1-2 x daily - 7 x weekly - 3 sets - 10 reps  - Standing Hip Abduction with Counter Support  - 1-2 x daily - 7 x weekly - 3 sets - 10 reps     Precautions: TKA      Manuals 10/28 10/30 11/4 11/6  11/11 11/13  11/18 11/20  10/21 10/23   prom JLW mr JLW JLW    JLW mr  JLW MR   Contract/relax for extension   mr       "       MR    massage          consider nv Tiger tail           fibular head mobilization           kl                        Neuro Re-Ed                                                                                                                                                                               Ther Ex                     Heel slides                     saq               5\"x15     Slr flexion       2.5#  20       2#  20     laq 5\" hold  5#  20               5\" hold 5#x20   Prone TKE                     Ht/tr                     standing slr x3                     sidestepping black  12ft x6     black  12ft x8    black  12ft x8         Cone taps                     Step ups 8\"  20 8\" 20 bosu  20 bosu  20  bosu with march 20ea   bosu   20         Leg press SL  40#  2x15 SL 40# 2x15  SL   40#   2x15  SL   40#   2x15  SL 50# 2x10  SL 50# 3x10 SL 50# 3x10   double  leg   50# x15   single leg   40# x20 DL 50#x10 60#x10  SL 40#x20   sls airex  30\"x3 Rebounder 30\" x 3     airex  rebounder ball toss  red ball  30 airex  rebounder ball toss  red ball  30   Y ball 30 fwd  on airex 15ea with rotations on ground Y ball 30 fwd  on airex 20 ea with rotations on ground    airex   30\"X3 Airex 20\"x3   Stair knee flexion                     TM walking                     Prone quad stretch 30\"x4 30\"x4  30\"x4  30\"x4  30\"x4  30\"x4    30\"x4 30\"x4   Lateral step down                 6\"   20     bike 8' 8' 8' 8'   8'  8' 8'  8' 8'    Split squats                     Single leg bridge                     lunges bosu  20 ea Bosu 20 ea  bosu   20 ea  bosu   20 ea  bosu 5# 10 ea      bosu   20 ea Bosux20   Slr x4   Abd 15 ea           [place] 11.4   20 ea B/L [place]is[place] 11.0 20ea B/L   Sustained squat     wobble board   20\"x4 wobble board   20\"x4   Bosu 30\"x4  bosu  20\"x4         sidelunge on slider  20 ea On slider 20 ea on slider  20 ea on slider  20 ea  on slider 20ea x20ea  X20 ea    nv   sideshuffle   nv  15ft x8    15ft x10    "        skaters          2x20 2x20 2x20          ball taps           2x30          Curtsy lunge           Slider 2x10  slider   2x10 ea                                     Ther Act                                                                 Gait Training                                                                 Modalities                     CP with extension hang

## 2024-11-29 DIAGNOSIS — E78.49 OTHER HYPERLIPIDEMIA: Primary | ICD-10-CM

## 2024-11-29 RX ORDER — ROSUVASTATIN CALCIUM 5 MG/1
5 TABLET, COATED ORAL DAILY
Qty: 90 TABLET | Refills: 1 | Status: SHIPPED | OUTPATIENT
Start: 2024-11-29

## 2024-12-09 ENCOUNTER — TELEPHONE (OUTPATIENT)
Dept: RHEUMATOLOGY | Facility: CLINIC | Age: 69
End: 2024-12-09

## 2024-12-11 ENCOUNTER — OFFICE VISIT (OUTPATIENT)
Age: 69
End: 2024-12-11

## 2024-12-11 VITALS
TEMPERATURE: 97.6 F | HEART RATE: 65 BPM | OXYGEN SATURATION: 96 % | WEIGHT: 132.2 LBS | HEIGHT: 66 IN | BODY MASS INDEX: 21.24 KG/M2 | SYSTOLIC BLOOD PRESSURE: 114 MMHG | DIASTOLIC BLOOD PRESSURE: 64 MMHG

## 2024-12-11 DIAGNOSIS — M15.0 PRIMARY GENERALIZED (OSTEO)ARTHRITIS: ICD-10-CM

## 2024-12-11 DIAGNOSIS — M85.832 OSTEOPENIA OF LEFT FOREARM: ICD-10-CM

## 2024-12-11 DIAGNOSIS — L40.59 POLYARTICULAR PSORIATIC ARTHRITIS (HCC): Primary | ICD-10-CM

## 2024-12-11 DIAGNOSIS — Z79.899 ENCOUNTER FOR LONG-TERM (CURRENT) USE OF MEDICATIONS: ICD-10-CM

## 2024-12-11 NOTE — ASSESSMENT & PLAN NOTE
History of osteopenia with low FRAX risk.  She is up-to-date with her DEXA scan and will be due for an updated DEXA in May 2026.  We will continue to monitor her bone density every 2 years.

## 2024-12-11 NOTE — ASSESSMENT & PLAN NOTE
Her psoriatic arthritis is stable with sulfasalazine.  No signs of active inflammation or synovitis on exam today.  We will continue to monitor her response to treatment.  Labs as ordered to monitor for medication side effects and toxicity.  Continue following with dermatology as well for management of psoriasis.  Follow-up here in 6 months or sooner if needed.  Orders:    CBC and differential; Future    Comprehensive metabolic panel; Future    Sedimentation rate, automated; Future    C-reactive protein; Future

## 2024-12-11 NOTE — ASSESSMENT & PLAN NOTE
She had a right total knee replacement done in September and is doing well postoperatively.  She will be having a left total shoulder replacement done in January.  Continue following with orthopedics.

## 2024-12-11 NOTE — PROGRESS NOTES
Name: Bianka Boyle      : 1955      MRN: [id number]  Encounter Provider: Jennifer Ramirez PA-C  Encounter Date: 2024   Encounter department: [facility] RHEUMATOLOGY [facility]  :  Assessment & Plan  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HCC)  Her psoriatic arthritis is stable with sulfasalazine.  No signs of active inflammation or synovitis on exam today.  We will continue to monitor her response to treatment.  Labs as ordered to monitor for medication side effects and toxicity.  Continue following with dermatology as well for management of psoriasis.  Follow-up here in 6 months or sooner if needed.  Orders:    CBC and differential; Future    Comprehensive metabolic panel; Future    Sedimentation rate, automated; Future    C-reactive protein; Future    Primary generalized (osteo)arthritis  She had a right total knee replacement done in September and is doing well postoperatively.  She will be having a left total shoulder replacement done in January.  Continue following with orthopedics.       Osteopenia of left forearm  History of osteopenia with low FRAX risk.  She is up-to-date with her DEXA scan and will be due for an updated DEXA in May 2026.  We will continue to monitor her bone density every 2 years.       Encounter for long-term (current) use of medications    Orders:    CBC and differential; Future    Comprehensive metabolic panel; Future    Sedimentation rate, automated; Future    C-reactive protein; Future        History of Present Illness   She is here for follow-up of her psoriatic arthritis.  She was initially diagnosed in  and started on sulfasalazine.  She is currently taking 500 mg twice daily.  Her psoriatic arthritis symptoms are well-controlled.  She has no swelling in her joints.  She has no signs of dactylitis or enthesitis.  Her psoriasis is generally quiescent.  She does occasionally have some dry patches on her arms and legs and uses topical agents for  this.     She has a history of osteoarthritis as well.  She follows with orthopedics for OA in her right knee.  She had a right total knee replacement done on 9/18/2024.  She has also been following with orthopedics for osteoarthritis in her left shoulder.  She is scheduled for a left total shoulder replacement in January.  She has a history of lumbar degenerative disc disease.  She gets occasional lower back pain.     She has a history of osteopenia.  She had a DEXA scan done on 5/20/2024.  Lumbar spine T-score (L1, L2) +1.4.  This is artificially elevated due to spondylosis and scoliosis.  Left total hip T-score -0.3.  Left femoral neck T-score 0.  Left forearm T-score -1.5.  FRAX hip fracture risk 0.3%, major fracture risk 6.5%.    She had labs done on 11/12/2024.  CBC essentially unremarkable.  Creatinine 0.85, GFR 70.  ESR, CRP within normal limits.  TSH within normal limits.  Vitamin D 54.7.    Bianka Boyle is a 69 y.o. female.        Review of Systems   Constitutional:  Negative for chills, fatigue and fever.   HENT:  Negative for hearing loss and sore throat.    Eyes:  Negative for pain and visual disturbance.   Respiratory:  Negative for cough and shortness of breath.    Cardiovascular:  Negative for chest pain and palpitations.   Gastrointestinal:  Negative for abdominal pain, nausea and vomiting.   Genitourinary:  Negative for difficulty urinating.   Musculoskeletal:  Positive for arthralgias and back pain (occasional). Negative for gait problem, joint swelling, myalgias, neck pain and neck stiffness.   Skin:  Negative for rash.   Neurological:  Negative for dizziness, seizures, weakness, numbness and headaches.   Psychiatric/Behavioral:  Negative for confusion, decreased concentration and sleep disturbance.      Current Outpatient Medications on File Prior to Visit   Medication Sig Dispense Refill    Ascorbic Acid (VITAMIN C PO) Vitamin C      Cholecalciferol (VITAMIN D3 PO) Vitamin D3       "Cyanocobalamin (B-12) 1000 MCG TABS       glucosamine-chondroitin 500-400 MG tablet Take 1 tablet by mouth 3 (three) times a day      levothyroxine 50 mcg tablet TAKE 1 TABLET BY MOUTH EVERY DAY 90 tablet 1    Omega-3 Fatty Acids (FISH OIL PO) Fish Oil      rosuvastatin (CRESTOR) 5 mg tablet Take 1 tablet (5 mg total) by mouth daily 90 tablet 1    sulfaSALAzine (AZULFIDINE-EN) 500 mg EC tablet Take 1 tablet (500 mg total) by mouth 2 (two) times a day 180 tablet 1    Calcium Carb-Cholecalciferol (CALCIUM + D3 PO) Take by mouth daily   (Patient not taking: Reported on 11/8/2024)      ciclopirox (LOPROX) 0.77 % SUSP Apply twice daily to affected toenails (Patient not taking: Reported on 11/8/2024) 60 mL 3    clobetasol propionate (CLOBEX) 0.05 % shampoo Apply 1 application. topically daily for 14 days (Patient not taking: Reported on 9/7/2023) 118 mL 1    diclofenac sodium (VOLTAREN) 1 % Apply 2 g topically 4 (four) times a day (Patient not taking: Reported on 11/8/2024) 3 Tube 1     No current facility-administered medications on file prior to visit.         Objective   /64 (BP Location: Left arm, Patient Position: Sitting, Cuff Size: Adult)   Pulse 65   Temp 97.6 °F (36.4 °C) (Temporal)   Ht 5' 5.5\" (1.664 m)   Wt 60 kg (132 lb 3.2 oz)   SpO2 96%   BMI 21.66 kg/m²      Physical Exam  Constitutional:       Appearance: Normal appearance.   Cardiovascular:      Rate and Rhythm: Normal rate and regular rhythm.   Pulmonary:      Breath sounds: Normal breath sounds.   Musculoskeletal:      Comments:  Full range of motion neck.  Limited abduction right greater than left shoulder.  Full range of motion bilateral elbows, wrists.  Hypermobility noted bilateral elbows.  Interphalangeal OA changes, squaring 1st CMC joints, Heberden's nodes, Ravindra's nodes bilateral hands.  No synovitis noted.  Full range of motion bilateral hips, knees, ankles.  Patellofemoral crepitus noted bilateral knees.  Hyperpronation, slight " hammertoe deformities, hallux valgus deformities bilateral feet.  No dactylitis noted.    Skin:     General: Skin is warm and dry.   Neurological:      General: No focal deficit present.      Mental Status: She is alert.         Dragon Dictation software was used to dictate this note. It may contain errors with dictating incorrect words/spelling. Please contact provider directly for any questions.

## 2024-12-12 ENCOUNTER — ANESTHESIA EVENT (OUTPATIENT)
Age: 69
End: 2024-12-12
Payer: MEDICARE

## 2024-12-19 DIAGNOSIS — L40.59 POLYARTICULAR PSORIATIC ARTHRITIS (HCC): ICD-10-CM

## 2024-12-19 RX ORDER — SULFASALAZINE 500 MG/1
500 TABLET, DELAYED RELEASE ORAL 2 TIMES DAILY
Qty: 180 TABLET | Refills: 1 | Status: SHIPPED | OUTPATIENT
Start: 2024-12-19

## 2025-01-02 NOTE — PRE-PROCEDURE INSTRUCTIONS
Pre-Surgery Instructions:   Medication Instructions    Ascorbic Acid (VITAMIN C PO) Stop taking 7 days prior to surgery.    Cholecalciferol (VITAMIN D3 PO) Stop taking 7 days prior to surgery.    Cyanocobalamin (B-12) 1000 MCG TABS Hold day of surgery.    glucosamine-chondroitin 500-400 MG tablet Stop taking 7 days prior to surgery.    levothyroxine 50 mcg tablet Take day of surgery.    Omega-3 Fatty Acids (FISH OIL PO) Stop taking 7 days prior to surgery.    rosuvastatin (CRESTOR) 5 mg tablet Take day of surgery.    sulfaSALAzine (AZULFIDINE-EN) 500 mg EC tablet Msg to soc      Medication instructions for day surgery reviewed. Please use only a sip of water to take your instructed medications. Avoid all over the counter vitamins, supplements and NSAIDS for one week prior to surgery per anesthesia guidelines. Tylenol is ok to take as needed.     You will receive a call one business day prior to surgery with an arrival time and hospital directions. If your surgery is scheduled on a Monday, the hospital will be calling you on the Friday prior to your surgery. If you have not heard from anyone by 8pm, please call the hospital supervisor through the hospital  at [phone]. ([place] [phone] or [place] [phone]).    Do not eat or drink anything after midnight the night before your surgery, including candy, mints, lifesavers, or chewing gum. Do not drink alcohol 24hrs before your surgery. Try not to smoke at least 24hrs before your surgery.       Follow the pre surgery showering instructions as listed in the “My Surgical Experience Booklet” or otherwise provided by your surgeon's office. Do not use a blade to shave the surgical area 1 week before surgery. It is okay to use a clean electric clippers up to 24 hours before surgery. Do not apply any lotions, creams, including makeup, cologne, deodorant, or perfumes after showering on the day of your surgery. Do not use dry shampoo, hair spray, hair gel,  or any type of hair products.     No contact lenses, eye make-up, or artificial eyelashes. Remove nail polish, including gel polish, and any artificial, gel, or acrylic nails if possible. Remove all jewelry including rings and body piercing jewelry.     Wear causal clothing that is easy to take on and off. Consider your type of surgery.    Keep any valuables, jewelry, piercings at home. Please bring any specially ordered equipment (sling, braces) if indicated.    Arrange for a responsible person to drive you to and from the hospital on the day of your surgery. Please confirm the visitor policy for the day of your procedure when you receive your phone call with an arrival time.     Call the surgeon's office with any new illnesses, exposures, or additional questions prior to surgery.    Please reference your “My Surgical Experience Booklet” for additional information to prepare for your upcoming surgery.

## 2025-01-06 ENCOUNTER — APPOINTMENT (OUTPATIENT)
Dept: LAB | Facility: CLINIC | Age: 70
End: 2025-01-06
Payer: MEDICARE

## 2025-01-06 DIAGNOSIS — M19.012 PRIMARY OSTEOARTHRITIS OF LEFT SHOULDER: ICD-10-CM

## 2025-01-06 LAB
ALBUMIN SERPL BCG-MCNC: 4.2 G/DL (ref 3.5–5)
ALP SERPL-CCNC: 56 U/L (ref 34–104)
ALT SERPL W P-5'-P-CCNC: 10 U/L (ref 7–52)
ANION GAP SERPL CALCULATED.3IONS-SCNC: 7 MMOL/L (ref 4–13)
AST SERPL W P-5'-P-CCNC: 19 U/L (ref 13–39)
BASOPHILS # BLD AUTO: 0.04 THOUSANDS/ΜL (ref 0–0.1)
BASOPHILS NFR BLD AUTO: 1 % (ref 0–1)
BILIRUB SERPL-MCNC: 0.37 MG/DL (ref 0.2–1)
BUN SERPL-MCNC: 15 MG/DL (ref 5–25)
CALCIUM SERPL-MCNC: 9.4 MG/DL (ref 8.4–10.2)
CHLORIDE SERPL-SCNC: 104 MMOL/L (ref 96–108)
CO2 SERPL-SCNC: 27 MMOL/L (ref 21–32)
CREAT SERPL-MCNC: 0.83 MG/DL (ref 0.6–1.3)
EOSINOPHIL # BLD AUTO: 0.02 THOUSAND/ΜL (ref 0–0.61)
EOSINOPHIL NFR BLD AUTO: 0 % (ref 0–6)
ERYTHROCYTE [DISTWIDTH] IN BLOOD BY AUTOMATED COUNT: 12.1 % (ref 11.6–15.1)
EST. AVERAGE GLUCOSE BLD GHB EST-MCNC: 103 MG/DL
GFR SERPL CREATININE-BSD FRML MDRD: 72 ML/MIN/1.73SQ M
GLUCOSE P FAST SERPL-MCNC: 88 MG/DL (ref 65–99)
HBA1C MFR BLD: 5.2 %
HCT VFR BLD AUTO: 44.1 % (ref 34.8–46.1)
HGB BLD-MCNC: 13.8 G/DL (ref 11.5–15.4)
IMM GRANULOCYTES # BLD AUTO: 0.01 THOUSAND/UL (ref 0–0.2)
IMM GRANULOCYTES NFR BLD AUTO: 0 % (ref 0–2)
LYMPHOCYTES # BLD AUTO: 1.67 THOUSANDS/ΜL (ref 0.6–4.47)
LYMPHOCYTES NFR BLD AUTO: 33 % (ref 14–44)
MCH RBC QN AUTO: 30.7 PG (ref 26.8–34.3)
MCHC RBC AUTO-ENTMCNC: 31.3 G/DL (ref 31.4–37.4)
MCV RBC AUTO: 98 FL (ref 82–98)
MONOCYTES # BLD AUTO: 0.63 THOUSAND/ΜL (ref 0.17–1.22)
MONOCYTES NFR BLD AUTO: 13 % (ref 4–12)
NEUTROPHILS # BLD AUTO: 2.63 THOUSANDS/ΜL (ref 1.85–7.62)
NEUTS SEG NFR BLD AUTO: 53 % (ref 43–75)
NRBC BLD AUTO-RTO: 0 /100 WBCS
PLATELET # BLD AUTO: 298 THOUSANDS/UL (ref 149–390)
PMV BLD AUTO: 10 FL (ref 8.9–12.7)
POTASSIUM SERPL-SCNC: 4.2 MMOL/L (ref 3.5–5.3)
PROT SERPL-MCNC: 7.4 G/DL (ref 6.4–8.4)
RBC # BLD AUTO: 4.5 MILLION/UL (ref 3.81–5.12)
SODIUM SERPL-SCNC: 138 MMOL/L (ref 135–147)
WBC # BLD AUTO: 5 THOUSAND/UL (ref 4.31–10.16)

## 2025-01-06 PROCEDURE — 83036 HEMOGLOBIN GLYCOSYLATED A1C: CPT

## 2025-01-06 PROCEDURE — 80053 COMPREHEN METABOLIC PANEL: CPT

## 2025-01-06 PROCEDURE — 36415 COLL VENOUS BLD VENIPUNCTURE: CPT

## 2025-01-06 PROCEDURE — 85025 COMPLETE CBC W/AUTO DIFF WBC: CPT

## 2025-01-06 PROCEDURE — 86901 BLOOD TYPING SEROLOGIC RH(D): CPT | Performed by: ORTHOPAEDIC SURGERY

## 2025-01-06 PROCEDURE — 86850 RBC ANTIBODY SCREEN: CPT | Performed by: ORTHOPAEDIC SURGERY

## 2025-01-06 PROCEDURE — 86900 BLOOD TYPING SEROLOGIC ABO: CPT | Performed by: ORTHOPAEDIC SURGERY

## 2025-01-07 ENCOUNTER — LAB REQUISITION (OUTPATIENT)
Dept: LAB | Facility: HOSPITAL | Age: 70
End: 2025-01-07
Payer: MEDICARE

## 2025-01-07 DIAGNOSIS — M19.012 PRIMARY OSTEOARTHRITIS, LEFT SHOULDER: ICD-10-CM

## 2025-01-07 LAB
ABO GROUP BLD: NORMAL
BLD GP AB SCN SERPL QL: NEGATIVE
RH BLD: POSITIVE
SPECIMEN EXPIRATION DATE: NORMAL

## 2025-01-13 ENCOUNTER — OFFICE VISIT (OUTPATIENT)
Dept: DERMATOLOGY | Facility: CLINIC | Age: 70
End: 2025-01-13
Payer: MEDICARE

## 2025-01-13 DIAGNOSIS — L82.1 SEBORRHEIC KERATOSIS: Primary | ICD-10-CM

## 2025-01-13 DIAGNOSIS — L81.4 LENTIGO: ICD-10-CM

## 2025-01-13 DIAGNOSIS — D22.5 MELANOCYTIC NEVI OF TRUNK: ICD-10-CM

## 2025-01-13 DIAGNOSIS — L30.9 DERMATITIS: ICD-10-CM

## 2025-01-13 DIAGNOSIS — L40.9 PSORIASIS: ICD-10-CM

## 2025-01-13 DIAGNOSIS — D18.01 CHERRY ANGIOMA: ICD-10-CM

## 2025-01-13 DIAGNOSIS — L57.0 ACTINIC KERATOSES: ICD-10-CM

## 2025-01-13 PROCEDURE — 17000 DESTRUCT PREMALG LESION: CPT | Performed by: STUDENT IN AN ORGANIZED HEALTH CARE EDUCATION/TRAINING PROGRAM

## 2025-01-13 PROCEDURE — 99214 OFFICE O/P EST MOD 30 MIN: CPT | Performed by: STUDENT IN AN ORGANIZED HEALTH CARE EDUCATION/TRAINING PROGRAM

## 2025-01-13 NOTE — PROGRESS NOTES
"[facility] Dermatology Clinic Note     Patient Name: Bianka Boyle  Encounter Date: 1/13/24     Have you been cared for by a [facility] Dermatologist in the last 3 years and, if so, which description applies to you?    Yes.  I have been here within the last 3 years, and my medical history has NOT changed since that time.  I am FEMALE/of child-bearing potential.    REVIEW OF SYSTEMS:  Have you recently had or currently have any of the following? No changes in my recent health.   PAST MEDICAL HISTORY:  Have you personally ever had or currently have any of the following?  If \"YES,\" then please provide more detail. No changes in my medical history.   HISTORY OF IMMUNOSUPPRESSION: Do you have a history of any of the following:  Systemic Immunosuppression such as Diabetes, Biologic or Immunotherapy, Chemotherapy, Organ Transplantation, Bone Marrow Transplantation or Prednisone?  No     Answering \"YES\" requires the addition of the dotphrase \"IMMUNOSUPPRESSED\" as the first diagnosis of the patient's visit.   FAMILY HISTORY:  Any \"first degree relatives\" (parent, brother, sister, or child) with the following?    No changes in my family's known health.   PATIENT EXPERIENCE:    Do you want the Dermatologist to perform a COMPLETE skin exam today including a clinical examination under the \"bra and underwear\" areas?  NO  If necessary, do we have your permission to call and leave a detailed message on your Preferred Phone number that includes your specific medical information?  Yes      No Known Allergies   Current Outpatient Medications:   •  Ascorbic Acid (VITAMIN C PO), Vitamin C, Disp: , Rfl:   •  calcipotriene (DOVONOX) 0.005 % ointment, Apply twice a day for effected areas of psoriasis Monday through Friday., Disp: 120 g, Rfl: 1  •  Calcium Carb-Cholecalciferol (CALCIUM + D3 PO), Take by mouth daily, Disp: , Rfl:   •  Cholecalciferol (VITAMIN D3 PO), Vitamin D3, Disp: , Rfl:   •  ciclopirox (LOPROX) 0.77 % SUSP, Apply twice " daily to affected toenails, Disp: 60 mL, Rfl: 3  •  clobetasol (TEMOVATE) 0.05 % cream, Apply twice a day as needed on weekends only., Disp: 60 g, Rfl: 0  •  clobetasol (TEMOVATE) 0.05 % external solution, Apply daily to the scalp when psoriasis is flaring only., Disp: 50 mL, Rfl: 1  •  Cyanocobalamin (B-12) 1000 MCG TABS, , Disp: , Rfl:   •  glucosamine-chondroitin 500-400 MG tablet, Take 1 tablet by mouth 3 (three) times a day, Disp: , Rfl:   •  levothyroxine 50 mcg tablet, TAKE 1 TABLET BY MOUTH EVERY DAY, Disp: 90 tablet, Rfl: 1  •  Omega-3 Fatty Acids (FISH OIL PO), Fish Oil, Disp: , Rfl:   •  rosuvastatin (CRESTOR) 5 mg tablet, Take 1 tablet (5 mg total) by mouth daily, Disp: 90 tablet, Rfl: 1  •  sulfaSALAzine (AZULFIDINE-EN) 500 mg EC tablet, TAKE 1 TABLET BY MOUTH TWICE A DAY, Disp: 180 tablet, Rfl: 1  •  amoxicillin (AMOXIL) 500 mg capsule, TAKE 4 CAPSULES BY MOUTH 1 HOUR BEFORE DENTIST, Disp: , Rfl:   •  celecoxib (CeleBREX) 200 mg capsule, Take 200 mg by mouth 2 (two) times a day, Disp: , Rfl:           Whom besides the patient is providing clinical information about today's encounter?   NO ADDITIONAL HISTORIAN (patient alone provided history)    Physical Exam and Assessment/Plan by Diagnosis:  SEBORRHEIC KERATOSES  - Relevant exam: Scattered over the trunk/extremities are waxy brown to black plaques and papules with stuck on appearance  - Exam and clinical history consistent with seborrheic keratoses  - Counseled that these are benign growths that do not require treatment  - Counseled that removal of lesions is considered cosmetic and so would incur a fee should patient elect to move forward.   - Patient to hold on treatments for now but will inform us should they desire additional treatments    MELANOCYTIC NEVI  -Relevant exam: Scattered over the trunk/extremities are homogenously pigmented brown macules and papules. ELM performed and without concerning findings.  - Exam and clinical history consistent  with melanocytic nevi  - Educated on the ABCDE's of melanoma; handout provided  - Counseled to return to clinic prior to scheduled appointment should any of these lesions change or should any new lesions of concern arise  - Counseled on use of sun protection daily. Reviewed latest FDA sunscreen guidelines, including use of broad spectrum (UVA and UVB blocking) sunscreen or sun protective clothing with SPF 30-50 every 2-3 hours and reapplied after exposure to water; use of photoprotective clothing, including a broad brim hat and UPF rated clothing if outdoors for several hours; avoid use of tanning beds as these pose significant risk for melanoma and skin cancer.    LENTIGINES  OTHER SKIN CHANGES DUE TO CHRONIC EXPOSURE TO NONIONIZING RADIATION  - Relevant exam: Over sun exposed areas are brown macules. ELM performed and without concerning findings.  - Exam and clinical history consistent with lentigines.  - Educated that these are indicative of prior sun exposure.   - Counseled to return to clinic prior to scheduled appointment should any of these lesions change or should any new lesions of concern arise.  - Recommended use of sunscreen as above and below.  - Counseled on use of sun protection daily. Reviewed latest FDA sunscreen guidelines, including use of broad spectrum (UVA and UVB blocking) sunscreen or sun protective clothing with SPF 30-50 every 2-3 hours and reapplied after exposure to water; use of photoprotective clothing, including a broad brim hat and UPF rated clothing if outdoors for several hours; avoid use of tanning beds as these pose significant risk for melanoma and skin cancer.    CHERRY ANGIOMAS  - Relevant exam: Scattered over the trunk/extremities are red papules  - Exam and clinical history consistent with cherry angiomas  - Educated that these are benign  - Educated that removal is considered aesthetic and would incur a fee.  - Patient does not wish to pursue removal at this time but will  contact us should this change.    ACTINIC KERATOSES  - Relevant exam: On the left posterior calf are gritty pink papules  - Exam and clinical history consistent with actinic keratoses  - Discussed that these lesions are considered premalignant with the potential to evolve into squamous cell carcinoma.   - Discussed that these are due to chronic sun exposure and therefore recommend use of sunscreen/sun protection to prevent further sun damage  - Discussed treatment options, including liquid nitrogen destruction, topical immunotherapy, and photodynamic therapy, including risks, benefits    PROCEDURE:  DESTRUCTION OF PRE-MALIGNANT LESIONS    - After a thorough discussion of treatment options and risk/benefits/alternatives (including but not limited to local pain, scarring, dyspigmentation, blistering, and possible superinfection), verbal and written consent were obtained and the aforementioned lesions were treated on with cryotherapy using liquid nitrogen x 1 cycle for 5-10 seconds.    TOTAL NUMBER of 1 pre-malignant lesions were treated today on the ANATOMIC LOCATION: left posterior calf     The patient tolerated the procedure well, and after-care instructions were provided.     PSORIASIS    Physical Exam:  Anatomic Location Affected:  Body   Morphological Description:    Severity: mild  Body Percent Affected: 5  Pertinent Positives:  Pertinent Negatives:    Additional History of Present Condition:  Patient was using clobetasol and states clobetasol is very helpful.      Assessment and Plan:  - Patient has a history consistent with mild psoriasis  - Discussed that psoriasis is a chronic inflammatory condition of the skin that can start at any age but with peak ae of onset at 15-25 years and 50-60 years.  - Educated that psoriasis is something that we assist to manage, not cure, and that we will continue to work together to help with this  - Educated that psoriasis can be limited to the skin or involve other organ  systems with the MSK most commonly affected and presenting as stiffness and joint pain that is worst in the morning or with lack of movement and/or dactylitis/enthesitis  - Educated that psoriasis is considered an autoimmune disease and that it can track with other autoimmune diseases or inflammatory conditions like lupus and inflammatory bowel disease  - Discussed the treatment ladder for psoriasis, including topical therapies, such as topical steroids, calcipotriene, tapinarof, roflumilast, phototherapy, oral treatments such as MTX, acitretin, apremilast,  and biologics.   Based on a thorough discussion of this condition and the management approach to it (including a comprehensive discussion of the known risks, side effects and potential benefits of treatment), the patient (family) agrees to implement the following specific plan:          BODY  For flares on body, apply Calcipotriene 0.005% BID M-F, then use Clobetasol BID Sat, Sun. Educated on side effects of steroids and that steroid should NOT be used on face, groin, armpits.  SCALP   Apply Clobeatsol solution on scalp       Scribe Attestation    I,:  Liya Olivera MA am acting as a scribe while in the presence of the attending physician.:       I,:  Calvin Riley DO personally performed the services described in this documentation    as scribed in my presence.:       \

## 2025-01-14 ENCOUNTER — CONSULT (OUTPATIENT)
Dept: INTERNAL MEDICINE CLINIC | Facility: CLINIC | Age: 70
End: 2025-01-14
Payer: MEDICARE

## 2025-01-14 VITALS
DIASTOLIC BLOOD PRESSURE: 70 MMHG | TEMPERATURE: 97.2 F | HEIGHT: 66 IN | BODY MASS INDEX: 21.41 KG/M2 | HEART RATE: 73 BPM | OXYGEN SATURATION: 96 % | SYSTOLIC BLOOD PRESSURE: 120 MMHG | WEIGHT: 133.2 LBS

## 2025-01-14 DIAGNOSIS — M19.012 PRIMARY OSTEOARTHRITIS OF LEFT SHOULDER: ICD-10-CM

## 2025-01-14 DIAGNOSIS — E78.49 OTHER HYPERLIPIDEMIA: ICD-10-CM

## 2025-01-14 DIAGNOSIS — L40.59 POLYARTICULAR PSORIATIC ARTHRITIS (HCC): ICD-10-CM

## 2025-01-14 DIAGNOSIS — E03.9 ACQUIRED HYPOTHYROIDISM: ICD-10-CM

## 2025-01-14 DIAGNOSIS — Z01.818 PRE-OP EVALUATION: Primary | ICD-10-CM

## 2025-01-14 PROCEDURE — 99214 OFFICE O/P EST MOD 30 MIN: CPT | Performed by: NURSE PRACTITIONER

## 2025-01-14 RX ORDER — AMOXICILLIN 500 MG/1
CAPSULE ORAL
COMMUNITY
Start: 2024-10-09

## 2025-01-14 RX ORDER — CELECOXIB 200 MG/1
200 CAPSULE ORAL 2 TIMES DAILY
COMMUNITY
Start: 2024-09-18

## 2025-01-14 NOTE — ASSESSMENT & PLAN NOTE
Scheduled for replacement on 1/21.   Reviewed pre op labs/EKG- stable.   She is at an acceptable risk for surgery.

## 2025-01-14 NOTE — PROGRESS NOTES
"Name: Bianka Boyle      : 1955      MRN: [id number]  Encounter Provider: JOSE ALEJANDRO Scherer  Encounter Date: 2025   Encounter department: [facility] INTERNAL MEDICINE  :  Assessment & Plan  Pre-op evaluation         Primary osteoarthritis of left shoulder  Scheduled for replacement on .   Reviewed pre op labs/EKG- stable.   She is at an acceptable risk for surgery.        Acquired hypothyroidism  On levothyroxine.        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HCC)  Stable.  On sulfasalazine.        Other hyperlipidemia  Continue statin.   Orders:    Lipid Panel with Direct LDL reflex; Future           History of Present Illness     Bianka is here today for pre op evaluation.   She is scheduled for a left shoulder replacement on .   She denies any issues with anesthesia in the past.   No cardiopulmonary complaints.       Review of Systems   Constitutional:  Negative for activity change, appetite change and fatigue.   Respiratory:  Negative for cough and shortness of breath.    Cardiovascular:  Negative for chest pain, palpitations and leg swelling.   Gastrointestinal:  Negative for abdominal pain.   Musculoskeletal:  Positive for arthralgias.   Neurological:  Negative for dizziness, light-headedness and headaches.   Psychiatric/Behavioral:  Negative for sleep disturbance.        Objective   /70   Pulse 73   Temp (!) 97.2 °F (36.2 °C)   Ht 5' 5.5\" (1.664 m)   Wt 60.4 kg (133 lb 3.2 oz)   SpO2 96%   BMI 21.83 kg/m²      Physical Exam  Vitals reviewed.   Constitutional:       Appearance: Normal appearance.   HENT:      Head: Normocephalic and atraumatic.   Eyes:      Conjunctiva/sclera: Conjunctivae normal.   Cardiovascular:      Rate and Rhythm: Normal rate and regular rhythm.      Heart sounds: Normal heart sounds.   Pulmonary:      Effort: Pulmonary effort is normal.      Breath sounds: Normal breath sounds.   Musculoskeletal:         General: Normal range of motion.      " Right lower leg: No edema.      Left lower leg: No edema.   Skin:     General: Skin is warm and dry.   Neurological:      Mental Status: She is alert and oriented to person, place, and time.   Psychiatric:         Mood and Affect: Mood normal.         Behavior: Behavior normal.

## 2025-01-14 NOTE — H&P (VIEW-ONLY)
"Name: Bianka Boyle      : 1955      MRN: [id number]  Encounter Provider: JOSE ALEJANDRO Scherer  Encounter Date: 2025   Encounter department: [facility] INTERNAL MEDICINE  :  Assessment & Plan  Pre-op evaluation         Primary osteoarthritis of left shoulder  Scheduled for replacement on .   Reviewed pre op labs/EKG- stable.   She is at an acceptable risk for surgery.        Acquired hypothyroidism  On levothyroxine.        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HCC)  Stable.  On sulfasalazine.        Other hyperlipidemia  Continue statin.   Orders:    Lipid Panel with Direct LDL reflex; Future           History of Present Illness     Bianka is here today for pre op evaluation.   She is scheduled for a left shoulder replacement on .   She denies any issues with anesthesia in the past.   No cardiopulmonary complaints.       Review of Systems   Constitutional:  Negative for activity change, appetite change and fatigue.   Respiratory:  Negative for cough and shortness of breath.    Cardiovascular:  Negative for chest pain, palpitations and leg swelling.   Gastrointestinal:  Negative for abdominal pain.   Musculoskeletal:  Positive for arthralgias.   Neurological:  Negative for dizziness, light-headedness and headaches.   Psychiatric/Behavioral:  Negative for sleep disturbance.        Objective   /70   Pulse 73   Temp (!) 97.2 °F (36.2 °C)   Ht 5' 5.5\" (1.664 m)   Wt 60.4 kg (133 lb 3.2 oz)   SpO2 96%   BMI 21.83 kg/m²      Physical Exam  Vitals reviewed.   Constitutional:       Appearance: Normal appearance.   HENT:      Head: Normocephalic and atraumatic.   Eyes:      Conjunctiva/sclera: Conjunctivae normal.   Cardiovascular:      Rate and Rhythm: Normal rate and regular rhythm.      Heart sounds: Normal heart sounds.   Pulmonary:      Effort: Pulmonary effort is normal.      Breath sounds: Normal breath sounds.   Musculoskeletal:         General: Normal range of motion.      " Right lower leg: No edema.      Left lower leg: No edema.   Skin:     General: Skin is warm and dry.   Neurological:      Mental Status: She is alert and oriented to person, place, and time.   Psychiatric:         Mood and Affect: Mood normal.         Behavior: Behavior normal.

## 2025-01-15 ENCOUNTER — EVALUATION (OUTPATIENT)
Dept: PHYSICAL THERAPY | Facility: CLINIC | Age: 70
End: 2025-01-15
Payer: MEDICARE

## 2025-01-15 DIAGNOSIS — M19.012 PRIMARY OSTEOARTHRITIS OF LEFT SHOULDER: Primary | ICD-10-CM

## 2025-01-15 PROCEDURE — 97161 PT EVAL LOW COMPLEX 20 MIN: CPT | Performed by: PHYSICAL THERAPIST

## 2025-01-15 RX ORDER — CLOBETASOL PROPIONATE 0.5 MG/G
CREAM TOPICAL
Qty: 60 G | Refills: 0 | Status: SHIPPED | OUTPATIENT
Start: 2025-01-15

## 2025-01-15 RX ORDER — CALCIPOTRIENE 50 UG/G
OINTMENT TOPICAL
Qty: 120 G | Refills: 1 | Status: SHIPPED | OUTPATIENT
Start: 2025-01-15

## 2025-01-15 RX ORDER — CLOBETASOL PROPIONATE 0.5 MG/ML
SOLUTION TOPICAL
Qty: 50 ML | Refills: 1 | Status: SHIPPED | OUTPATIENT
Start: 2025-01-15

## 2025-01-15 NOTE — PROGRESS NOTES
PT Evaluation     Today's date: 1/15/2025  Patient name: Bianka Boyle  : 1955  MRN: [id number]  Referring provider: Luis Alfredo Fiore*  Dx:   Encounter Diagnosis     ICD-10-CM    1. Primary osteoarthritis of left shoulder  M19.012 Ambulatory referral to Physical Therapy                     Assessment    Assessment details: Pt is a 69 y.o. female who presents to outpatient PT pre-op TSA with pain, decreased rom, decreased strength and decreased functional mobility. She will benefit from skilled PT to address these deficits in order to achieve her goals and maximize her functional mobility. She is scheduled for her first post-op apt 25.            Goals  Short Term Goals:   Independent performance of initial hep  Decrease pain 2 points on VAS      Long Term Goals:  Performance of IADL's is returned to max level of function  Performance in recreational activities is improved to max level of function  Able to reach overhead with min pain    Plan    Planned therapy interventions: manual therapy, massage, strengthening, stretching, therapeutic activities, therapeutic exercise, therapeutic training, home exercise program and IADL retraining    Frequency: 2x week  Duration in weeks: 8        Subjective Evaluation    History of Present Illness  Mechanism of injury: Pt has history of chronic L shoulder pain that has been unresponsives to other therapies.  She is scheduled for TSA on 25.  Reports pain increases when reaching behind her back during self-care activate and when reaching overhead. Reports that she has difficulty sleeping on her L sides, tossing a tennis ball in order to serve and returning to a workout program including yoga.     Denies numbness or tingling.    Patient Goals  Patient goals for therapy: decreased pain, increased motion, increased strength, independence with ADLs/IADLs and return to sport/leisure activities    Pain  Current pain ratin  At worst pain rating:  8          Objective    L shoulder    AROM:   Flexion:160   Abduction:155   IR: to iliac creast    PROM:   Flexion: 160   Abduction:155   ER:  73   IR: 45    Strength: 4/5   Abduction:   4-/5   ER:    4/5   IR:     4-/5    Crepitus noted with arom  Slight pain produced with resisted IR>ER  Shoulder shrug noted with end range flexion and abduction                                 Precautions: TSA 1/21/25, protocol on chart      Manuals             prom                                                    Neuro Re-Ed                                                                                                        Ther Ex             pendulums             Elbow arom             gripper             Table slides                                                                 Ther Activity                                       Gait Training                                       Modalities             Cp-prn

## 2025-01-16 ENCOUNTER — HOSPITAL ENCOUNTER (OUTPATIENT)
Dept: MAMMOGRAPHY | Facility: MEDICAL CENTER | Age: 70
Discharge: HOME | End: 2025-01-16
Payer: MEDICARE

## 2025-01-16 VITALS — WEIGHT: 133.16 LBS | HEIGHT: 66 IN | BODY MASS INDEX: 21.4 KG/M2

## 2025-01-16 DIAGNOSIS — Z12.31 ENCOUNTER FOR SCREENING MAMMOGRAM FOR BREAST CANCER: ICD-10-CM

## 2025-01-16 PROCEDURE — 77063 BREAST TOMOSYNTHESIS BI: CPT

## 2025-01-16 PROCEDURE — 77067 SCR MAMMO BI INCL CAD: CPT

## 2025-01-21 ENCOUNTER — APPOINTMENT (OUTPATIENT)
Age: 70
End: 2025-01-21
Payer: MEDICARE

## 2025-01-21 ENCOUNTER — HOSPITAL ENCOUNTER (OUTPATIENT)
Age: 70
Setting detail: OUTPATIENT SURGERY
Discharge: HOME | End: 2025-01-21
Attending: ORTHOPAEDIC SURGERY | Admitting: ORTHOPAEDIC SURGERY
Payer: MEDICARE

## 2025-01-21 ENCOUNTER — ANESTHESIA (OUTPATIENT)
Age: 70
End: 2025-01-21
Payer: MEDICARE

## 2025-01-21 VITALS
BODY MASS INDEX: 21.05 KG/M2 | HEART RATE: 60 BPM | WEIGHT: 131 LBS | TEMPERATURE: 97.9 F | RESPIRATION RATE: 16 BRPM | OXYGEN SATURATION: 98 % | SYSTOLIC BLOOD PRESSURE: 120 MMHG | HEIGHT: 66 IN | DIASTOLIC BLOOD PRESSURE: 79 MMHG

## 2025-01-21 DIAGNOSIS — M19.011 OSTEOARTHRITIS OF RIGHT GLENOHUMERAL JOINT: Primary | ICD-10-CM

## 2025-01-21 PROCEDURE — C1776 JOINT DEVICE (IMPLANTABLE): HCPCS | Performed by: ORTHOPAEDIC SURGERY

## 2025-01-21 PROCEDURE — 97167 OT EVAL HIGH COMPLEX 60 MIN: CPT

## 2025-01-21 PROCEDURE — 73020 X-RAY EXAM OF SHOULDER: CPT

## 2025-01-21 PROCEDURE — 97535 SELF CARE MNGMENT TRAINING: CPT

## 2025-01-21 PROCEDURE — 23472 RECONSTRUCT SHOULDER JOINT: CPT | Performed by: ORTHOPAEDIC SURGERY

## 2025-01-21 DEVICE — IMPLANTABLE DEVICE
Type: IMPLANTABLE DEVICE | Site: SHOULDER | Status: FUNCTIONAL
Brand: TORNIER SIMPLICITI® SHOULDER SYSTEM

## 2025-01-21 DEVICE — IMPLANTABLE DEVICE
Type: IMPLANTABLE DEVICE | Site: SHOULDER | Status: FUNCTIONAL
Brand: TORNIER PERFORM® ANATOMIC AUGMENTED GLENOID

## 2025-01-21 RX ORDER — ACETAMINOPHEN 325 MG/1
650 TABLET ORAL EVERY 6 HOURS PRN
Status: DISCONTINUED | OUTPATIENT
Start: 2025-01-21 | End: 2025-01-21 | Stop reason: HOSPADM

## 2025-01-21 RX ORDER — HYDROMORPHONE HCL/PF 1 MG/ML
0.5 SYRINGE (ML) INJECTION EVERY 2 HOUR PRN
Status: DISCONTINUED | OUTPATIENT
Start: 2025-01-21 | End: 2025-01-21 | Stop reason: HOSPADM

## 2025-01-21 RX ORDER — CALCIUM CARBONATE 500 MG/1
1000 TABLET, CHEWABLE ORAL DAILY PRN
Status: DISCONTINUED | OUTPATIENT
Start: 2025-01-21 | End: 2025-01-21 | Stop reason: HOSPADM

## 2025-01-21 RX ORDER — PROPOFOL 10 MG/ML
INJECTION, EMULSION INTRAVENOUS AS NEEDED
Status: DISCONTINUED | OUTPATIENT
Start: 2025-01-21 | End: 2025-01-21

## 2025-01-21 RX ORDER — CEFAZOLIN SODIUM 2 G/50ML
2000 SOLUTION INTRAVENOUS ONCE
Status: COMPLETED | OUTPATIENT
Start: 2025-01-21 | End: 2025-01-21

## 2025-01-21 RX ORDER — OXYCODONE HYDROCHLORIDE 5 MG/1
5 TABLET ORAL EVERY 4 HOURS PRN
Status: DISCONTINUED | OUTPATIENT
Start: 2025-01-21 | End: 2025-01-21 | Stop reason: HOSPADM

## 2025-01-21 RX ORDER — TRANEXAMIC ACID 10 MG/ML
1000 INJECTION, SOLUTION INTRAVENOUS ONCE
Status: COMPLETED | OUTPATIENT
Start: 2025-01-21 | End: 2025-01-21

## 2025-01-21 RX ORDER — CEFAZOLIN SODIUM 2 G/50ML
2000 SOLUTION INTRAVENOUS EVERY 8 HOURS
Status: DISCONTINUED | OUTPATIENT
Start: 2025-01-21 | End: 2025-01-21 | Stop reason: HOSPADM

## 2025-01-21 RX ORDER — HYDROMORPHONE HCL/PF 1 MG/ML
0.5 SYRINGE (ML) INJECTION
Status: DISCONTINUED | OUTPATIENT
Start: 2025-01-21 | End: 2025-01-21 | Stop reason: HOSPADM

## 2025-01-21 RX ORDER — BUPIVACAINE HYDROCHLORIDE 2.5 MG/ML
INJECTION, SOLUTION EPIDURAL; INFILTRATION; INTRACAUDAL
Status: COMPLETED | OUTPATIENT
Start: 2025-01-21 | End: 2025-01-21

## 2025-01-21 RX ORDER — CHLORHEXIDINE GLUCONATE ORAL RINSE 1.2 MG/ML
15 SOLUTION DENTAL ONCE
Status: COMPLETED | OUTPATIENT
Start: 2025-01-21 | End: 2025-01-21

## 2025-01-21 RX ORDER — ROCURONIUM BROMIDE 10 MG/ML
INJECTION, SOLUTION INTRAVENOUS AS NEEDED
Status: DISCONTINUED | OUTPATIENT
Start: 2025-01-21 | End: 2025-01-21

## 2025-01-21 RX ORDER — DEXAMETHASONE SODIUM PHOSPHATE 10 MG/ML
INJECTION, SOLUTION INTRAMUSCULAR; INTRAVENOUS AS NEEDED
Status: DISCONTINUED | OUTPATIENT
Start: 2025-01-21 | End: 2025-01-21

## 2025-01-21 RX ORDER — FENTANYL CITRATE 50 UG/ML
INJECTION, SOLUTION INTRAMUSCULAR; INTRAVENOUS AS NEEDED
Status: DISCONTINUED | OUTPATIENT
Start: 2025-01-21 | End: 2025-01-21

## 2025-01-21 RX ORDER — LIDOCAINE HYDROCHLORIDE 10 MG/ML
INJECTION, SOLUTION EPIDURAL; INFILTRATION; INTRACAUDAL; PERINEURAL AS NEEDED
Status: DISCONTINUED | OUTPATIENT
Start: 2025-01-21 | End: 2025-01-21

## 2025-01-21 RX ORDER — MAGNESIUM HYDROXIDE 1200 MG/15ML
LIQUID ORAL AS NEEDED
Status: DISCONTINUED | OUTPATIENT
Start: 2025-01-21 | End: 2025-01-21 | Stop reason: HOSPADM

## 2025-01-21 RX ORDER — MIDAZOLAM HYDROCHLORIDE 2 MG/2ML
2 INJECTION, SOLUTION INTRAMUSCULAR; INTRAVENOUS ONCE
Status: COMPLETED | OUTPATIENT
Start: 2025-01-21 | End: 2025-01-21

## 2025-01-21 RX ORDER — OXYCODONE HYDROCHLORIDE 10 MG/1
10 TABLET ORAL EVERY 4 HOURS PRN
Status: DISCONTINUED | OUTPATIENT
Start: 2025-01-21 | End: 2025-01-21 | Stop reason: HOSPADM

## 2025-01-21 RX ORDER — FENTANYL CITRATE/PF 50 MCG/ML
50 SYRINGE (ML) INJECTION
Status: DISCONTINUED | OUTPATIENT
Start: 2025-01-21 | End: 2025-01-21 | Stop reason: HOSPADM

## 2025-01-21 RX ORDER — SODIUM CHLORIDE, SODIUM LACTATE, POTASSIUM CHLORIDE, CALCIUM CHLORIDE 600; 310; 30; 20 MG/100ML; MG/100ML; MG/100ML; MG/100ML
50 INJECTION, SOLUTION INTRAVENOUS CONTINUOUS
OUTPATIENT
Start: 2025-01-21

## 2025-01-21 RX ORDER — SODIUM CHLORIDE, SODIUM LACTATE, POTASSIUM CHLORIDE, CALCIUM CHLORIDE 600; 310; 30; 20 MG/100ML; MG/100ML; MG/100ML; MG/100ML
125 INJECTION, SOLUTION INTRAVENOUS CONTINUOUS
Status: DISCONTINUED | OUTPATIENT
Start: 2025-01-21 | End: 2025-01-21 | Stop reason: HOSPADM

## 2025-01-21 RX ORDER — BUPIVACAINE HYDROCHLORIDE 5 MG/ML
INJECTION, SOLUTION EPIDURAL; INTRACAUDAL
Status: COMPLETED | OUTPATIENT
Start: 2025-01-21 | End: 2025-01-21

## 2025-01-21 RX ORDER — ONDANSETRON 2 MG/ML
4 INJECTION INTRAMUSCULAR; INTRAVENOUS EVERY 6 HOURS PRN
Status: DISCONTINUED | OUTPATIENT
Start: 2025-01-21 | End: 2025-01-21 | Stop reason: HOSPADM

## 2025-01-21 RX ORDER — ONDANSETRON 2 MG/ML
INJECTION INTRAMUSCULAR; INTRAVENOUS AS NEEDED
Status: DISCONTINUED | OUTPATIENT
Start: 2025-01-21 | End: 2025-01-21

## 2025-01-21 RX ORDER — OXYCODONE HYDROCHLORIDE 5 MG/1
5 TABLET ORAL EVERY 4 HOURS PRN
Qty: 15 TABLET | Refills: 0 | Status: SHIPPED | OUTPATIENT
Start: 2025-01-21

## 2025-01-21 RX ORDER — DOCUSATE SODIUM 100 MG/1
100 CAPSULE, LIQUID FILLED ORAL 2 TIMES DAILY
Status: DISCONTINUED | OUTPATIENT
Start: 2025-01-21 | End: 2025-01-21 | Stop reason: HOSPADM

## 2025-01-21 RX ORDER — ONDANSETRON 2 MG/ML
4 INJECTION INTRAMUSCULAR; INTRAVENOUS ONCE AS NEEDED
Status: DISCONTINUED | OUTPATIENT
Start: 2025-01-21 | End: 2025-01-21 | Stop reason: HOSPADM

## 2025-01-21 RX ORDER — SODIUM CHLORIDE, SODIUM LACTATE, POTASSIUM CHLORIDE, CALCIUM CHLORIDE 600; 310; 30; 20 MG/100ML; MG/100ML; MG/100ML; MG/100ML
100 INJECTION, SOLUTION INTRAVENOUS CONTINUOUS
Status: DISCONTINUED | OUTPATIENT
Start: 2025-01-21 | End: 2025-01-21 | Stop reason: HOSPADM

## 2025-01-21 RX ADMIN — LIDOCAINE HYDROCHLORIDE 50 MG: 10 SOLUTION INTRAVENOUS at 09:39

## 2025-01-21 RX ADMIN — MIDAZOLAM 2 MG: 1 INJECTION INTRAMUSCULAR; INTRAVENOUS at 08:45

## 2025-01-21 RX ADMIN — CHLORHEXIDINE GLUCONATE 15 ML: 1.2 RINSE ORAL at 08:07

## 2025-01-21 RX ADMIN — DEXAMETHASONE SODIUM PHOSPHATE 10 MG: 10 INJECTION INTRAMUSCULAR; INTRAVENOUS at 09:56

## 2025-01-21 RX ADMIN — ROCURONIUM BROMIDE 40 MG: 10 INJECTION, SOLUTION INTRAVENOUS at 09:41

## 2025-01-21 RX ADMIN — PHENYLEPHRINE HYDROCHLORIDE 20 MCG/MIN: 10 INJECTION INTRAVENOUS at 09:54

## 2025-01-21 RX ADMIN — BUPIVACAINE HYDROCHLORIDE 10 ML: 5 INJECTION, SOLUTION EPIDURAL; INTRACAUDAL; PERINEURAL at 08:45

## 2025-01-21 RX ADMIN — CEFAZOLIN SODIUM 2000 MG: 2 SOLUTION INTRAVENOUS at 09:48

## 2025-01-21 RX ADMIN — SODIUM CHLORIDE, SODIUM LACTATE, POTASSIUM CHLORIDE, AND CALCIUM CHLORIDE 125 ML/HR: .6; .31; .03; .02 INJECTION, SOLUTION INTRAVENOUS at 08:07

## 2025-01-21 RX ADMIN — ROCURONIUM BROMIDE 10 MG: 10 INJECTION, SOLUTION INTRAVENOUS at 10:50

## 2025-01-21 RX ADMIN — PROPOFOL 60 MG: 10 INJECTION, EMULSION INTRAVENOUS at 09:41

## 2025-01-21 RX ADMIN — FENTANYL CITRATE 50 MCG: 50 INJECTION INTRAMUSCULAR; INTRAVENOUS at 09:38

## 2025-01-21 RX ADMIN — PROPOFOL 140 MG: 10 INJECTION, EMULSION INTRAVENOUS at 09:40

## 2025-01-21 RX ADMIN — FENTANYL CITRATE 50 MCG: 50 INJECTION INTRAMUSCULAR; INTRAVENOUS at 10:50

## 2025-01-21 RX ADMIN — TRANEXAMIC ACID 1000 MG: 10 INJECTION, SOLUTION INTRAVENOUS at 09:56

## 2025-01-21 RX ADMIN — SUGAMMADEX 200 MG: 100 INJECTION, SOLUTION INTRAVENOUS at 11:16

## 2025-01-21 RX ADMIN — BUPIVACAINE 20 ML: 13.3 INJECTION, SUSPENSION, LIPOSOMAL INFILTRATION at 08:45

## 2025-01-21 RX ADMIN — BUPIVACAINE HYDROCHLORIDE 20 ML: 2.5 INJECTION, SOLUTION EPIDURAL; INFILTRATION; INTRACAUDAL; PERINEURAL at 08:50

## 2025-01-21 RX ADMIN — ONDANSETRON 4 MG: 2 INJECTION INTRAMUSCULAR; INTRAVENOUS at 09:56

## 2025-01-21 NOTE — ANESTHESIA PROCEDURE NOTES
Peripheral Block    Patient location during procedure: holding area  Start time: 1/21/2025 8:50 AM  Reason for block: at surgeon's request and post-op pain management  Staffing  Performed by: Silvestre Goldman MD  Authorized by: Silvestre Goldman MD    Preanesthetic Checklist  Completed: patient identified, IV checked, site marked, risks and benefits discussed, surgical consent, monitors and equipment checked, pre-op evaluation and timeout performed  Peripheral Block  Patient position: sitting  Prep: ChloraPrep  Patient monitoring: frequent blood pressure checks, continuous pulse oximetry and heart rate  Anesthesia block type: PECS2.  Laterality: left  Injection technique: single-shot  Procedures: ultrasound guided, Ultrasound guidance required for the procedure to increase accuracy and safety of medication placement and decrease risk of complications.  Ultrasound permanent image saved  bupivacaine (PF) (MARCAINE) 0.25 % injection 20 mL - Perineural   20 mL - 1/21/2025 8:50:00 AM  Needle  Needle type: Stimuplex   Needle gauge: 20 G  Needle length: 4 in  Needle localization: anatomical landmarks and ultrasound guidance  Assessment  Injection assessment: incremental injection, frequent aspiration, injected with ease, negative aspiration, negative for heart rate change, no paresthesia on injection, no symptoms of intraneural/intravenous injection and needle tip visualized at all times  Paresthesia pain: none  Post-procedure:  site cleaned  patient tolerated the procedure well with no immediate complications

## 2025-01-21 NOTE — ANESTHESIA POSTPROCEDURE EVALUATION
Post-Op Assessment Note    CV Status:  Stable    Pain management: adequate    Multimodal analgesia used between 6 hours prior to anesthesia start to PACU discharge    Mental Status:  Alert   Hydration Status:  Stable   PONV Controlled:  None   Airway Patency:  Patent     Post Op Vitals Reviewed: Yes    No anethesia notable event occurred.    Staff: Anesthesiologist           Last Filed PACU Vitals:  Vitals Value Taken Time   Temp 97.9 °F (36.6 °C) 01/21/25 1130   Pulse 63 01/21/25 1204   /79 01/21/25 1200   Resp 23 01/21/25 1204   SpO2 99 % 01/21/25 1204   Vitals shown include unfiled device data.    Modified Shannon:     Vitals Value Taken Time   Activity 2 01/21/25 1200   Respiration 2 01/21/25 1200   Circulation 2 01/21/25 1200   Consciousness 2 01/21/25 1200   Oxygen Saturation 2 01/21/25 1200     Modified Shannon Score: 10            
Post-Op Assessment Note    CV Status:  Stable  Pain Score: 0    Pain management: adequate       Mental Status:  Alert and awake   Hydration Status:  Euvolemic   PONV Controlled:  Controlled   Airway Patency:  Patent     Post Op Vitals Reviewed: Yes    No anethesia notable event occurred.    Staff: Anesthesiologist, CRNA           Last Filed PACU Vitals:  Vitals Value Taken Time   Temp 97.9 °F (36.6 °C) 01/21/25 1130   Pulse 79 01/21/25 1136   /83 01/21/25 1130   Resp 15 01/21/25 1136   SpO2 100 % 01/21/25 1136   Vitals shown include unfiled device data.    Modified Shannon:     Vitals Value Taken Time   Activity 1 01/21/25 1130   Respiration 2 01/21/25 1130   Circulation 2 01/21/25 1130   Consciousness 1 01/21/25 1130   Oxygen Saturation 1 01/21/25 1130     Modified Shannon Score: 7            
Initial (On Arrival)

## 2025-01-21 NOTE — OCCUPATIONAL THERAPY NOTE
Occupational Therapy Evaluation and Treatment     Patient Name: Bianka Boyle  Today's Date: 1/21/2025  Problem List  Principal Problem:    Osteoarthritis of right glenohumeral joint    Past Medical History  Past Medical History:   Diagnosis Date    Abnormal thyroid function test     R....5/8/17     Arthritis     Bilateral hearing loss, unspecified hearing loss type     Colon polyps     Disease of thyroid gland     Dislocated shoulder     Right / LA...1/30/13     Effusion of knee     LA...5/21/14   R...5/8/17     HL (hearing loss)     Hypothyroidism     LA...2/6/13   R....3/31/15     Primary osteoarthritis of right knee     LA...4/9/14   R...5/21/14     Prolapsing mitral leaflet syndrome     LA...1/28/13   AR...3/31/15     Psoriasis     Psoriatic arthritis (HCC)     Tinnitus     ringing in both ears    Vaginal Pap smear, abnormal     Vitamin D deficiency     LA...5/8/17  R...3/31/15      Past Surgical History  Past Surgical History:   Procedure Laterality Date    CERVICAL BIOPSY  W/ LOOP ELECTRODE EXCISION      COLONOSCOPY      COLONOSCOPY W/ POLYPECTOMY      JOINT REPLACEMENT  4/22/2017    R shoulder    KNEE ARTHROSCOPY Right     right knee, right shoulder , open right shoulder     FL ARTHROPLASTY GLENOHUMERAL JOINT TOTAL SHOULDER Right 04/25/2017    Procedure: TOTAL SHOULDER ARTHROPLASTY ;  Surgeon: Luis Alfredo Fiore MD;  Location: [facility];  Service: Orthopedics    FL COLONOSCOPY FLX DX W/COLLJ SPEC WHEN PFRMD N/A 10/24/2017    Procedure: COLONOSCOPY;  Surgeon: Nicho Churchill MD;  Location: [facility];  Service: Gastroenterology    REPLACEMENT TOTAL KNEE Right 09/18/2024    [facility]    SHOULDER SURGERY Right     total shoulder replacement     SKIN BIOPSY          01/21/25 1308   OT Last Visit   OT Visit Date 01/21/25   Note Type   Note type Evaluation   Additional Comments pt greeted in supine, agreeable to OT Evaluation   Pain Assessment   Pain Assessment Tool 0-10   Pain Score No Pain   Hospital Pain  Intervention(s) Repositioned;Ambulation/increased activity;Emotional support;Rest   Restrictions/Precautions   Weight Bearing Precautions Per Order Yes   LUE Weight Bearing Per Order (S)  NWB   Braces or Orthoses Other (Comment)  (shoulder abduction sling)   Other Precautions WBS;Multiple lines;Telemetry   Home Living   Type of Home House   Home Layout Multi-level;Performs ADLs on one level;Able to live on main level with bedroom/bathroom  (0 ERICK through the front)   Bathroom Shower/Tub Walk-in shower   Bathroom Toilet Raised   Bathroom Equipment Built-in shower seat   Bathroom Accessibility Accessible   Home Equipment Walker;Cane   Prior Function   Level of [place] Independent with ADLs;Independent with functional mobility;Independent with IADLS   Lives With Spouse   Receives Help From Family   IADLs Independent with driving;Independent with meal prep;Independent with medication management   Falls in the last 6 months 0   Vocational Retired  (worked for [employer])   Comments (+)    Lifestyle   Autonomy Independent with all ADls/IADLs   Reciprocal Relationships Spouse   Service to Others Retired   General   Family/Caregiver Present No   ADL   Where Assessed Edge of bed   Eating Assistance 5  Supervision/Setup   Grooming Assistance 5  Supervision/Setup   UB Bathing Assistance 3  Moderate Assistance   LB Bathing Assistance 4  Minimal Assistance   UB Dressing Assistance 3  Moderate Assistance   LB Dressing Assistance 4  Minimal Assistance   Toileting Assistance  4  Minimal Assistance   Bed Mobility   Supine to Sit 5  Supervision   Additional items HOB elevated;Increased time required;Verbal cues   Sit to Supine Unable to assess   Additional Comments Pt greeted in supine, ended session EOB.   Transfers   Sit to Stand 5  Supervision   Additional items Increased time required;Verbal cues   Stand to Sit 5  Supervision   Additional items Increased time required;Verbal cues   Toilet transfer 5  Supervision    Additional items Increased time required;Verbal cues;Standard toilet;Other  (use of grab bars)   Additional Comments verbal cues for hand placement and safety with NWB in RUE   Functional Mobility   Functional Mobility 5  Supervision   Additional Comments Pt completed functional mobility functional household distance with S and no AD use. PT ABLE TO COMPLETE FUNCTIONAL MOBILITY FOR GREATER THAN 100+ FEET AS WELL AS 2+ STEPS FOR STAIR MANAGEMENT AT AN OVERALL SUPERVISION LEVEL IN ORDER TO SAFELY ENTER/EXIT HOME ENVIRONMENT AND COMPLETE COMMUNITY MOBILITY.   Balance   Static Sitting Normal   Dynamic Sitting Good   Static Standing Fair +   Dynamic Standing Fair   Ambulatory Fair   Activity Tolerance   Activity Tolerance Patient tolerated treatment well   Medical Staff Made Aware LUIS F Howell   Nurse Made Aware yes   RUE Assessment   RUE Assessment X  (NWB in RUE in shoulder abduction sling)   LUE Assessment   LUE Assessment WFL   Hand Function   Gross Motor Coordination Functional   Fine Motor Coordination Functional   Cognition   Overall Cognitive Status WFL   Arousal/Participation Alert;Cooperative   Attention Within functional limits   Orientation Level Oriented X4   Memory Within functional limits   Following Commands Follows all commands and directions without difficulty   Comments Cooperative and pleasant   Assessment   Limitation Decreased ADL status;Decreased endurance;Decreased self-care trans;Decreased high-level ADLs   Prognosis Good   Assessment Pt is a 69 y.o. female seen for OT evaluation s/p adm to [facility] on 1/21/2025 w/ Osteoarthritis of right glenohumeral joint . Comorbidities affecting pt’s functional performance include a significant PMH of BL hearing loss, disease of thyroid gland, osteoarthritis od R knee, psoriatic arthritis, tinnitus, hyperlipidemia, lumbar spondylosis. Pt with active OT orders. Pt lives with spouse in a two level house with 0 ERICK. Pt has walkin shower and raised  toilets. At baseline, pt was independent with all ADLs/IADLs. Pt educated on sling management. Pt completed supine to sit with S. Pt completed doff of sling with maxA from therapist. Pt completed don of shirt with modA seated. Pt completed don of sling with maxA from therapist. Pt completed don of underwear and pants with Ezra seated then standing to pull over waist. See additional treatment session focusing on ADLs/IADLs, sling management, and transfer for independence at home. Upon evaluation, pt currently requires modA for UB ADLs, Ezra for LB ADLs, Ezra for toileting, S for bed mobility, S for functional mobility, and S for transfers 2* the following deficits impacting occupational performance: weakness, decreased strength , decreased balance, decreased activity tolerance, and orthopedic restrictions. These impairments, as well at pt’s personal factors of: ERICK home environment, steps within home environment, difficulty performing ADLs, difficulty performing IADLs, and WBS limit pt’s ability to safely engage in all baseline areas of occupation. Based on the aforementioned OT evaluation, functional performance deficits, and assessments, pt has been identified as a high complexity evaluation. Pt to continue to benefit from continued acute OT services during hospital stay to address defined deficits and to maximize level of functional independence in the following Occupational Performance areas: grooming, bathing/shower, toilet hygiene, dressing, health maintenance, clothing management, cleaning, meal prep, household maintenance, and job performance/volunteering. From OT standpoint, recommend III; Minimum Resource Intensity (OPPT)upon D/C. OT will continue to follow pt 3-5x/wk.   Goals   Patient Goals to go home   STG Time Frame 1-3   Short Term Goal #1 Pt will demonstrate 100% adherence to LUE TSR ROM protocol during all functional activities w/o cues from therapist   Short Term Goal #2 Pt will be able to don/doff  LUE shoulder immobilizer at a mod I level w/ one-handed techniques to decrease caregiver assistance required   Short Term Goal  Pt will demonstrate ability to complete HEP (Pendulum exercises, AROM exercises as able within TSR ROM protocol) at a mod I level after education from therapist   LTG Time Frame 3-7   Long Term Goal #1 Pt will improve activity tolerance to G for min 30 min treatment sessions for increase engagement in functional tasks   Long Term Goal #2 Pt will complete bed mobility at a mod I level w/ G balance/safety demonstrated to decrease caregiver assistance required   Long Term Goal Pt will complete UB dressing/self care w/ mod I using adaptive device and DME as needed   Plan   Treatment Interventions ADL retraining;Functional transfer training;Endurance training;Patient/family training;Equipment evaluation/education;Compensatory technique education;Continued evaluation;Energy conservation;Activityengagement   Goal Expiration Date 01/28/25   OT Treatment Day 0   OT Frequency 3-5x/wk   Discharge Recommendation   Rehab Resource Intensity Level, OT III (Minimum Resource Intensity)  (OPPT)   Additional Comments  The patient's raw score on the AM-PAC Daily Activity Inpatient Short Form is 19 . A raw score of greater than or equal to 19 suggests the patient may benefit from discharge to home. Please refer to the recommendation of the Occupational Therapist for safe discharge planning.   AM-PAC Daily Activity Inpatient   Lower Body Dressing 3   Bathing 3   Toileting 3   Upper Body Dressing 3   Grooming 3   Eating 4   Daily Activity Raw Score 19   Daily Activity Standardized Score (Calc for Raw Score >=11) 40.22   AM-PAC Applied Cognition Inpatient   Following a Speech/Presentation 4   Understanding Ordinary Conversation 4   Taking Medications 4   Remembering Where Things Are Placed or Put Away 4   Remembering List of 4-5 Errands 4   Taking Care of Complicated Tasks 4   Applied Cognition Raw Score 24    Applied Cognition Standardized Score 62.21   Additional Treatment Session   Start Time 1320   End Time 1330   Treatment Assessment Pt seen for OT treatment session focusing on functional activity tolerance, education on total shoulder post-op restrictions, one-handed dressing techniques, and donning/doffing LUE shoulder immobilizer. Pt alert and cooperative throughout session. Pt completed functional mobility from EOB to bathroom with S. Pt completed toilet transfer and toileting with S. Pt completed perineal hygiene and clothing management with Ezra. Pt educated on the following L UE exercises with AROM: elbow flexion/extension, forearm pronation/supination, wrist flexion/extension, and hand squeezes. Additionally, educated pt on pendulum exercises through verbal instructions and demonstration w/ pt. Pt able to don/doff LUE shoulder immobilizer with maxA after education from therapist with cues for safe technique. Educated pt on wearing sling with exception for hygiene and exercises. Educated pt on one-handed dressing techniques w/ pt.   Additional Treatment Day 1   End of Consult   Education Provided Yes   Patient Position at End of Consult Seated edge of bed;All needs within reach   Nurse Communication Nurse aware of consult   End of Consult Comments Pt seated EOB at end of session. Call bell and phone within reach. All needs met and pt reports no further questions for OT at this time.   Isabel Roberts, OT

## 2025-01-21 NOTE — INTERVAL H&P NOTE
H&P reviewed. After examining the patient I find no changes in the patients condition since the H&P had been written.    Vitals:    01/21/25 0825   BP: 136/84   Pulse: 62   Resp: 16   Temp:    SpO2: 99%

## 2025-01-21 NOTE — ANESTHESIA PREPROCEDURE EVALUATION
"Procedure:  TOTAL SHOULDER ARTHROPLASTY (Left: Shoulder)     - denies any chest pain, palpitations, shortness of breath, syncope, lightheadedness, seizures   - denies any recent infectious symptoms such as fevers, chills, cough   - denies taking any anticoagulation medications or any issues with bleeding, bruising, clotting    Relevant Problems   ANESTHESIA (within normal limits)      CARDIO   (+) Other hyperlipidemia   (+) Varicose veins without complication      ENDO   (+) Acquired hypothyroidism      GI/HEPATIC (within normal limits)      /RENAL (within normal limits)      GYN (within normal limits)      HEMATOLOGY (within normal limits)      MUSCULOSKELETAL   (+) Arthritis   (+) Chondromalacia patellae   (+) Localized secondary osteoarthritis of right shoulder region   (+) Lumbar spondylosis   (+) Osteoarthritis of right glenohumeral joint   (+) 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HCC)   (+) Primary generalized (osteo)arthritis   (+) Primary osteoarthritis of left knee   (+) Primary osteoarthritis of left shoulder      NEURO/PSYCH (within normal limits)      PULMONARY (within normal limits)      Lab Results   Component Value Date    WBC 5.00 01/06/2025    HGB 13.8 01/06/2025    HCT 44.1 01/06/2025    MCV 98 01/06/2025     01/06/2025     Lab Results   Component Value Date     12/15/2015    SODIUM 138 01/06/2025    K 4.2 01/06/2025     01/06/2025    CO2 27 01/06/2025    ANIONGAP 6 12/15/2015    AGAP 7 01/06/2025    BUN 15 01/06/2025    CREATININE 0.83 01/06/2025    GLUC 96 09/19/2024    GLUF 88 01/06/2025    CALCIUM 9.4 01/06/2025    AST 19 01/06/2025    ALT 10 01/06/2025    ALKPHOS 56 01/06/2025    PROT 7.5 12/15/2015    TP 7.4 01/06/2025    BILITOT 0.43 12/15/2015    TBILI 0.37 01/06/2025    EGFR 72 01/06/2025     No results found for: \"PTT\"  Lab Results   Component Value Date    INR 1.0 08/27/2024       Physical Exam    Airway    Mallampati score: I  TM Distance: >3 FB  Neck ROM: full "     Dental   No notable dental hx     Cardiovascular  Rhythm: regular, Rate: normal, Cardiovascular exam normal    Pulmonary  Pulmonary exam normal Breath sounds clear to auscultation    Other Findings  post-pubertal.      Anesthesia Plan  ASA Score- 2     Anesthesia Type- general with ASA Monitors.         Additional Monitors:     Airway Plan: ETT.    Comment: GA with ETT with interscalene nerve block.       Plan Factors-Exercise tolerance (METS): >4 METS.    Chart reviewed. EKG reviewed.  Existing labs reviewed. Patient summary reviewed.          Obstructive sleep apnea risk education given perioperatively.        Induction- intravenous.    Postoperative Plan- Plan for postoperative opioid use.         Informed Consent- Anesthetic plan and risks discussed with patient.  I personally reviewed this patient with the CRNA. Discussed and agreed on the Anesthesia Plan with the CRNA..      NPO Status:  Vitals Value Taken Time   Date of last liquid 01/21/25 01/21/25 0755   Time of last liquid 0600 01/21/25 0755   Date of last solid 01/20/25 01/21/25 0755   Time of last solid 1900 01/21/25 0755

## 2025-01-21 NOTE — OP NOTE
OPERATIVE REPORT  PATIENT NAME: Bianka Boyle    :  1955  MRN: [id number]  Pt Location: [facility] OR ROOM 03    SURGERY DATE: 2025     SURGEON: Luis Alfredo Fiore MD     ASSISTANT: Juana Ross PA-C    NOTE: Juana Ross PA-C was present throughout the entire procedure and performed essential assistance with patient prepping, draping, positioning, trial implantation/range of motion, final implant insertion, careful retraction, wound closure, sterile dressing application and sling application, all under my direct supervision.     : Mayco Sullivan MD PGY-2 Assisted in the procedure.  I, Luis Alfredo Fiore MD, was present for the entire procedure and was scrubbed for and performed all the key and essential components of the procedure.     PREOPERATIVE DIAGNOSIS:  Left Shoulder Glenohumeral Osteoarthritis    POSTOPERATIVE DIAGNOSIS: Same    PROCEDURES: Left Total Shoulder Arthroplasty with Long Head Biceps Tenodesis    ANESTHESIA STAFF:  ZENAIDA Goldman MD      ANESTHESIA TYPE: General with endotracheal tube with ultrasound guided interscalene block (Exparel).  The interscalene block was provided by the anesthesia staff per my request for postoperative pain control and to decrease the use of postoperative narcotic medication for pain control.    COMPLICATIONS: None    FINDINGS: Glenohumeral Osteoarthritis, Walsch B2 Glenoid    SPECIMEN(S): None    ESTIMATED BLOOD LOSS: Minimal    INDICATIONS FOR PROCEDURE:  The patient is a 69 y.o. female presenting with pain and lack of function secondary to glenohumeral osteoarthritis of the left shoulder.  After a thorough discussion of the risks and benefits of operative and nonoperative care the patient elected for left total shoulder arthroplasty. Informed consent was obtained in the office.      OPERATIVE TECHNIQUE:  The day of surgery I identified the patient's left shoulder and marked it with my initials.  The patient was taken back to the operating  "room where anesthesia was induced by the anesthesia staff without complication. The patient was placed in the beachchair position with all bony prominences padded. The left shoulder was prepped and draped in routine sterile fashion and after a time-out for safety and confirming 2 grams of IV Cefazolin were given, a standard deltopectoral approach was performed. The dissection was carried down to the subscapularis and a subscapularis peel was preformed. The long head of biceps had severe tenosynovitis and degeneration, so it was released and tagged for later tenodesis to the anterior humerus at the end of the case.  The humeral head was exposed and found to have severe degenerative change with an intact rotator cuff. The humerus was prepared keeping with the surgical technique for a Tornier Simpliciti prosthesis.  There was a good size inferior humeral head osteophyte which did become confluent with the calcar of the humeral cortex so care was taken to remove as much of the osteophyte as possible without violating the cortex.  There was a fragment off the posterior aspect of the osteophyte which was removed and was felt to represent the \"loose body \"seen on CT scan as no further loose bodies were seen in the axillary pouch.  After preparing the humerus the glenoid was exposed and confirmed to have B2 wear pattern.  This was appreciated on preoperative planning and an augmented glenoid component was prepared to be utilized to correct this wear pattern and approximately 16 degrees of retroversion.  The glenoid was prepared for a Tornier Perform Plus A25 Augmented glenoid size Small.  After confirming appropriate size and version the final glenoid component was press fit with excellent fixation.  The humerus was then finished and a size 1 Nucleus with a 50 x 19 mm size head was trialed and found to have excellent stability with full range of motion and appropriate soft tissue tension. Final implants were placed and the " area was irrigated with pulse lavage. The subscapularis was repaired to the anterior humerus and the long head biceps was tenodesed to the anterior humerus with Orthocord sutures. The deltopectoral interval was loosely closed with 0 Vicryl and the subdermal layer with 2-0 Vicryl with staples for skin. Sterile dressings and a sling with abduction pillow was placed and the patient was awoken. The patient was transported to the recovery room in good condition and will be discharged to home and physical therapy will be initiated following the standard total shoulder arthroplasty protocol.    SIGNATURE: Luis Alfredo Fiore MD  DATE: January 21, 2025  TIME: 11:09 AM

## 2025-01-21 NOTE — PLAN OF CARE
Problem: OCCUPATIONAL THERAPY ADULT  Goal: Performs self-care activities at highest level of function for planned discharge setting.  See evaluation for individualized goals.  Description: Treatment Interventions: ADL retraining, Functional transfer training, Endurance training, Patient/family training, Equipment evaluation/education, Compensatory technique education, Continued evaluation, Energy conservation, Activityengagement          See flowsheet documentation for full assessment, interventions and recommendations.   Note: Limitation: Decreased ADL status, Decreased endurance, Decreased self-care trans, Decreased high-level ADLs  Prognosis: Good  Assessment: Pt is a 69 y.o. female seen for OT evaluation s/p adm to [facility] on 1/21/2025 w/ Osteoarthritis of right glenohumeral joint . Comorbidities affecting pt’s functional performance include a significant PMH of BL hearing loss, disease of thyroid gland, osteoarthritis od R knee, psoriatic arthritis, tinnitus, hyperlipidemia, lumbar spondylosis. Pt with active OT orders. Pt lives with spouse in a two level house with 0 ERICK. Pt has walkin shower and raised toilets. At baseline, pt was independent with all ADLs/IADLs. Pt educated on sling management. Pt completed supine to sit with S. Pt completed doff of sling with maxA from therapist. Pt completed don of shirt with modA seated. Pt completed don of sling with maxA from therapist. Pt completed don of underwear and pants with Ezra seated then standing to pull over waist. See additional treatment session focusing on ADLs/IADLs, sling management, and transfer for independence at home. Upon evaluation, pt currently requires modA for UB ADLs, Ezra for LB ADLs, Ezra for toileting, S for bed mobility, S for functional mobility, and S for transfers 2* the following deficits impacting occupational performance: weakness, decreased strength , decreased balance, decreased activity tolerance, and orthopedic  restrictions. These impairments, as well at pt’s personal factors of: ERICK home environment, steps within home environment, difficulty performing ADLs, difficulty performing IADLs, and WBS limit pt’s ability to safely engage in all baseline areas of occupation. Based on the aforementioned OT evaluation, functional performance deficits, and assessments, pt has been identified as a high complexity evaluation. Pt to continue to benefit from continued acute OT services during hospital stay to address defined deficits and to maximize level of functional independence in the following Occupational Performance areas: grooming, bathing/shower, toilet hygiene, dressing, health maintenance, clothing management, cleaning, meal prep, household maintenance, and job performance/volunteering. From OT standpoint, recommend III; Minimum Resource Intensity (OPPT)upon D/C. OT will continue to follow pt 3-5x/wk.     Rehab Resource Intensity Level, OT: III (Minimum Resource Intensity) (OPPT)

## 2025-01-21 NOTE — ANESTHESIA PROCEDURE NOTES
Peripheral Block    Patient location during procedure: holding area  Start time: 1/21/2025 8:45 AM  Reason for block: at surgeon's request and post-op pain management  Staffing  Performed by: Silvestre Goldman MD  Authorized by: Silvestre Goldman MD    Preanesthetic Checklist  Completed: patient identified, IV checked, site marked, risks and benefits discussed, surgical consent, monitors and equipment checked, pre-op evaluation and timeout performed  Peripheral Block  Patient position: sitting  Prep: ChloraPrep  Patient monitoring: frequent blood pressure checks, continuous pulse oximetry and heart rate  Block type: Interscalene  Laterality: left  Injection technique: single-shot  Procedures: ultrasound guided, Ultrasound guidance required for the procedure to increase accuracy and safety of medication placement and decrease risk of complications.  Ultrasound permanent image saved  bupivacaine (PF) (MARCAINE) 0.5 % injection 20 mL - Perineural   10 mL - 1/21/2025 8:45:00 AM  bupivacaine liposomal (EXPAREL) 1.3 % injection 20 mL - Perineural   20 mL - 1/21/2025 8:45:00 AM  Needle  Needle type: Stimuplex   Needle gauge: 20 G  Needle length: 4 in  Needle localization: anatomical landmarks and ultrasound guidance  Assessment  Injection assessment: incremental injection, frequent aspiration, injected with ease, negative aspiration, negative for heart rate change, no paresthesia on injection, no symptoms of intraneural/intravenous injection and needle tip visualized at all times  Paresthesia pain: none  Post-procedure:  site cleaned  patient tolerated the procedure well with no immediate complications

## 2025-01-21 NOTE — DISCHARGE INSTR - AVS FIRST PAGE
What to Expect Before and After Shoulder Replacement Surgery  You are being scheduled for a shoulder replacement by Dr. Fiore to treat your shoulder condition.  Here is some information which may help to answer questions that you may have.  Please do not hesitate to reach out to our team to answer questions not addressed here.    Before Surgery  You will be contacted the evening prior to your surgery to confirm the scheduled time of the procedure and when to arrive at the hospital.   Do not eat or drink anything after midnight the night before your surgery so that the anesthesia can be performed safely.  If you have been fitted for a sling in the office prior to the surgery please remember to bring it to the hospital.  You will meet the anesthesiologist the morning of the surgery.  The surgery is performed under a general anesthetic but they will also offer you a regional block (shot to numb the arm) to help control your post-operative pain.  While everyone experiences that effect of the block differently, most patients find it very helpful at managing the post operative pain.    After Surgery  The shoulder replacement surgery typically takes approximately an hour.  When surgery is completed, your surgeon will update your family and friends on your condition and progress. You will remain in the recovery room for at least an hour or until the anesthesia has worn off and your blood pressure and pulse are stable. If you have pain, the nurses will give you medication.   Once out of surgery, your surgeon will decide on how long you will be using a sling in order to protect and position your shoulder. However, this won't keep you from starting physical therapy.  Exercises typically begin on the day after surgery with emphasis on moving the shoulder, wrist, and hand.  The physical therapist will be provided with a detailed protocol but typically the first 8 weeks are used to regain range of motion and then strengthening  is initiated. Starting strengthening exercises too early may lead to complications.    When You Are Discharged from the Orthopedic Hospital  You can expect to be released from the hospital the same day of the surgery as this has been defined as an outpatient surgery since 2023 (of course this may change if you have special needs or medical conditions). Before you are released, the treatment team (Orthopaedic surgery residents, physician assistants and physical therapists) will talk with you about the importance of limiting any sudden or stressful movements to the arm for several weeks or longer. Activities that involve pushing, pulling, and lifting should not be done until you are given permission from your surgeon.     Your First Day at Home  You may need help with your daily activities, so it is a good idea to have family and friends prepared to help you.   It is okay to remove the sling and let the arm hang at the side so that you can get cleaned and change your clothes.  To put on a shirt, place the bad arm in first and then the good arm.  Reverse to take it off.    Don't forget to wear the sling every night for at least the first month after surgery, and never use your arm to push yourself up in bed or from a chair. The added weight on your shoulder may cause you to re-injure the joint.    How to Howe in the First Week  You are encouraged to return to your normal eating and sleeping patterns as soon as possible. It is important for you to be active in order to control your weight and muscle tone.  It is ok to increase your activity level and even perform light aerobic exercise (like walking or riding a stationary bike) within the first week or so if you are feeling up to it.  If there is concern about these activates best to wait until the first post-operative visit and discuss this with the team.   You might be able to return to work within several days if you can perform your job while wearing a sling.  Consult with your doctor, as this differs from patient to patient. However, if your job requires heavy lifting or climbing, there may be a delay for several months.   Until you are seen for your first follow-up visit, please try and keep wound dry.   It is okay to shower but try your best to keep the incision out of direct contact with the water (or consider using a waterproof bandage).  If it does gets wet please dry as best as possible afterwards.  If you notice any drainage or a foul odor from your incision or your temperature goes above 101.5 degrees, please contact the office.     What You Can Expect in the First Month  Your first post-operative appointment will be with Dr Fiore’s physician assistant (PA) around 2 weeks after the surgery.  You skin staples will be removed and X-rays will be obtained at that visit.  Please understand that it is quite common to still experience pain at that time but the pain should be steadily improving.   Hopefully physical therapy has already begun but if not it will be initiated at this visit and will continue for the 8-12 weeks.   At about 12 weeks after surgery you will start a progressive strengthening program. Physical therapy is a deliberate process of not only strengthening your shoulder but also altering how you use your arm. It may be many months before your desired results are achieved, so do not get discouraged.  Your shoulder will generally continue to improve steadily up to 6-8 months after surgery. After that point further improvement is very slow; although it has been shown that even after a year or more, activity can increase as muscle strength continues to improve.    After the First Month at Home   Because each person heals differently, there are different recovery timelines. An average recovery period typically lasts about between 3-6 months.  Talk with your surgeon about which activities will be appropriate for you once you have recovered

## 2025-01-24 ENCOUNTER — OFFICE VISIT (OUTPATIENT)
Dept: PHYSICAL THERAPY | Facility: CLINIC | Age: 70
End: 2025-01-24
Payer: MEDICARE

## 2025-01-24 DIAGNOSIS — M19.012 PRIMARY OSTEOARTHRITIS OF LEFT SHOULDER: Primary | ICD-10-CM

## 2025-01-24 PROCEDURE — 97164 PT RE-EVAL EST PLAN CARE: CPT | Performed by: PHYSICAL THERAPIST

## 2025-01-24 PROCEDURE — 97140 MANUAL THERAPY 1/> REGIONS: CPT | Performed by: PHYSICAL THERAPIST

## 2025-01-24 NOTE — PROGRESS NOTES
A (wr [facility] .[id number]) guidewire was removed.  PT Evaluation     Today's date: 2025  Patient name: Bianka Boyle  : 1955  MRN: [id number]  Referring provider: Luis Alfredo Fiore*  Dx:   Encounter Diagnosis     ICD-10-CM    1. Primary osteoarthritis of left shoulder  M19.012                      Assessment    Assessment details: Pt is a 69 y.o. female who presents to outpatient PT post-op TSA with pain, decreased rom, decreased strength and decreased functional mobility. She will benefit from skilled PT to address these deficits in order to achieve her goals and maximize her functional mobility.           Goals  Short Term Goals:   Independent performance of initial hep  Decrease pain 2 points on VAS      Long Term Goals:  Performance of IADL's is returned to max level of function  Performance in recreational activities is improved to max level of function  Able to reach overhead with min pain    Plan    Planned therapy interventions: manual therapy, massage, strengthening, stretching, therapeutic activities, therapeutic exercise, therapeutic training, home exercise program and IADL retraining    Frequency: 2x week  Duration in weeks: 8        Subjective Evaluation    History of Present Illness  Mechanism of injury: Pt has history of chronic L shoulder pain that has been unresponsives to other therapies.  She underwent TSA  with biceps tenodesis on 25 .  Reports compliance with her sling wear. Report continues residual numbness in her hand but feels this is improving. Reports that she required Rx pain medication 1 day post-op but that pain is currently well controlled with OTC medication.    Denies numbness or tingling.    Patient Goals  Patient goals for therapy: decreased pain, increased motion, increased strength, independence with ADLs/IADLs and return to sport/leisure activities    Pain  Current pain ratin  At worst pain ratin          Objective    L shoulder    AROM: not tested   Flexion:   Abduction:       PROM:   Flexion:  88   Abduction:85   ER:  10   IR: to body    Strength: NOT tested   flexion   Abduction:      ER:       IR:         Incision site is covered with post-op bandage with no obvious signs of infection  Mod ecchymosis noted along medial biceps  Sling position is slightly adjusted for proper fit.                                  Precautions: TSA  with biceps tenodesis 1/21/25, protocol on chart      Manuals 1/24            prom kl                                                   Neuro Re-Ed                                                                                                        Ther Ex             pendulums             Elbow arom             gripper             Table slides If comfortable nv                                                                Ther Activity                                       Gait Training                                       Modalities             Cp-prn

## 2025-01-26 DIAGNOSIS — E03.9 ACQUIRED HYPOTHYROIDISM: ICD-10-CM

## 2025-01-26 RX ORDER — LEVOTHYROXINE SODIUM 50 UG/1
50 TABLET ORAL DAILY
Qty: 90 TABLET | Refills: 1 | Status: SHIPPED | OUTPATIENT
Start: 2025-01-26

## 2025-01-27 ENCOUNTER — OFFICE VISIT (OUTPATIENT)
Dept: PHYSICAL THERAPY | Facility: CLINIC | Age: 70
End: 2025-01-27
Payer: MEDICARE

## 2025-01-27 DIAGNOSIS — M19.012 PRIMARY OSTEOARTHRITIS OF LEFT SHOULDER: Primary | ICD-10-CM

## 2025-01-27 PROCEDURE — 97110 THERAPEUTIC EXERCISES: CPT | Performed by: PHYSICAL THERAPIST

## 2025-01-27 PROCEDURE — 97140 MANUAL THERAPY 1/> REGIONS: CPT | Performed by: PHYSICAL THERAPIST

## 2025-01-27 NOTE — PROGRESS NOTES
"Daily Note     Today's date: 2025  Patient name: Bianka Boyle  : 1955  MRN: [id number]  Referring provider: Luis Alfredo Fiore*  Dx:   Encounter Diagnosis     ICD-10-CM    1. Primary osteoarthritis of left shoulder  M19.012                      Subjective: pt reports that she continues to do well, requires only OTC medication at this time. Had 1 incident yesterday when drying herself off when she experienced increased pain but this has since subsided      Objective: See treatment diary below      Assessment: pt is tolerating manual tx well. Able to achieve 90 degrees prom flexion with no complaints.  Instructed pt that she can add table slides in flexion after 24 hours if no sig increase in pain      Plan: Continue per plan of care.      Precautions: TSA  with biceps tenodesis 25, protocol on chart      Manuals            prom kl kl                                                  Neuro Re-Ed                                                                                                        Ther Ex             pendulums                          gripper             Table slides If comfortable nv 5\"x20 flexion           Scap retraction  5\"x20                                                  Ther Activity                                       Gait Training                                       Modalities             Cp-prn                               "

## 2025-01-29 ENCOUNTER — APPOINTMENT (OUTPATIENT)
Dept: PHYSICAL THERAPY | Facility: CLINIC | Age: 70
End: 2025-01-29
Payer: MEDICARE

## 2025-01-30 ENCOUNTER — OFFICE VISIT (OUTPATIENT)
Dept: PHYSICAL THERAPY | Facility: CLINIC | Age: 70
End: 2025-01-30
Payer: MEDICARE

## 2025-01-30 DIAGNOSIS — M19.012 PRIMARY OSTEOARTHRITIS OF LEFT SHOULDER: Primary | ICD-10-CM

## 2025-01-30 PROCEDURE — 97140 MANUAL THERAPY 1/> REGIONS: CPT | Performed by: PHYSICAL THERAPIST

## 2025-01-30 PROCEDURE — 97110 THERAPEUTIC EXERCISES: CPT | Performed by: PHYSICAL THERAPIST

## 2025-01-30 NOTE — PROGRESS NOTES
"Daily Note     Today's date: 2025  Patient name: Bianka Boyle  : 1955  MRN: [id number]  Referring provider: Luis Alfredo Fiore*  Dx:   Encounter Diagnosis     ICD-10-CM    1. Primary osteoarthritis of left shoulder  M19.012                      Subjective: pt reports that she was performing passive flexion table slides and felt a pop and pain at the time. Reports intensity of pain is decreased but reports concern and that she has since stopped this exercise.        Objective: See treatment diary below      Assessment: pt is tolerating manual tx well. 0-90 degrees passive flexion with no sig pain and 0-10 degrees ER with no pain or cavitation. Instructed pt to hold table slides through the weekend and will re-evaluate resuming pt guided prom NV.        Plan: Continue per plan of care.      Precautions: TSA  with biceps tenodesis 25, protocol on chart      Manuals           prom kl kl kl                                                 Neuro Re-Ed                                                                                                        Ther Ex             pendulums   x20                       gripper             Table slides If comfortable nv 5\"x20 flexion           Scap retraction  5\"x20 5\"x20                                                 Ther Activity                                       Gait Training                                       Modalities             Cp-prn   Sitting 10'                            "

## 2025-02-01 NOTE — PROGRESS NOTES
"Orthopaedic Surgery - Office Note  Bianka Boyle (69 y.o. female)   : 1955   MRN: [id number]  Encounter Date: 2/3/2025    Chief Complaint   Patient presents with    Left Shoulder - Post-op         Assessment/Plan  Diagnoses and all orders for this visit:    Status post total shoulder arthroplasty, left-2025  -     XR shoulder 2+ vw left; Future    Patient will follow all postoperative instructions.  Patient will wear the sling for 4 weeks, removing the pillow portion at 2 weeks postoperative.  Patient will attend physical therapy as scheduled following the total shoulder protocol which was provided to the patient in the office today in the after visit summary.  Postoperative x-rays were reviewed with the patient in the office today.  Wound care instructions were again reviewed with the patient in the office today.  All question and concerns were reviewed with the patient in the office today.       Return for Recheck in 8 weeks with myself .        History of Present Illness  Patient is here for a postop visit after left total shoulder arthroplasty on 2025.  Patient has been attending physical therapy and reports that she encountered a painful pop while doing passive table slides.  Patient reports she has had no similar incidents since backing off the exercise.  Patient reports the pain is controlled.  No paresthesias are reported.  She has had the right side replaced approximately 8 years ago.  She feels comfortable utilizing the sling.    Review of Systems  Pertinent items are noted in HPI.  All other systems were reviewed and are negative.    Physical Exam  Ht 5' 6\" (1.676 m)   Wt 59.4 kg (131 lb)   BMI 21.14 kg/m²   Cons: Appears well.  No apparent distress.  Psych: Alert. Oriented x3.  Mood and affect normal.    Operative shoulder incisions are healing well.  There are no signs of infection.  Patient is neurovascular intact in the operative extremity with full active wrist extension and full " range of motion of all digits.  Sling is fitting appropriately.      X-rays performed in the office today of the left shoulder show the prosthesis in place in proper anatomical alignment without any fractures or dislocations.  X-rays were reviewed from an orthopedic standpoint will await official radiologist interpretation.      Studies Reviewed  XR SHOULDER 1 VW LEFT     INDICATION: Xray Left shoulder s/p TSA.     COMPARISON: 11/20/2024     FINDINGS:     No acute fracture or dislocation.     Left shoulder prosthesis appears grossly satisfactory.     No lytic or blastic osseous lesion.     Postop findings in the soft tissues with some soft tissue gas and skin staples visible.     IMPRESSION:     Satisfactory appearance of left shoulder prosthesis        Computerized Assisted Algorithm (CAA) may have been used to analyze all applicable images.        Workstation performed: [id number]  X-ray images as well as reports were reviewed for today's visit.  Operative report and postoperative protocol were reviewed for today's visit  Procedures  No procedures today.    Medical, Surgical, Family, and Social History  The patient's medical history, family history, and social history, were reviewed and updated as appropriate.    Past Medical History:   Diagnosis Date    Abnormal thyroid function test     R....5/8/17     Arthritis     Bilateral hearing loss, unspecified hearing loss type     Colon polyps     Disease of thyroid gland     Dislocated shoulder     Right / LA...1/30/13     Effusion of knee     LA...5/21/14   R...5/8/17     HL (hearing loss)     Hypothyroidism     LA...2/6/13   R....3/31/15     Primary osteoarthritis of right knee     LA...4/9/14   R...5/21/14     Prolapsing mitral leaflet syndrome     LA...1/28/13   AR...3/31/15     Psoriasis     Psoriatic arthritis (HCC)     Tinnitus     ringing in both ears    Vaginal Pap smear, abnormal     Vitamin D deficiency     LA...5/8/17  R...3/31/15        Past Surgical  History:   Procedure Laterality Date    CERVICAL BIOPSY  W/ LOOP ELECTRODE EXCISION      COLONOSCOPY      COLONOSCOPY W/ POLYPECTOMY      JOINT REPLACEMENT  2017    R shoulder    KNEE ARTHROSCOPY Right     right knee, right shoulder , open right shoulder     GA ARTHROPLASTY GLENOHUMERAL JOINT TOTAL SHOULDER Right 2017    Procedure: TOTAL SHOULDER ARTHROPLASTY ;  Surgeon: Luis Alfredo Fiore MD;  Location: [facility];  Service: Orthopedics    GA ARTHROPLASTY GLENOHUMERAL JOINT TOTAL SHOULDER Left 2025    Procedure: TOTAL SHOULDER ARTHROPLASTY;  Surgeon: Luis Alfredo Fiore MD;  Location: [facility];  Service: Orthopedics    GA COLONOSCOPY FLX DX W/COLLJ SPEC WHEN PFRMD N/A 10/24/2017    Procedure: COLONOSCOPY;  Surgeon: Nicho Churchill MD;  Location: [facility];  Service: Gastroenterology    REPLACEMENT TOTAL KNEE Right 2024    [facility]    SHOULDER SURGERY Right     total shoulder replacement     SKIN BIOPSY         Family History   Problem Relation Age of Onset    Arthritis Mother          - brain lesion     Diabetes type II Mother     Diabetes Mother     Arthritis Father             Throat cancer Father     No Known Problems Sister     No Known Problems Daughter     Diabetes type II Maternal Grandmother     Prostate cancer Maternal Grandfather     Colon cancer Maternal Grandfather     Breast cancer Paternal Grandmother 55    No Known Problems Paternal Grandfather     Colon cancer Maternal Uncle     Breast cancer Paternal Aunt 48    Diabetes Family     Osteoarthritis Family        Social History     Occupational History    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      Comment: working full time   Tobacco Use    Smoking status: Never     Passive exposure: Past    Smokeless tobacco: Never   Vaping Use    Vaping status: Never Used   Substance and Sexual Activity    Alcohol use: Yes     Alcohol/week: 3.0 - 4.0 standard drinks of alcohol     Types: 3 - 4  Glasses of wine per week     Comment: socially    Drug use: No    Sexual activity: Yes     Partners: Male     Birth control/protection: Post-menopausal       No Known Allergies      Current Outpatient Medications:     amoxicillin (AMOXIL) 500 mg capsule, TAKE 4 CAPSULES BY MOUTH 1 HOUR BEFORE DENTIST, Disp: , Rfl:     Ascorbic Acid (VITAMIN C PO), Vitamin C, Disp: , Rfl:     calcipotriene (DOVONOX) 0.005 % ointment, Apply twice a day for effected areas of psoriasis Monday through Friday., Disp: 120 g, Rfl: 1    Calcium Carb-Cholecalciferol (CALCIUM + D3 PO), Take by mouth daily, Disp: , Rfl:     celecoxib (CeleBREX) 200 mg capsule, Take 200 mg by mouth 2 (two) times a day, Disp: , Rfl:     Cholecalciferol (VITAMIN D3 PO), Vitamin D3, Disp: , Rfl:     ciclopirox (LOPROX) 0.77 % SUSP, Apply twice daily to affected toenails, Disp: 60 mL, Rfl: 3    clobetasol (TEMOVATE) 0.05 % cream, Apply twice a day as needed on weekends only., Disp: 60 g, Rfl: 0    clobetasol (TEMOVATE) 0.05 % external solution, Apply daily to the scalp when psoriasis is flaring only., Disp: 50 mL, Rfl: 1    Cyanocobalamin (B-12) 1000 MCG TABS, , Disp: , Rfl:     glucosamine-chondroitin 500-400 MG tablet, Take 1 tablet by mouth 3 (three) times a day, Disp: , Rfl:     levothyroxine 50 mcg tablet, TAKE 1 TABLET BY MOUTH EVERY DAY, Disp: 90 tablet, Rfl: 1    Omega-3 Fatty Acids (FISH OIL PO), Fish Oil, Disp: , Rfl:     oxyCODONE (Roxicodone) 5 immediate release tablet, Take 1 tablet (5 mg total) by mouth every 4 (four) hours as needed for moderate pain for up to 15 doses Max Daily Amount: 30 mg, Disp: 15 tablet, Rfl: 0    rosuvastatin (CRESTOR) 5 mg tablet, Take 1 tablet (5 mg total) by mouth daily, Disp: 90 tablet, Rfl: 1    sulfaSALAzine (AZULFIDINE-EN) 500 mg EC tablet, TAKE 1 TABLET BY MOUTH TWICE A DAY, Disp: 180 tablet, Rfl: 1      Luis Alfredo Cristina PA-C

## 2025-02-01 NOTE — PATIENT INSTRUCTIONS
Total Shoulder Arthroplasty Post-Operative Rehabilitation Protocol  (adapted from Charles BG and Jocelyn BENEDICT. “Disease Specific Methods of Rehab” in Disorders of the Shoulder. F[employer]us and Waldo ed. )    Luis Alfredo Fiore MD  [facility]  [address]  [place] [address]  [phone]  Phase 1 (Weeks 0-6)   Immediate Postoperative Period   Goals:    Diminish Pain and Inflammation  Protect Subscapularis Repair   No ER Past 30 for 6 Weeks    Gradually Increase Passive ROM     Become Independent with Modified ADLs   Day 0-6    Elbow, Wrist, Hand AROM Exercises     Start Cervical AROM and Scapula Isometrics  May Start Pendulum Exercises    May Start Supine, Pain Free, PROM (PT Assisted or Patient Assisted)  Forward Flexion, External Rotation to Above Limit (Elbow at side), Internal Rotation, Cross Body Adduction    Cryotherapy/Ice for Pain and Inflammation    Instruct in Hygiene, Posture, and Positioning    Day 7-21    Continue Above    Can Introduce Light Cardio (Walking, Stationary Bike)    [facility]qua Therapy may begin at week 3 (day 21) as long as no wound problems   Day 22-42    Continue Above    May D/C Sling if Patient Comfortable  Begin Gentle Mobilizations (GH and Scapulothoracic) to Regain Full PROM if Needed.  May add Heat prior to PROM Exercises, Ice after Exercises  May Begin Light Rotator Cuff Isometrics (except IR due to Subscap Healing)     Phase 2 (Weeks 6-12)   Protection and Active Motion   Goals of Phase:    Allow Healing of Soft Tissues    Decrease Pain and Inflammation  Increase Strength of Scapular Stabilizers   Week 6-12    Continue Above    Add Progressive Antoinette-scapular Resistive Exercises     Shoulder Shrugs, Retraction/Protraction, Biceps, Triceps    Allow ER Past the Above Limit    Add Phase 2 Strengthening for Rotator Cuff to Tolerance     Add IR Only if Pain Free    Add PNFs at Week 8        Phase 3 (Weeks 12-16)   Goal of Phase:    Gradual Return of Shoulder  Strength, Power and Endurance    Gradual Return to Functional Activities  Treatments  Add Work Specific Training  Suggest Modifications to Work Activities if Needed  Note:   This is a general program which may be modified based on intra-operative findings, additional procedures preformed, repair stability, and patient biological factors.  If in doubt, please check with my office for individual patient specifics.  The early precautions are all about protecting the Subscapularis as it heals.  The amount of ER allowed in the first 6 weeks is determined intra-operatively and as such it is different for every patient.  Please contact my office if this motion limit is unclear.

## 2025-02-03 ENCOUNTER — OFFICE VISIT (OUTPATIENT)
Dept: OBGYN CLINIC | Facility: OTHER | Age: 70
End: 2025-02-03

## 2025-02-03 ENCOUNTER — APPOINTMENT (OUTPATIENT)
Dept: RADIOLOGY | Facility: OTHER | Age: 70
End: 2025-02-03
Payer: MEDICARE

## 2025-02-03 ENCOUNTER — OFFICE VISIT (OUTPATIENT)
Dept: PHYSICAL THERAPY | Facility: CLINIC | Age: 70
End: 2025-02-03
Payer: MEDICARE

## 2025-02-03 VITALS — WEIGHT: 131 LBS | BODY MASS INDEX: 21.05 KG/M2 | HEIGHT: 66 IN

## 2025-02-03 DIAGNOSIS — Z96.612 STATUS POST TOTAL SHOULDER ARTHROPLASTY, LEFT: Primary | ICD-10-CM

## 2025-02-03 DIAGNOSIS — M19.012 PRIMARY OSTEOARTHRITIS OF LEFT SHOULDER: Primary | ICD-10-CM

## 2025-02-03 DIAGNOSIS — Z96.612 STATUS POST TOTAL SHOULDER ARTHROPLASTY, LEFT: ICD-10-CM

## 2025-02-03 PROCEDURE — 97110 THERAPEUTIC EXERCISES: CPT | Performed by: PHYSICAL THERAPIST

## 2025-02-03 PROCEDURE — 73030 X-RAY EXAM OF SHOULDER: CPT

## 2025-02-03 PROCEDURE — 97140 MANUAL THERAPY 1/> REGIONS: CPT | Performed by: PHYSICAL THERAPIST

## 2025-02-03 PROCEDURE — 99024 POSTOP FOLLOW-UP VISIT: CPT | Performed by: PHYSICIAN ASSISTANT

## 2025-02-03 NOTE — PROGRESS NOTES
"Daily Note     Today's date: 2/3/2025  Patient name: Bianka Boyle  : 1955  MRN: [id number]  Referring provider: Luis Alfredo Fiore*  Dx:   Encounter Diagnosis     ICD-10-CM    1. Primary osteoarthritis of left shoulder  M19.012                      Subjective: pt reports that she has not noticed any additional popping that she felt last week. Reports that her pain is well controlled with OTC medication. Reports that she has a f/u with ortho later today.        Objective: See treatment diary below      Assessment: pt is tolerating manual tx well. 0-90 degrees passive flexion with no sig pain    Plan: Continue per plan of care.      Precautions: TSA  with biceps tenodesis 25, protocol on chart      Manuals 1/24 1/27 1/30 2/3         prom kl kl kl kl                                                Neuro Re-Ed                                                                                                        Ther Ex             pendulums   x20 x20                      gripper             Table slides If comfortable nv 5\"x20 flexion           Scap retraction  5\"x20 5\"x20 5\"x20                                                Ther Activity                                       Gait Training                                       Modalities             Cp-prn   Sitting 10'                            "

## 2025-02-05 ENCOUNTER — APPOINTMENT (OUTPATIENT)
Dept: PHYSICAL THERAPY | Facility: CLINIC | Age: 70
End: 2025-02-05
Payer: MEDICARE

## 2025-02-06 ENCOUNTER — OFFICE VISIT (OUTPATIENT)
Dept: PHYSICAL THERAPY | Facility: CLINIC | Age: 70
End: 2025-02-06
Payer: MEDICARE

## 2025-02-06 DIAGNOSIS — M19.012 PRIMARY OSTEOARTHRITIS OF LEFT SHOULDER: Primary | ICD-10-CM

## 2025-02-06 PROCEDURE — 97140 MANUAL THERAPY 1/> REGIONS: CPT | Performed by: PHYSICAL THERAPIST

## 2025-02-06 PROCEDURE — 97110 THERAPEUTIC EXERCISES: CPT | Performed by: PHYSICAL THERAPIST

## 2025-02-06 NOTE — PROGRESS NOTES
"Daily Note     Today's date: 2025  Patient name: Bianka Boyle  : 1955  MRN: [id number]  Referring provider: Luis Alfredo Fiore*  Dx:   Encounter Diagnosis     ICD-10-CM    1. Primary osteoarthritis of left shoulder  M19.012                      Subjective: pt reports that she had a f/u with ortho including an x-ray and that they are pleased with her progress. Instructed that she could remove abduction pillow at 2 weeks an wean from sling 4 week post-op. Reports that she shifting quickly today to avoid stepping on her cat and felt a \"jolt\" she was in the sling at the time    Objective: See treatment diary below      Assessment: rom is slowly improving and pt reports only mild soreness to manual tx.          Plan: Continue per plan of care.      Precautions: TSA  with biceps tenodesis 25, protocol on chart  Per f/u note \"Patient will wear the sling for 4 weeks, removing the pillow portion at 2 weeks postoperative\"  RA 25      Manuals 1/24 1/27 1/30 2/3 2/6        prom kl kl kl kl kl                                               Neuro Re-Ed                                                                                                        Ther Ex             pendulums   x20 x20 x20                     gripper             Table slides If comfortable nv 5\"x20 flexion           Scap retraction  5\"x20 5\"x20 5\"x20 5\"x20                                               Ther Activity                                       Gait Training                                       Modalities             Cp-prn   Sitting 10'                            "

## 2025-02-10 ENCOUNTER — OFFICE VISIT (OUTPATIENT)
Dept: PHYSICAL THERAPY | Facility: CLINIC | Age: 70
End: 2025-02-10
Payer: MEDICARE

## 2025-02-10 DIAGNOSIS — M19.012 PRIMARY OSTEOARTHRITIS OF LEFT SHOULDER: Primary | ICD-10-CM

## 2025-02-10 PROCEDURE — 97110 THERAPEUTIC EXERCISES: CPT | Performed by: PHYSICAL THERAPIST

## 2025-02-10 PROCEDURE — 97140 MANUAL THERAPY 1/> REGIONS: CPT | Performed by: PHYSICAL THERAPIST

## 2025-02-10 NOTE — PROGRESS NOTES
"Daily Note     Today's date: 2/10/2025  Patient name: Bianka Boyle  : 1955  MRN: [id number]  Referring provider: Luis Alfredo Fiore*  Dx:   Encounter Diagnosis     ICD-10-CM    1. Primary osteoarthritis of left shoulder  M19.012                      Subjective: pt reports that she continues to have soreness when her shoulder \"shifts\" such as when getting into her car but that the intensity of this is decreasing. Remains compliant with her sling wear.      Objective: See treatment diary below      Assessment: rom continues to slowly improve but pt remains guarded during manual tx.  Plan to attempt to resume self-prom NV.         Plan: Continue per plan of care.      Precautions: TSA  with biceps tenodesis 25, protocol on chart      Per f/u note \"Patient will wear the sling for 4 weeks, removing the pillow portion at 2 weeks postoperative\"  4 weeks (24)  RA 25      Manuals 1/24 1/27 1/30 2/3 2/6 2/10       prom kl kl kl kl kl kl                                              Neuro Re-Ed                                                                                                        Ther Ex             pendulums   x20 x20 x20 x20                    gripper             Table slides If comfortable nv 5\"x20 flexion           Scap retraction  5\"x20 5\"x20 5\"x20 5\"x20 5\"x20                                              Ther Activity                                       Gait Training                                       Modalities             Cp-prn   Sitting 10'                            "

## 2025-02-12 ENCOUNTER — APPOINTMENT (OUTPATIENT)
Dept: PHYSICAL THERAPY | Facility: CLINIC | Age: 70
End: 2025-02-12
Payer: MEDICARE

## 2025-02-13 ENCOUNTER — OFFICE VISIT (OUTPATIENT)
Dept: PHYSICAL THERAPY | Facility: CLINIC | Age: 70
End: 2025-02-13
Payer: MEDICARE

## 2025-02-13 DIAGNOSIS — M19.012 PRIMARY OSTEOARTHRITIS OF LEFT SHOULDER: Primary | ICD-10-CM

## 2025-02-13 PROCEDURE — 97110 THERAPEUTIC EXERCISES: CPT | Performed by: PHYSICAL THERAPIST

## 2025-02-13 PROCEDURE — 97140 MANUAL THERAPY 1/> REGIONS: CPT | Performed by: PHYSICAL THERAPIST

## 2025-02-13 NOTE — PROGRESS NOTES
"Daily Note     Today's date: 2025  Patient name: Bianka Boyle  : 1955  MRN: [id number]  Referring provider: Luis Alfredo Fiore*  Dx:   Encounter Diagnosis     ICD-10-CM    1. Primary osteoarthritis of left shoulder  M19.012                      Subjective: pt reports continues soreness but that the intensity is decreasing.        Objective: See treatment diary below      Assessment: rom continues to slowly improve but pt remains guarded during manual tx.          Plan: Continue per plan of care.      Precautions: TSA  with biceps tenodesis 25, protocol on chart      Per f/u note \"Patient will wear the sling for 4 weeks, removing the pillow portion at 2 weeks postoperative\"  4 weeks (24)  RA 25      Manuals 1/24 1/27 1/30 2/3 2/6 2/10 2/13      prom kl kl kl kl kl kl kl                                             Neuro Re-Ed                                                                                                        Ther Ex             pendulums   x20 x20 x20 x20 x20                   gripper             Table slides If comfortable nv 5\"x20 flexion           Scap retraction  5\"x20 5\"x20 5\"x20 5\"x20 5\"x20 5\"x20                                             Ther Activity                                       Gait Training                                       Modalities             Cp-prn   Sitting 10'                            "

## 2025-02-17 ENCOUNTER — OFFICE VISIT (OUTPATIENT)
Dept: PHYSICAL THERAPY | Facility: CLINIC | Age: 70
End: 2025-02-17
Payer: MEDICARE

## 2025-02-17 DIAGNOSIS — M19.012 PRIMARY OSTEOARTHRITIS OF LEFT SHOULDER: Primary | ICD-10-CM

## 2025-02-17 PROCEDURE — 97140 MANUAL THERAPY 1/> REGIONS: CPT

## 2025-02-17 PROCEDURE — 97110 THERAPEUTIC EXERCISES: CPT

## 2025-02-17 NOTE — PROGRESS NOTES
"Daily Note     Today's date: 2025  Patient name: Bianka Boyle  : 1955  MRN: [id number]  Referring provider: Luis Alfredo Fiore*  Dx:   Encounter Diagnosis     ICD-10-CM    1. Primary osteoarthritis of left shoulder  M19.012           Start Time: 846  Stop Time: 929  Total time in clinic (min): 43 minutes      Subjective: Patient states, \"When I catch myself jerking forward I do have a little pain.\"  Patient reports she is fearful to remove the sling.      Objective: See treatment diary below.      Assessment: Patient approaching 4 weeks post surgery tomorrow.  Progression of TE program is tolerated well.  Patient would benefit from continued PT to restore left shoulder ROM and strength.  Encouraged patient to remove pillow portion of the sling.      Plan: Continue treatment as per PT plan of care.       Precautions: TSA  with biceps tenodesis 25, protocol on chart    Per f/u note \"Patient will wear the sling for 4 weeks, removing the pillow portion at 2 weeks postoperative\"  4 weeks (24)  RA 25      Manuals 1/24 1/27 1/30 2/3 2/6 2/10 2/13 2/17     prom kl kl kl kl kl kl kl JLW                                            Neuro Re-Ed                                                                                                        Ther Ex             pendulums   x20 x20 x20 x20 x20 20                  gripper             Table slides If comfortable nv 5\"x20 flexion      flex  5\"x15     Scap retraction  5\"x20 5\"x20 5\"x20 5\"x20 5\"x20 5\"x20 5\"x20     isometrics        flex, ext  5\"x10                                            Ther Activity                                       Gait Training                                       Modalities             Cp-prn   Sitting 10'                              "

## 2025-02-19 ENCOUNTER — APPOINTMENT (OUTPATIENT)
Dept: PHYSICAL THERAPY | Facility: CLINIC | Age: 70
End: 2025-02-19
Payer: MEDICARE

## 2025-02-20 ENCOUNTER — EVALUATION (OUTPATIENT)
Dept: PHYSICAL THERAPY | Facility: CLINIC | Age: 70
End: 2025-02-20
Payer: MEDICARE

## 2025-02-20 DIAGNOSIS — M19.012 PRIMARY OSTEOARTHRITIS OF LEFT SHOULDER: Primary | ICD-10-CM

## 2025-02-20 PROCEDURE — 97140 MANUAL THERAPY 1/> REGIONS: CPT | Performed by: PHYSICAL THERAPIST

## 2025-02-20 PROCEDURE — 97110 THERAPEUTIC EXERCISES: CPT | Performed by: PHYSICAL THERAPIST

## 2025-02-20 NOTE — PROGRESS NOTES
PT Re-Evaluation     Today's date: 2025  Patient name: Bianka Boyle  : 1955  MRN: [id number]  Referring provider: Luis Alfredo Fiore*  Dx:   Encounter Diagnosis     ICD-10-CM    1. Primary osteoarthritis of left shoulder  M19.012                      Assessment    Assessment details: Pt is being treated with outpatient PT. She has  made slow gains in rom, strength, pain reduction and functional mobility but continues to have deficits. She will benefit from continued skilled PT to address these deficits. Thank you for this referral.              Goals  Short Term Goals:   Independent performance of initial hep-met  Decrease pain 2 points on VAS-met      Long Term Goals:  Performance of IADL's is returned to max level of function-ongoing  Performance in recreational activities is improved to max level of function-not met  Able to reach overhead with min pain-not met    Plan    Planned therapy interventions: manual therapy, massage, strengthening, stretching, therapeutic activities, therapeutic exercise, therapeutic training, home exercise program and IADL retraining    Frequency: 2x week  Duration in weeks: 6        Subjective Evaluation    History of Present Illness  Mechanism of injury:  She underwent TSA  with biceps tenodesis on 25 .  Pt reports that she has started weaning from her sling but is more comfortable wearing this at night. Reports that her pain remains  well controlled. Reports that she is still limited in most IADLs secondary to post-op restriction. Reports compliance with her hep.   Patient Goals  Patient goals for therapy: decreased pain, increased motion, increased strength, independence with ADLs/IADLs and return to sport/leisure activities    Pain  Current pain ratin  At worst pain ratin          Objective    L shoulder    AROM:    Flexion: 70   Abduction:40  Pt is hesitant when performing active elevation         PROM:   Flexion: 88   Abduction:85   ER:  10   IR:  "to body    Strength:    Flexion 2/5   Abduction:  2/5    ER:    2/5   IR:     no tested    Stiffness reported with end range motion in all planes    Precautions: TSA  with biceps tenodesis 1/21/25, protocol on chart    Per f/u note \"Patient will wear the sling for 4 weeks, removing the pillow portion at 2 weeks postoperative\"  4 weeks (2/18/24)  RA 2/20/25      Manuals 1/24 1/27 1/30 2/3 2/6 2/10 2/13 2/17 2/20    prom kl kl kl kl kl kl kl JLW kl                                           Neuro Re-Ed                                                                                                        Ther Ex             pendulums   x20 x20 x20 x20 x20 20 x20                 gripper             Table slides If comfortable nv 5\"x20 flexion      flex  5\"x15 5\"x20    Scap retraction  5\"x20 5\"x20 5\"x20 5\"x20 5\"x20 5\"x20 5\"x20 5'x20    isometrics        flex, ext  5\"x10 Flex/ext/ER/abd 5\"x10ea                                           Ther Activity                                       Gait Training                                       Modalities             Cp-prn   Sitting 10'                                                       "

## 2025-02-23 NOTE — PROGRESS NOTES
Assessment  Diagnoses and all orders for this visit:    Primary osteoarthritis of left shoulder    Discussion and Plan:    Explained to the patient that her MRI only shows what we knew on her x rays, that is severe glenohumeral joint osteoarthritis. Her rotator cuff remains intact. At this time, she is a candidate for a total shoulder arthroplasty due to her extensive trial of non operative treatments and continued intermittent pain/symptoms about her left shoulder. She understood and all questions were answered.  She wished to recover more from her recent total knee arthroplasty, about 6 weeks ago, with Dr Rahman at [facility]. We will sign her up for an anatomic total shoulder arthroplasty and obtain the appropriate pre operative testing and sign informed consent. She will pick out a date at a later time.   Patient has tried and failed conservative treatment measures, including activity modification, time, steroid injections and physical therapy.  They are indicated for total shoulder arthroplasty.  Patient is experiencing severe pain and functional disability which interferes with ADLs from their advanced joint disease.A thorough discussion was performed with the patient reviewing all operative and nonoperative options as well as the risks of the procedure.  Risks discussed include but not limited to persistent pain, dislocation, loosening of the prosthesis, infection, need for further surgery including revision to a reverse total shoulder, rotator cuff rupture , neurovascular injury, as well as the risk of anesthesia.  After this discussion all questions were answered and informed consent was obtained for Anatomic Total Shoulder Arthroplasty of the LEFT shoulder.  Follow up post operatively    Subjective:   Patient ID: Bianka Boyle is a 69 y.o. female presents today for a follow up visit for her left shoulder. She was last seen in 2023 and has known severe glenohumeral joint osteoarthritis of her left shoulder.  "Today, she reports intermittent nightly symptoms/pain that does wake her up. Her pain is localized about the anterolateral aspect of the shoulder. She had a cortisone injection on 7/6/23 with some noted benefit. She reports that she is considering a surgical procedure at this time due to her continued returning symptoms/pain despite her extensive non operative treatments but would like to recover more from her recent (6 weeks ago) total knee arthroplasty performed by Dr Rahman at [facility]. No numbness or tingling. No fevers or chills.     The following portions of the patient's history were reviewed and updated as appropriate: allergies, current medications, past family history, past medical history, past social history, past surgical history and problem list.    Objective:  /80   Pulse 81   Ht 5' 6\" (1.676 m)   Wt 60.1 kg (132 lb 9.6 oz)   BMI 21.40 kg/m²       Left Shoulder Exam     Range of Motion   External rotation:  40   Forward flexion:  140     Muscle Strength   Abduction: 5/5   External rotation: 5/5     Other   Erythema: absent  Sensation: normal  Pulse: present             Physical Exam  Vitals and nursing note reviewed.   Constitutional:       Appearance: She is well-developed.   HENT:      Head: Normocephalic and atraumatic.   Eyes:      Pupils: Pupils are equal, round, and reactive to light.   Cardiovascular:      Rate and Rhythm: Normal rate and regular rhythm.      Pulses: Normal pulses.      Heart sounds: Normal heart sounds.   Pulmonary:      Effort: Pulmonary effort is normal. No respiratory distress.      Breath sounds: Normal breath sounds.   Abdominal:      General: There is no distension.      Palpations: Abdomen is soft.   Musculoskeletal:      Cervical back: Normal range of motion and neck supple.   Skin:     General: Skin is warm and dry.   Neurological:      Mental Status: She is alert and oriented to person, place, and time.   Psychiatric:         Mood and Affect: Mood normal.        "  Behavior: Behavior normal.         Thought Content: Thought content normal.         Judgment: Judgment normal.       I have personally reviewed pertinent films in PACS and my interpretation is as follows.    MRI Left Shoulder 11/4/24: Severe glenohumeral joint osteoarthritis. Intact rotator cuff    X Ray Left Shoulder 10/1/24: Severe glenohumeral joint osteoarthritis.       Records Reviewed: office notes from PT and Dr Hector Miller Attestation      I,:  Sae Morel am acting as a scribe while in the presence of the attending physician.:       I,:  Luis Alfredo Fiore MD personally performed the services described in this documentation    as scribed in my presence.:              1 = Total assistance

## 2025-02-24 ENCOUNTER — OFFICE VISIT (OUTPATIENT)
Dept: PHYSICAL THERAPY | Facility: CLINIC | Age: 70
End: 2025-02-24
Payer: MEDICARE

## 2025-02-24 DIAGNOSIS — M19.012 PRIMARY OSTEOARTHRITIS OF LEFT SHOULDER: Primary | ICD-10-CM

## 2025-02-24 PROCEDURE — 97140 MANUAL THERAPY 1/> REGIONS: CPT | Performed by: PHYSICAL THERAPIST

## 2025-02-24 PROCEDURE — 97110 THERAPEUTIC EXERCISES: CPT | Performed by: PHYSICAL THERAPIST

## 2025-02-24 NOTE — PROGRESS NOTES
"Daily Note     Today's date: 2025  Patient name: Bianka Boyle  : 1955  MRN: [id number]  Referring provider: Luis Alfredo Fiore*  Dx:   Encounter Diagnosis     ICD-10-CM    1. Primary osteoarthritis of left shoulder  M19.012                      Subjective: pt reports that she woke up with more pain this morning. Reports \"I might have slept wrong\" Reports compliance with her current hep.       Objective: See treatment diary below      Assessment: Pt continues to hold her arm in a gaurded position. Encouraged her to work on arm swing when she walks and to add  cane scaption with eccentric lowering to her hep. Initially requires cues to limited shoulder shrug compensation but this improves with reps. Prom is more stiff today compared to last week but improves with manual tx.           Plan: Continue per plan of care.    Precautions: TSA  with biceps tenodesis 25, protocol on chart    4 weeks (24)  RA 25      Manuals  2/3 2/6 2/10 2/13 2/17 2/20 2/24   prom kl kl kl kl kl kl kl JLW kl kl                                          Neuro Re-Ed                                                                                                        Ther Ex             pendulums   x20 x20 x20 x20 x20 20 x20 x20                gripper             Table slides If comfortable nv 5\"x20 flexion      flex  5\"x15 5\"x20 5\"x20   Scap retraction  5\"x20 5\"x20 5\"x20 5\"x20 5\"x20 5\"x20 5\"x20 5'x20    isometrics        flex, ext  5\"x10 Flex/ext/ER/abd 5\"x10ea Flex/ext/abd/ER 5\"x10ea   AArom cane scaption with eccentric lowering          x20                             Ther Activity                                       Gait Training                                       Modalities             Cp-prn   Sitting 10'                                             "
- C/w home Lipitor 40mg, Ezetimibe 10mg

## 2025-02-26 ENCOUNTER — APPOINTMENT (OUTPATIENT)
Dept: PHYSICAL THERAPY | Facility: CLINIC | Age: 70
End: 2025-02-26
Payer: MEDICARE

## 2025-02-27 ENCOUNTER — OFFICE VISIT (OUTPATIENT)
Dept: PHYSICAL THERAPY | Facility: CLINIC | Age: 70
End: 2025-02-27
Payer: MEDICARE

## 2025-02-27 DIAGNOSIS — M19.012 PRIMARY OSTEOARTHRITIS OF LEFT SHOULDER: Primary | ICD-10-CM

## 2025-02-27 PROCEDURE — 97140 MANUAL THERAPY 1/> REGIONS: CPT

## 2025-02-27 PROCEDURE — 97110 THERAPEUTIC EXERCISES: CPT

## 2025-02-27 NOTE — PROGRESS NOTES
"Daily Note     Today's date: 2025  Patient name: Bianka Boyle  : 1955  MRN: [id number]  Referring provider: Luis Alfredo Fiore*  Dx:   Encounter Diagnosis     ICD-10-CM    1. Primary osteoarthritis of left shoulder  M19.012           Start Time: 845  Stop Time: 928  Total time in clinic (min): 43 minutes      Subjective: Patient reports she's been practicing table slides at home.      Objective: See treatment diary below.      Assessment: Treatment is tolerated well.  Left shoulder PROM improving.      Plan: Continue treatment as per PT plan of care.       Precautions: L TSA  with biceps tenodesis 25, protocol on chart      Manuals 2/27  1/30 2/3 2/6 2/10 2/13 2/17 2/20 2/24   prom JLW  kl kl kl kl kl JLW kl kl                                          Neuro Re-Ed                                                                                                        Ther Ex             pendulums   x20 x20 x20 x20 x20 20 x20 x20                gripper             Table slides flex, scap  5\"x30 ea       flex  5\"x15 5\"x20 5\"x20   Scap retraction   5\"x20 5\"x20 5\"x20 5\"x20 5\"x20 5\"x20 5'x20    isometrics flex/ext/abd/ER 5\"x10ea       flex, ext  5\"x10 Flex/ext/ER/abd 5\"x10ea Flex/ext/abd/ER 5\"x10ea   AArom cane scaption with eccentric lowering 20         x20                             Ther Activity                                       Gait Training                                       Modalities             Cp-prn   Sitting 10'                                "

## 2025-03-03 ENCOUNTER — OFFICE VISIT (OUTPATIENT)
Dept: PHYSICAL THERAPY | Facility: CLINIC | Age: 70
End: 2025-03-03
Payer: MEDICARE

## 2025-03-03 DIAGNOSIS — M19.012 PRIMARY OSTEOARTHRITIS OF LEFT SHOULDER: Primary | ICD-10-CM

## 2025-03-03 PROCEDURE — 97110 THERAPEUTIC EXERCISES: CPT

## 2025-03-03 PROCEDURE — 97140 MANUAL THERAPY 1/> REGIONS: CPT

## 2025-03-03 NOTE — PROGRESS NOTES
"Daily Note     Today's date: 3/3/2025  Patient name: Bianka Boyle  : 1955  MRN: [id number]  Referring provider: Luis Alfredo Fiore*  Dx:   Encounter Diagnosis     ICD-10-CM    1. Primary osteoarthritis of left shoulder  M19.012           Start Time: 845  Stop Time: 929  Total time in clinic (min): 44 minutes      Subjective: Patient reports one episode over the weekend when her left shoulder \"tightened up a little bit.\"      Objective: See treatment diary below.      Assessment: Patient will be 6 weeks post surgery tomorrow and is recovering well.  Progression of TE program is tolerated well.  Left shoulder PROM improving.      Plan: Continue treatment as per PT plan of care.       Precautions: L TSA with biceps tenodesis 25, protocol on chart      Manuals 2/27 3/3  2/3 2/6 2/10 2/13 2/17 2/20 2/24   prom JLW JLW  kl kl kl kl JLW kl kl                                          Neuro Re-Ed                                                                                                        Ther Ex             pendulums    x20 x20 x20 x20 20 x20 x20                gripper             table slides flex, scap  5\"x30 ea flex, scap  5\"x20 ea      flex  5\"x15 5\"x20 5\"x20   Scap retraction    5\"x20 5\"x20 5\"x20 5\"x20 5\"x20 5'x20    isometrics flex/ext/abd/ER 5\"x10ea flex/ext/abd/ER 5\"x10ea      flex, ext  5\"x10 Flex/ext/ER/abd 5\"x10ea Flex/ext/abd/ER 5\"x10ea   AAROM cane scaption with eccentric lowering 20 20        x20   rows  red  20           tricep ext  blue  20           bicep curl  2#  20                        Ther Activity                                       Gait Training                                       Modalities             Cp-prn                                   "

## 2025-03-05 ENCOUNTER — APPOINTMENT (OUTPATIENT)
Dept: PHYSICAL THERAPY | Facility: CLINIC | Age: 70
End: 2025-03-05
Payer: MEDICARE

## 2025-03-06 ENCOUNTER — OFFICE VISIT (OUTPATIENT)
Dept: PHYSICAL THERAPY | Facility: CLINIC | Age: 70
End: 2025-03-06
Payer: MEDICARE

## 2025-03-06 DIAGNOSIS — M19.012 PRIMARY OSTEOARTHRITIS OF LEFT SHOULDER: Primary | ICD-10-CM

## 2025-03-06 PROCEDURE — 97110 THERAPEUTIC EXERCISES: CPT

## 2025-03-06 PROCEDURE — 97140 MANUAL THERAPY 1/> REGIONS: CPT

## 2025-03-06 NOTE — PROGRESS NOTES
"Daily Note     Today's date: 3/6/2025  Patient name: Bianka Boyle  : 1955  MRN: [id number]  Referring provider: Luis Alfredo Fiore*  Dx:   Encounter Diagnosis     ICD-10-CM    1. Primary osteoarthritis of left shoulder  M19.012           Start Time: 0850  Stop Time: 0930  Total time in clinic (min): 40 minutes      Subjective: Patient reports she was a little sore after last session.  Patient reports she tried driving yesterday - \"It felt really weird.\"  Patient reports she does not feel ready to drive herself yet.      Objective: See treatment diary below.      Assessment: Progression of TE program is tolerated well.  Left shoulder PROM is improving.      Plan: Continue treatment as per PT plan of care.       Precautions: L TSA with biceps tenodesis 25, protocol on chart      Manuals 2/27 3/3 3/6  2/6 2/10 2/13 2/17 2/20 2/24   prom JLW JLW JLW  kl kl kl JLW kl kl                                          Neuro Re-Ed                                                                                                        Ther Ex             pendulums     x20 x20 x20 20 x20 x20                pulleys   5\"x15          table slides flex, scap  5\"x30 ea flex, scap  5\"x20 ea      flex  5\"x15 5\"x20 5\"x20   Scap retraction     5\"x20 5\"x20 5\"x20 5\"x20 5'x20    isometrics flex/ext/abd/ER 5\"x10ea flex/ext/abd/ER 5\"x10ea      flex, ext  5\"x10 Flex/ext/ER/abd 5\"x10ea Flex/ext/abd/ER 5\"x10ea   AAROM cane scaption with eccentric lowering 20 20 20       x20   rows  red  20 red  20          low rows   red  20          tricep ext  blue  20 blue  20          bicep curl  2#  20 2#  20          sidelying er   20                                    Ther Activity                                       Gait Training                                       Modalities             Cp-prn                                     "

## 2025-03-10 ENCOUNTER — OFFICE VISIT (OUTPATIENT)
Dept: PHYSICAL THERAPY | Facility: CLINIC | Age: 70
End: 2025-03-10
Payer: MEDICARE

## 2025-03-10 DIAGNOSIS — M19.012 PRIMARY OSTEOARTHRITIS OF LEFT SHOULDER: Primary | ICD-10-CM

## 2025-03-10 PROCEDURE — 97110 THERAPEUTIC EXERCISES: CPT

## 2025-03-10 PROCEDURE — 97140 MANUAL THERAPY 1/> REGIONS: CPT

## 2025-03-10 NOTE — PROGRESS NOTES
"Daily Note     Today's date: 3/10/2025  Patient name: Bianka Boyle  : 1955  MRN: [id number]  Referring provider: Luis Alfredo Fiore*  Dx:   Encounter Diagnosis     ICD-10-CM    1. Primary osteoarthritis of left shoulder  M19.012           Start Time: 849  Stop Time: 929  Total time in clinic (min): 40 minutes      Subjective: Patient reports her left shoulder is feeling good.      Objective: See treatment diary below.      Assessment: Progression of TE program is tolerated well.  Left shoulder PROM is improving.      Plan: Continue treatment as per PT plan of care.       Precautions: L TSA with biceps tenodesis 25, protocol on chart      Manuals 2/27 3/3 3/6 3/10  2/10 2/13 2/17 2/20 2/24   prom JLW JLW JLW JLW  kl kl JLW kl kl                                          Neuro Re-Ed                                                                                                        Ther Ex             pendulums      x20 x20 20 x20 x20                pulleys   5\"x15 5\"x20         table slides flex, scap  5\"x30 ea flex, scap  5\"x20 ea      flex  5\"x15 5\"x20 5\"x20   Scap retraction      5\"x20 5\"x20 5\"x20 5'x20    isometrics flex/ext/abd/ER 5\"x10ea flex/ext/abd/ER 5\"x10ea      flex, ext  5\"x10 Flex/ext/ER/abd 5\"x10ea Flex/ext/abd/ER 5\"x10ea   AAROM cane scaption with eccentric lowering 20 20 20 20      x20   rows  red  20 red  20 green  20         low rows   red  20 red  20         tricep ext  blue  20 blue  20 blue  20         bicep curl  2#  20 2#  20 4#  20         sidelying er   20 1#  20                                   Ther Activity                                       Gait Training                                       Modalities             Cp-prn                                       "

## 2025-03-12 ENCOUNTER — APPOINTMENT (OUTPATIENT)
Dept: PHYSICAL THERAPY | Facility: CLINIC | Age: 70
End: 2025-03-12
Payer: MEDICARE

## 2025-03-13 ENCOUNTER — OFFICE VISIT (OUTPATIENT)
Dept: PHYSICAL THERAPY | Facility: CLINIC | Age: 70
End: 2025-03-13
Payer: MEDICARE

## 2025-03-13 DIAGNOSIS — M19.012 PRIMARY OSTEOARTHRITIS OF LEFT SHOULDER: Primary | ICD-10-CM

## 2025-03-13 PROCEDURE — 97140 MANUAL THERAPY 1/> REGIONS: CPT

## 2025-03-13 PROCEDURE — 97110 THERAPEUTIC EXERCISES: CPT

## 2025-03-13 NOTE — PROGRESS NOTES
"Daily Note     Today's date: 3/13/2025  Patient name: Bianka Boyle  : 1955  MRN: [id number]  Referring provider: Luis Alfredo Fiore*  Dx:   Encounter Diagnosis     ICD-10-CM    1. Primary osteoarthritis of left shoulder  M19.012           Start Time: 846  Stop Time: 927  Total time in clinic (min): 41 minutes      Subjective: Patient states, \"It's getting better.\"  Patient reports feeling \"a big stretch\" when on the pulleys.      Objective: See treatment diary below.      Assessment: Progression of TE program is tolerated well.  Left shoulder PROM improving.      Plan: Continue treatment as per PT plan of care.       Precautions: L TSA with biceps tenodesis 25, protocol on chart      Manuals 2/27 3/3 3/6 3/10 3/13  2/13 2/17 2/20 2/24   prom JLW JLW JLW JLW JLW  kl JLW kl kl                                          Neuro Re-Ed                                                                                                        Ther Ex             pendulums       x20 20 x20 x20                pulleys   5\"x15 5\"x20 5\"x20        table slides flex, scap  5\"x30 ea flex, scap  5\"x20 ea      flex  5\"x15 5\"x20 5\"x20   Scap retraction       5\"x20 5\"x20 5'x20    isometrics flex/ext/abd/ER 5\"x10ea flex/ext/abd/ER 5\"x10ea      flex, ext  5\"x10 Flex/ext/ER/abd 5\"x10ea Flex/ext/abd/ER 5\"x10ea   AAROM cane scaption with eccentric lowering 20 20 20 20 20     x20   rows  red  20 red  20 green  20 green  30        low rows   red  20 red  20 red  20        tricep ext  blue  20 blue  20 blue  20 blue  20        bicep curl  2#  20 2#  20 4#  20 4#  30        sidelying er   20 1#  20 2#  20        squigz     10                     Ther Activity                                       Gait Training                                       Modalities             Cp-prn                                         "

## 2025-03-14 ENCOUNTER — OFFICE VISIT (OUTPATIENT)
Dept: AUDIOLOGY | Age: 70
End: 2025-03-14
Payer: COMMERCIAL

## 2025-03-14 ENCOUNTER — TELEPHONE (OUTPATIENT)
Age: 70
End: 2025-03-14

## 2025-03-14 DIAGNOSIS — H90.3 SENSORINEURAL HEARING LOSS (SNHL) OF BOTH EARS: Primary | ICD-10-CM

## 2025-03-14 DIAGNOSIS — H90.3 SENSORY HEARING LOSS, BILATERAL: Primary | ICD-10-CM

## 2025-03-14 NOTE — TELEPHONE ENCOUNTER
Patient calling to request audiology script- needs script for hearing test appt 4/2/25 , please review

## 2025-03-14 NOTE — PROGRESS NOTES
Hearing Aid Visit:    Name:  Bianka Boyle  :  1955  Age:  69 y.o.  MRN:  [id number]  Date of Evaluation: 25     HISTORY:    Bianka Boyle was seen today (3/14/2025) for a(n) out-of-warranty hearing aid check of her bilateral hearing aids. Today, Bianka needed her new phone paired to her hearing aids.    Most recent Audiogram: 3/23/23    DEVICE INFORMATION: isai Boyle is fit with Oticon Opn S 1 mini RITE R hearing aid(s).   Right serial number [id number]. Left serial number [id number].   Warranty date: 23 (Loss/Damage and repair).      Left Device Right Device   Hearing Aid Make: Oticon  Oticon    Hearing Aid Model: OPN S 1 Mini RITE-R OPN S 1 Mini RITE-R   Serial Number: [id number]   Repair Warranty Date: 23   Loss/Damage Warranty Status:             Length/Output    Wax System: Pro Wax miniFIT Pro Wax miniFIT   Dome Size/Style: 6DB 6DB   Battery: Lithium-ion Rechargeable Lithium-ion Rechargeable      [employer] Serial Number: N/A N/A   [employer] Warranty Date:  N/A N/A    Serial Number: N/A   Warranty Date: N/A    Accessories: N/A       ACTION/ADJUSTMENTS:    Patient needed a connect clip in order to stream to her phone. Patient opted to purchase our in stock connect clips with no warranty.     Visual inspection of the device(s) revealed no noticeable damage or defects.     A listening check revealed good sound quality from the device(s).    The hearing aids were cleaned and checked. The patients wax filters and domes were replaced bilaterally.     No adjustments were made at this time.     The patients hearing aid(s) were connected to their cell phone via bluetooth. Patient demonstrated excellent understanding of the mann and bluetooth capabilities of the hearing aids.     Patient paid $105.00 today.     RECOMMENDATIONS:     Follow up for new hearing test.       Lindsay Diehl, CCC-A  Clinical Audiologist  [facility]  [address] [place] [id number]

## 2025-03-17 ENCOUNTER — OFFICE VISIT (OUTPATIENT)
Dept: PHYSICAL THERAPY | Facility: CLINIC | Age: 70
End: 2025-03-17
Payer: MEDICARE

## 2025-03-17 DIAGNOSIS — M19.012 PRIMARY OSTEOARTHRITIS OF LEFT SHOULDER: Primary | ICD-10-CM

## 2025-03-17 PROCEDURE — 97110 THERAPEUTIC EXERCISES: CPT | Performed by: PHYSICAL THERAPIST

## 2025-03-17 PROCEDURE — 97140 MANUAL THERAPY 1/> REGIONS: CPT | Performed by: PHYSICAL THERAPIST

## 2025-03-17 NOTE — PROGRESS NOTES
"Daily Note     Today's date: 3/17/2025  Patient name: Bianka Boyle  : 1955  MRN: [id number]  Referring provider: Luis Alfredo Fiore*  Dx:   Encounter Diagnosis     ICD-10-CM    1. Primary osteoarthritis of left shoulder  M19.012                        Subjective: Pt reports she feels she is steadily improving. Reports compliance with her current hep.       Objective: See treatment diary below.      Assessment: added therex and increased reps and resistance as listed, pt reports fatigue but sig pain. Required Vcs initially to avoid shoulder shrug during active scaption but this improves with reps. Monitor response and progress as freedom NV.     Plan: Continue treatment as per PT plan of care.  RA 3/27       Precautions: L TSA with biceps tenodesis 25, protocol on chart      Manuals 2/27 3/3 3/6 3/10 3/13 3/17 2/13 2/17 2/20 2/24   prom JLW JLW JLW JLW JLW kl kl JLW kl kl                                          Neuro Re-Ed                                                                                                        Ther Ex             pendulums       x20 20 x20 x20                pulleys   5\"x15 5\"x20 5\"x20 5\"x20       table slides flex, scap  5\"x30 ea flex, scap  5\"x20 ea      flex  5\"x15 5\"x20 5\"x20   Scap retraction       5\"x20 5\"x20 5'x20    isometrics flex/ext/abd/ER 5\"x10ea flex/ext/abd/ER 5\"x10ea      flex, ext  5\"x10 Flex/ext/ER/abd 5\"x10ea Flex/ext/abd/ER 5\"x10ea   AAROM cane scaption with eccentric lowering 20 20 20 20 20 x20    x20   rows  red  20 red  20 green  20 green  30 Green x30       low rows   red  20 red  20 red  20 Green x30       tricep ext  blue  20 blue  20 blue  20 blue  20 Black x30       bicep curl  2#  20 2#  20 4#  20 4#  30 4#x30       sidelying er   20 1#  20 2#  20 4#x20       squigz     10        Chest press      Grey bar x20       Prone t      x20       Prone rows      4# x20       Active scaption      2# 2x10       Ther Activity                                "        Gait Training                                       Modalities             Cp-prn

## 2025-03-19 ENCOUNTER — APPOINTMENT (OUTPATIENT)
Dept: PHYSICAL THERAPY | Facility: CLINIC | Age: 70
End: 2025-03-19
Payer: MEDICARE

## 2025-03-20 ENCOUNTER — OFFICE VISIT (OUTPATIENT)
Dept: PHYSICAL THERAPY | Facility: CLINIC | Age: 70
End: 2025-03-20
Payer: MEDICARE

## 2025-03-20 DIAGNOSIS — M19.012 PRIMARY OSTEOARTHRITIS OF LEFT SHOULDER: Primary | ICD-10-CM

## 2025-03-20 PROCEDURE — 97110 THERAPEUTIC EXERCISES: CPT

## 2025-03-20 PROCEDURE — 97140 MANUAL THERAPY 1/> REGIONS: CPT

## 2025-03-20 NOTE — PROGRESS NOTES
"Daily Note     Today's date: 3/20/2025  Patient name: Bianka Boyle  : 1955  MRN: [id number]  Referring provider: Luis Alfredo Fiore*  Dx:   Encounter Diagnosis     ICD-10-CM    1. Primary osteoarthritis of left shoulder  M19.012           Start Time: 846  Stop Time: 927  Total time in clinic (min): 41 minutes      Subjective: Patient reports her left shoulder is feeling good.  At home, patient reports she is using the bands once a day and trying to stretch more often.      Objective: See treatment diary below.      Assessment: TE program is tolerated well.  Left shoulder ROM and strength improving.      Plan: Continue treatment as per PT plan of care.       Precautions: L TSA with biceps tenodesis 25, protocol on chart      Manuals 2/27 3/3 3/6 3/10 3/13 3/17 3/20  2/20 2/24   prom JLW JLW JLW JLW JLW kl JLW  kl kl                                          Neuro Re-Ed                                                                                                        Ther Ex             pendulums         x20 x20                pulleys   5\"x15 5\"x20 5\"x20 5\"x20 5\"x20      table slides flex, scap  5\"x30 ea flex, scap  5\"x20 ea       5\"x20 5\"x20   Scap retraction         5'x20    isometrics flex/ext/abd/ER 5\"x10ea flex/ext/abd/ER 5\"x10ea       Flex/ext/ER/abd 5\"x10ea Flex/ext/abd/ER 5\"x10ea   AAROM cane scaption with eccentric lowering 20 20 20 20 20 x20    x20   rows  red  20 red  20 green  20 green  30 Green x30 green  30      low rows   red  20 red  20 red  20 Green x30 green  30      tricep ext  blue  20 blue  20 blue  20 blue  20 Black x30 black  30      bicep curl  2#  20 2#  20 4#  20 4#  30 4#x30 4#  30      sidelying er   20 1#  20 2#  20 4#x20 4#  20      squigz     10  10      Chest press      Grey bar x20 grey bar 30 .     Prone t      x20  20      Prone rows      4# x20  4#   20      active scaption      2# 2x10  2#   2x10                                Ther Activity                    "                    Gait Training                                       Modalities             CP prn

## 2025-03-24 ENCOUNTER — OFFICE VISIT (OUTPATIENT)
Dept: PHYSICAL THERAPY | Facility: CLINIC | Age: 70
End: 2025-03-24
Payer: MEDICARE

## 2025-03-24 DIAGNOSIS — M19.012 PRIMARY OSTEOARTHRITIS OF LEFT SHOULDER: Primary | ICD-10-CM

## 2025-03-24 PROCEDURE — 97110 THERAPEUTIC EXERCISES: CPT

## 2025-03-24 PROCEDURE — 97140 MANUAL THERAPY 1/> REGIONS: CPT

## 2025-03-24 NOTE — PROGRESS NOTES
"Daily Note     Today's date: 3/24/2025  Patient name: Bianka Boyle  : 1955  MRN: [id number]  Referring provider: Luis Alfredo Fiore*  Dx:   Encounter Diagnosis     ICD-10-CM    1. Primary osteoarthritis of left shoulder  M19.012           Start Time: 928  Stop Time: 1015  Total time in clinic (min): 47 minutes      Subjective: Patient reports her left shoulder is feeling good.      Objective: See treatment diary below.      Assessment: Progression of TE program is tolerated well.  Left shoulder ROM and strength improving.      Plan: Continue treatment as per PT plan of care.       Precautions: L TSA with biceps tenodesis 25, protocol on chart      Manuals 2/27 3/3 3/6 3/10 3/13 3/17 3/20 3/24  2/24   prom JLW JLW JLW JLW JLW kl JLW JLW  kl                                          Neuro Re-Ed                                                                                                        Ther Ex             pendulums          x20                pulleys   5\"x15 5\"x20 5\"x20 5\"x20 5\"x20 5\"x20     table slides flex, scap  5\"x30 ea flex, scap  5\"x20 ea        5\"x20   Scap retraction             isometrics flex/ext/abd/ER 5\"x10ea flex/ext/abd/ER 5\"x10ea        Flex/ext/abd/ER 5\"x10ea   AAROM cane scaption with eccentric lowering 20 20 20 20 20 x20  30  x20   rows  red  20 red  20 green  20 green  30 Green x30 green  30 Blue  30     low rows   red  20 red  20 red  20 Green x30 green  30 Blue  30     tricep ext  blue  20 blue  20 blue  20 blue  20 Black x30 black  30 Black  30     bicep curl  2#  20 2#  20 4#  20 4#  30 4#x30 4#  30 6#  30     sidelying er   20 1#  20 2#  20 4#x20 4#  20 4#  2x10     squigz     10  10 10     Chest press      Grey bar x20 grey bar 30 grey bar 30     Prone t      x20  20  30     Prone rows      4# x20  4#   20  7#   20     active scaption      2# 2x10  2#   2x10  2#   2x10                               Ther Activity                                       Gait Training   "                                     Modalities             CP prn

## 2025-03-27 ENCOUNTER — EVALUATION (OUTPATIENT)
Dept: PHYSICAL THERAPY | Facility: CLINIC | Age: 70
End: 2025-03-27
Payer: MEDICARE

## 2025-03-27 DIAGNOSIS — M19.012 PRIMARY OSTEOARTHRITIS OF LEFT SHOULDER: Primary | ICD-10-CM

## 2025-03-27 PROCEDURE — 97140 MANUAL THERAPY 1/> REGIONS: CPT

## 2025-03-27 PROCEDURE — 97110 THERAPEUTIC EXERCISES: CPT

## 2025-03-27 NOTE — PROGRESS NOTES
PT Re-Evaluation     Today's date: 3/27/2025  Patient name: Bianka Boyle  : 1955  MRN: [id number]  Referring provider: Luis Alfredo Fiore*  Dx:   Encounter Diagnosis     ICD-10-CM    1. Primary osteoarthritis of left shoulder  M19.012           Start Time: 845  Stop Time: 930  Total time in clinic (min): 45 minutes    Assessment    Assessment details: Pt is being treated with outpatient PT. She has  made slow gains in rom, strength, pain reduction and functional mobility but continues to have deficits. She will benefit from continued skilled PT to address these deficits. Thank you for this referral.              Goals  Short Term Goals:   Independent performance of initial hep-met  Decrease pain 2 points on VAS-met      Long Term Goals:  Performance of IADL's is returned to max level of function-ongoing  Performance in recreational activities is improved to max level of function-not met  Able to reach overhead with min pain-not met    Plan    Planned therapy interventions: manual therapy, massage, strengthening, stretching, therapeutic activities, therapeutic exercise, therapeutic training, home exercise program and IADL retraining    Frequency: 2x week  Duration in weeks: 6        Subjective Evaluation    History of Present Illness  Mechanism of injury:  She underwent TSA  with biceps tenodesis on 25 .  Reports that she is progressing well. She has min pain at this time but she continues to have difficulty reaching overhead secondary to limited rom.  Reports that she is compliant with her hep.    Patient Goals  Patient goals for therapy: decreased pain, increased motion, increased strength, independence with ADLs/IADLs and return to sport/leisure activities    Pain  At best pain ratin  At worst pain ratin          Objective    L shoulder    AROM:    Flexion:95   Abduction:not test  Shoulder shrug noted with active elevation       PROM:   Flexion: 130   Abduction:120   ER:  45   IR: to  body    Strength:    Flexion 3/5   Abduction:  3/5    ER:    3/5   IR:     no tested    Stiffness reported with end range motion in all planes    Precautions: TSA  with biceps tenodesis 1/21/25, protocol on chart

## 2025-03-27 NOTE — PROGRESS NOTES
"Daily Note     Today's date: 3/27/2025  Patient name: Bianka Boyle  : 1955  MRN: [id number]  Referring provider: Luis Alfredo Fiore*  Dx:   Encounter Diagnosis     ICD-10-CM    1. Primary osteoarthritis of left shoulder  M19.012           Start Time: 845  Stop Time: 927   Total time in clinic (min): 42 minutes      Subjective: Patient rates left shoulder pain 1-2/10 at most.      Objective: See treatment diary below.      Assessment: Patient is doing well with TE program.  Left shoulder ROM and strength improving.      Plan: Continue treatment as per PT plan of care.       Precautions: L TSA with biceps tenodesis 25, protocol on chart      Manuals 2/27 3/3 3/6 3/10 3/13 3/17 3/20 3/24 3/27    prom JLW JLW JLW JLW JLW kl JLW JLW JLW                                           Neuro Re-Ed                                                                                                        Ther Ex             pendulums                          pulleys   5\"x15 5\"x20 5\"x20 5\"x20 5\"x20 5\"x20 5\"x20    table slides flex, scap  5\"x30 ea flex, scap  5\"x20 ea           Scap retraction             isometrics flex/ext/abd/ER 5\"x10ea flex/ext/abd/ER 5\"x10ea           AAROM cane scaption with eccentric lowering 20 20 20 20 20 x20  30     rows  red  20 red  20 green  20 green  30 Green x30 green  30 Blue  30 black  30    low rows   red  20 red  20 red  20 Green x30 green  30 Blue  30 blue  30    tricep ext  blue  20 blue  20 blue  20 blue  20 Black x30 black  30 Black  30 black  30    bicep curl  2#  20 2#  20 4#  20 4#  30 4#x30 4#  30 6#  30 6#  30    sidelying er   20 1#  20 2#  20 4#x20 4#  20 4#  2x10 4#  2x10    squigz     10  10 10 10    Chest press      Grey bar x20 grey bar 30 grey bar 30 grey bar 30    Prone t      x20  20  30  30    Prone rows      4# x20  4#   20  7#   20  8#   30    active scaption      2# 2x10  2#   2x10  2#   2x10  2#   2x10                              Ther Activity                "                        Gait Training                                       Modalities             CP prn

## 2025-03-27 NOTE — PROGRESS NOTES
"Orthopaedic Surgery - Office Note  Bianka Boyle (69 y.o. female)   : 1955   MRN: [id number]  Encounter Date: 3/31/2025    Chief Complaint   Patient presents with    Left Shoulder - Post-op         Assessment & Plan  Status post total shoulder arthroplasty, left-2025  Postoperatively for recovery and protocol were reviewed:  hase 2 (Weeks 6-12)   Protection and Active Motion   Goals of Phase:    Allow Healing of Soft Tissues    Decrease Pain and Inflammation  Increase Strength of Scapular Stabilizers   Week 6-12    Continue Above    Add Progressive Antoinette-scapular Resistive Exercises     Shoulder Shrugs, Retraction/Protraction, Biceps, Triceps    Allow ER Past the Above Limit    Add Phase 2 Strengthening for Rotator Cuff to Tolerance     Add IR Only if Pain Free    Add PNFs at Week 8        Phase 3 (Weeks 12-16)   Goal of Phase:    Gradual Return of Shoulder Strength, Power and Endurance    Gradual Return to Functional Activities  Treatments  Add Work Specific Training  Suggest Modifications to Work Activities if Needed     Patient was encouraged to continue physical therapy as well as a home exercise program.  She was encouraged to do low weight high repetition as opposed to heavier weights.  All question and concerns were answered in the office today     Return for Recheck in 8 weeks.        History of Present Illness  Patient is here for recheck after left total shoulder arthroplasty on 2025.  She is attending physical therapy and doing the home exercise program.  Patient reports she is doing well with very minimal pain.  She denies any setbacks.  No paresthesias are reported    Review of Systems  Pertinent items are noted in HPI.  All other systems were reviewed and are negative.    Physical Exam  Ht 5' 6\" (1.676 m)   Wt 59.4 kg (131 lb)   BMI 21.14 kg/m²   Cons: Appears well.  No apparent distress.  Psych: Alert. Oriented x3.  Mood and affect normal.  Left shoulder has active forward flexion to " 115 degrees in the office today.  Internal rotation is to the buttocks.  External rotation is to 50 degrees.  Empty can testing is negative for weakness but does fatigue quickly.  She is neurovascularly intact in the left upper extremity.            Studies Reviewed  Operative report, protocol, and therapy notes were reviewed for today's visit  XR shoulder 2+ vw left  Order date: 2/3/2025  Authorizing: Luis Alfredo Cristina PA-C  Ordered by Luis Alfredo Cristina PA-C on 2/3/2025.  Narrative & Impression        LEFT SHOULDER     INDICATION:   Presence of left artificial shoulder joint.      COMPARISON:  None.     VIEWS:  XR SHOULDER 2+ VW LEFT      FINDINGS:     There is no acute fracture or dislocation.     Status post left shoulder arthroplasty. Compared to 1/21/2025, there is anatomic alignment. The hardware is intact. No loosening or heterotopic bone formation. Skin clips removed. Subcutaneous emphysema resolved.     No lytic or blastic osseous lesion.     Soft tissues are unremarkable.     IMPRESSION:        Satisfactory left shoulder arthroplasty as above, unchanged hardware from prior.  Anatomic alignment without loosening.  Skin clips removed. Subcutaneous emphysema resorbed postoperatively     Procedures  No procedures today.    Medical, Surgical, Family, and Social History  The patient's medical history, family history, and social history, were reviewed and updated as appropriate.    Past Medical History:   Diagnosis Date    Abnormal thyroid function test     R....5/8/17     Arthritis     Bilateral hearing loss, unspecified hearing loss type     Colon polyps     Disease of thyroid gland     Dislocated shoulder     Right / LA...1/30/13     Effusion of knee     LA...5/21/14   R...5/8/17     HL (hearing loss)     Hypothyroidism     LA...2/6/13   R....3/31/15     Primary osteoarthritis of right knee     LA...4/9/14   R...5/21/14     Prolapsing mitral leaflet syndrome     LA...1/28/13   AR...3/31/15     Psoriasis      Psoriatic arthritis (HCC)     Tinnitus     ringing in both ears    Vaginal Pap smear, abnormal     Vitamin D deficiency     LA...17  R...3/31/15        Past Surgical History:   Procedure Laterality Date    CERVICAL BIOPSY  W/ LOOP ELECTRODE EXCISION      COLONOSCOPY      COLONOSCOPY W/ POLYPECTOMY      JOINT REPLACEMENT  2017    R shoulder    KNEE ARTHROSCOPY Right     right knee, right shoulder , open right shoulder     CA ARTHROPLASTY GLENOHUMERAL JOINT TOTAL SHOULDER Right 2017    Procedure: TOTAL SHOULDER ARTHROPLASTY ;  Surgeon: Luis Alfredo Fiore MD;  Location: [facility];  Service: Orthopedics    CA ARTHROPLASTY GLENOHUMERAL JOINT TOTAL SHOULDER Left 2025    Procedure: TOTAL SHOULDER ARTHROPLASTY;  Surgeon: Luis Alfredo Fiore MD;  Location: [facility];  Service: Orthopedics    CA COLONOSCOPY FLX DX W/COLLJ SPEC WHEN PFRMD N/A 10/24/2017    Procedure: COLONOSCOPY;  Surgeon: Nicho Churchill MD;  Location: [facility];  Service: Gastroenterology    REPLACEMENT TOTAL KNEE Right 2024    [facility]    SHOULDER SURGERY Right     total shoulder replacement     SKIN BIOPSY         Family History   Problem Relation Age of Onset    Arthritis Mother          - brain lesion     Diabetes type II Mother     Diabetes Mother     Arthritis Father             Throat cancer Father     No Known Problems Sister     No Known Problems Daughter     Diabetes type II Maternal Grandmother     Prostate cancer Maternal Grandfather     Colon cancer Maternal Grandfather     Breast cancer Paternal Grandmother 55    No Known Problems Paternal Grandfather     Colon cancer Maternal Uncle     Breast cancer Paternal Aunt 48    Diabetes Family     Osteoarthritis Family        Social History     Occupational History    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      Comment: working full time   Tobacco Use    Smoking status: Never     Passive exposure: Past    Smokeless tobacco:  Never   Vaping Use    Vaping status: Never Used   Substance and Sexual Activity    Alcohol use: Yes     Alcohol/week: 3.0 - 4.0 standard drinks of alcohol     Types: 3 - 4 Glasses of wine per week     Comment: socially    Drug use: No    Sexual activity: Yes     Partners: Male     Birth control/protection: Post-menopausal       No Known Allergies      Current Outpatient Medications:     amoxicillin (AMOXIL) 500 mg capsule, TAKE 4 CAPSULES BY MOUTH 1 HOUR BEFORE DENTIST, Disp: , Rfl:     Ascorbic Acid (VITAMIN C PO), Vitamin C, Disp: , Rfl:     calcipotriene (DOVONOX) 0.005 % ointment, Apply twice a day for effected areas of psoriasis Monday through Friday., Disp: 120 g, Rfl: 1    Calcium Carb-Cholecalciferol (CALCIUM + D3 PO), Take by mouth daily, Disp: , Rfl:     celecoxib (CeleBREX) 200 mg capsule, Take 200 mg by mouth 2 (two) times a day, Disp: , Rfl:     Cholecalciferol (VITAMIN D3 PO), Vitamin D3, Disp: , Rfl:     ciclopirox (LOPROX) 0.77 % SUSP, Apply twice daily to affected toenails, Disp: 60 mL, Rfl: 3    clobetasol (TEMOVATE) 0.05 % cream, Apply twice a day as needed on weekends only., Disp: 60 g, Rfl: 0    clobetasol (TEMOVATE) 0.05 % external solution, Apply daily to the scalp when psoriasis is flaring only., Disp: 50 mL, Rfl: 1    Cyanocobalamin (B-12) 1000 MCG TABS, , Disp: , Rfl:     glucosamine-chondroitin 500-400 MG tablet, Take 1 tablet by mouth 3 (three) times a day, Disp: , Rfl:     levothyroxine 50 mcg tablet, TAKE 1 TABLET BY MOUTH EVERY DAY, Disp: 90 tablet, Rfl: 1    Omega-3 Fatty Acids (FISH OIL PO), Fish Oil, Disp: , Rfl:     oxyCODONE (Roxicodone) 5 immediate release tablet, Take 1 tablet (5 mg total) by mouth every 4 (four) hours as needed for moderate pain for up to 15 doses Max Daily Amount: 30 mg, Disp: 15 tablet, Rfl: 0    rosuvastatin (CRESTOR) 5 mg tablet, Take 1 tablet (5 mg total) by mouth daily, Disp: 90 tablet, Rfl: 1    sulfaSALAzine (AZULFIDINE-EN) 500 mg EC tablet, TAKE 1  TABLET BY MOUTH TWICE A DAY, Disp: 180 tablet, Rfl: 1      Luis Alfredo Cristina PA-C

## 2025-03-27 NOTE — PATIENT INSTRUCTIONS
Total Shoulder Arthroplasty Post-Operative Rehabilitation Protocol  (adapted from Charles BG and Jocelyn BENEDICT. “Disease Specific Methods of Rehab” in Disorders of the Shoulder. Festus and Waldo ed. )    Luis Alfredo Fiore MD  [facility]  [address]  [place] [address]  [phone]  Phase 1 (Weeks 0-6)   Immediate Postoperative Period   Goals:    Diminish Pain and Inflammation  Protect Subscapularis Repair   No ER Past 30 for 6 Weeks    Gradually Increase Passive ROM     Become Independent with Modified ADLs   Day 0-6    Elbow, Wrist, Hand AROM Exercises     Start Cervical AROM and Scapula Isometrics  May Start Pendulum Exercises    May Start Supine, Pain Free, PROM (PT Assisted or Patient Assisted)  Forward Flexion, External Rotation to Above Limit (Elbow at side), Internal Rotation, Cross Body Adduction    Cryotherapy/Ice for Pain and Inflammation    Instruct in Hygiene, Posture, and Positioning    Day 7-21    Continue Above    Can Introduce Light Cardio (Walking, Stationary Bike)    [facility] may begin at week 3 (day 21) as long as no wound problems   Day 22-42    Continue Above    May D/C Sling if Patient Comfortable  Begin Gentle Mobilizations (GH and Scapulothoracic) to Regain Full PROM if Needed.  May add Heat prior to PROM Exercises, Ice after Exercises  May Begin Light Rotator Cuff Isometrics (except IR due to Subscap Healing)     Phase 2 (Weeks 6-12)   Protection and Active Motion   Goals of Phase:    Allow Healing of Soft Tissues    Decrease Pain and Inflammation  Increase Strength of Scapular Stabilizers   Week 6-12    Continue Above    Add Progressive Antoinette-scapular Resistive Exercises     Shoulder Shrugs, Retraction/Protraction, Biceps, Triceps    Allow ER Past the Above Limit    Add Phase 2 Strengthening for Rotator Cuff to Tolerance     Add IR Only if Pain Free    Add PNFs at Week 8        Phase 3 (Weeks 12-16)   Goal of Phase:    Gradual Return of Shoulder  Strength, Power and Endurance    Gradual Return to Functional Activities  Treatments  Add Work Specific Training  Suggest Modifications to Work Activities if Needed  Note:   This is a general program which may be modified based on intra-operative findings, additional procedures preformed, repair stability, and patient biological factors.  If in doubt, please check with my office for individual patient specifics.  The early precautions are all about protecting the Subscapularis as it heals.  The amount of ER allowed in the first 6 weeks is determined intra-operatively and as such it is different for every patient.  Please contact my office if this motion limit is unclear.

## 2025-03-31 ENCOUNTER — OFFICE VISIT (OUTPATIENT)
Dept: OBGYN CLINIC | Facility: OTHER | Age: 70
End: 2025-03-31

## 2025-03-31 VITALS — HEIGHT: 66 IN | WEIGHT: 131 LBS | BODY MASS INDEX: 21.05 KG/M2

## 2025-03-31 DIAGNOSIS — Z96.612 STATUS POST TOTAL SHOULDER ARTHROPLASTY, LEFT: Primary | ICD-10-CM

## 2025-03-31 PROCEDURE — 99024 POSTOP FOLLOW-UP VISIT: CPT | Performed by: PHYSICIAN ASSISTANT

## 2025-04-01 ENCOUNTER — OFFICE VISIT (OUTPATIENT)
Dept: PHYSICAL THERAPY | Facility: CLINIC | Age: 70
End: 2025-04-01
Payer: MEDICARE

## 2025-04-01 DIAGNOSIS — M19.012 PRIMARY OSTEOARTHRITIS OF LEFT SHOULDER: Primary | ICD-10-CM

## 2025-04-01 PROCEDURE — 97140 MANUAL THERAPY 1/> REGIONS: CPT

## 2025-04-01 PROCEDURE — 97110 THERAPEUTIC EXERCISES: CPT

## 2025-04-01 NOTE — PROGRESS NOTES
"Daily Note     Today's date: 2025  Patient name: Bianka Boyle  : 1955  MRN: [id number]  Referring provider: Luis Alfredo Fiore*  Dx:   Encounter Diagnosis     ICD-10-CM    1. Primary osteoarthritis of left shoulder  M19.012           Start Time: 0845  Stop Time: 0930  Total time in clinic (min): 45 minutes      Subjective: Patient states, \"It's getting there.\"      Objective: See treatment diary below.      Assessment: Progression of TE program is tolerated well.  Left shoulder ROM and strength improving.      Plan: Continue treatment as per PT plan of care.       Precautions: L TSA with biceps tenodesis 25, protocol on chart      Manuals 2/27 3/3 3/6 3/10 3/13 3/17 3/20 3/24 3/27 4/1   prom JLW JLW JLW JLW JLW kl JLW JLW JLW JLW                                          Neuro Re-Ed                                                                                                        Ther Ex             pendulums                          pulleys   5\"x15 5\"x20 5\"x20 5\"x20 5\"x20 5\"x20 5\"x20 5\"x20   table slides flex, scap  5\"x30 ea flex, scap  5\"x20 ea           Scap retraction             isometrics flex/ext/abd/ER 5\"x10ea flex/ext/abd/ER 5\"x10ea           AAROM cane scaption with eccentric lowering 20 20 20 20 20 x20  30     rows  red  20 red  20 green  20 green  30 Green x30 green  30 Blue  30 black  30 black  30   low rows   red  20 red  20 red  20 Green x30 green  30 Blue  30 blue  30 black  30   tricep ext  blue  20 blue  20 blue  20 blue  20 Black x30 black  30 Black  30 black  30 black  30   bicep curl  2#  20 2#  20 4#  20 4#  30 4#x30 4#  30 6#  30 6#  30 6#  30   sidelying er   20 1#  20 2#  20 4#x20 4#  20 4#  2x10 4#  2x10 4#  3x10   squigz     10  10 10 10 10   Chest press      Grey bar x20 grey bar 30 grey bar 30 grey bar 30 grey bar 40   Prone t      x20  20  30  30  30   Prone rows      4# x20  4#   20  7#   20  8#   30  8#   30   active scaption      2# 2x10  2#   2x10  2#   2x10 "  2#   2x10  2#   2x10   tband diagonal           red   15                Ther Activity                                       Gait Training                                       Modalities             CP prn

## 2025-04-02 ENCOUNTER — OFFICE VISIT (OUTPATIENT)
Dept: AUDIOLOGY | Age: 70
End: 2025-04-02
Payer: MEDICARE

## 2025-04-02 DIAGNOSIS — H90.3 SENSORY HEARING LOSS, BILATERAL: Primary | ICD-10-CM

## 2025-04-02 PROCEDURE — 92552 PURE TONE AUDIOMETRY AIR: CPT | Performed by: AUDIOLOGIST

## 2025-04-02 PROCEDURE — 92567 TYMPANOMETRY: CPT | Performed by: AUDIOLOGIST

## 2025-04-02 PROCEDURE — 92556 SPEECH AUDIOMETRY COMPLETE: CPT | Performed by: AUDIOLOGIST

## 2025-04-02 NOTE — PROGRESS NOTES
Progress Note    Name:  Bianka Boyle  :  1955  Age:  69 y.o.  MRN:  [id number]  Date of Evaluation: 25     HISTORY:    The patient was seen to discuss her current hearing aids. She was informed that she needs earmolds and a power speaker to aid her shift in hearing. Patient would like to pursue new hearing aids with earmolds, however, earmold impressions could not be completed today due to wax build up. Patient will schedule ear cleaning appointment and then return to this office to have earmold impressions completed.       Lindsay Diehl, CCC-A  Clinical Audiologist

## 2025-04-02 NOTE — PROGRESS NOTES
Diagnostic Hearing Evaluation    Name:  Bianka Boyle  :  1955  Age:  69 y.o.   MRN:  [id number]  Date of Evaluation: 25     HISTORY:     Reason for visit: Annual Hearing Test    Bianka Boyle is being seen today at the request of Dr. Morales for an annual evaluation of hearing. The patient reports no issues or concerns for change to hearing status. The patient  denies otalgia, otorrhea, dizziness, fullness, noise exposure, family history of hearing loss, and notes a positive history of tinnitus.     EVALUATION:    Otoscopic Evaluation:   Right Ear: Non-occluding cerumen, TM view obscured    Left Ear: Unremarkable, canal clear    Tympanometry:   Right Ear: Type A; normal middle ear pressure and static compliance    Left Ear: Type A; normal middle ear pressure and static compliance     Speech Audiometry:  Speech Reception (SRT)    Right Ear: 20 dB HL    Left Ear: 20 dB HL    Word Recognition Scores (WRS):  Right Ear: fair (64 % correct)     Left Ear: fair (68 % correct)    Stimuli: [id number]    Pure Tone Audiometry:  Conventional pure tone audiometry from 250 - 8000 Hz  was obtained with good reliability and revealed the following:     Right Ear: Normal sloping to severe sensorineural hearing loss (SNHL)    Left Ear: Normal sloping to severe sensorineural hearing loss (SNHL)     *see attached audiogram    IMPRESSIONS:   Normal to severe hearing loss bilaterally. Drop form 4kHz+    RECOMMENDATIONS:  Annual hearing eval, Return to [facility]. for F/U, and Copy to Patient/Caregiver    PATIENT EDUCATION:   The results of today's results and recommendations were reviewed with the patient and her hearing thresholds were explained at length. Treatment options, including amplification and communication strategies, were discussed as appropriate. The patient voiced understanding of her test results. Questions were addressed and the patient was encouraged to contact our department should concerns arise.      Andressa  Lindsay Dejesus, CCC-A  Clinical Audiologist  [facility]  [address] [address]

## 2025-04-03 ENCOUNTER — OFFICE VISIT (OUTPATIENT)
Dept: PHYSICAL THERAPY | Facility: CLINIC | Age: 70
End: 2025-04-03
Payer: MEDICARE

## 2025-04-03 DIAGNOSIS — M19.012 PRIMARY OSTEOARTHRITIS OF LEFT SHOULDER: Primary | ICD-10-CM

## 2025-04-03 PROCEDURE — 97140 MANUAL THERAPY 1/> REGIONS: CPT

## 2025-04-03 PROCEDURE — 97110 THERAPEUTIC EXERCISES: CPT

## 2025-04-03 NOTE — PROGRESS NOTES
"Daily Note     Today's date: 4/3/2025  Patient name: Bianka Boyle  : 1955  MRN: [id number]  Referring provider: Luis Alfredo Fiore*  Dx:   Encounter Diagnosis     ICD-10-CM    1. Primary osteoarthritis of left shoulder  M19.012           Start Time: 0848  Stop Time: 932  Total time in clinic (min): 44 minutes      Subjective: Patient reports her left shoulder is feeling better everyday.      Objective: See treatment diary below.      Assessment: Patient is doing well with TE program.  Left shoulder ROM and strength improving.      Plan: Continue treatment as per PT plan of care.       Precautions: L TSA with biceps tenodesis 25, protocol on chart      Manuals 4/3  3/6 3/10 3/13 3/17 3/20 3/24 3/27 4/1   prom JLW  JLW JLW JLW kl JLW JLW JLW JLW                                          Neuro Re-Ed                                                                                                        Ther Ex             pendulums                          pulleys 5\"x20  5\"x15 5\"x20 5\"x20 5\"x20 5\"x20 5\"x20 5\"x20 5\"x20   table slides             Scap retraction             isometrics             AAROM cane scaption with eccentric lowering   20 20 20 x20  30     rows black  30  red  20 green  20 green  30 Green x30 green  30 Blue  30 black  30 black  30   low rows black  30  red  20 red  20 red  20 Green x30 green  30 Blue  30 blue  30 black  30   tricep ext black  30  blue  20 blue  20 blue  20 Black x30 black  30 Black  30 black  30 black  30   bicep curl 6#  30  2#  20 4#  20 4#  30 4#x30 4#  30 6#  30 6#  30 6#  30   sidelying er 4#  3x10  20 1#  20 2#  20 4#x20 4#  20 4#  2x10 4#  2x10 4#  3x10   squigz 10    10  10 10 10 10   Chest press grey bar 30     Grey bar x20 grey bar 30 grey bar 30 grey bar 30 grey bar 40   Prone t 30     x20  20  30  30  30   Prone rows 9#  30     4# x20  4#   20  7#   20  8#   30  8#   30   active scaption 2#  2x10     2# 2x10  2#   2x10  2#   2x10  2#   2x10  2#   2x10 "   tband diagonal red  15          red   15                             Ther Activity                                       Gait Training                                       Modalities             CP prn

## 2025-04-07 ENCOUNTER — OFFICE VISIT (OUTPATIENT)
Dept: PHYSICAL THERAPY | Facility: CLINIC | Age: 70
End: 2025-04-07
Payer: MEDICARE

## 2025-04-07 DIAGNOSIS — M19.012 PRIMARY OSTEOARTHRITIS OF LEFT SHOULDER: Primary | ICD-10-CM

## 2025-04-07 PROCEDURE — 97110 THERAPEUTIC EXERCISES: CPT

## 2025-04-07 PROCEDURE — 97140 MANUAL THERAPY 1/> REGIONS: CPT

## 2025-04-07 NOTE — PROGRESS NOTES
"Daily Note     Today's date: 2025  Patient name: Bianka Boyle  : 1955  MRN: [id number]  Referring provider: Luis Alfredo Fiore*  Dx:   Encounter Diagnosis     ICD-10-CM    1. Primary osteoarthritis of left shoulder  M19.012           Start Time: 08  Stop Time: 930  Total time in clinic (min): 47 minutes      Subjective: Patient reports her left shoulder is feeling good.      Objective: See treatment diary below.      Assessment: Progression of TE program is tolerated well.  Left shoulder ROM and strength improving.      Plan: Continue treatment as per PT plan of care.        Precautions: L TSA with biceps tenodesis 25, protocol on chart      Manuals 4/3 4/7  3/10 3/13 3/17 3/20 3/24 3/27 4/1   prom JLW JLW  JLW JLW kl JLW JLW JLW JLW                                          Neuro Re-Ed                                                                                                        Ther Ex             pendulums                          pulleys 5\"x20 5\"x20  5\"x20 5\"x20 5\"x20 5\"x20 5\"x20 5\"x20 5\"x20   table slides             Scap retraction             isometrics             AAROM cane scaption with eccentric lowering    20 20 x20  30     rows black  30 black  30  green  20 green  30 Green x30 green  30 Blue  30 black  30 black  30   low rows black  30 black  30  red  20 red  20 Green x30 green  30 Blue  30 blue  30 black  30   tricep ext black  30 black  30  blue  20 blue  20 Black x30 black  30 Black  30 black  30 black  30   bicep curl 6#  30 7#  3x10  4#  20 4#  30 4#x30 4#  30 6#  30 6#  30 6#  30   sidelying er 4#  3x10 4#  3x10  1#  20 2#  20 4#x20 4#  20 4#  2x10 4#  2x10 4#  3x10   squigz 10 10   10  10 10 10 10   Chest press grey bar 30 grey bar and 5# cuff weight  30    Grey bar x20 grey bar 30 grey bar 30 grey bar 30 grey bar 40   Prone t 30  30    x20  20  30  30  30   Prone rows 9#  30  10#   30    4# x20  4#   20  7#   20  8#   30  8#   30   active scaption 2#  2x10  2#   " 3x10    2# 2x10  2#   2x10  2#   2x10  2#   2x10  2#   2x10   tband diagonal red  15 red   2x10         red   15                                          Ther Activity                                       Gait Training                                       Modalities             CP prn

## 2025-04-10 ENCOUNTER — OFFICE VISIT (OUTPATIENT)
Dept: PHYSICAL THERAPY | Facility: CLINIC | Age: 70
End: 2025-04-10
Payer: MEDICARE

## 2025-04-10 DIAGNOSIS — M19.012 PRIMARY OSTEOARTHRITIS OF LEFT SHOULDER: Primary | ICD-10-CM

## 2025-04-10 PROCEDURE — 97110 THERAPEUTIC EXERCISES: CPT | Performed by: PHYSICAL THERAPIST

## 2025-04-10 PROCEDURE — 97140 MANUAL THERAPY 1/> REGIONS: CPT | Performed by: PHYSICAL THERAPIST

## 2025-04-10 NOTE — PROGRESS NOTES
"Daily Note     Today's date: 4/10/2025  Patient name: Bianka Boyle  : 1955  MRN: [id number]  Referring provider: Luis Alfredo Fiore*  Dx:   Encounter Diagnosis     ICD-10-CM    1. Primary osteoarthritis of left shoulder  M19.012                        Subjective: Patient reports reports min pain but that she continues to feel weak when reaching above her head      Objective: See treatment diary below.      Assessment: added therex and increased weight and listed. Pt reports fatigue but no sig pain with the progresison. Continue to progress as freedom NV.        Plan: Continue treatment as per PT plan of care.        Precautions: L TSA with biceps tenodesis 25, protocol on chart      Manuals 4/3 4/7 4/10 3/10 3/13 3/17 3/20 3/24 3/27 4/1   prom JLW JLW kl JLW JLW kl JLW JLW JLW JLW                                          Neuro Re-Ed                                                                                                        Ther Ex             pendulums                          pulleys 5\"x20 5\"x20 5\"x20 5\"x20 5\"x20 5\"x20 5\"x20 5\"x20 5\"x20 5\"x20   table slides             Scap retraction             isometrics             AAROM cane scaption with eccentric lowering    20 20 x20  30     rows black  30 black  30 [place] to fatigue start at 21.5 green  20 green  30 Green x30 green  30 Blue  30 black  30 black  30   low rows black  30 black  30 [place]  to fatigue start at 16 red  20 red  20 Green x30 green  30 Blue  30 blue  30 black  30   tricep ext black  30 black  30 [place] 16.0 x30 blue  20 blue  20 Black x30 black  30 Black  30 black  30 black  30   bicep curl 6#  30 7#  3x10 7#x30 4#  20 4#  30 4#x30 4#  30 6#  30 6#  30 6#  30   sidelying er 4#  3x10 4#  3x10 4#3x10 1#  20 2#  20 4#x20 4#  20 4#  2x10 4#  2x10 4#  3x10   squigz 10 10   10  10 10 10 10   Chest press grey bar 30 grey bar and 5# cuff weight  30 8# cuff weight 2x20   Grey bar x20 grey bar 30 grey bar 30 grey bar 30 grey bar 40 "   Prone t 30  30    x20  20  30  30  30   Prone rows 9#  30  10#   30 10#x30   4# x20  4#   20  7#   20  8#   30  8#   30   active scaption 2#  2x10  2#   3x10    2# 2x10  2#   2x10  2#   2x10  2#   2x10  2#   2x10   tband diagonal red  15 red   2x10 [place] d1   .9 3x10 flexion  D2 ext 4.1 3x10        red   15   Wall ball rolling   Green ed ball cw/ccw 20x3ea                                    Ther Activity                                       Gait Training                                       Modalities             CP prn

## 2025-04-14 ENCOUNTER — OFFICE VISIT (OUTPATIENT)
Dept: PHYSICAL THERAPY | Facility: CLINIC | Age: 70
End: 2025-04-14
Attending: ORTHOPAEDIC SURGERY
Payer: MEDICARE

## 2025-04-14 DIAGNOSIS — M19.012 PRIMARY OSTEOARTHRITIS OF LEFT SHOULDER: Primary | ICD-10-CM

## 2025-04-14 PROCEDURE — 97140 MANUAL THERAPY 1/> REGIONS: CPT

## 2025-04-14 PROCEDURE — 97110 THERAPEUTIC EXERCISES: CPT

## 2025-04-14 NOTE — PROGRESS NOTES
"Daily Note     Today's date: 2025  Patient name: Bianka Boyle  : 1955  MRN: [id number]  Referring provider: Luis Alfredo Fiore*  Dx:   Encounter Diagnosis     ICD-10-CM    1. Primary osteoarthritis of left shoulder  M19.012           Start Time: 1402  Stop Time: 1446  Total time in clinic (min): 44 minutes      Subjective: Although patient reports she is right handed, she reports she played Qello on Saturday and felt good.      Objective: See treatment diary below.      Assessment: Treatment is tolerated well.  Left shoulder ROM and strength improving.      Plan: Continue treatment as per PT plan of care.       Precautions: L TSA with biceps tenodesis 25, protocol on chart      Manuals 4/3 4/7 4/10 4/14  3/17 3/20 3/24 3/27 4/1   prom JLW JLW kl JLW  kl JLW JLW JLW JLW                                          Neuro Re-Ed                                                                                                        Ther Ex             pendulums                          pulleys 5\"x20 5\"x20 5\"x20 5\"x20  5\"x20 5\"x20 5\"x20 5\"x20 5\"x20   table slides             Scap retraction             isometrics             AAROM cane scaption with eccentric lowering      x20  30     rows black  30 black  30 [place] to fatigue start at 21.5 matheus  21.5  30  Green x30 green  30 Blue  30 black  30 black  30   low rows black  30 black  30 [place]  to fatigue start at 16 black  30  Green x30 green  30 Blue  30 blue  30 black  30   tricep ext black  30 black  30 [place] 16.0 x30 matheus  17.6  30  Black x30 black  30 Black  30 black  30 black  30   bicep curl 6#  30 7#  3x10 7#x30 8#  30  4#x30 4#  30 6#  30 6#  30 6#  30   sidelying er 4#  3x10 4#  3x10 4#3x  10 4#  3x10  4#x20 4#  20 4#  2x10 4#  2x10 4#  3x10   squigz 10 10  2#  10   10 10 10 10   Chest press grey bar 30 grey bar and 5# cuff weight  30 8# cuff weight 2x20   Grey bar x20 grey bar 30 grey bar 30 grey bar 30 grey bar 40   Prone t 30  30   " 30  x20  20  30  30  30   Prone rows 9#  30  10#   30 10#x30  10#   30  4# x20  4#   20  7#   20  8#   30  8#   30   active scaption 2#  2x10  2#   3x10    2# 2x10  2#   2x10  2#   2x10  2#   2x10  2#   2x10   tband diagonal red  15 red   2x10 [place] d1   .9 3x10 flexion  D2 ext 4.1 3x10  D2 flex   green   30   D1 ext   2.7    30       red   15   Wall ball rolling   Green ed ball cw/ccw 20x3ea  red ball   cw/ccw  30 ea                                   Ther Activity                                       Gait Training                                       Modalities             CP prn

## 2025-04-15 ENCOUNTER — OFFICE VISIT (OUTPATIENT)
Dept: AUDIOLOGY | Age: 70
End: 2025-04-15
Payer: COMMERCIAL

## 2025-04-15 DIAGNOSIS — H90.3 SENSORY HEARING LOSS, BILATERAL: Primary | ICD-10-CM

## 2025-04-15 PROCEDURE — V5261 HEARING AID, DIGIT, BIN, BTE: HCPCS | Performed by: AUDIOLOGIST

## 2025-04-15 PROCEDURE — V5160 DISPENSING FEE BINAURAL: HCPCS | Performed by: AUDIOLOGIST

## 2025-04-15 NOTE — PROGRESS NOTES
Progress Note    Name:  Bianka Boyle  :  1955  Age:  69 y.o.  MRN:  [id number]  Date of Evaluation: 04/15/25     HISTORY:    The patient  was seen to have earmolds completed. Earmold impression(s) completed without incident with good parting otoscopy noted.         ACTION/ADJUSTMENTS:    Paid down payment for hearing aids. Hearing aids ordered. [id number].      Lindsay Diehl, CCC-A  Clinical Audiologist

## 2025-04-17 ENCOUNTER — OFFICE VISIT (OUTPATIENT)
Dept: PHYSICAL THERAPY | Facility: CLINIC | Age: 70
End: 2025-04-17
Attending: ORTHOPAEDIC SURGERY
Payer: MEDICARE

## 2025-04-17 DIAGNOSIS — M19.012 PRIMARY OSTEOARTHRITIS OF LEFT SHOULDER: Primary | ICD-10-CM

## 2025-04-17 PROCEDURE — 97140 MANUAL THERAPY 1/> REGIONS: CPT

## 2025-04-17 PROCEDURE — 97110 THERAPEUTIC EXERCISES: CPT

## 2025-04-17 NOTE — PROGRESS NOTES
Progress Note    Name:  Bianka Boyle  :  1955  Age:  69 y.o.  MRN:  [id number]  Date of Evaluation: 25     Hearing aids arrived.  Right: [id number]  Left: BL5TM2  : [id number]  5/15/2028    Waiting on molds    Radames Almonte., CCC-A  Clinical Audiologist

## 2025-04-17 NOTE — PROGRESS NOTES
"Daily Note     Today's date: 2025  Patient name: Bianka Boyle  : 1955  MRN: [id number]  Referring provider: Luis Alfredo Fiore*  Dx:   Encounter Diagnosis     ICD-10-CM    1. Primary osteoarthritis of left shoulder  M19.012                        Subjective: Patient offers no new complaints today.        Objective: See treatment diary below.      Assessment: Treatment is tolerated well.  Appropriate fatigue with exercises without increased pain.  Patient progressing well towards PT goals  Left shoulder ROM and strength improving.      Plan: Continue treatment as per PT plan of care.       Precautions: L TSA with biceps tenodesis 25, protocol on chart      Manuals 4/3 4/7 4/10 4/14 4/17 3/17 3/20 3/24 3/27 4/1   prom JLW JLW kl JLW KSG kl JLW JLW JLW JLW                                          Neuro Re-Ed                                                                                                        Ther Ex             pendulums                          pulleys 5\"x20 5\"x20 5\"x20 5\"x20 5\" x30 5\"x20 5\"x20 5\"x20 5\"x20 5\"x20   table slides             Scap retraction             isometrics             AAROM cane scaption with eccentric lowering      x20  30     rows black  30 black  30 [place] to fatigue start at 21.5 matheus  21.5  30 [place] 21.5 x30 Green x30 green  30 Blue  30 black  30 black  30   low rows black  30 black  30 [place]  to fatigue start at 16 black  30 Black x30 Green x30 green  30 Blue  30 blue  30 black  30   tricep ext black  30 black  30 [place] 16.0 x30 matheus  17.6  30 [place]  17.6  x30 Black x30 black  30 Black  30 black  30 black  30   bicep curl 6#  30 7#  3x10 7#x30 8#  30 8# x30 4#x30 4#  30 6#  30 6#  30 6#  30   sidelying er 4#  3x10 4#  3x10 4#3x  10 4#  3x10 4# 3x10 4#x20 4#  20 4#  2x10 4#  2x10 4#  3x10   squigz 10 10  2#  10 2# x10  10 10 10 10   Chest press grey bar 30 grey bar and 5# cuff weight  30 8# cuff weight 2x20   Grey bar x20 grey bar 30 grey bar " 30 grey bar 30 grey bar 40   Prone t 30  30   30 x30 x20  20  30  30  30   Prone rows 9#  30  10#   30 10#x30  10#   30 10# x30 4# x20  4#   20  7#   20  8#   30  8#   30   active scaption 2#  2x10  2#   3x10    2# 2x10  2#   2x10  2#   2x10  2#   2x10  2#   2x10   tband diagonal red  15 red   2x10 [place] d1   .9 3x10 flexion  D2 ext 4.1 3x10  D2 flex   green   30   D1 ext   2.7    30 D2 flex [id number]  D1 Ext 2.7 x30        red   15   Wall ball rolling   Green ed ball cw/ccw 20x3ea  red ball   cw/ccw  30 ea Red ball cw/ccw x30 ea                                  Ther Activity                                       Gait Training                                       Modalities             CP prn

## 2025-04-21 ENCOUNTER — OFFICE VISIT (OUTPATIENT)
Dept: PHYSICAL THERAPY | Facility: CLINIC | Age: 70
End: 2025-04-21
Attending: ORTHOPAEDIC SURGERY
Payer: MEDICARE

## 2025-04-21 DIAGNOSIS — M19.012 PRIMARY OSTEOARTHRITIS OF LEFT SHOULDER: Primary | ICD-10-CM

## 2025-04-21 PROCEDURE — 97110 THERAPEUTIC EXERCISES: CPT

## 2025-04-21 PROCEDURE — 97140 MANUAL THERAPY 1/> REGIONS: CPT

## 2025-04-21 NOTE — PROGRESS NOTES
"Daily Note     Today's date: 2025  Patient name: Bianka Boyle  : 1955  MRN: [id number]  Referring provider: Luis Alfredo Fiore*  Dx:   Encounter Diagnosis     ICD-10-CM    1. Primary osteoarthritis of left shoulder  M19.012           Start Time: 846  Stop Time: 929  Total time in clinic (min): 43 minutes      Subjective: Patient reports her left shoulder feels fine.  Patient reports she played pickle ball on Friday - reports she hit her right knee twice with her paddle and then tripped and landed on her right foot - reports she felt pain in her right knee afterwards.      Objective: See treatment diary below.      Assessment: Treatment is tolerated well.  Left shoulder ROM and strength improving.      Plan: Continue treatment as per PT plan of care.       Precautions: L TSA with biceps tenodesis 25, protocol on chart      Manuals 4/3 4/7 4/10 4/14 4/17 4/21  3/24 3/27 4/1   prom JLW JLW kl JLW KSG JLW  JLW JLW JLW                                          Neuro Re-Ed                                                                                                        Ther Ex             pendulums                          pulleys 5\"x20 5\"x20 5\"x20 5\"x20 5\" x30 5\"x30  5\"x20 5\"x20 5\"x20   table slides             Scap retraction             isometrics             AAROM cane scaption with eccentric lowering        30     rows black  30 black  30 [place] to fatigue start at 21.5 matheus  21.5  30 [place] 21.5 x30 matheus  22.2  30  Blue  30 black  30 black  30   low rows black  30 black  30 [place]  to fatigue start at 16 black  30 Black x30 matheus  13.7  30  Blue  30 blue  30 black  30   tricep ext black  30 black  30 [place] 16.0 x30 matheus  17.6  30 [place]  17.6  x30 matheus  17.7  30  Black  30 black  30 black  30   bicep curl 6#  30 7#  3x10 7#x30 8#  30 8# x30 8#  30  6#  30 6#  30 6#  30   sidelying er 4#  3x10 4#  3x10 4#3x  10 4#  3x10 4# 3x10 4#  3x10  4#  2x10 4#  2x10 4#  3x10   squigz 10 10 "  2#  10 2# x10 2#  10  10 10 10   Chest press grey bar 30 grey bar and 5# cuff weight  30 8# cuff weight 2x20     grey bar 30 grey bar 30 grey bar 40   Prone t 30  30   30 x30    30  30  30   Prone rows 9#  30  10#   30 10#x30  10#   30 10# x30  10#   30   7#   20  8#   30  8#   30   active scaption 2#  2x10  2#   3x10       2#   2x10  2#   2x10  2#   2x10   tband diagonal red  15 red   2x10 [place] d1   .9 3x10 flexion  D2 ext 4.1 3x10  D2 flex   green   30   D1 ext   2.7    30 D2 flex [id number]  D1 Ext 2.7 x30   D2 flex   green   30   D1 ext   2.7    30     red   15   Wall ball rolling   Green ed ball cw/ccw 20x3ea  red ball   cw/ccw  30 ea Red ball cw/ccw x30 ea red ball   cw/ccw  30 ea                                 Ther Activity                                       Gait Training                                       Modalities             CP prn

## 2025-04-24 ENCOUNTER — OFFICE VISIT (OUTPATIENT)
Dept: PHYSICAL THERAPY | Facility: CLINIC | Age: 70
End: 2025-04-24
Attending: ORTHOPAEDIC SURGERY
Payer: MEDICARE

## 2025-04-24 DIAGNOSIS — M19.012 PRIMARY OSTEOARTHRITIS OF LEFT SHOULDER: Primary | ICD-10-CM

## 2025-04-24 PROCEDURE — 97110 THERAPEUTIC EXERCISES: CPT

## 2025-04-24 PROCEDURE — 97140 MANUAL THERAPY 1/> REGIONS: CPT

## 2025-04-24 NOTE — PROGRESS NOTES
"Daily Note     Today's date: 2025  Patient name: Bianka Boyle  : 1955  MRN: [id number]  Referring provider: Luis Alfredo Fiore*  Dx:   Encounter Diagnosis     ICD-10-CM    1. Primary osteoarthritis of left shoulder  M19.012           Start Time: 08  Stop Time: 932  Total time in clinic (min): 43 minutes      Subjective: Patient reports she helped to clean up a tennis court yesterday and her left arm felt sore and tired afterwards.      Objective: See treatment diary below.      Assessment: TE program is tolerated well.  Left shoulder ROM and strength improving.      Plan: Continue treatment as per PT plan of care.       Precautions: L TSA with biceps tenodesis 25, protocol on chart      Manuals 4/3 4/7 4/10 4/14 4/17 4/21 4/24  3/27 4/1   prom JLW JLW kl JLW KSG JLW JLW  JLW JLW                                          Neuro Re-Ed                                                                                                        Ther Ex             pendulums                          pulleys 5\"x20 5\"x20 5\"x20 5\"x20 5\" x30 5\"x30 5\"x30  5\"x20 5\"x20   table slides             Scap retraction             isometrics             AAROM cane scaption with eccentric lowering             rows black  30 black  30 [place] to fatigue start at 21.5 matheus  21.5  30 [place] 21.5 x30 matheus  22.2  30 [place]  22.7  30  black  30 black  30   low rows black  30 black  30 [place]  to fatigue start at 16 black  30 [place] x30 [place]he[place]  13.7  30 mathe[place]  14.6  30  blue  30 black  30   tricep ext black  30 black  30 [place] 16.0 x30 matheus  17.6  30 [place]  17.6  x30 matheus  17.7  30 matheus  18.2  30  black  30 black  30   bicep curl 6#  30 7#  3x10 7#x30 8#  30 8# x30 8#  30 9#  3x10  6#  30 6#  30   sidelying er 4#  3x10 4#  3x10 4#3x  10 4#  3x10 4# 3x10 4#  3x10 4#  3x10  4#  2x10 4#  3x10   squigz 10 10  2#  10 2# x10 2#  10 2#  10  10 10   Chest press grey bar 30 grey bar and 5# cuff weight  30 8# cuff weight 2x20 "      grey bar 30 grey bar 40   Prone t 30  30   30 x30     30  30   Prone rows 9#  30  10#   30 10#x30  10#   30 10# x30  10#   30  10#   30   8#   30  8#   30   active scaption 2#  2x10  2#   3x10      2#   2x10   2#   2x10  2#   2x10   tband diagonal red  15 red   2x10 [place] d1   .9 3x10 flexion  D2 ext 4.1 3x10  D2 flex   green   30   D1 ext   2.7    30 D2 flex [id number]  D1 Ext 2.7 x30   D2 flex   green   30   D1 ext   2.7    30 D2 flex   green   30   D1 ext   3.7    30    red   15   Wall ball rolling   Green ed ball cw/ccw 20x3ea  red ball   cw/ccw  30 ea Red ball cw/ccw x30 ea red ball   cw/ccw  30 ea red ball   cw/ccw  30 ea                                Ther Activity                                       Gait Training                                       Modalities             CP prn

## 2025-04-28 ENCOUNTER — OFFICE VISIT (OUTPATIENT)
Dept: PHYSICAL THERAPY | Facility: CLINIC | Age: 70
End: 2025-04-28
Payer: MEDICARE

## 2025-04-28 DIAGNOSIS — M19.012 PRIMARY OSTEOARTHRITIS OF LEFT SHOULDER: Primary | ICD-10-CM

## 2025-04-28 PROCEDURE — 97140 MANUAL THERAPY 1/> REGIONS: CPT | Performed by: PHYSICAL THERAPIST

## 2025-04-28 PROCEDURE — 97110 THERAPEUTIC EXERCISES: CPT | Performed by: PHYSICAL THERAPIST

## 2025-04-28 NOTE — PROGRESS NOTES
"Daily Note     Today's date: 2025  Patient name: Bianka Boyle  : 1955  MRN: [id number]  Referring provider: Luis Alfredo Fiore*  Dx:   Encounter Diagnosis     ICD-10-CM    1. Primary osteoarthritis of left shoulder  M19.012                        Subjective: Patient reports that she feel she is improving, reports min mm soreness after her LV. Reports that she has been working out at they gym.        Objective: See treatment diary below.      Assessment: progressed therex as listed. Pt is able to work to fatigue with most therex. Plan to RA NV and if continued progress consider trial hold of PT to determine effectiveness of hep alone.      Plan: Continue treatment as per PT plan of care. RA NV.        Precautions: L TSA with biceps tenodesis 25, protocol on chart      Manuals 4/3 4/7 4/10 4/14 4/17 4/21 4/24 4/28  3/27 4/1   prom JLW JLW kl JLW KSG JLW JLW kl  JLW JLW                                             Neuro Re-Ed                                                                                                                Ther Ex              pendulums                            pulleys 5\"x20 5\"x20 5\"x20 5\"x20 5\" x30 5\"x30 5\"x30 5\"x20  5\"x20 5\"x20   table slides              Scap retraction              isometrics              AAROM cane scaption with eccentric lowering              rows black  30 black  30 [place] to fatigue start at 21.5 matheus  21.5  30 [place] 21.5 x30 matheus  22.2  30 matheus  22.7  30 24.0 x30  black  30 black  30   low rows black  30 black  30 [place]  to fatigue start at 16 black  30 [place] x30 [place]  13.7  30 [place]  14.6  30 21.0 x20  blue  30 black  30   tricep ext black  30 black  30 [place] 16.0 x30 matheus  17.6  30 [place]  17.6  x30 [place]  17.7  30 [place]  18.2  30 19.0 30  black  30 black  30   bicep curl 6#  30 7#  3x10 7#x30 8#  30 8# x30 8#  30 9#  3x10 9#x30  6#  30 6#  30   sidelying er 4#  3x10 4#  3x10 4#3x  10 4#  3x10 4# 3x10 4#  3x10 4#  3x10 4# " x30  4#  2x10 4#  3x10   squigz 10 10  2#  10 2# x10 2#  10 2#  10   10 10   Chest press grey bar 30 grey bar and 5# cuff weight  30 8# cuff weight 2x20       grey bar 30 grey bar 40   Prone t 30  30   30 x30      30  30   Prone rows 9#  30  10#   30 10#x30  10#   30 10# x30  10#   30  10#   30 10# x30   8#   30  8#   30   active scaption 2#  2x10  2#   3x10      2#   2x10 2#x20   2#   2x10  2#   2x10   tband diagonal red  15 red   2x10 [place] d1   .9 3x10 flexion  D2 ext 4.1 3x10  D2 flex   green   30   D1 ext   2.7    30 D2 flex [id number]  D1 Ext 2.7 x30   D2 flex   green   30   D1 ext   2.7    30 D2 flex   green   30   D1 ext   3.7    30 D2flex [place] 1.4 x20 d1 ext 3.5 x20    red   15   Wall ball rolling   Green ed ball cw/ccw 20x3ea  red ball   cw/ccw  30 ea Red ball cw/ccw x30 ea red ball   cw/ccw  30 ea red ball   cw/ccw  30 ea 30/30 cw/ccw red ball      Prone shoulder ER        3x10      Wall slides        Red tb 3x10      Ther Activity                                          Gait Training                                          Modalities              CP prn

## 2025-04-29 ENCOUNTER — OFFICE VISIT (OUTPATIENT)
Dept: AUDIOLOGY | Age: 70
End: 2025-04-29

## 2025-04-29 DIAGNOSIS — H90.3 SENSORY HEARING LOSS, BILATERAL: Primary | ICD-10-CM

## 2025-04-29 NOTE — PROGRESS NOTES
Hearing Aid Fitting    Name:  Bianka Boyle  :  1955  Age:  69 y.o.  MRN:  [id number]  Date of Evaluation: 25     HISTORY:    Bianka Boyle was seen today for a binaural hearing aid fitting of her [place] REAL 1 Mini [facility]  hearing aid(s). Bianka was unaccompanied to today's visit. Hearing aid purchase is being paid by private Pay.    DEVICE INFORMATION:     Left Device Right Device   Hearing Aid Make: Oticon  Oticon    Hearing Aid Model: REAL 1 Mini [facility] REAL 1 Mini [facility]   Serial Number: [id number]   Repair Warranty Date: 5/15/28 5/15/28   Loss/Damage Warranty Status: Active  Active        Length/Output    Wax System: Pro Wax Pro Wax   Dome Size/Style: - -   Battery: Lithium-ion Rechargeable Lithium-ion Rechargeable      [employer]armold Serial Number: [id number]   Earmold Warranty Date:  28    Serial Number:  [id number]    Warranty Date:  5/15/28     Accessories: N/A     DEVICE SETTINGS:    Hearing aid(s) were programmed using VAC+ fitting formula and were adjusted based on the patient's perceived comfort level. Hearing aid(s) was set to experience level 3  per patient's subjective listening preference. The patient noted good sound quality, and was happy with the overall sound quality and fit of the hearing aid(s).    DEVICE ORIENTATION:    The patient was counseled on device components and component function. Proper insertion and removal of the aid(s) was demonstrated. The patient practiced insertion and removal of the devices in the office, they demonstrated excellent ability to manipulate the hearing aids. The patient  was given the devices users manual that reviews aid usage and operation, hearing aid cleaning tools, and hearing aid carrying case.     The hearing aid warranty, including unlimited repair and a one time loss and damage per hearing aid, through the , as well as [facility] hearing aid service plan, including unlimited office  visits, and supplies were outlined thoroughly. The patient agreed to the terms of sale listed on the purchase agreement containing device specifications, warranties, pricing information, as well as [facility]'s 45-day trial period timeline. After this period has elapsed, hearing aids cannot be returned.      DEVICE EXPECTATIONS & USAGE:     Hearing aids are assistive devices and are not designed to restore normal hearing sensitivity. The importance of realistic expectations, especially in the presence of background noise, was emphasized. The need for daily, consistent usage (8-12 hours per day) for proper device adjustment, as well as the importance of self-advocacy and practicing effective communication strategies was outlined.     Effective communication strategies include:  1.) Maintaining face-to-face communication, allowing for speechreading of facial expressions, lips, and gestures.  2.) Reducing background noise and distance between communication partners.  3.) Having communication partners reduce their rate of speech when appropriate.  4.) Beginning conversation by getting communication partner's attention.  5.) Asking for rephrasing of missed aspects of conversation rather than asking for repetition.    RECOMMENDATIONS:  The patient demonstrated understanding of all the topics discussed. The patientis to follow-up in 2-3 weeks for a hearing aid check within the trial period as scheduled.     Lindsay Diehl, CCC-A  Clinical Audiologist   [facility]  [address] [place] [id number]

## 2025-05-01 ENCOUNTER — EVALUATION (OUTPATIENT)
Dept: PHYSICAL THERAPY | Facility: CLINIC | Age: 70
End: 2025-05-01
Attending: ORTHOPAEDIC SURGERY
Payer: MEDICARE

## 2025-05-01 DIAGNOSIS — M19.012 PRIMARY OSTEOARTHRITIS OF LEFT SHOULDER: Primary | ICD-10-CM

## 2025-05-01 PROCEDURE — 97140 MANUAL THERAPY 1/> REGIONS: CPT

## 2025-05-01 PROCEDURE — 97110 THERAPEUTIC EXERCISES: CPT

## 2025-05-01 NOTE — PROGRESS NOTES
PT Re-Evaluation     Today's date: 2025  Patient name: Bianka Boyle  : 1955  MRN: [id number]  Referring provider: Luis Alfredo Fiore*  Dx:   Encounter Diagnosis     ICD-10-CM    1. Primary osteoarthritis of left shoulder  M19.012           Start Time: 08  Stop Time: 931  Total time in clinic (min): 43 minutes    Assessment    Assessment details: Pt is being treated with outpatient PT. She has  made slow gains in rom, strength, pain reduction and functional mobility but continues to have deficits.  She has been provided with and hep and will attempt this over the next 2 weeks. Plan to re-eval at that time to determine need for continued PT vs DC to hep.  Thank you for this referral.              Goals  Short Term Goals:   Independent performance of initial hep-met  Decrease pain 2 points on VAS-met      Long Term Goals:  Performance of IADL's is returned to max level of function-ongoing  Performance in recreational activities is improved to max level of function-not met  Able to reach overhead with min pain-not met    Plan    Planned therapy interventions: manual therapy, massage, strengthening, stretching, therapeutic activities, therapeutic exercise, therapeutic training, home exercise program and IADL retraining    Plan details: F/u in 2 weeks to determine effectiveness of hep alone        Subjective Evaluation    History of Present Illness  Mechanism of injury:  She underwent TSA  with biceps tenodesis on 25 .  Reports that she is progressing well. Reports that she plans to continue with her program at the local gym but has concerns about her ability to continue to progress like she has when attending PT.  Reports continued limitations when reaching overhead and lingering weakness in her shoulder.  Patient Goals  Patient goals for therapy: decreased pain, increased motion, increased strength, independence with ADLs/IADLs and return to sport/leisure activities    Pain  At best pain rating:  0  At worst pain ratin          Objective    L shoulder    AROM:    Flexion:110    Shoulder shrug noted with active elevation       PROM:   Flexion: 1145   Abduction:130   ER:  52   IR: to body    Strength:    Flexion 4-/5   Abduction:  3+/5    ER:    4-/5   IR:     4/5    Stiffness reported with end range motion in all planes

## 2025-05-01 NOTE — PROGRESS NOTES
"Daily Note     Today's date: 2025  Patient name: Bianka Boyle  : 1955  MRN: [id number]  Referring provider: Luis Alfredo Fiore*  Dx:   Encounter Diagnosis     ICD-10-CM    1. Primary osteoarthritis of left shoulder  M19.012           Start Time: 0848  Stop Time: 931  Total time in clinic (min): 43 minutes      Subjective: Patient reports her left shoulder is feeling good.  Patient reports compliance with the HEP.      Objective: See treatment diary below.      Assessment: Patient is doing well with therapeutic exercise program.  Left shoulder ROM and strength improving.  Patient would benefit from continued compliance with the HEP over the next 3 weeks.      Plan: Patient will be traveling. Next PT appointment scheduled on 25.       Precautions: L TSA with biceps tenodesis 25, protocol on chart      Manuals 4/3 4/7 4/10 4/14 4/17 4/21 4/24 4/28 5/1    prom JLW JLW kl JLW KSG JLW JLW kl JLW                                           Neuro Re-Ed                                                                                                        Ther Ex             pendulums                          pulleys 5\"x20 5\"x20 5\"x20 5\"x20 5\" x30 5\"x30 5\"x30 5\"x20 5\"x20    table slides             Scap retraction             isometrics             AAROM cane scaption with eccentric lowering             rows black  30 black  30 Matheus to fatigue start at 21.5 matheus  21.5  30 Matheus 21.5 x30 matheus  22.2  30 matheus  22.7  30 24.0 x30 matheus  24.5  30    low rows black  30 black  30 [place]  to fatigue start at 16 black  30 Black x30 matheus  13.7  30 matheus  14.6  30 21.0 x20 matheus  19.9  30    tricep ext black  30 black  30 [place] 16.0 x30 matheus  17.6  30 [place]  17.6  x30 matheus  17.7  30 matheus  18.2  30 19.0 30 matheus  19.9  30    bicep curl 6#  30 7#  3x10 7#x30 8#  30 8# x30 8#  30 9#  3x10 9#x30 9#  30    sidelying er 4#  3x10 4#  3x10 4#3x  10 4#  3x10 4# 3x10 4#  3x10 4#  3x10 4# x30 4#  30  "   squigz 10 10  2#  10 2# x10 2#  10 2#  10      Chest press grey bar 30 grey bar and 5# cuff weight  30 8# cuff weight 2x20          Prone t 30  30   30 x30        Prone rows 9#  30  10#   30 10#x30  10#   30 10# x30  10#   30  10#   30 10# x30  10#   30    active scaption 2#  2x10  2#   3x10      2#   2x10 2#x20  2#   20    tband diagonal red  15 red   2x10 [place] d1   .9 3x10 flexion  D2 ext 4.1 3x10  D2 flex   green   30   D1 ext   2.7    30 D2 flex [id number]  D1 Ext 2.7 x30   D2 flex   green   30   D1 ext   2.7    30 D2 flex   green   30   D1 ext   3.7    30 D2flex braden 1.4 x20 d1 ext 3.5 x20 D2 flex   1.3   30   D1 ext   3.9    30    Wall ball rolling   Green ed ball cw/ccw 20x3ea  red ball   cw/ccw  30 ea Red ball cw/ccw x30 ea red ball   cw/ccw  30 ea red ball   cw/ccw  30 ea 30/30 cw/ccw red ball     Prone shoulder ER        3x10  3x10    Wall slides        Red tb 3x10                               Ther Activity                                       Gait Training                                       Modalities             CP prn

## 2025-05-02 ENCOUNTER — APPOINTMENT (OUTPATIENT)
Dept: LAB | Facility: CLINIC | Age: 70
End: 2025-05-02
Payer: MEDICARE

## 2025-05-02 ENCOUNTER — OFFICE VISIT (OUTPATIENT)
Dept: AUDIOLOGY | Age: 70
End: 2025-05-02
Payer: MEDICARE

## 2025-05-02 DIAGNOSIS — E78.49 OTHER HYPERLIPIDEMIA: ICD-10-CM

## 2025-05-02 DIAGNOSIS — H90.3 SENSORY HEARING LOSS, BILATERAL: Primary | ICD-10-CM

## 2025-05-02 DIAGNOSIS — L40.59 POLYARTICULAR PSORIATIC ARTHRITIS (HCC): ICD-10-CM

## 2025-05-02 DIAGNOSIS — Z79.899 ENCOUNTER FOR LONG-TERM (CURRENT) USE OF MEDICATIONS: ICD-10-CM

## 2025-05-02 LAB
ALBUMIN SERPL BCG-MCNC: 4 G/DL (ref 3.5–5)
ALP SERPL-CCNC: 59 U/L (ref 34–104)
ALT SERPL W P-5'-P-CCNC: 14 U/L (ref 7–52)
ANION GAP SERPL CALCULATED.3IONS-SCNC: 9 MMOL/L (ref 4–13)
AST SERPL W P-5'-P-CCNC: 21 U/L (ref 13–39)
BASOPHILS # BLD AUTO: 0.04 THOUSANDS/ÂΜL (ref 0–0.1)
BASOPHILS NFR BLD AUTO: 1 % (ref 0–1)
BILIRUB SERPL-MCNC: 0.49 MG/DL (ref 0.2–1)
BUN SERPL-MCNC: 14 MG/DL (ref 5–25)
CALCIUM SERPL-MCNC: 9.1 MG/DL (ref 8.4–10.2)
CHLORIDE SERPL-SCNC: 105 MMOL/L (ref 96–108)
CHOLEST SERPL-MCNC: 170 MG/DL (ref ?–200)
CO2 SERPL-SCNC: 26 MMOL/L (ref 21–32)
CREAT SERPL-MCNC: 0.79 MG/DL (ref 0.6–1.3)
CRP SERPL QL: 1.7 MG/L
EOSINOPHIL # BLD AUTO: 0 THOUSAND/ÂΜL (ref 0–0.61)
EOSINOPHIL NFR BLD AUTO: 0 % (ref 0–6)
ERYTHROCYTE [DISTWIDTH] IN BLOOD BY AUTOMATED COUNT: 12.8 % (ref 11.6–15.1)
ERYTHROCYTE [SEDIMENTATION RATE] IN BLOOD: 12 MM/HOUR (ref 0–29)
GFR SERPL CREATININE-BSD FRML MDRD: 76 ML/MIN/1.73SQ M
GLUCOSE P FAST SERPL-MCNC: 86 MG/DL (ref 65–99)
HCT VFR BLD AUTO: 43.2 % (ref 34.8–46.1)
HDLC SERPL-MCNC: 73 MG/DL
HGB BLD-MCNC: 13.8 G/DL (ref 11.5–15.4)
IMM GRANULOCYTES # BLD AUTO: 0.02 THOUSAND/UL (ref 0–0.2)
IMM GRANULOCYTES NFR BLD AUTO: 0 % (ref 0–2)
LDLC SERPL CALC-MCNC: 87 MG/DL (ref 0–100)
LYMPHOCYTES # BLD AUTO: 1.72 THOUSANDS/ÂΜL (ref 0.6–4.47)
LYMPHOCYTES NFR BLD AUTO: 32 % (ref 14–44)
MCH RBC QN AUTO: 31.7 PG (ref 26.8–34.3)
MCHC RBC AUTO-ENTMCNC: 31.9 G/DL (ref 31.4–37.4)
MCV RBC AUTO: 99 FL (ref 82–98)
MONOCYTES # BLD AUTO: 0.65 THOUSAND/ÂΜL (ref 0.17–1.22)
MONOCYTES NFR BLD AUTO: 12 % (ref 4–12)
NEUTROPHILS # BLD AUTO: 3 THOUSANDS/ÂΜL (ref 1.85–7.62)
NEUTS SEG NFR BLD AUTO: 55 % (ref 43–75)
NRBC BLD AUTO-RTO: 0 /100 WBCS
PLATELET # BLD AUTO: 271 THOUSANDS/UL (ref 149–390)
PMV BLD AUTO: 9.9 FL (ref 8.9–12.7)
POTASSIUM SERPL-SCNC: 4.1 MMOL/L (ref 3.5–5.3)
PROT SERPL-MCNC: 6.4 G/DL (ref 6.4–8.4)
RBC # BLD AUTO: 4.36 MILLION/UL (ref 3.81–5.12)
SODIUM SERPL-SCNC: 140 MMOL/L (ref 135–147)
TRIGL SERPL-MCNC: 51 MG/DL (ref ?–150)
WBC # BLD AUTO: 5.43 THOUSAND/UL (ref 4.31–10.16)

## 2025-05-02 PROCEDURE — 85025 COMPLETE CBC W/AUTO DIFF WBC: CPT

## 2025-05-02 PROCEDURE — 80061 LIPID PANEL: CPT

## 2025-05-02 PROCEDURE — 80053 COMPREHEN METABOLIC PANEL: CPT

## 2025-05-02 PROCEDURE — 86140 C-REACTIVE PROTEIN: CPT

## 2025-05-02 PROCEDURE — 85652 RBC SED RATE AUTOMATED: CPT

## 2025-05-02 PROCEDURE — 36415 COLL VENOUS BLD VENIPUNCTURE: CPT

## 2025-05-02 NOTE — PROGRESS NOTES
Hearing Aid Visit:    Name:  Bianka Boyle  :  1955  Age:  69 y.o.  MRN:  [id number]  Date of Evaluation: 25     HISTORY:    Bianka Boyle was seen today (2025) for a(n) in-warranty hearing aid check of her bilateral hearing aids. Today, Bianka noted she feels occluded with the new earmolds.    Most recent Audiogram: 25    DEVICE INFORMATION:       Left Device Right Device   Hearing Aid Make: [employer]  [employer]    Hearing Aid Model: REAL 1 Mini RITE-R REAL 1 Mini RITE-R   Serial Number: [id number]   Repair Warranty Date: 5/15/28 5/15/28   Loss/Damage Warranty Status: Active  Active         Length/Output    Wax System: Pro Wax Pro Wax   Dome Size/Style: - -   Battery: Lithium-ion Rechargeable Lithium-ion Rechargeable       Earmold Serial Number: [id number]   Earmold Warranty Date:  28    Serial Number:  [id number]    Warranty Date:  5/15/28     Accessories: N/A       ACTION/ADJUSTMENTS:    Ordering new earmolds.    RECOMMENDATIONS:     Follow up when earmolds arrive.      Lindsay Diehl, CCC-A  Clinical Audiologist  [facility]  [facility] [place] [id number]

## 2025-05-05 ENCOUNTER — RESULTS FOLLOW-UP (OUTPATIENT)
Dept: INTERNAL MEDICINE CLINIC | Facility: CLINIC | Age: 70
End: 2025-05-05

## 2025-05-13 ENCOUNTER — OFFICE VISIT (OUTPATIENT)
Dept: AUDIOLOGY | Age: 70
End: 2025-05-13

## 2025-05-13 DIAGNOSIS — H90.3 SENSORY HEARING LOSS, BILATERAL: Primary | ICD-10-CM

## 2025-05-15 DIAGNOSIS — L40.9 PSORIASIS: ICD-10-CM

## 2025-05-16 NOTE — TELEPHONE ENCOUNTER
Pt was seen 01/15/25    BODY  For flares on body, apply Calcipotriene 0.005% BID M-F, then use Clobetasol BID Sat, Sun. Educated on side effects of steroids and that steroid should NOT be used on face, groin, armpits.  SCALP   Apply Clobeatsol solution on scalp

## 2025-05-19 DIAGNOSIS — E78.49 OTHER HYPERLIPIDEMIA: ICD-10-CM

## 2025-05-19 RX ORDER — CLOBETASOL PROPIONATE 0.5 MG/G
CREAM TOPICAL
Qty: 60 G | Refills: 0 | Status: SHIPPED | OUTPATIENT
Start: 2025-05-19

## 2025-05-19 RX ORDER — ROSUVASTATIN CALCIUM 5 MG/1
5 TABLET, COATED ORAL DAILY
Qty: 90 TABLET | Refills: 1 | Status: SHIPPED | OUTPATIENT
Start: 2025-05-19

## 2025-05-23 ENCOUNTER — EVALUATION (OUTPATIENT)
Dept: PHYSICAL THERAPY | Facility: CLINIC | Age: 70
End: 2025-05-23
Attending: ORTHOPAEDIC SURGERY
Payer: MEDICARE

## 2025-05-23 DIAGNOSIS — M19.012 PRIMARY OSTEOARTHRITIS OF LEFT SHOULDER: Primary | ICD-10-CM

## 2025-05-23 PROCEDURE — 97140 MANUAL THERAPY 1/> REGIONS: CPT | Performed by: PHYSICAL THERAPIST

## 2025-05-23 PROCEDURE — 97110 THERAPEUTIC EXERCISES: CPT | Performed by: PHYSICAL THERAPIST

## 2025-05-23 NOTE — PROGRESS NOTES
PT Discharge    Today's date: 2025  Patient name: Bianka Boyle  : 1955  MRN: [id number]  Referring provider: Luis Alfredo Fiore*  Dx:   Encounter Diagnosis     ICD-10-CM    1. Primary osteoarthritis of left shoulder  M19.012                      Assessment    Assessment details: Pt has achieved a majority of her goals set at the start of therapy, is independent with her hep, and will be Dc'd at this time. She is instructed to call the clinic if she has question with her hep or if symptoms return.  Thank you for this referral.              Goals  Short Term Goals:   Independent performance of initial hep-met  Decrease pain 2 points on VAS-met      Long Term Goals:  Performance of IADL's is returned to max level of function-ongoing  Performance in recreational activities is improved to max level of function-partially met  Able to reach overhead with min pain- met    Plan    Planned therapy interventions: manual therapy, massage, strengthening, stretching, therapeutic activities, therapeutic exercise, therapeutic training, home exercise program and IADL retraining    Plan details: DC to HEP        Subjective Evaluation    History of Present Illness  Mechanism of injury:  She underwent TSA  with biceps tenodesis on 25 she has been on a trial hold from PT. Pt reports compliance with her hep during this time and feels she is progressing. Reports that she has not returned to tennis but that it mostly because of lingering knee pain that it being tx by ortho. Reports that she has started to return to working out at they local gym.    Patient Goals  Patient goals for therapy: decreased pain, increased motion, increased strength, independence with ADLs/IADLs and return to sport/leisure activities    Pain  At best pain ratin  At worst pain ratin          Objective    L shoulder    AROM:    Flexion:125 shoulder shrug noted at end range    Shoulder shrug noted with active  "elevation       PROM:   Flexion: 160   Abduction:150   ER:  65   IR: to body    Strength:    Flexion 4+/5   Abduction:  4/5    ER:    4/5   IR:     5-/5    Stiffness reported with end range motion in all planes          Manuals 4/3 4/7 4/10 4/14 4/17 4/21 4/24 4/28 5/1 5/23   prom JLW JLW kl JLW KSG JLW JLW kl JLW kl                                          Neuro Re-Ed                                                                                                        Ther Ex             pendulums                          pulleys 5\"x20 5\"x20 5\"x20 5\"x20 5\" x30 5\"x30 5\"x30 5\"x20 5\"x20    table slides             Scap retraction             isometrics             AAROM cane scaption with eccentric lowering             rows black  30 black  30 Matheus to fatigue start at 21.5 matheus  21.5  30 Matheus 21.5 x30 matheus  22.2  30 matheus  22.7  30 24.0 x30 matheus  24.5  30    low rows black  30 black  30 Matheus  to fatigue start at 16 black  30 Black x30 matheus  13.7  30 matheus  14.6  30 21.0 x20 matheus  19.9  30    tricep ext black  30 black  30 Matheus 16.0 x30 matheus  17.6  30 Matheus  17.6  x30 matheus  17.7  30 matheus  18.2  30 19.0 30 matheus  19.9  30    bicep curl 6#  30 7#  3x10 7#x30 8#  30 8# x30 8#  30 9#  3x10 9#x30 9#  30    sidelying er 4#  3x10 4#  3x10 4#3x  10 4#  3x10 4# 3x10 4#  3x10 4#  3x10 4# x30 4#  30    squigz 10 10  2#  10 2# x10 2#  10 2#  10      Chest press grey bar 30 grey bar and 5# cuff weight  30 8# cuff weight 2x20          Prone t 30  30   30 x30        Prone rows 9#  30  10#   30 10#x30  10#   30 10# x30  10#   30  10#   30 10# x30  10#   30    active scaption 2#  2x10  2#   3x10      2#   2x10 2#x20  2#   20    tband diagonal red  15 red   2x10 [place] d1   .9 3x10 flexion  D2 ext 4.1 3x10  D2 flex   green   30   D1 ext   2.7    30 D2 flex [id number]  D1 Ext 2.7 x30   D2 flex   green   30   D1 ext   2.7    30 D2 flex   green   30   D1 ext   3.7    30 D2flex matheus 1.4 x20 d1 ext 3.5 x20 D2 " flex   1.3   30   D1 ext   3.9    30    Wall ball rolling   Green ed ball cw/ccw 20x3ea  red ball   cw/ccw  30 ea Red ball cw/ccw x30 ea red ball   cw/ccw  30 ea red ball   cw/ccw  30 ea 30/30 cw/ccw red ball     Prone shoulder ER        3x10  3x10    Wall slides        Red tb 3x10     Reviewed HEP          25'                Ther Activity                                       Gait Training                                       Modalities             CP prn

## 2025-05-27 ENCOUNTER — OFFICE VISIT (OUTPATIENT)
Dept: AUDIOLOGY | Age: 70
End: 2025-05-27

## 2025-05-27 DIAGNOSIS — H90.3 SENSORY HEARING LOSS, BILATERAL: Primary | ICD-10-CM

## 2025-05-27 NOTE — PROGRESS NOTES
Hearing Aid Visit:    Name:  Bianka Boyle  :  1955  Age:  69 y.o.  MRN:  [id number]  Date of Evaluation: 25     HISTORY:    Bianka Boyle was seen today (2025) for a(n) in-warranty hearing aid check of her bilateral hearing aids. Today, Bianka is here because she is unhappy with her molds and is having difficulty in background noise. She stopped wearing her new aids because she cannot tolerate the sounds of her chewing and swallowing with the molds.       DEVICE INFORMATION:         Left Device Right Device   Hearing Aid Make: [employer]  [employer]    Hearing Aid Model: REAL 1 Mini RITE-R REAL 1 Mini RITE-R   Serial Number: [id number]   Repair Warranty Date: 5/15/28 5/15/28   Loss/Damage Warranty Status: Active  Active         Length/Output 2100 2100   Wax System: Pro Wax Pro Wax   Dome Size/Style: - -   Battery: Lithium-ion Rechargeable Lithium-ion Rechargeable       Earmold Serial Number: [id number]   Earmold Warranty Date:  28    Serial Number:  [id number]    Warranty Date:  5/15/28     Accessories: N/A       ACTION/ADJUSTMENTS:    Changed patient to 85 dB receivers with power domes. Ordeered 2-100 receivers to have in stock for when she returns.    Maximized noise reduction settings.  Slightly decreased soft sounds and increased moderate sounds as patient reports she is hearing people far away better than those close by her.    RECOMMENDATIONS:     Return in one week to see Andressa regarding whether she is perceiving benefit from the devices.      Radames Joshi., CCC-A  Clinical Audiologist  [facility]  [address] [place] [address]  [place] [id number]

## 2025-06-04 ENCOUNTER — OFFICE VISIT (OUTPATIENT)
Dept: AUDIOLOGY | Age: 70
End: 2025-06-04

## 2025-06-04 DIAGNOSIS — H90.3 SENSORY HEARING LOSS, BILATERAL: Primary | ICD-10-CM

## 2025-06-04 NOTE — PROGRESS NOTES
Hearing Aid Visit:    Name:  Bianka Boyle  :  1955  Age:  69 y.o.  MRN:  [id number]  Date of Evaluation: 25     HISTORY:    Bianka Boyle was seen today (2025) for a(n) in-warranty hearing aid check of her bilateral hearing aids. Today, Bianka was seen to  new power speakers.    Most recent Audiogram: 25    DEVICE INFORMATION:       Left Device Right Device   Hearing Aid Make: [employer]  Ot[employer]obe    Hearing Aid Model: REAL 1 Mini RITE-R REAL 1 Mini RITE-R   Serial Number: [id number]   Repair Warranty Date: 5/15/28 5/15/28   Loss/Damage Warranty Status: Active  Active         Length/Output    Wax System: Pro Wax Pro Wax   Dome Size/Style: - -   Battery: Lithium-ion Rechargeable Lithium-ion Rechargeable       Earmold Serial Number: [id number]   Earmold Warranty Date:  28    Serial Number:  [id number]    Warranty Date:  5/15/28     Accessories: N/A       ACTION/ADJUSTMENTS:    Added new power speakers bilaterally. Added 8DB domes. Patient noted good sound quality and mild occlusion when talking.    RECOMMENDATIONS:     Follow up in one week.       Lindsay Diehl, CCC-A  Clinical Audiologist  [facility]  [address] [place] [address]  [place] [phone]

## 2025-06-11 ENCOUNTER — OFFICE VISIT (OUTPATIENT)
Dept: AUDIOLOGY | Age: 70
End: 2025-06-11

## 2025-06-11 DIAGNOSIS — H90.3 SENSORY HEARING LOSS, BILATERAL: Primary | ICD-10-CM

## 2025-06-11 NOTE — PROGRESS NOTES
Hearing Aid Visit:    Name:  Bianka Boyle  :  1955  Age:  69 y.o.  MRN:  [id number]  Date of Evaluation: 25     HISTORY:    Bianka Boyle was seen today (2025) for a(n) in-warranty hearing aid check of her bilateral hearing aids. Today, Bianka noted some issues with Bluetooth connection to her phone.    Most recent Audiogram: 25    DEVICE INFORMATION:       Left Device Right Device   Hearing Aid Make: [employer]  [employer]    Hearing Aid Model: REAL 1 Mini RITE-R REAL 1 Mini RITE-R   Serial Number: [id number]   Repair Warranty Date: 5/15/28 5/15/28   Loss/Damage Warranty Status: Active  Active         Length/Output    Wax System: Pro Wax Pro Wax   Dome Size/Style: - -   Battery: Lithium-ion Rechargeable Lithium-ion Rechargeable       Earmold Serial Number: [id number]   Earmold Warranty Date:  28    Serial Number:  [id number]    Warranty Date:  5/15/28     Accessories: N/A       ACTION/ADJUSTMENTS:    Decreased right hearing aid gain by one click. Patient noted good, balance sound quality.     RECOMMENDATIONS:     Follow up PRN.       Lindsay Diehl, CCC-A  Clinical Audiologist  [facility]UD[facility]  [facility] [address]  [place] [address]

## 2025-06-23 DIAGNOSIS — L40.59 POLYARTICULAR PSORIATIC ARTHRITIS (HCC): ICD-10-CM

## 2025-06-24 RX ORDER — SULFASALAZINE 500 MG/1
500 TABLET, DELAYED RELEASE ORAL 2 TIMES DAILY
Qty: 180 TABLET | Refills: 1 | Status: SHIPPED | OUTPATIENT
Start: 2025-06-24

## 2025-07-02 ENCOUNTER — OFFICE VISIT (OUTPATIENT)
Age: 70
End: 2025-07-02
Payer: MEDICARE

## 2025-07-02 VITALS
TEMPERATURE: 96.6 F | WEIGHT: 134.4 LBS | BODY MASS INDEX: 22.39 KG/M2 | DIASTOLIC BLOOD PRESSURE: 72 MMHG | HEART RATE: 79 BPM | HEIGHT: 65 IN | SYSTOLIC BLOOD PRESSURE: 112 MMHG | OXYGEN SATURATION: 97 %

## 2025-07-02 DIAGNOSIS — M85.832 OSTEOPENIA OF LEFT FOREARM: ICD-10-CM

## 2025-07-02 DIAGNOSIS — Z79.899 ENCOUNTER FOR LONG-TERM (CURRENT) USE OF MEDICATIONS: ICD-10-CM

## 2025-07-02 DIAGNOSIS — L40.59 POLYARTICULAR PSORIATIC ARTHRITIS (HCC): Primary | ICD-10-CM

## 2025-07-02 PROCEDURE — 99214 OFFICE O/P EST MOD 30 MIN: CPT | Performed by: PHYSICIAN ASSISTANT

## 2025-07-02 NOTE — ASSESSMENT & PLAN NOTE
History of osteopenia with low FRAX risk.  She is up-to-date with her DEXA scan will be due for an updated DEXA in May 2026.  She will continue to monitor her bone density every 2 years.

## 2025-07-02 NOTE — PROGRESS NOTES
Name: Bianka Boyle      : 1955      MRN: [id number]  Encounter Provider: Jennifer Ramirez PA-C  Encounter Date: 2025   Encounter department: [facility] RHEUMATOLOGY [facility]  :  Assessment & Plan  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HCC)  Her psoriatic arthritis symptoms are well-controlled sulfasalazine.  No signs of active inflammation or synovitis on exam today.  We will continue to monitor her response to treatment.  Labs as ordered monitor for medication side effects and toxicity.  Follow-up in 6 months or sooner if needed.    Orders:    CBC and differential; Future    Comprehensive metabolic panel; Future    Sedimentation rate, automated; Future    C-reactive protein; Future    Osteopenia of left forearm  History of osteopenia with low FRAX risk.  She is up-to-date with her DEXA scan will be due for an updated DEXA in May 2026.  She will continue to monitor her bone density every 2 years.       Encounter for long-term (current) use of medications    Orders:    CBC and differential; Future    Comprehensive metabolic panel; Future    Sedimentation rate, automated; Future    C-reactive protein; Future        History of Present Illness   Bianka Boyle is a 69 y.o. female.  She is here for follow-up of her psoriatic arthritis.  She was initially diagnosed in  and started on sulfasalazine.  She is currently taking 500 mg twice daily.  Her psoriatic arthritis symptoms are well-controlled.  She has no swelling in her joints.  She has no signs of dactylitis or enthesitis.  Her psoriasis is generally quiescent.  She does occasionally have some dry patches on her arms and legs and uses topical agents for this.     She has a history of osteoarthritis as well.  She follows with orthopedics for OA in her right knee.  She had a right total knee replacement done on 2024.  She has also been following with orthopedics for osteoarthritis in her left shoulder.  She had a left total  "shoulder replacement in January 2025.  She has a history of lumbar degenerative disc disease.  She gets occasional lower back pain.     She has a history of osteopenia.  She had a DEXA scan done on 5/20/2024.  Lumbar spine T-score (L1, L2) +1.4.  This is artificially elevated due to spondylosis and scoliosis.  Left total hip T-score -0.3.  Left femoral neck T-score 0.  Left forearm T-score -1.5.  FRAX hip fracture risk 0.3%, major fracture risk 6.5%.    She had labs done on 5/2/2025.  CBC, CMP essentially unremarkable.  ESR, CRP within normal limits.          Review of Systems   Constitutional:  Negative for chills, fatigue and fever.   HENT:  Negative for hearing loss and sore throat.    Eyes:  Negative for pain and visual disturbance.   Respiratory:  Negative for cough and shortness of breath.    Cardiovascular:  Negative for chest pain and palpitations.   Gastrointestinal:  Negative for abdominal pain, nausea and vomiting.   Genitourinary:  Negative for difficulty urinating.   Musculoskeletal:  Positive for arthralgias and back pain (occasional). Negative for gait problem, joint swelling, myalgias, neck pain and neck stiffness.   Skin:  Negative for rash.   Neurological:  Negative for dizziness, seizures, weakness, numbness and headaches.   Psychiatric/Behavioral:  Negative for confusion, decreased concentration and sleep disturbance.      Medications Ordered Prior to Encounter[1]      Objective   /72   Pulse 79   Temp (!) 96.6 °F (35.9 °C)   Ht 5' 5.25\" (1.657 m)   Wt 61 kg (134 lb 6.4 oz)   SpO2 97%   BMI 22.19 kg/m²      Physical Exam  Constitutional:       Appearance: Normal appearance.     Cardiovascular:      Rate and Rhythm: Normal rate and regular rhythm.   Pulmonary:      Breath sounds: Normal breath sounds.     Musculoskeletal:      Comments:  Full range of motion neck.  Limited abduction right greater than left shoulder.  Full range of motion bilateral elbows, wrists.  Hypermobility noted " bilateral elbows.  Interphalangeal OA changes, squaring 1st CMC joints, Heberden's nodes, Ravindra's nodes bilateral hands.  No synovitis noted.  Full range of motion bilateral hips, knees, ankles.  Patellofemoral crepitus noted bilateral knees.  Hyperpronation, slight hammertoe deformities, hallux valgus deformities bilateral feet.  No dactylitis noted.      Skin:     General: Skin is warm and dry.     Neurological:      General: No focal deficit present.      Mental Status: She is alert.           Dragon Dictation software was used to dictate this note. It may contain errors with dictating incorrect words/spelling. Please contact provider directly for any questions.         [1]   Current Outpatient Medications on File Prior to Visit   Medication Sig Dispense Refill    amoxicillin (AMOXIL) 500 mg capsule       Ascorbic Acid (VITAMIN C PO)       calcipotriene (DOVONOX) 0.005 % ointment Apply twice a day for effected areas of psoriasis Monday through Friday. 120 g 1    Calcium Carb-Cholecalciferol (CALCIUM + D3 PO) Take by mouth in the morning.      Cholecalciferol (VITAMIN D3 PO)       clobetasol (TEMOVATE) 0.05 % cream APPLY TWICE A DAY AS NEEDED ON WEEKENDS ONLY. 60 g 0    clobetasol (TEMOVATE) 0.05 % external solution Apply daily to the scalp when psoriasis is flaring only. 50 mL 1    Cyanocobalamin (B-12) 1000 MCG TABS       glucosamine-chondroitin 500-400 MG tablet Take 1 tablet by mouth in the morning and 1 tablet in the evening and 1 tablet before bedtime.      levothyroxine 50 mcg tablet TAKE 1 TABLET BY MOUTH EVERY DAY 90 tablet 1    Omega-3 Fatty Acids (FISH OIL PO)       rosuvastatin (CRESTOR) 5 mg tablet TAKE 1 TABLET (5 MG TOTAL) BY MOUTH DAILY. 90 tablet 1    sulfaSALAzine (AZULFIDINE-EN) 500 mg EC tablet TAKE 1 TABLET BY MOUTH TWICE A  tablet 1    celecoxib (CeleBREX) 200 mg capsule Take 200 mg by mouth 2 (two) times a day      ciclopirox (LOPROX) 0.77 % SUSP Apply twice daily to affected  toenails 60 mL 3    oxyCODONE (Roxicodone) 5 immediate release tablet Take 1 tablet (5 mg total) by mouth every 4 (four) hours as needed for moderate pain for up to 15 doses Max Daily Amount: 30 mg 15 tablet 0     No current facility-administered medications on file prior to visit.

## 2025-07-14 ENCOUNTER — TELEPHONE (OUTPATIENT)
Age: 70
End: 2025-07-14

## 2025-07-14 NOTE — TELEPHONE ENCOUNTER
Patient called in to check on status if she has ever had a tetanus vaccine. Upon review of the patients chart there is no record of her ever having one done. Patient stated her daughter is pregnant and would like to get a tdap vaccine.     Please place order in chart if acceptable for patient to have prior to scheduling as the access center cannot schedule a nurse visit without the order being placed first by the Primary Care Provider. Thank you.

## 2025-07-21 ENCOUNTER — CLINICAL SUPPORT (OUTPATIENT)
Dept: INTERNAL MEDICINE CLINIC | Facility: CLINIC | Age: 70
End: 2025-07-21
Payer: MEDICARE

## 2025-07-21 DIAGNOSIS — Z23 ENCOUNTER FOR IMMUNIZATION: Primary | ICD-10-CM

## 2025-07-21 DIAGNOSIS — Z23 NEED FOR TDAP VACCINATION: ICD-10-CM

## 2025-07-21 PROCEDURE — 90471 IMMUNIZATION ADMIN: CPT

## 2025-07-21 PROCEDURE — 90715 TDAP VACCINE 7 YRS/> IM: CPT

## 2025-07-26 DIAGNOSIS — E03.9 ACQUIRED HYPOTHYROIDISM: ICD-10-CM

## 2025-07-28 RX ORDER — LEVOTHYROXINE SODIUM 50 UG/1
50 TABLET ORAL DAILY
Qty: 90 TABLET | Refills: 1 | Status: SHIPPED | OUTPATIENT
Start: 2025-07-28

## (undated) DEVICE — SUT 2 ORTHOCORD 223114

## (undated) DEVICE — GLOVE INDICATOR PI UNDERGLOVE SZ 7.5 BLUE

## (undated) DEVICE — DRAPE EQUIPMENT RF WAND

## (undated) DEVICE — BETHLEHEM TOTAL HIP, KIT: Brand: CARDINAL HEALTH

## (undated) DEVICE — PLUMEPEN PRO 10FT

## (undated) DEVICE — SUT 2 ORTHOCORD MO7

## (undated) DEVICE — 60 ML SYRINGE,REGULAR TIP: Brand: MONOJECT

## (undated) DEVICE — GLOVE SRG BIOGEL 8

## (undated) DEVICE — U-DRAPE: Brand: CONVERTORS

## (undated) DEVICE — SAW BLADE OSCILLATING BRAZOL 167

## (undated) DEVICE — DRESSING MEPILEX AG BORDER 4 X 8 IN

## (undated) DEVICE — SPONGE PVP SCRUB WING STERILE

## (undated) DEVICE — SUT 2 ORTHOCORD 36 IN W/O NEEDLES

## (undated) DEVICE — THE SIMPULSE SOLO SYSTEM WITH ULTREX RETRACTABLE SPLASH SHIELD TIP: Brand: SIMPULSE SOLO

## (undated) DEVICE — 3M™ IOBAN™ 2 ANTIMICROBIAL INCISE DRAPE 6650EZ: Brand: IOBAN™ 2

## (undated) DEVICE — DUAL CUT SAGITTAL BLADE

## (undated) DEVICE — SUT VICRYL PLUS 2-0 CTB-1 27 IN VCPB259H

## (undated) DEVICE — HOOD: Brand: T7PLUS

## (undated) DEVICE — CAPIT ASCEND FLEX TSA W PERFORM

## (undated) DEVICE — WET SKIN PREP TRAY: Brand: MEDLINE INDUSTRIES, INC.

## (undated) DEVICE — WORKING LENGTH 235CM, WORKING CHANNEL 2.8MM: Brand: RESOLUTION 360 CLIP

## (undated) DEVICE — SUT VICRYL 2-0 CT-1 27 IN J259H

## (undated) DEVICE — GLOVE SRG BIOGEL 7.5

## (undated) DEVICE — PROXIMATE PLUS MD MULTI-DIRECTIONAL RELEASE SKIN STAPLERS CONTAINS 35 STAINLESS STEEL STAPLES APPROXIMATE CLOSED DIMENSIONS: 6.9MM X 3.9MM WIDE: Brand: PROXIMATE

## (undated) DEVICE — INTENT TO BE USED WITH SUTURE MATERIAL FOR TISSUE CLOSURE: Brand: RICHARD-ALLAN®  NEEDLE 1/2 CIRCLE REVERSE CUTTING

## (undated) DEVICE — DRESSING MEPILEX AG BORDER POST-OP 4 X 8 IN

## (undated) DEVICE — HEWSON SUTURE RETRIEVER: Brand: HEWSON SUTURE RETRIEVER

## (undated) DEVICE — PENCIL ELECTROSURG E-Z CLEAN -0035H

## (undated) DEVICE — KIT STABILIZATION SHOULDER MARCO

## (undated) DEVICE — HOOD: Brand: FLYTE, SURGICOOL

## (undated) DEVICE — SUT VICRYL PLUS 0 CTB-1 27 IN VCPB260H

## (undated) DEVICE — CAPIT SHLDR TOTAL SIMPLICITI

## (undated) DEVICE — AEQUALIS PERFORM GUIDE WIRE 2.5 X 220MM

## (undated) DEVICE — HOOD WITH PEEL AWAY FACE SHIELD: Brand: T7PLUS

## (undated) DEVICE — HANDPIECE SET WITH RETRACTABLE COAXIAL FAN SPRAY TIP AND SUCTION TUBE: Brand: INTERPULSE

## (undated) DEVICE — CHLORAPREP HI-LITE 26ML ORANGE

## (undated) DEVICE — IMPERVIOUS STOCKINETTE: Brand: DEROYAL

## (undated) DEVICE — ARTHROSCOPY FLOOR MAT

## (undated) DEVICE — CAPIT UPCHARGE PERFORM PLUS GLENOID

## (undated) DEVICE — GUIDE PIN 3 X 75MM STRL

## (undated) DEVICE — PIN

## (undated) DEVICE — INTENDED FOR TISSUE SEPARATION, AND OTHER PROCEDURES THAT REQUIRE A SHARP SURGICAL BLADE TO PUNCTURE OR CUT.: Brand: BARD-PARKER SAFETY BLADES SIZE 10, STERILE

## (undated) DEVICE — SPINNING CEMENT MIXING BOWL

## (undated) DEVICE — SHOULDER STABILIZATION KIT,                                    DISPOSABLE 12 PER BOX

## (undated) DEVICE — PACK MAJOR ORTHO W/SPLITS PBDS

## (undated) DEVICE — GLOVE INDICATOR PI UNDERGLOVE SZ 8 BLUE

## (undated) DEVICE — STIRRUP STRAP ADULT DISP

## (undated) DEVICE — GLOVE SRG BIOGEL ECLIPSE 7

## (undated) DEVICE — HEAVY DUTY TABLE COVER: Brand: CONVERTORS

## (undated) DEVICE — ASTOUND STANDARD SURGICAL GOWN, XL: Brand: CONVERTORS